# Patient Record
Sex: FEMALE | Race: WHITE | NOT HISPANIC OR LATINO | Employment: OTHER | URBAN - METROPOLITAN AREA
[De-identification: names, ages, dates, MRNs, and addresses within clinical notes are randomized per-mention and may not be internally consistent; named-entity substitution may affect disease eponyms.]

---

## 2017-01-10 ENCOUNTER — LAB (OUTPATIENT)
Dept: INTERNAL MEDICINE | Facility: CLINIC | Age: 67
End: 2017-01-10

## 2017-01-10 DIAGNOSIS — Z00.00 HEALTH CARE MAINTENANCE: ICD-10-CM

## 2017-01-10 DIAGNOSIS — D72.810 LYMPHOCYTOPENIA: ICD-10-CM

## 2017-01-10 LAB
BASOPHILS # BLD AUTO: 0.03 10*3/MM3 (ref 0–0.2)
BASOPHILS NFR BLD AUTO: 0.6 % (ref 0–1)
DEPRECATED RDW RBC AUTO: 45.2 FL (ref 37–54)
EOSINOPHIL # BLD AUTO: 0.16 10*3/MM3 (ref 0.1–0.3)
EOSINOPHIL NFR BLD AUTO: 3.1 % (ref 0–3)
ERYTHROCYTE [DISTWIDTH] IN BLOOD BY AUTOMATED COUNT: 13.9 % (ref 11.3–14.5)
HCT VFR BLD AUTO: 37.6 % (ref 34.5–44)
HCV AB SER DONR QL: NORMAL
HGB BLD-MCNC: 12.2 G/DL (ref 11.5–15.5)
IMM GRANULOCYTES # BLD: 0.01 10*3/MM3 (ref 0–0.03)
IMM GRANULOCYTES NFR BLD: 0.2 % (ref 0–0.6)
LYMPHOCYTES # BLD AUTO: 0.79 10*3/MM3 (ref 0.6–4.8)
LYMPHOCYTES NFR BLD AUTO: 15.3 % (ref 24–44)
MCH RBC QN AUTO: 28.7 PG (ref 27–31)
MCHC RBC AUTO-ENTMCNC: 32.4 G/DL (ref 32–36)
MCV RBC AUTO: 88.5 FL (ref 80–99)
MONOCYTES # BLD AUTO: 0.33 10*3/MM3 (ref 0–1)
MONOCYTES NFR BLD AUTO: 6.4 % (ref 0–12)
NEUTROPHILS # BLD AUTO: 3.85 10*3/MM3 (ref 1.5–8.3)
NEUTROPHILS NFR BLD AUTO: 74.4 % (ref 41–71)
PLATELET # BLD AUTO: 276 10*3/MM3 (ref 150–450)
PMV BLD AUTO: 9.4 FL (ref 6–12)
RBC # BLD AUTO: 4.25 10*6/MM3 (ref 3.89–5.14)
WBC NRBC COR # BLD: 5.17 10*3/MM3 (ref 3.5–10.8)

## 2017-01-10 PROCEDURE — 86803 HEPATITIS C AB TEST: CPT | Performed by: PHYSICIAN ASSISTANT

## 2017-01-10 PROCEDURE — 85025 COMPLETE CBC W/AUTO DIFF WBC: CPT | Performed by: PHYSICIAN ASSISTANT

## 2017-01-11 LAB — VZV IGG SER IA-ACNC: 1630 INDEX

## 2017-01-16 ENCOUNTER — TELEPHONE (OUTPATIENT)
Dept: INTERNAL MEDICINE | Facility: CLINIC | Age: 67
End: 2017-01-16

## 2017-01-16 NOTE — TELEPHONE ENCOUNTER
Pt called requesting recent labs results. Read pt her lab letter and informed her she should be receiving it in the mail soon. Pt verbalized understanding and had no further questions.

## 2017-02-02 ENCOUNTER — OFFICE VISIT (OUTPATIENT)
Dept: INTERNAL MEDICINE | Facility: CLINIC | Age: 67
End: 2017-02-02

## 2017-02-02 VITALS
SYSTOLIC BLOOD PRESSURE: 126 MMHG | HEIGHT: 67 IN | TEMPERATURE: 98.5 F | OXYGEN SATURATION: 98 % | WEIGHT: 150 LBS | HEART RATE: 87 BPM | DIASTOLIC BLOOD PRESSURE: 64 MMHG | BODY MASS INDEX: 23.54 KG/M2

## 2017-02-02 DIAGNOSIS — J06.9 ACUTE URI: Primary | ICD-10-CM

## 2017-02-02 PROCEDURE — 99213 OFFICE O/P EST LOW 20 MIN: CPT | Performed by: PHYSICIAN ASSISTANT

## 2017-02-02 RX ORDER — AZITHROMYCIN 250 MG/1
TABLET, FILM COATED ORAL
Qty: 6 TABLET | Refills: 0 | Status: SHIPPED | OUTPATIENT
Start: 2017-02-02 | End: 2017-03-14

## 2017-02-02 NOTE — PROGRESS NOTES
Chief Complaint   Patient presents with   • Nasal pressure, congestion, drainage, headaches x5 days       Subjective   Annalise Winters is a 66 y.o. female.       History of Present Illness     Pt has had nasal congestion, drainage, ear fullness. NOticed some bilateral maxillary sinus pressure yesterday. Some yellow mucus yesterday. Low grade fever 2 days ago, none since. Has been around the public a lot, no know sick contacts.    Current Outpatient Prescriptions:   •  amLODIPine (NORVASC) 2.5 MG tablet, Take 2.5 mg by mouth Daily. Takes .25 of tablet daily.  Pt is taking half a tablet daily., Disp: , Rfl:   •  Cholecalciferol (VITAMIN D) 2000 UNITS capsule, Take 1 capsule by mouth daily., Disp: , Rfl:   •  flecainide (TAMBOCOR) 50 MG tablet, TAKE ONE TABLET BY MOUTH UP TO  TWICE A DAY AS NEEDED FOR AFIB (ATRIAL FIBRILLATION), Disp: 60 tablet, Rfl: 3  •  levothyroxine (SYNTHROID, LEVOTHROID) 50 MCG tablet, Take one tablet by mouth daily., Disp: 30 tablet, Rfl: 2  •  Loratadine (CLARITIN PO), Take 1 tablet by mouth Daily., Disp: , Rfl:   •  LORazepam (ATIVAN) 0.5 MG tablet, Take 1 tablet by mouth Daily As Needed for anxiety., Disp: 30 tablet, Rfl: 2  •  montelukast (SINGULAIR) 10 MG tablet, Take 1 tablet by mouth Every Night., Disp: 30 tablet, Rfl: 2  •  Multiple Vitamins-Minerals (MULTI FOR HER) capsule, Take 1 capsule by mouth daily., Disp: , Rfl:   •  pantoprazole (PROTONIX) 40 MG EC tablet, 40 mg Daily., Disp: , Rfl:   •  azithromycin (ZITHROMAX Z-HAMIDA) 250 MG tablet, Take 2 tablets the first day, then 1 tablet daily for 4 days., Disp: 6 tablet, Rfl: 0     PMFSH  The following portions of the patient's history were reviewed and updated as appropriate: allergies, current medications, past family history, past medical history, past social history, past surgical history and problem list.    Review of Systems   Constitutional: Positive for fatigue. Negative for activity change and unexpected weight change.   HENT:  "Positive for congestion, postnasal drip and sore throat. Negative for ear pain.    Eyes: Negative for pain and discharge.   Respiratory: Positive for cough. Negative for chest tightness, shortness of breath and wheezing.    Cardiovascular: Negative for chest pain and palpitations.   Gastrointestinal: Negative for abdominal pain, diarrhea and vomiting.   Endocrine: Negative.    Genitourinary: Negative.    Musculoskeletal: Negative for joint swelling.   Skin: Negative for color change, rash and wound.   Allergic/Immunologic: Negative.    Neurological: Negative for seizures and syncope.   Psychiatric/Behavioral: Negative.        Objective   Visit Vitals   • /64   • Pulse 87   • Temp 98.5 °F (36.9 °C)   • Ht 67\" (170.2 cm)   • Wt 150 lb (68 kg)   • SpO2 98%   • BMI 23.49 kg/m2       Physical Exam   Constitutional: She is oriented to person, place, and time. She appears well-developed and well-nourished.  Non-toxic appearance. No distress.   HENT:   Head: Normocephalic and atraumatic. Hair is normal.   Right Ear: External ear normal. No drainage, swelling or tenderness. Tympanic membrane is retracted.   Left Ear: External ear normal. No drainage, swelling or tenderness. Tympanic membrane is retracted.   Nose: Mucosal edema present. No epistaxis.   Mouth/Throat: Uvula is midline and mucous membranes are normal. No oral lesions. No uvula swelling. Posterior oropharyngeal erythema present. No oropharyngeal exudate.   Eyes: Conjunctivae and EOM are normal. Pupils are equal, round, and reactive to light. Right eye exhibits no discharge. Left eye exhibits no discharge. No scleral icterus.   Neck: Normal range of motion. Neck supple.   Cardiovascular: Normal rate, regular rhythm and normal heart sounds.  Exam reveals no gallop.    No murmur heard.  Pulmonary/Chest: Breath sounds normal. No stridor. No respiratory distress. She has no wheezes. She has no rales. She exhibits no tenderness.   Abdominal: Soft. There is no " tenderness.   Lymphadenopathy:     She has cervical adenopathy.   Neurological: She is alert and oriented to person, place, and time. She exhibits normal muscle tone.   Skin: Skin is warm and dry. No rash noted. She is not diaphoretic.   Psychiatric: She has a normal mood and affect. Her behavior is normal. Judgment and thought content normal.   Nursing note and vitals reviewed.           ASSESSMENT/PLAN    Problem List Items Addressed This Visit        Respiratory    Acute URI - Primary    Relevant Medications    azithromycin (ZITHROMAX Z-HAMIDA) 250 MG tablet        Use OTC symptomatic care, increase rest and fluids. If worsening or no improvement, take z-pack as directed.         Return if symptoms worsen or fail to improve.

## 2017-02-14 RX ORDER — AMLODIPINE BESYLATE 2.5 MG/1
TABLET ORAL
Qty: 90 TABLET | Refills: 1 | Status: SHIPPED | OUTPATIENT
Start: 2017-02-14 | End: 2017-08-07 | Stop reason: SDUPTHER

## 2017-02-28 ENCOUNTER — TELEPHONE (OUTPATIENT)
Dept: INTERNAL MEDICINE | Facility: CLINIC | Age: 67
End: 2017-02-28

## 2017-03-01 NOTE — TELEPHONE ENCOUNTER
Saw Emi for an acute visit, hence needs a 6 month follow up.  Please call in 30 days , no refills, needs follow up for future refills.    thanks

## 2017-03-03 DIAGNOSIS — F41.9 ANXIETY: ICD-10-CM

## 2017-03-09 DIAGNOSIS — F41.9 ANXIETY: ICD-10-CM

## 2017-03-09 RX ORDER — LORAZEPAM 0.5 MG/1
0.5 TABLET ORAL DAILY PRN
Qty: 10 TABLET | Refills: 0 | OUTPATIENT
Start: 2017-03-09 | End: 2017-03-14 | Stop reason: SDUPTHER

## 2017-03-14 ENCOUNTER — OFFICE VISIT (OUTPATIENT)
Dept: INTERNAL MEDICINE | Facility: CLINIC | Age: 67
End: 2017-03-14

## 2017-03-14 ENCOUNTER — OFFICE VISIT (OUTPATIENT)
Dept: CARDIOLOGY | Facility: CLINIC | Age: 67
End: 2017-03-14

## 2017-03-14 VITALS
HEIGHT: 68 IN | SYSTOLIC BLOOD PRESSURE: 128 MMHG | BODY MASS INDEX: 22.88 KG/M2 | DIASTOLIC BLOOD PRESSURE: 76 MMHG | HEART RATE: 65 BPM | OXYGEN SATURATION: 98 % | WEIGHT: 151 LBS

## 2017-03-14 VITALS
DIASTOLIC BLOOD PRESSURE: 80 MMHG | SYSTOLIC BLOOD PRESSURE: 150 MMHG | HEIGHT: 68 IN | BODY MASS INDEX: 22.88 KG/M2 | WEIGHT: 151 LBS | HEART RATE: 76 BPM

## 2017-03-14 DIAGNOSIS — F41.9 ANXIETY: Primary | ICD-10-CM

## 2017-03-14 DIAGNOSIS — I48.19 PERSISTENT ATRIAL FIBRILLATION (HCC): Primary | ICD-10-CM

## 2017-03-14 DIAGNOSIS — I10 ESSENTIAL HYPERTENSION: ICD-10-CM

## 2017-03-14 PROCEDURE — 99213 OFFICE O/P EST LOW 20 MIN: CPT | Performed by: INTERNAL MEDICINE

## 2017-03-14 PROCEDURE — 99213 OFFICE O/P EST LOW 20 MIN: CPT | Performed by: NURSE PRACTITIONER

## 2017-03-14 RX ORDER — KETOROLAC TROMETHAMINE 10 MG/1
10 TABLET, FILM COATED ORAL 2 TIMES DAILY PRN
Qty: 10 TABLET | Refills: 0 | Status: SHIPPED | OUTPATIENT
Start: 2017-03-14 | End: 2017-03-14

## 2017-03-14 RX ORDER — HYDROCODONE BITARTRATE AND ACETAMINOPHEN 5; 325 MG/1; MG/1
1 TABLET ORAL EVERY 6 HOURS PRN
Qty: 30 TABLET | Refills: 0 | Status: SHIPPED | OUTPATIENT
Start: 2017-03-14 | End: 2017-03-14

## 2017-03-14 RX ORDER — LORAZEPAM 0.5 MG/1
0.5 TABLET ORAL DAILY PRN
Qty: 30 TABLET | Refills: 3 | Status: SHIPPED | OUTPATIENT
Start: 2017-03-14 | End: 2017-07-17 | Stop reason: SDUPTHER

## 2017-03-14 RX ORDER — LIDOCAINE 50 MG/G
1 PATCH TOPICAL EVERY 24 HOURS
Qty: 30 PATCH | Refills: 1 | Status: SHIPPED | OUTPATIENT
Start: 2017-03-14 | End: 2017-03-14

## 2017-03-14 NOTE — ASSESSMENT & PLAN NOTE
· Continue amlodipine 2.5 mg daily  · Monitor BP for the next month and contact us if SBP less than 100 or greater than 145.  · Recommended a sleep study and patient will contact us should she wish to proceed

## 2017-03-14 NOTE — PROGRESS NOTES
Acute URI and Med Refill    Subjective   Annalise Winters is a 66 y.o. female is here today for follow-up.    History of Present Illness   Annalise is s/p a Cardiology eval, and BP was elevated. Was suggested thyroid and anemia eval for her fatigue.  I adv. Recent labs were negative for thyroid dz or anemia.  She is not on the Flecainide on a routine basis.  She in needing a refill on the ativan.  Issues with Arthritis, and unable to take steroid shots and is concerned , if needs to see rheumatologist.  Having occ. parestheisas in her hands and feet.  HA in her neck and   She stays active as a .  Also was suggested sleep study.    Current Outpatient Prescriptions:   •  amLODIPine (NORVASC) 2.5 MG tablet, TAKE ONE TABLET BY MOUTH DAILY, Disp: 90 tablet, Rfl: 1  •  Cholecalciferol (VITAMIN D) 2000 UNITS capsule, Take 1 capsule by mouth daily., Disp: , Rfl:   •  flecainide (TAMBOCOR) 50 MG tablet, TAKE ONE TABLET BY MOUTH UP TO  TWICE A DAY AS NEEDED FOR AFIB (ATRIAL FIBRILLATION), Disp: 60 tablet, Rfl: 3  •  levothyroxine (SYNTHROID, LEVOTHROID) 50 MCG tablet, Take one tablet by mouth daily., Disp: 30 tablet, Rfl: 2  •  Loratadine (CLARITIN PO), Take 1 tablet by mouth Daily., Disp: , Rfl:   •  LORazepam (ATIVAN) 0.5 MG tablet, Take 1 tablet by mouth Daily As Needed for Anxiety., Disp: 30 tablet, Rfl: 3  •  montelukast (SINGULAIR) 10 MG tablet, Take 1 tablet by mouth Every Night., Disp: 30 tablet, Rfl: 2  •  Multiple Vitamins-Minerals (MULTI FOR HER) capsule, Take 1 capsule by mouth daily., Disp: , Rfl:   •  pantoprazole (PROTONIX) 40 MG EC tablet, 40 mg Daily., Disp: , Rfl:       The following portions of the patient's history were reviewed and updated as appropriate: allergies, current medications, past family history, past medical history, past social history, past surgical history and problem list.    Review of Systems   Constitutional: Positive for fatigue. Negative for chills and fever.   HENT:  "Negative for ear discharge, ear pain, sinus pressure and sore throat.    Respiratory: Negative for cough, chest tightness and shortness of breath.    Cardiovascular: Negative for chest pain, palpitations and leg swelling.   Gastrointestinal: Negative for diarrhea, nausea and vomiting.   Musculoskeletal: Positive for arthralgias and myalgias. Negative for back pain.   Neurological: Negative for dizziness, syncope and headaches.   Psychiatric/Behavioral: Negative for confusion and sleep disturbance.       Objective   Visit Vitals   • /76   • Pulse 65   • Ht 68\" (172.7 cm)   • Wt 151 lb (68.5 kg)   • SpO2 98%   • BMI 22.96 kg/m2     Physical Exam   Constitutional: She is oriented to person, place, and time. She appears well-developed and well-nourished.   HENT:   Head: Normocephalic and atraumatic.   Mouth/Throat: No oropharyngeal exudate.   Eyes: Conjunctivae are normal. Pupils are equal, round, and reactive to light.   Neck: Neck supple.   Cardiovascular: Normal rate and regular rhythm.    Pulmonary/Chest: Effort normal and breath sounds normal.   Abdominal: Soft. Bowel sounds are normal. She exhibits no distension. There is no tenderness.   Musculoskeletal: She exhibits tenderness (multiple joints). She exhibits no edema.   Neurological: She is alert and oriented to person, place, and time. No cranial nerve deficit.   Skin: Skin is warm and dry.   Psychiatric: She has a normal mood and affect. Judgment normal.   Nursing note and vitals reviewed.        Results for orders placed or performed in visit on 01/10/17   Hepatitis C Antibody   Result Value Ref Range    Hepatitis C Ab Non-Reactive Non-Reactive   Varicella Zoster Antibody, IgG   Result Value Ref Range    Varicella IgG 1630 Immune >165 index   CBC Auto Differential   Result Value Ref Range    WBC 5.17 3.50 - 10.80 10*3/mm3    RBC 4.25 3.89 - 5.14 10*6/mm3    Hemoglobin 12.2 11.5 - 15.5 g/dL    Hematocrit 37.6 34.5 - 44.0 %    MCV 88.5 80.0 - 99.0 fL    " MCH 28.7 27.0 - 31.0 pg    MCHC 32.4 32.0 - 36.0 g/dL    RDW 13.9 11.3 - 14.5 %    RDW-SD 45.2 37.0 - 54.0 fl    MPV 9.4 6.0 - 12.0 fL    Platelets 276 150 - 450 10*3/mm3    Neutrophil % 74.4 (H) 41.0 - 71.0 %    Lymphocyte % 15.3 (L) 24.0 - 44.0 %    Monocyte % 6.4 0.0 - 12.0 %    Eosinophil % 3.1 (H) 0.0 - 3.0 %    Basophil % 0.6 0.0 - 1.0 %    Immature Grans % 0.2 0.0 - 0.6 %    Neutrophils, Absolute 3.85 1.50 - 8.30 10*3/mm3    Lymphocytes, Absolute 0.79 0.60 - 4.80 10*3/mm3    Monocytes, Absolute 0.33 0.00 - 1.00 10*3/mm3    Eosinophils, Absolute 0.16 0.10 - 0.30 10*3/mm3    Basophils, Absolute 0.03 0.00 - 0.20 10*3/mm3    Immature Grans, Absolute 0.01 0.00 - 0.03 10*3/mm3             Assessment/Plan   Diagnoses and all orders for this visit:    Anxiety  -     LORazepam (ATIVAN) 0.5 MG tablet; Take 1 tablet by mouth Daily As Needed for Anxiety.    Other orders  -     Discontinue: HYDROcodone-acetaminophen (NORCO) 5-325 MG per tablet; Take 1 tablet by mouth Every 6 (Six) Hours As Needed for Moderate Pain (4-6).  -     Discontinue: ketorolac (TORADOL) 10 MG tablet; Take 1 tablet by mouth 2 (Two) Times a Day As Needed for Moderate Pain (4-6).  -     Discontinue: lidocaine (LIDODERM) 5 %; Place 1 patch on the skin Daily. Remove & Discard patch within 12 hours      BP better. Adv. Arthralgias could be from sleep apnea, and sleep study a good option. She will call back if wants to proceed.           Return in about 4 months (around 7/14/2017) for Next scheduled follow up.

## 2017-03-14 NOTE — PROGRESS NOTES
Encounter Date:03/14/2017    Patient ID: Annalise Winters is a 66 y.o. female who resides in [place], [place].    CC/Reason for visit:  Follow-up (palpitations)          Annalise Winters returns today as a follow up for persistent atrial fibrillation and hypertension. Since last visit, patient has been doing fairly well.  She has only used her flecainide one time several months ago for what she thought could've been a brief episode of atrial fibrillation.  She followed up with Dr. Lackey last October who made no changes to her medication regimen.  She denies any signs or symptoms of a TIA and/or stroke.  She specifically denies visual disturbances, one-sided weakness, slurred speech or facial drooping.  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  She is able to be active without any limitations or difficulties.  She specifically denies chest pain, dyspnea, orthopnea, palpitations or syncope.  She has been concerned about her blood pressure.  It is slightly elevated at today's visit with a systolic of 150.  She takes 2.5 mg amlodipine daily.  She monitors her blood pressure most days in the afternoon and notices it will get as low as 110 but in the earlier part of the day her systolic blood pressure will be in the 150s.  Yesterday afternoon in particular her blood pressure was 105/66.  She is not symptomatic during these times other than feeling more tired than usual.  She has most of her energy in the early part of the day and notices by the afternoon she will feel very fatigued.  This has been going on for quite some time.  She has an appointment with her primary care provider today and is hoping they can see if she has any vitamin deficiencies or if her thyroid levels are within normal limits.  She has never been tested for sleep apnea.    Review of Systems   Constitution: Negative for weakness and malaise/fatigue.   Eyes:  "Negative for vision loss in left eye and vision loss in right eye.   Cardiovascular: Negative for chest pain, dyspnea on exertion, near-syncope, orthopnea, palpitations, paroxysmal nocturnal dyspnea and syncope.   Musculoskeletal: Negative for myalgias.   Neurological: Negative for brief paralysis, excessive daytime sleepiness, focal weakness, numbness and paresthesias.   All other systems reviewed and are negative.      The patient's past medical, social, and family history reviewed in the patient's electronic medical record.    Allergies  Ace inhibitors; Celexa [citalopram hydrobromide]; Paxil [paroxetine hcl]; and Corticosteroids    Outpatient Prescriptions Marked as Taking for the 3/14/17 encounter (Office Visit) with TAMMY Aj   Medication Sig Dispense Refill   • amLODIPine (NORVASC) 2.5 MG tablet TAKE ONE TABLET BY MOUTH DAILY 90 tablet 1   • Cholecalciferol (VITAMIN D) 2000 UNITS capsule Take 1 capsule by mouth daily.     • flecainide (TAMBOCOR) 50 MG tablet TAKE ONE TABLET BY MOUTH UP TO  TWICE A DAY AS NEEDED FOR AFIB (ATRIAL FIBRILLATION) 60 tablet 3   • levothyroxine (SYNTHROID, LEVOTHROID) 50 MCG tablet Take one tablet by mouth daily. 30 tablet 2   • Loratadine (CLARITIN PO) Take 1 tablet by mouth Daily.     • LORazepam (ATIVAN) 0.5 MG tablet Take 1 tablet by mouth Daily As Needed for Anxiety. 10 tablet 0   • montelukast (SINGULAIR) 10 MG tablet Take 1 tablet by mouth Every Night. 30 tablet 2   • Multiple Vitamins-Minerals (MULTI FOR HER) capsule Take 1 capsule by mouth daily.     • pantoprazole (PROTONIX) 40 MG EC tablet 40 mg Daily.           Blood pressure 150/80, pulse 76, height 68\" (172.7 cm), weight 151 lb (68.5 kg).  Body mass index is 22.96 kg/(m^2).    Physical Exam   Constitutional: She is oriented to person, place, and time. She appears well-developed and well-nourished.   HENT:   Head: Normocephalic and atraumatic.   Eyes: Pupils are equal, round, and reactive to light. No " scleral icterus.   Neck: No JVD present. Carotid bruit is not present. No thyromegaly present.   Cardiovascular: Normal rate, regular rhythm, S1 normal and S2 normal.  Exam reveals no gallop.    No murmur heard.  Pulmonary/Chest: Effort normal and breath sounds normal.   Abdominal: Soft. There is no tenderness.   Neurological: She is alert and oriented to person, place, and time.   Skin: Skin is warm and dry. No cyanosis. Nails show no clubbing.   Psychiatric: She has a normal mood and affect. Her behavior is normal.       Data Review:   Procedures         Problem List Items Addressed This Visit        Cardiovascular and Mediastinum    Persistent atrial fibrillation - Primary    Overview     · Initially diagnosed in 2012.  · Myocardial perfusion study (04/02/2013):  No ischemia.  · Event monitor (10/2013): demonstrating paroxysmal atrial fibrillation.  · Failure of flecainide, sotalol and Multaq therapy.  · Pulmonary vein ablation by Dr. Abdirahman Lackey, 08/21/2013.  · Chads Vasc=3 no anticoagulation needed due to successful PVA           Current Assessment & Plan     · Continue flecainide 50 mg BID when necessary for episodes of atrial fibrillation         Essential hypertension    Current Assessment & Plan     · Continue amlodipine 2.5 mg daily  · Monitor BP for the next month and contact us if SBP less than 100 or greater than 145.  · Recommended a sleep study and patient will contact us should she wish to proceed             Mrs. Winters is doing well from a cardiovascular standpoint.  I do feel she suffers from some anxiety and is overtaking her blood pressure.  The lowest her blood pressure is been was 105/66 and she was asymptomatic.  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  I did however feel he would benefit for her to follow-up with her primary care provider for blood work to test for anemia,  hypothyroidism or vitamin B-12 deficiency.  We will make no changes to her medication and have her follow-up with us in 12 months.       · Continue current medications  · Follow-up PCP concerning fatigue  · Keep blood pressure log and monitor blood pressure in the afternoons ×1 month.  Contact us if SBP less than 100 or greater than 145  · Recommend a sleep study to rule out obstructive sleep apnea however patient declines and will contact us should she change her mind.  · Follow-up 12 months or sooner if needed      Britta MUNOZ evaluated treated patient independently    TAMMY Santiago  3/14/2017

## 2017-03-15 ENCOUNTER — TELEPHONE (OUTPATIENT)
Dept: INTERNAL MEDICINE | Facility: CLINIC | Age: 67
End: 2017-03-15

## 2017-03-15 NOTE — TELEPHONE ENCOUNTER
Called and notified Natalia that it was an error.  She said they sent a PA request on the lidocaine patch- please disregard.

## 2017-03-15 NOTE — TELEPHONE ENCOUNTER
----- Message from Madison Malave sent at 3/15/2017  8:19 AM EDT -----  Contact: DULCE WITH [facility]  SHE IS CALLING ABOUT A FEW SCRIPTS THAT WAS CALLED IN YESTERDAY. SHE SAID LIDOCAINE 5 PERCENT PATCHES AND KETOROLAC 10 MG WERE E-SCRIBED FROM US TO THEM YESTERDAY AT 4:35 PM. SHE IS NOT SURE IF THESE WERE SENT IN ERROR OR IF PATIENT IS NEEDING THESE MEDICATIONS. PATIENT IS UNAWARE OF THESE MEDICATION BEING PRESCRIBED AS WELL. SHE NEEDS CLARIFICATION IF THESE WERE SUPPOSE TO BE CALLED IN FOR PATIENT. YOU CAN REACH HER [phone]

## 2017-03-27 ENCOUNTER — TELEPHONE (OUTPATIENT)
Dept: CARDIOLOGY | Facility: CLINIC | Age: 67
End: 2017-03-27

## 2017-03-27 RX ORDER — MONTELUKAST SODIUM 10 MG/1
TABLET ORAL
Qty: 30 TABLET | Refills: 5 | Status: SHIPPED | OUTPATIENT
Start: 2017-03-27 | End: 2017-09-25 | Stop reason: SDUPTHER

## 2017-03-28 RX ORDER — LEVOTHYROXINE SODIUM 0.05 MG/1
TABLET ORAL
Qty: 30 TABLET | Refills: 1 | Status: SHIPPED | OUTPATIENT
Start: 2017-03-28 | End: 2017-05-27 | Stop reason: SDUPTHER

## 2017-03-28 NOTE — TELEPHONE ENCOUNTER
Pt wants to know if she can get steroid shot in her foot.  You had told her in past to not get anymore as she had problems after her knee injection.  She was given Kenalog 40mg with 1% lido = 4cc injected for her knee,  for her foot it would be .5cc dexamethasone, .5cc depomedrol ? and .5cc lido.  She states the pain is significant in her foot.

## 2017-03-28 NOTE — TELEPHONE ENCOUNTER
I don't recall telling her that and my notes back to 2014 don't mention anything about it. If she needs the injection this to me would outweigh any possible PVC palpitation issue that might occur. She does have steroids listed as an allergy so she needs to confirm the reaction with her treating doctor.

## 2017-04-03 ENCOUNTER — TELEPHONE (OUTPATIENT)
Dept: INTERNAL MEDICINE | Facility: CLINIC | Age: 67
End: 2017-04-03

## 2017-04-03 NOTE — TELEPHONE ENCOUNTER
Spoke to pt states that [facility] advised her to see GI and GYN ASAP, pt has put calls into both offices, she will call us back if needing seen here.  Pt declined scheduling appt here tbs.

## 2017-04-03 NOTE — TELEPHONE ENCOUNTER
----- Message from Liliam Weber sent at 4/3/2017 12:33 PM EDT -----  Contact: PATIENT   PATIENT WOULD LIKE A RETURN CALL REGARDING EARLIER CALL ABOUT BEING SEEN BY THIS OFFICE. SHE SPOKE WITH  WHO REFERRED HER BACK TO THIS OFFICE.    CALL BACK #[phone]

## 2017-04-25 ENCOUNTER — TELEPHONE (OUTPATIENT)
Dept: CARDIOLOGY | Facility: CLINIC | Age: 67
End: 2017-04-25

## 2017-04-25 NOTE — TELEPHONE ENCOUNTER
Wants to know if ok with you if she takes vitamin B12 5000mcg daily and also valerian root for sleep.

## 2017-05-30 RX ORDER — LEVOTHYROXINE SODIUM 0.05 MG/1
TABLET ORAL
Qty: 30 TABLET | Refills: 0 | Status: SHIPPED | OUTPATIENT
Start: 2017-05-30 | End: 2017-06-24 | Stop reason: SDUPTHER

## 2017-06-26 RX ORDER — LEVOTHYROXINE SODIUM 0.05 MG/1
TABLET ORAL
Qty: 30 TABLET | Refills: 5 | Status: SHIPPED | OUTPATIENT
Start: 2017-06-26 | End: 2017-12-26 | Stop reason: SDUPTHER

## 2017-07-17 ENCOUNTER — OFFICE VISIT (OUTPATIENT)
Dept: INTERNAL MEDICINE | Facility: CLINIC | Age: 67
End: 2017-07-17

## 2017-07-17 VITALS
OXYGEN SATURATION: 98 % | HEART RATE: 74 BPM | SYSTOLIC BLOOD PRESSURE: 148 MMHG | DIASTOLIC BLOOD PRESSURE: 72 MMHG | BODY MASS INDEX: 22.56 KG/M2 | WEIGHT: 148.4 LBS

## 2017-07-17 DIAGNOSIS — E53.8 B12 DEFICIENCY: ICD-10-CM

## 2017-07-17 DIAGNOSIS — I10 ESSENTIAL HYPERTENSION: ICD-10-CM

## 2017-07-17 DIAGNOSIS — R53.83 FATIGUE, UNSPECIFIED TYPE: ICD-10-CM

## 2017-07-17 DIAGNOSIS — E03.9 ACQUIRED HYPOTHYROIDISM: ICD-10-CM

## 2017-07-17 DIAGNOSIS — F41.9 ANXIETY: ICD-10-CM

## 2017-07-17 DIAGNOSIS — R21 RASH: Primary | ICD-10-CM

## 2017-07-17 LAB
DEPRECATED RDW RBC AUTO: 45.1 FL (ref 37–54)
ERYTHROCYTE [DISTWIDTH] IN BLOOD BY AUTOMATED COUNT: 13.9 % (ref 11.3–14.5)
HCT VFR BLD AUTO: 37.5 % (ref 34.5–44)
HGB BLD-MCNC: 12 G/DL (ref 11.5–15.5)
MCH RBC QN AUTO: 28.1 PG (ref 27–31)
MCHC RBC AUTO-ENTMCNC: 32 G/DL (ref 32–36)
MCV RBC AUTO: 87.8 FL (ref 80–99)
PLATELET # BLD AUTO: 271 10*3/MM3 (ref 150–450)
PMV BLD AUTO: 10 FL (ref 6–12)
RBC # BLD AUTO: 4.27 10*6/MM3 (ref 3.89–5.14)
T4 FREE SERPL-MCNC: 1.15 NG/DL (ref 0.89–1.76)
TSH SERPL DL<=0.05 MIU/L-ACNC: 1.13 MIU/ML (ref 0.35–5.35)
VIT B12 BLD-MCNC: 543 PG/ML (ref 211–911)
WBC NRBC COR # BLD: 4.47 10*3/MM3 (ref 3.5–10.8)

## 2017-07-17 PROCEDURE — 99214 OFFICE O/P EST MOD 30 MIN: CPT | Performed by: INTERNAL MEDICINE

## 2017-07-17 PROCEDURE — 86038 ANTINUCLEAR ANTIBODIES: CPT | Performed by: INTERNAL MEDICINE

## 2017-07-17 PROCEDURE — 85027 COMPLETE CBC AUTOMATED: CPT | Performed by: INTERNAL MEDICINE

## 2017-07-17 PROCEDURE — 84443 ASSAY THYROID STIM HORMONE: CPT | Performed by: INTERNAL MEDICINE

## 2017-07-17 PROCEDURE — 84439 ASSAY OF FREE THYROXINE: CPT | Performed by: INTERNAL MEDICINE

## 2017-07-17 PROCEDURE — 82607 VITAMIN B-12: CPT | Performed by: INTERNAL MEDICINE

## 2017-07-17 RX ORDER — PIMECROLIMUS 10 MG/G
CREAM TOPICAL 2 TIMES DAILY
Qty: 30 G | Refills: 0 | Status: SHIPPED | OUTPATIENT
Start: 2017-07-17 | End: 2017-10-04

## 2017-07-17 RX ORDER — LORAZEPAM 0.5 MG/1
0.5 TABLET ORAL DAILY PRN
Qty: 30 TABLET | Refills: 3 | Status: SHIPPED | OUTPATIENT
Start: 2017-07-17 | End: 2018-06-11 | Stop reason: SDUPTHER

## 2017-07-17 NOTE — PROGRESS NOTES
Hypertension; Hypothyroidism; and Rash (on neck)    Subjective   Annalise Winters is a 66 y.o. female is here today for follow-up.    History of Present Illness   STressed due her dog being sick, has to take care of her.Not sleeping well, has been takinhg the valerian root and ativan as needed for sleep.  No energy, B12 supplement has not helped at all, wonders if B12 shots help.  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    Current Outpatient Prescriptions:   •  amLODIPine (NORVASC) 2.5 MG tablet, TAKE ONE TABLET BY MOUTH DAILY, Disp: 90 tablet, Rfl: 1  •  Calcium-Vitamin D-Vitamin K (CALCIUM SOFT CHEWS PO), Take  by mouth., Disp: , Rfl:   •  Cholecalciferol (VITAMIN D) 2000 UNITS capsule, Take 1 capsule by mouth daily., Disp: , Rfl:   •  flecainide (TAMBOCOR) 50 MG tablet, TAKE ONE TABLET BY MOUTH UP TO  TWICE A DAY AS NEEDED FOR AFIB (ATRIAL FIBRILLATION), Disp: 60 tablet, Rfl: 3  •  levothyroxine (SYNTHROID, LEVOTHROID) 50 MCG tablet, TAKE ONE TABLET BY MOUTH DAILY, Disp: 30 tablet, Rfl: 5  •  Loratadine (CLARITIN PO), Take 1 tablet by mouth Daily., Disp: , Rfl:   •  LORazepam (ATIVAN) 0.5 MG tablet, Take 1 tablet by mouth Daily As Needed for Anxiety., Disp: 30 tablet, Rfl: 3  •  montelukast (SINGULAIR) 10 MG tablet, TAKE ONE TABLET BY MOUTH ONCE NIGHTLY, Disp: 30 tablet, Rfl: 5  •  Multiple Vitamins-Minerals (MULTI FOR HER) capsule, Take 1 capsule by mouth daily., Disp: , Rfl:   •  pantoprazole (PROTONIX) 40 MG EC tablet, 40 mg Daily., Disp: , Rfl:   •  VALERIAN PO, Take  by mouth., Disp: , Rfl:   •  pimecrolimus (ELIDEL) 1 % cream, Apply  topically 2 (Two) Times a Day., Disp: 30 g, Rfl: 0      The following portions of the patient's history were reviewed and updated as appropriate: allergies, current medications, past family history, past medical history, past social history, past surgical history and problem list.    Review of Systems   Constitutional: Positive for fatigue. Negative for chills and fever.    HENT: Negative for ear discharge, ear pain, sinus pressure and sore throat.    Respiratory: Negative for cough, chest tightness and shortness of breath.    Cardiovascular: Negative for chest pain, palpitations and leg swelling.   Gastrointestinal: Negative for diarrhea, nausea and vomiting.   Musculoskeletal: Positive for arthralgias. Negative for back pain and myalgias.   Neurological: Negative for dizziness, syncope and headaches.   Psychiatric/Behavioral: Negative for confusion and sleep disturbance.       Objective   /72  Pulse 74  Wt 148 lb 6.4 oz (67.3 kg)  SpO2 98% Comment: ra  BMI 22.56 kg/m2  Physical Exam   Constitutional: She is oriented to person, place, and time. She appears well-developed and well-nourished.   HENT:   Head: Normocephalic and atraumatic.   Mouth/Throat: No oropharyngeal exudate.   Eyes: Conjunctivae are normal. Pupils are equal, round, and reactive to light.   Neck: Neck supple. No thyromegaly present.   Cardiovascular: Normal rate and regular rhythm.  Exam reveals no friction rub.    No murmur heard.  Pulmonary/Chest: Effort normal and breath sounds normal. She has no wheezes. She has no rales.   Abdominal: Soft. Bowel sounds are normal. She exhibits no distension. There is no tenderness.   Musculoskeletal: She exhibits no edema.   Neurological: She is alert and oriented to person, place, and time. No cranial nerve deficit.   Skin: Skin is warm and dry.   Psychiatric: She has a normal mood and affect. Judgment normal.   Nursing note and vitals reviewed.        Results for orders placed or performed during the hospital encounter of 04/03/17   POCT Urinalysis (automated dipstick)   Result Value Ref Range    Color Yellow Yellow, Straw, Dark Yellow, Kimberli    Clarity, UA Clear Clear    Glucose, UA Negative Negative, 1000 mg/dL (3+) mg/dL    Bilirubin Negative Negative    Ketones, UA Negative Negative    Specific Gravity  1.000 (A) 1.005 - 1.030    Blood, UA Negative Negative     pH, Urine 6.0 5.0 - 8.0    Protein, POC Negative Negative mg/dL    Urobilinogen, UA Normal Normal    Leukocytes Negative Negative    Nitrite, UA Negative Negative             Assessment/Plan   Diagnoses and all orders for this visit:    Rash  -     Nuclear Antigen Antibody, IFA  -     pimecrolimus (ELIDEL) 1 % cream; Apply  topically 2 (Two) Times a Day.    Anxiety  -     LORazepam (ATIVAN) 0.5 MG tablet; Take 1 tablet by mouth Daily As Needed for Anxiety.    Fatigue, unspecified type  -     CBC (No Diff)  -     Nuclear Antigen Antibody, IFA    Acquired hypothyroidism  -     TSH  -     T4, Free    Essential hypertension    B12 deficiency  -     Vitamin B12    Other orders  -     VALERIAN PO; Take  by mouth.  -     Calcium-Vitamin D-Vitamin K (CALCIUM SOFT CHEWS PO); Take  by mouth.         The patient has read and signed the [facility] Controlled Substance Contract.  I will continue to see patient for regular follow up appointments.  They are well controlled on their medication.  JASMYN has been reviewed by me and is updated every 3 months. The patient is aware of the potential for addiction and dependence.    Return in about 5 months (around 12/17/2017) for Medicare Wellness.

## 2017-07-19 LAB
ANA SER QL IA: POSITIVE
ANA SPECKLED TITR SER: NORMAL {TITER}
Lab: NORMAL

## 2017-07-25 ENCOUNTER — TELEPHONE (OUTPATIENT)
Dept: INTERNAL MEDICINE | Facility: CLINIC | Age: 67
End: 2017-07-25

## 2017-07-26 ENCOUNTER — TELEPHONE (OUTPATIENT)
Dept: INTERNAL MEDICINE | Facility: CLINIC | Age: 67
End: 2017-07-26

## 2017-07-26 NOTE — TELEPHONE ENCOUNTER
Please let Pt. Know that her thyroid and blood counts and B12 levels are normal. Autoimmune labs , show mild positive test, but they are not significant as the titer has to be > 1: 320, but she is only at 1: 80.  Letter done , in the mail.

## 2017-08-07 RX ORDER — AMLODIPINE BESYLATE 2.5 MG/1
TABLET ORAL
Qty: 90 TABLET | Refills: 3 | Status: SHIPPED | OUTPATIENT
Start: 2017-08-07 | End: 2018-10-09 | Stop reason: SDUPTHER

## 2017-09-26 RX ORDER — MONTELUKAST SODIUM 10 MG/1
TABLET ORAL
Qty: 30 TABLET | Refills: 4 | Status: SHIPPED | OUTPATIENT
Start: 2017-09-26 | End: 2018-03-14 | Stop reason: SDUPTHER

## 2017-10-03 ENCOUNTER — TELEPHONE (OUTPATIENT)
Dept: ENDOCRINOLOGY | Facility: CLINIC | Age: 67
End: 2017-10-03

## 2017-10-03 NOTE — TELEPHONE ENCOUNTER
PT CALLED AND HAS HARD LUMP ON FOREHEAD. SAID DR KU HAS LOOKED AT IT BEFORE IN PST VISITS BUT PT IS STILL CONCERNED.       PT ALSO STILL HAS RASH THAT DR KU TOOK A LOOK AT IN LAST APPOINTMENT. PT HAS BEEN PRESCRIBED CREAM BUT NEVER GOT IT BECAUSE IT WAS TOO EXPENSIVE. IS THERE ANY OTHER CREAM SHE COULD USE OR SHOULD SHE JUST GO TO THE DERMATOLOGIST?

## 2017-10-04 NOTE — TELEPHONE ENCOUNTER
Please check if we have samples of Eucrisia, we can give it to her to use instead.( 2-3 small tubes)  Since she is allergic to steroids, I would rather she try these.  If Eucrisia works, they have a copay card which may help her bring the price down.  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 No need to worry.

## 2017-10-10 ENCOUNTER — OFFICE VISIT (OUTPATIENT)
Dept: CARDIOLOGY | Facility: CLINIC | Age: 67
End: 2017-10-10

## 2017-10-10 VITALS
WEIGHT: 148.2 LBS | SYSTOLIC BLOOD PRESSURE: 136 MMHG | BODY MASS INDEX: 22.46 KG/M2 | HEIGHT: 68 IN | HEART RATE: 73 BPM | DIASTOLIC BLOOD PRESSURE: 82 MMHG

## 2017-10-10 DIAGNOSIS — I10 ESSENTIAL HYPERTENSION: ICD-10-CM

## 2017-10-10 DIAGNOSIS — I48.19 PERSISTENT ATRIAL FIBRILLATION (HCC): Primary | ICD-10-CM

## 2017-10-10 PROCEDURE — 93000 ELECTROCARDIOGRAM COMPLETE: CPT | Performed by: PHYSICIAN ASSISTANT

## 2017-10-10 PROCEDURE — 99214 OFFICE O/P EST MOD 30 MIN: CPT | Performed by: PHYSICIAN ASSISTANT

## 2017-10-10 NOTE — PROGRESS NOTES
Subjective:   Annalise Winters  1950  [phone]      10/10/2017    [facility] CARDIOLOGY    Maryse Jauregui MD  [id number] [id number]        Patient ID: Annalise Winters is a 66 y.o. female.    Chief Complaint: Afib    PROBLEM LIST:  1.  Palpitations/event monitor, 01/23/2015 through 02/21/2015 revealing episodes of occasional PVCs, occasional PACs, and brief atrial tachycardia, but no evidence of atrial fibrillation.    2. Symptomatic paroxysmal atrial fibrillation:  a. Initial diagnosis, 10/05/2012, with failure of sotalol therapy.  b. Echocardiogram, October 2002,  revealing normal LV function.  c. Cardiolite stress test, April 2013, revealing no evidence of ischemia.  d. Event monitor, October 2013, revealing paroxysmal atrial fibrillation.  e. Flecainide therapy initiated, January 2013, with breakthrough episodes  of atrial fibrillation and later switched to Multaq by Dr. Cooper.  Subsequent episodes of breakthrough atrial fibrillation on Multaq therapy.  f. EP study and RFA on pulmonary veins, 08/21/2013.  EF 65%.  Dr. Abdirahman Lackey.  g. Episode of palpitations,  occurring, January 2015, on 2 separate occasions, lasting approximately 3-4 hours that were self-limiting.   3.  Labile hypertension.  4. Anxiety.  5. Hypothyroidism on chronic Synthroid replacement.  6. GERD.  7. Remote surgical history:  a.  Remote tubal ligation.  b. Remote rhinoplasty.  c. Partial hysterectomy.    Allergies   Allergen Reactions   • Ace Inhibitors Cough   • Celexa [Citalopram Hydrobromide] Dizziness   • Paxil [Paroxetine Hcl] Other (See Comments)     somnolence     • Corticosteroids Rash       Current Outpatient Prescriptions:   •  acetaminophen (TYLENOL) 500 MG tablet, Take 500 mg by mouth As Needed., Disp: , Rfl:   •  amLODIPine (NORVASC) 2.5 MG tablet, TAKE ONE TABLET BY MOUTH DAILY (Patient taking differently: TAKE 1/2 TABLET BY MOUTH DAILY), Disp: 90 tablet, Rfl:  3  •  Calcium-Vitamin D-Vitamin K (CALCIUM SOFT CHEWS PO), Take  by mouth., Disp: , Rfl:   •  Cholecalciferol (VITAMIN D) 2000 UNITS capsule, Take 1 capsule by mouth daily., Disp: , Rfl:   •  Crisaborole (EUCRISA) 2 % ointment, Apply 1 application topically 2 (Two) Times a Day., Disp: 2 tube, Rfl: 0  •  famotidine-calcium carb-mag hydroxide (PEPCID COMPLETE) -165 MG chewable tablet, Chew Daily., Disp: , Rfl:   •  flecainide (TAMBOCOR) 50 MG tablet, TAKE ONE TABLET BY MOUTH UP TO  TWICE A DAY AS NEEDED FOR AFIB (ATRIAL FIBRILLATION), Disp: 60 tablet, Rfl: 3  •  Ginger, Zingiber officinalis, (GINGER EXTRACT PO), Take  by mouth Take As Directed., Disp: , Rfl:   •  levothyroxine (SYNTHROID, LEVOTHROID) 50 MCG tablet, TAKE ONE TABLET BY MOUTH DAILY, Disp: 30 tablet, Rfl: 5  •  Loratadine (CLARITIN PO), Take 1 tablet by mouth Daily., Disp: , Rfl:   •  LORazepam (ATIVAN) 0.5 MG tablet, Take 1 tablet by mouth Daily As Needed for Anxiety., Disp: 30 tablet, Rfl: 3  •  montelukast (SINGULAIR) 10 MG tablet, TAKE ONE TABLET BY MOUTH ONCE NIGHTLY, Disp: 30 tablet, Rfl: 4  •  Multiple Vitamins-Minerals (MULTI FOR HER) capsule, Take 1 capsule by mouth daily., Disp: , Rfl:   •  pantoprazole (PROTONIX) 40 MG EC tablet, 40 mg Daily., Disp: , Rfl:   •  VALERIAN PO, Take  by mouth., Disp: , Rfl:     History of Present Illness  The patient presents for follow-up regarding history of Atrial fibrillation and hypertension.  She states this visit with Dr. Lackey she is overall been doing well although she has had still occasional breakthrough episodes of palpitations lasting longest about an hour once every few months.  She's had a remote PVA for A. fib and following the procedure did have a monitor that time revealing occasional atrial tach/PACs and PVCs.  She's been using Tambocor therapy as needed basis during this time he states this works well for her.  She denies any chest pain or chest tightness with exertion suggesting  "angina.  She denies any near-syncope or syncopal events.  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  She reports that her blood pressure has been well controlled on current medical therapy. . No recent ER visits or hospital stays.    The following portions of the patient's history were reviewed and updated as appropriate: allergies, current medications, past family history, past medical history, past social history, past surgical history and problem list.    ROS   14 point ROS negative except as outlined in problem list, HPI and other parts of the note.      ECG 12 Lead  Date/Time: 10/10/2017 11:05 AM  Performed by: SHAYNA MILLIGAN  Authorized by: SHAYNA MILLIGAN   Rhythm: sinus rhythm  Rate: normal  Conduction: conduction normal  ST Segments: ST segments normal  T Waves: T waves normal  QRS axis: normal  Other: no other findings  Clinical impression: normal ECG               Objective:       Vitals:    10/10/17 1009   BP: 136/82   BP Location: Right arm   Patient Position: Sitting   Pulse: 73   Weight: 148 lb 3.2 oz (67.2 kg)   Height: 68\" (172.7 cm)       GENERAL: Well-developed, well-nourished patient in no acute distress.  HEENT: Normocephalic, atraumatic,   NECK: No JVD present at 30°. No carotid bruits auscultated.  LUNGS: Clear to auscultation.  CARDIOVASCULAR: Heart has a regular rate and rhythm. No murmurs, gallops or rubs noted.   SKIN: Pink, warm  Neuro: Nonfocal exam. Gait intact  Ext: No edema or bruising    The patient's old records including ambulatory rhythm recordings (ECGs, Holter/event monitor) were reviewed and discussed.      Lab Review:   Results for orders placed or performed in visit on 07/17/17   CBC (No Diff)   Result Value Ref Range    WBC 4.47 3.50 - 10.80 10*3/mm3    RBC 4.27 3.89 - 5.14 10*6/mm3    Hemoglobin 12.0 11.5 - 15.5 g/dL    Hematocrit 37.5 34.5 - 44.0 %    MCV 87.8 80.0 - 99.0 " fL    MCH 28.1 27.0 - 31.0 pg    MCHC 32.0 32.0 - 36.0 g/dL    RDW 13.9 11.3 - 14.5 %    RDW-SD 45.1 37.0 - 54.0 fl    MPV 10.0 6.0 - 12.0 fL    Platelets 271 150 - 450 10*3/mm3   TSH   Result Value Ref Range    TSH 1.128 0.350 - 5.350 mIU/mL   T4, Free   Result Value Ref Range    Free T4 1.15 0.89 - 1.76 ng/dL   Nuclear Antigen Antibody, IFA   Result Value Ref Range    GRUPO Positive (A)    Vitamin B12   Result Value Ref Range    Vitamin B-12 543 211 - 911 pg/mL   CHICHO Staining Patterns   Result Value Ref Range    Speckled Pattern 1:80     Note: (Reference) Comment              Diagnosis:   1.  PAF:  S/p Remote PVA 2013. Recurrent palpitations with the event monitor revealing PACs and PVCs.  She is using as needed Tambocor.  2.  Labile HTN: Controlled.  3. Hypothyroidism: Chronic synthroid    Assessment & Plan:   Continue current medical therapy with exception of add aspirin 81 mg daily to current regiment.  We did ask her she has another episode of sustained palpitations try to obtain EKG in clinic although it is important to note that in the past event monitor is revealed that these were actually PACs and PVCs and not recurrent atrial fibrillation.  In any event she does have a St. episode she states she will have a EKG at that time.  Otherwise return follow up her also posterior one year or sooner as needed.    ANDREW Gavin  10/10/17  10:27 AM

## 2017-10-23 ENCOUNTER — TELEPHONE (OUTPATIENT)
Dept: CARDIOLOGY | Facility: CLINIC | Age: 67
End: 2017-10-23

## 2017-11-08 ENCOUNTER — OFFICE VISIT (OUTPATIENT)
Dept: ORTHOPEDIC SURGERY | Facility: CLINIC | Age: 67
End: 2017-11-08

## 2017-11-08 VITALS
HEART RATE: 73 BPM | WEIGHT: 147 LBS | HEIGHT: 68 IN | DIASTOLIC BLOOD PRESSURE: 59 MMHG | SYSTOLIC BLOOD PRESSURE: 117 MMHG | BODY MASS INDEX: 22.28 KG/M2

## 2017-11-08 DIAGNOSIS — M17.11 PRIMARY OSTEOARTHRITIS OF RIGHT KNEE: Primary | ICD-10-CM

## 2017-11-08 PROCEDURE — 99213 OFFICE O/P EST LOW 20 MIN: CPT | Performed by: ORTHOPAEDIC SURGERY

## 2017-11-08 NOTE — PROGRESS NOTES
[facility] [place] [facility] Note    Subjective     Chief Complaint   Patient presents with   • Right Knee - Follow-up     4 months         HPI    Annalise Winters is a 66 y.o. female. She presents today for evaluation of knee pain.  The pain is been mild, aching, associated with swelling and stiffness.  Overall it has improved with the use of viscosupplementation injections.  She is not interested in surgical management.  She currently works as a .       Patient Active Problem List   Diagnosis   • Asthma   • Persistent atrial fibrillation   • Hypertonicity of bladder   • Essential hypertension   • Hypothyroidism   • Anxiety   • Back pain   • Acute URI   • Hyperthyroidism   • GERD (gastroesophageal reflux disease)   • Allergic rhinitis   • Shortness of breath   • Dizziness   • Dysuria   • Skin lesion of face   • Vitamin D deficiency     Past Medical History:   Diagnosis Date   • Abnormal blood chemistry    • Arthritis     right knee   • Cyst of buttocks    • Dizziness    • Dysuria    • Esophagitis    • Flushing    • GERD (gastroesophageal reflux disease)    • Heartburn    • Hypertension    • Hyperthyroidism    • Hypothyroidism    • Irritable bowel syndrome    • LLQ abdominal pain    • Polyp of sigmoid colon    • Sinusitis    • Skin lesion of face    • UTI (urinary tract infection)       Past Surgical History:   Procedure Laterality Date   • CARDIAC ABLATION  2013    Pulmonary vein ablation by Dr. Abdirahman Lackey, 2013.   • PARTIAL HYSTERECTOMY     • RHINOPLASTY     • TUBAL ABDOMINAL LIGATION        Family History   Problem Relation Age of Onset   • Dementia Mother    • Glomerulonephritis Mother    • Hypertension Mother    • Other Mother       at age 88   • Arthritis Father    • Cancer Father      prostate cancer   • Other Father       at age 65   • Heart attack Father    • Hypertension Other    • Osteoarthritis Other      Social History     Social History   •  Marital status:      Spouse name: N/A   • Number of children: N/A   • Years of education: N/A     Occupational History   • Not on file.     Social History Main Topics   • Smoking status: Never Smoker   • Smokeless tobacco: Never Used   • Alcohol use Yes      Comment: on occasion   • Drug use: No   • Sexual activity: Defer     Other Topics Concern   • Not on file     Social History Narrative      Current Outpatient Prescriptions on File Prior to Visit   Medication Sig Dispense Refill   • acetaminophen (TYLENOL) 500 MG tablet Take 500 mg by mouth As Needed.     • amLODIPine (NORVASC) 2.5 MG tablet TAKE ONE TABLET BY MOUTH DAILY (Patient taking differently: TAKE 1/2 TABLET BY MOUTH DAILY) 90 tablet 3   • Calcium-Vitamin D-Vitamin K (CALCIUM SOFT CHEWS PO) Take  by mouth.     • Cholecalciferol (VITAMIN D) 2000 UNITS capsule Take 1 capsule by mouth daily.     • Crisaborole (EUCRISA) 2 % ointment Apply 1 application topically 2 (Two) Times a Day. 2 tube 0   • famotidine-calcium carb-mag hydroxide (PEPCID COMPLETE) -165 MG chewable tablet Chew Daily.     • flecainide (TAMBOCOR) 50 MG tablet TAKE ONE TABLET BY MOUTH UP TO  TWICE A DAY AS NEEDED FOR AFIB (ATRIAL FIBRILLATION) 60 tablet 3   • Ginger, Zingiber officinalis, (GINGER EXTRACT PO) Take  by mouth Take As Directed.     • levothyroxine (SYNTHROID, LEVOTHROID) 50 MCG tablet TAKE ONE TABLET BY MOUTH DAILY 30 tablet 5   • Loratadine (CLARITIN PO) Take 1 tablet by mouth Daily.     • LORazepam (ATIVAN) 0.5 MG tablet Take 1 tablet by mouth Daily As Needed for Anxiety. 30 tablet 3   • montelukast (SINGULAIR) 10 MG tablet TAKE ONE TABLET BY MOUTH ONCE NIGHTLY 30 tablet 4   • Multiple Vitamins-Minerals (MULTI FOR HER) capsule Take 1 capsule by mouth daily.     • pantoprazole (PROTONIX) 40 MG EC tablet 40 mg Daily.     • VALERIAN PO Take  by mouth.       No current facility-administered medications on file prior to visit.       Allergies   Allergen Reactions   •  Ace Inhibitors Cough   • Celexa [Citalopram Hydrobromide] Dizziness   • Paxil [Paroxetine Hcl] Other (See Comments)     somnolence     • Corticosteroids Rash        Review of Systems   Constitutional: Negative for activity change, appetite change, chills, diaphoresis, fatigue, fever and unexpected weight change.   HENT: Negative for congestion, dental problem, drooling, ear discharge, ear pain, facial swelling, hearing loss, mouth sores, nosebleeds, postnasal drip, rhinorrhea, sinus pressure, sneezing, sore throat, tinnitus, trouble swallowing and voice change.    Eyes: Negative for photophobia, pain, discharge, redness, itching and visual disturbance.   Respiratory: Negative for apnea, cough, choking, chest tightness, shortness of breath, wheezing and stridor.    Cardiovascular: Negative for chest pain, palpitations and leg swelling.   Gastrointestinal: Negative for abdominal distention, abdominal pain, anal bleeding, blood in stool, constipation, diarrhea, nausea, rectal pain and vomiting.   Endocrine: Negative for cold intolerance, heat intolerance, polydipsia, polyphagia and polyuria.   Genitourinary: Negative for decreased urine volume, difficulty urinating, dysuria, enuresis, flank pain, frequency, genital sores, hematuria and urgency.   Musculoskeletal: Negative for arthralgias, back pain, gait problem, joint swelling, myalgias, neck pain and neck stiffness.        Joint Pain    Skin: Negative for color change, pallor, rash and wound.   Allergic/Immunologic: Negative for environmental allergies, food allergies and immunocompromised state.   Neurological: Negative for dizziness, tremors, seizures, syncope, facial asymmetry, speech difficulty, weakness, light-headedness, numbness and headaches.   Hematological: Negative for adenopathy. Does not bruise/bleed easily.   Psychiatric/Behavioral: Negative for agitation, behavioral problems, confusion, decreased concentration, dysphoric mood, hallucinations,  "self-injury, sleep disturbance and suicidal ideas. The patient is not nervous/anxious and is not hyperactive.         Objective      Physical Exam  /59  Pulse 73  Ht 68\" (172.7 cm)  Wt 147 lb (66.7 kg)  LMP  (LMP Unknown)  BMI 22.35 kg/m2    Body mass index is 22.35 kg/(m^2).    General:   Mental Status:  Alert   Appearance: Cooperative, in no acute distress   Build and Nutrition: Well-nourished and well developed female   Orientation: Alert and oriented to person, place and time   Posture: Normal   Gait: Normal    Integument:   Right knee: No skin lesions, no rash, no ecchymosis    Neurologic:   Sensation:    Right foot: Intact to light touch on the dorsal and plantar aspect   Motor:  Right lower extremity: 5/5 quadriceps, hamstrings, ankle dorsiflexors, and ankle plantar flexors    Vascular:   Right lower extremity: 2+ dorsalis pedis pulse, prompt capillary refill    Lower Extremities:   Right Knee:    Tenderness:  None    Effusion:  None    Swelling:  None    Crepitus:  Positive    Atrophy:  None    Range of motion:  Extension: 0°       Flexion: 125°  Instability:  No varus laxity, no valgus laxity, negative anterior drawer  Deformities:  None      Imaging/Studies  Previous radiographs were reviewed, which showed arthritic changes in the right knee.      Assessment and Plan     Annalise was seen today for follow-up.    Diagnoses and all orders for this visit:    Primary osteoarthritis of right knee        I reviewed my findings with patient today.  He responded well to Visco supplementation injections.  Last round was in June of this year, and she may repeat the round in December, which is her desire.  We will submit for approval, and I will see her back once that is ready for administration.      Return for After approval of the visco injection.      Medical Decision Making  Management Options : prescription/IM medicine  Data/Risk: independent visualization of imaging, lab tests, or EMG/NCV      Larry " NANCIE Bucio MD  11/08/17  3:11 PM

## 2017-11-09 ENCOUNTER — OFFICE VISIT (OUTPATIENT)
Dept: INTERNAL MEDICINE | Facility: CLINIC | Age: 67
End: 2017-11-09

## 2017-11-09 VITALS
WEIGHT: 149 LBS | DIASTOLIC BLOOD PRESSURE: 82 MMHG | BODY MASS INDEX: 22.66 KG/M2 | SYSTOLIC BLOOD PRESSURE: 148 MMHG | TEMPERATURE: 99.6 F

## 2017-11-09 DIAGNOSIS — J06.9 ACUTE URI: Primary | ICD-10-CM

## 2017-11-09 PROCEDURE — 99213 OFFICE O/P EST LOW 20 MIN: CPT | Performed by: NURSE PRACTITIONER

## 2017-11-09 NOTE — PROGRESS NOTES
Chief Complaint   Patient presents with   • Cough   • Sinus pressure and drainage       HPI  Annalise Winters is a 66 y.o. female who presents with URI. Started with right ear pain 2 days ago, sore throat yesterday and today developed fever 100.2. Took coricidin HBP last night tylenol this afternoon; and has used throat lozenges.  Occasional cough. No SOA.    PMSFH  The following portions of the patient's history were reviewed and updated as appropriate: allergies, current medications, past family history, past medical history, past social history, past surgical history and problem list.    Past Medical History:   Diagnosis Date   • Abnormal blood chemistry    • Arthritis     right knee   • Cyst of buttocks    • Dizziness    • Dysuria    • Esophagitis    • Flushing    • GERD (gastroesophageal reflux disease)    • Heartburn    • Hypertension    • Hyperthyroidism    • Hypothyroidism    • Irritable bowel syndrome    • LLQ abdominal pain    • Polyp of sigmoid colon    • Sinusitis    • Skin lesion of face    • UTI (urinary tract infection)      Past Surgical History:   Procedure Laterality Date   • CARDIAC ABLATION  08/21/2013    Pulmonary vein ablation by Dr. Abdirahman Lackey, 08/21/2013.   • PARTIAL HYSTERECTOMY     • RHINOPLASTY     • TUBAL ABDOMINAL LIGATION       Patient Active Problem List    Diagnosis   • Shortness of breath [R06.02]   • Dizziness [R42]   • Dysuria [R30.0]     Overview Note:     Impression: 06/10/2014 - Send urine for culture. Discussed referral for urodynamic testing with gyn or urology if urine is clear. Pt will see if her current gynecologist does that testing.;      • Skin lesion of face [L98.9]     Overview Note:     Impression: 06/10/2014 - Pt will contact dermatologist for eval of facial lesion and possible removal.;      • Vitamin D deficiency [E55.9]   • Allergic rhinitis [J30.9]     Overview Note:     Impression: 06/10/2014 - Trial of singulair to see if it helps with her occasional  shortness of breath.;      • Hyperthyroidism [E05.90]   • GERD (gastroesophageal reflux disease) [K21.9]   • Acute URI [J06.9]   • Asthma [J45.909]   • Persistent atrial fibrillation [I48.1]     Overview Note:     · Initially diagnosed in 2012.  · Myocardial perfusion study (04/02/2013):  No ischemia.  · Event monitor (10/2013): demonstrating paroxysmal atrial fibrillation.  · Failure of flecainide, sotalol and Multaq therapy.  · Pulmonary vein ablation by Dr. Abdirahman Lackey, 08/21/2013.  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 reports it is resolving, by itself.  Saw GYN who plans to try Vaginal hormone.  Impression: 05/18/2015 - Given hand out for kegels, she will try that, and if not better, will discuss with GYN.;      • Essential hypertension [I10]   • Hypothyroidism [E03.9]   • Anxiety [F41.9]   • Back pain [M54.9]     Overview Note:     Impression: 06/10/2014 - Pt will try chiropractor. If no improvement, she will call for referral to PT.;        Social History   Substance Use Topics   • Smoking status: Never Smoker   • Smokeless tobacco: Never Used   • Alcohol use Yes      Comment: on occasion     Current Outpatient Prescriptions on File Prior to Visit   Medication Sig Dispense Refill   • acetaminophen (TYLENOL) 500 MG tablet Take 500 mg by mouth As Needed.     • amLODIPine (NORVASC) 2.5 MG tablet TAKE ONE TABLET BY MOUTH DAILY (Patient taking differently: TAKE 1/2 TABLET BY MOUTH DAILY) 90 tablet 3   • Calcium-Vitamin D-Vitamin K (CALCIUM SOFT CHEWS PO) Take  by mouth.     • Cholecalciferol (VITAMIN D) 2000 UNITS capsule Take 1 capsule by mouth daily.     • Crisaborole (EUCRISA) 2 % ointment Apply 1 application topically 2 (Two) Times a Day. 2 tube 0   • famotidine-calcium carb-mag hydroxide (PEPCID COMPLETE) -165 MG chewable tablet Chew Daily.     •  flecainide (TAMBOCOR) 50 MG tablet TAKE ONE TABLET BY MOUTH UP TO  TWICE A DAY AS NEEDED FOR AFIB (ATRIAL FIBRILLATION) 60 tablet 3   • Ginger, Zingiber officinalis, (GINGER EXTRACT PO) Take  by mouth Take As Directed.     • levothyroxine (SYNTHROID, LEVOTHROID) 50 MCG tablet TAKE ONE TABLET BY MOUTH DAILY 30 tablet 5   • Loratadine (CLARITIN PO) Take 1 tablet by mouth Daily.     • LORazepam (ATIVAN) 0.5 MG tablet Take 1 tablet by mouth Daily As Needed for Anxiety. 30 tablet 3   • montelukast (SINGULAIR) 10 MG tablet TAKE ONE TABLET BY MOUTH ONCE NIGHTLY 30 tablet 4   • Multiple Vitamins-Minerals (MULTI FOR HER) capsule Take 1 capsule by mouth daily.     • pantoprazole (PROTONIX) 40 MG EC tablet 40 mg Daily.     • VALERIAN PO Take  by mouth.       No current facility-administered medications on file prior to visit.          Review of Systems   Constitutional: Positive for fatigue and fever.   HENT: Positive for congestion, ear pain (pressure), facial swelling, postnasal drip, rhinorrhea and sore throat.    Respiratory: Positive for cough (intermittent). Negative for shortness of breath and wheezing.    Cardiovascular: Negative.    Gastrointestinal: Negative.    Musculoskeletal: Negative for myalgias.   Skin: Negative.    Neurological: Negative.    All other systems reviewed and are negative.          Objective   Blood pressure 148/82, temperature 99.6 °F (37.6 °C), weight 149 lb (67.6 kg).  Body mass index is 22.66 kg/(m^2).      Physical Exam   Constitutional: She is oriented to person, place, and time. She appears well-developed and well-nourished. She appears ill (mildly). No distress.   HENT:   Head: Normocephalic and atraumatic.   Right Ear: Tympanic membrane and ear canal normal. Tympanic membrane is not erythematous.   Left Ear: Tympanic membrane and ear canal normal. Tympanic membrane is not erythematous.   Nose: Right sinus exhibits no maxillary sinus tenderness. Left sinus exhibits no maxillary sinus  tenderness.   Mouth/Throat: Uvula is midline and mucous membranes are normal. Posterior oropharyngeal erythema present. No oropharyngeal exudate or posterior oropharyngeal edema.   Eyes: Conjunctivae are normal. Pupils are equal, round, and reactive to light.   Neck: Normal range of motion.   Cardiovascular: Normal rate, regular rhythm and normal heart sounds.    Pulmonary/Chest: Effort normal and breath sounds normal. No respiratory distress. She has no wheezes.   Lymphadenopathy:     She has cervical adenopathy.   Neurological: She is alert and oriented to person, place, and time.   Skin: Skin is warm and dry. No rash noted.   Psychiatric: She has a normal mood and affect. Her speech is normal and behavior is normal. Thought content normal. Cognition and memory are normal.             ASSESSMENT/PLAN  1. Acute URI  Aggressive symptoms management: Emergencee, warm teas and soup, VICKs, throat lozenges. Tylenol prn fever/pain        Plan of care reviewed with patient at the conclusion of today's visit. Education was provided in regards to diagnosis, management and any prescribed or recommended OTC medications.  Patient verbalizes Understanding of and agreement with management plan.      FOLLOW-UP  Return if symptoms worsen or fail to improve.      Future Appointments  Date Time Provider Department Center   12/11/2017 1:15 PM Maryse Jauregui MD [facility] None   12/20/2017 10:50 AM Larry Bucio MD [facility] None   3/20/2018 9:45 AM Goran Ruth MD [facility] None   10/16/2018 10:00 AM ANDREW Abrams [facility] None         Electronically signed by:  TAMMY Lara  11/09/2017

## 2017-11-14 ENCOUNTER — TELEPHONE (OUTPATIENT)
Dept: INTERNAL MEDICINE | Facility: CLINIC | Age: 67
End: 2017-11-14

## 2017-11-14 ENCOUNTER — TELEPHONE (OUTPATIENT)
Dept: CARDIOLOGY | Facility: CLINIC | Age: 67
End: 2017-11-14

## 2017-11-14 NOTE — TELEPHONE ENCOUNTER
PATIENT CALLED TO REQUEST THIS MEDICATION BE SENT IT TO HER PHARMACY FOR HER. SHE BELIEVES THIS WILL REQUIRE A PA AND WOULD LIKE TO KNOW IF THERE ARE ANY MORE SAMPLES AVAILABLE TO LAST UNTIL A PA CAN BE OBTAINED.    CALL BACK [phone]

## 2017-11-15 NOTE — TELEPHONE ENCOUNTER
LM for pt that we do not have any samples and that I would send script to pharmacy.  Pharmacy will let me know if PA is needed.

## 2017-11-15 NOTE — TELEPHONE ENCOUNTER
Where she's allergic to steroids, Unfortunately there's not another cream to prescribe.    I would recommend she see a dermatologist for evaluation, and treatment options.    thanks

## 2017-11-15 NOTE — TELEPHONE ENCOUNTER
Pharmacy called to request a different medication to replace the order for Eucrisa as this medication is not covered by the patient's insurance provider and will cost $600 out of pocket.    Triamcinolone, fluticasone, mometasone are preferred by her insurance provider.

## 2017-11-20 ENCOUNTER — TELEPHONE (OUTPATIENT)
Dept: CARDIOLOGY | Facility: CLINIC | Age: 67
End: 2017-11-20

## 2017-11-20 NOTE — TELEPHONE ENCOUNTER
Patient is requesting clearance to have dental work done.                  [facility]  [phone])[phone]  Attention: Emi

## 2017-12-11 ENCOUNTER — OFFICE VISIT (OUTPATIENT)
Dept: INTERNAL MEDICINE | Facility: CLINIC | Age: 67
End: 2017-12-11

## 2017-12-11 VITALS
BODY MASS INDEX: 22.48 KG/M2 | OXYGEN SATURATION: 99 % | DIASTOLIC BLOOD PRESSURE: 74 MMHG | WEIGHT: 147.87 LBS | SYSTOLIC BLOOD PRESSURE: 120 MMHG | HEART RATE: 78 BPM

## 2017-12-11 DIAGNOSIS — I48.19 PERSISTENT ATRIAL FIBRILLATION (HCC): ICD-10-CM

## 2017-12-11 DIAGNOSIS — I10 ESSENTIAL HYPERTENSION: ICD-10-CM

## 2017-12-11 DIAGNOSIS — E03.9 ACQUIRED HYPOTHYROIDISM: ICD-10-CM

## 2017-12-11 DIAGNOSIS — G89.29 CHRONIC RIGHT-SIDED LOW BACK PAIN WITHOUT SCIATICA: ICD-10-CM

## 2017-12-11 DIAGNOSIS — Z00.00 MEDICARE ANNUAL WELLNESS VISIT, SUBSEQUENT: Primary | ICD-10-CM

## 2017-12-11 DIAGNOSIS — M54.50 CHRONIC RIGHT-SIDED LOW BACK PAIN WITHOUT SCIATICA: ICD-10-CM

## 2017-12-11 DIAGNOSIS — K21.00 GASTROESOPHAGEAL REFLUX DISEASE WITH ESOPHAGITIS: ICD-10-CM

## 2017-12-11 DIAGNOSIS — E55.9 VITAMIN D DEFICIENCY: ICD-10-CM

## 2017-12-11 LAB
25(OH)D3 SERPL-MCNC: 43.5 NG/ML
ALBUMIN SERPL-MCNC: 4.5 G/DL (ref 3.2–4.8)
ALBUMIN/GLOB SERPL: 1.7 G/DL (ref 1.5–2.5)
ALP SERPL-CCNC: 66 U/L (ref 25–100)
ALT SERPL W P-5'-P-CCNC: 27 U/L (ref 7–40)
ANION GAP SERPL CALCULATED.3IONS-SCNC: 8 MMOL/L (ref 3–11)
ARTICHOKE IGE QN: 115 MG/DL (ref 0–130)
AST SERPL-CCNC: 26 U/L (ref 0–33)
BILIRUB BLD-MCNC: NEGATIVE MG/DL
BILIRUB SERPL-MCNC: 0.4 MG/DL (ref 0.3–1.2)
BUN BLD-MCNC: 17 MG/DL (ref 9–23)
BUN/CREAT SERPL: 24.3 (ref 7–25)
CALCIUM SPEC-SCNC: 9.5 MG/DL (ref 8.7–10.4)
CHLORIDE SERPL-SCNC: 102 MMOL/L (ref 99–109)
CHOLEST SERPL-MCNC: 221 MG/DL (ref 0–200)
CLARITY, POC: CLEAR
CO2 SERPL-SCNC: 32 MMOL/L (ref 20–31)
COLOR UR: YELLOW
CREAT BLD-MCNC: 0.7 MG/DL (ref 0.6–1.3)
DEPRECATED RDW RBC AUTO: 48.9 FL (ref 37–54)
ERYTHROCYTE [DISTWIDTH] IN BLOOD BY AUTOMATED COUNT: 15.1 % (ref 11.3–14.5)
GFR SERPL CREATININE-BSD FRML MDRD: 83 ML/MIN/1.73
GLOBULIN UR ELPH-MCNC: 2.6 GM/DL
GLUCOSE BLD-MCNC: 90 MG/DL (ref 70–100)
GLUCOSE UR STRIP-MCNC: NEGATIVE MG/DL
HCT VFR BLD AUTO: 38.5 % (ref 34.5–44)
HDLC SERPL-MCNC: 90 MG/DL (ref 40–60)
HGB BLD-MCNC: 12 G/DL (ref 11.5–15.5)
KETONES UR QL: NEGATIVE
LEUKOCYTE EST, POC: NEGATIVE
MCH RBC QN AUTO: 27.3 PG (ref 27–31)
MCHC RBC AUTO-ENTMCNC: 31.2 G/DL (ref 32–36)
MCV RBC AUTO: 87.7 FL (ref 80–99)
NITRITE UR-MCNC: NEGATIVE MG/ML
PH UR: 6 [PH] (ref 5–8)
PLATELET # BLD AUTO: 295 10*3/MM3 (ref 150–450)
PMV BLD AUTO: 10 FL (ref 6–12)
POTASSIUM BLD-SCNC: 4.6 MMOL/L (ref 3.5–5.5)
PROT SERPL-MCNC: 7.1 G/DL (ref 5.7–8.2)
PROT UR STRIP-MCNC: NEGATIVE MG/DL
RBC # BLD AUTO: 4.39 10*6/MM3 (ref 3.89–5.14)
RBC # UR STRIP: NEGATIVE /UL
SODIUM BLD-SCNC: 142 MMOL/L (ref 132–146)
SP GR UR: 1.01 (ref 1–1.03)
TRIGL SERPL-MCNC: 93 MG/DL (ref 0–150)
TSH SERPL DL<=0.05 MIU/L-ACNC: 1.97 MIU/ML (ref 0.35–5.35)
UROBILINOGEN UR QL: NORMAL
WBC NRBC COR # BLD: 4.85 10*3/MM3 (ref 3.5–10.8)

## 2017-12-11 PROCEDURE — G0439 PPPS, SUBSEQ VISIT: HCPCS | Performed by: INTERNAL MEDICINE

## 2017-12-11 PROCEDURE — 81003 URINALYSIS AUTO W/O SCOPE: CPT | Performed by: INTERNAL MEDICINE

## 2017-12-11 PROCEDURE — G0444 DEPRESSION SCREEN ANNUAL: HCPCS | Performed by: INTERNAL MEDICINE

## 2017-12-11 PROCEDURE — 99213 OFFICE O/P EST LOW 20 MIN: CPT | Performed by: INTERNAL MEDICINE

## 2017-12-11 PROCEDURE — 80061 LIPID PANEL: CPT | Performed by: INTERNAL MEDICINE

## 2017-12-11 PROCEDURE — 80053 COMPREHEN METABOLIC PANEL: CPT | Performed by: INTERNAL MEDICINE

## 2017-12-11 PROCEDURE — 82306 VITAMIN D 25 HYDROXY: CPT | Performed by: INTERNAL MEDICINE

## 2017-12-11 PROCEDURE — 84443 ASSAY THYROID STIM HORMONE: CPT | Performed by: INTERNAL MEDICINE

## 2017-12-11 PROCEDURE — 85027 COMPLETE CBC AUTOMATED: CPT | Performed by: INTERNAL MEDICINE

## 2017-12-11 RX ORDER — DIAZEPAM 5 MG/1
TABLET ORAL
COMMUNITY
Start: 2017-11-20 | End: 2017-12-11

## 2017-12-11 RX ORDER — CLOPIDOGREL BISULFATE 75 MG/1
75 TABLET ORAL DAILY
Qty: 30 TABLET | Refills: 5 | Status: SHIPPED | OUTPATIENT
Start: 2017-12-11 | End: 2018-07-31 | Stop reason: ALTCHOICE

## 2017-12-11 NOTE — PROGRESS NOTES
QUICK REFERENCE INFORMATION:  The ABCs of the Annual Wellness Visit    Subsequent Medicare Wellness Visit    HEALTH RISK ASSESSMENT    1950    Recent Hospitalizations:  No hospitalization(s) within the last year..        Current Medical Providers:  Patient Care Team:  Maryse Jauregui MD as PCP - General  Larry Bucio MD as PCP - Claims Attributed        Smoking Status:  History   Smoking Status   • Never Smoker   Smokeless Tobacco   • Never Used       Alcohol Consumption:  History   Alcohol Use   • Yes     Comment: on occasion       Depression Screen:   PHQ-2/PHQ-9 Depression Screening 12/11/2017   Little interest or pleasure in doing things 0   Feeling down, depressed, or hopeless 0   Trouble falling or staying asleep, or sleeping too much -   Feeling tired or having little energy -   Poor appetite or overeating -   Feeling bad about yourself - or that you are a failure or have let yourself or your family down -   Trouble concentrating on things, such as reading the newspaper or watching television -   Moving or speaking so slowly that other people could have noticed. Or the opposite - being so fidgety or restless that you have been moving around a lot more than usual -   Thoughts that you would be better off dead, or of hurting yourself in some way -   Total Score 0   If you checked off any problems, how difficult have these problems made it for you to do your work, take care of things at home, or get along with other people? -       Health Habits and Functional and Cognitive Screening:  Functional & Cognitive Status 12/11/2017   Do you have difficulty preparing food and eating? No   Do you have difficulty bathing yourself, getting dressed or grooming yourself? No   Do you have difficulty using the toilet? No   Do you have difficulty moving around from place to place? No   Do you have trouble with steps or getting out of a bed or a chair? No   In the past year have you fallen or experienced a near fall?  No   Current Diet Well Balanced Diet   Dental Exam Up to date   Eye Exam Up to date   Exercise (times per week) (No Data)   Current Exercise Activities Include Aerobics   Do you need help using the phone?  No   Are you deaf or do you have serious difficulty hearing?  No   Do you need help with transportation? No   Do you need help shopping? No   Do you need help preparing meals?  No   Do you need help with housework?  No   Do you need help with laundry? No   Do you need help taking your medications? No   Do you need help managing money? No   Have you felt unusual stress, anger or loneliness in the last month? No   Who do you live with? Spouse   If you need help, do you have trouble finding someone available to you? No   Have you been bothered in the last four weeks by sexual problems? No   Do you have difficulty concentrating, remembering or making decisions? No           Does the patient have evidence of cognitive impairment? No    Aspirin use counseling: Unable to take Asa, due to GI side effects,. To start Plavix      Recent Lab Results:  CMP:  Lab Results   Component Value Date    GLU 92 05/16/2016    BUN 17 12/11/2017    CREATININE 0.70 12/11/2017    EGFRIFNONA 83 12/11/2017    EGFRIFAFRI 106 05/16/2016    BCR 24.3 12/11/2017     12/11/2017    K 4.6 12/11/2017    CO2 32.0 (H) 12/11/2017    CALCIUM 9.5 12/11/2017    PROTENTOTREF 6.9 05/16/2016    ALBUMIN 4.50 12/11/2017    LABGLOBREF 2.3 05/16/2016    LABIL2 1.7 12/11/2017    BILITOT 0.4 12/11/2017    ALKPHOS 66 12/11/2017    AST 26 12/11/2017    ALT 27 12/11/2017     Lipid Panel:  Lab Results   Component Value Date    CHOL 221 (H) 12/11/2017    TRIG 93 12/11/2017    HDL 90 (H) 12/11/2017    VLDL 13 05/16/2016    LDLDIRECT 115 12/11/2017     HbA1c:       Visual Acuity:  No exam data present    Age-appropriate Screening Schedule:  Refer to the list below for future screening recommendations based on patient's age, sex and/or medical conditions. Orders for  these recommended tests are listed in the plan section. The patient has been provided with a written plan.    Health Maintenance   Topic Date Due   • ZOSTER VACCINE  12/12/2018 (Originally 5/10/2016)   • MAMMOGRAM  11/30/2018   • COLONOSCOPY  12/18/2024   • INFLUENZA VACCINE  Completed   • PNEUMOCOCCAL VACCINES (65+ LOW/MEDIUM RISK)  Completed   • TDAP/TD VACCINES  Excluded        Subjective   History of Present Illness    Annalise Winters is a 66 y.o. female who presents for an Subsequent Wellness Visit.and follow up on her PAF, arthralgias, having chronic back issues which are getting worse.  Concerned about her sister's history of dementia and Parkinsons, if its hereditary.      The following portions of the patient's history were reviewed and updated as appropriate: allergies, current medications, past family history, past medical history, past social history, past surgical history and problem list.    Outpatient Medications Prior to Visit   Medication Sig Dispense Refill   • acetaminophen (TYLENOL) 500 MG tablet Take 500 mg by mouth As Needed.     • amLODIPine (NORVASC) 2.5 MG tablet TAKE ONE TABLET BY MOUTH DAILY (Patient taking differently: TAKE 1/2 TABLET BY MOUTH DAILY) 90 tablet 3   • Calcium-Vitamin D-Vitamin K (CALCIUM SOFT CHEWS PO) Take  by mouth.     • Cholecalciferol (VITAMIN D) 2000 UNITS capsule Take 1 capsule by mouth daily.     • Crisaborole (EUCRISA) 2 % ointment Apply 1 application topically 2 (Two) Times a Day. 60 g 1   • famotidine-calcium carb-mag hydroxide (PEPCID COMPLETE) -165 MG chewable tablet Chew Daily.     • flecainide (TAMBOCOR) 50 MG tablet TAKE ONE TABLET BY MOUTH UP TO  TWICE A DAY AS NEEDED FOR AFIB (ATRIAL FIBRILLATION) 60 tablet 3   • Ginger, Zingiber officinalis, (GINGER EXTRACT PO) Take  by mouth Take As Directed.     • levothyroxine (SYNTHROID, LEVOTHROID) 50 MCG tablet TAKE ONE TABLET BY MOUTH DAILY 30 tablet 5   • Loratadine (CLARITIN PO) Take 1 tablet by mouth  Daily.     • LORazepam (ATIVAN) 0.5 MG tablet Take 1 tablet by mouth Daily As Needed for Anxiety. 30 tablet 3   • montelukast (SINGULAIR) 10 MG tablet TAKE ONE TABLET BY MOUTH ONCE NIGHTLY 30 tablet 4   • Multiple Vitamins-Minerals (MULTI FOR HER) capsule Take 1 capsule by mouth daily.     • pantoprazole (PROTONIX) 40 MG EC tablet 40 mg Daily.     • VALERIAN PO Take  by mouth.       No facility-administered medications prior to visit.        Patient Active Problem List   Diagnosis   • Asthma   • Persistent atrial fibrillation   • Hypertonicity of bladder   • Essential hypertension   • Hypothyroidism   • Anxiety   • Back pain   • Acute URI   • Hyperthyroidism   • GERD (gastroesophageal reflux disease)   • Allergic rhinitis   • Shortness of breath   • Dizziness   • Dysuria   • Skin lesion of face   • Vitamin D deficiency       Advance Care Planning:  has an advance directive - a copy has been provided and is in file    Identification of Risk Factors:  Risk factors include: cardiovascular risk, increased fall risk and polypharmacy.    Review of Systems   Constitutional: Positive for fatigue. Negative for chills and fever.   HENT: Negative for ear discharge, ear pain, sinus pressure and sore throat.    Eyes: Negative for pain and redness.   Respiratory: Negative for cough, chest tightness and shortness of breath.    Cardiovascular: Negative for chest pain, palpitations and leg swelling.   Gastrointestinal: Negative for diarrhea, nausea and vomiting.   Endocrine: Negative for polydipsia and polyphagia.   Genitourinary: Negative for frequency and urgency.   Musculoskeletal: Positive for arthralgias, back pain and myalgias.   Skin: Positive for rash.   Neurological: Negative for dizziness, syncope and headaches.   Psychiatric/Behavioral: Negative for confusion and sleep disturbance.       Compared to one year ago, the patient feels her physical health is better.  Compared to one year ago, the patient feels her mental  health is better.    Objective     Physical Exam   Constitutional: She is oriented to person, place, and time. She appears well-developed and well-nourished.   HENT:   Head: Normocephalic and atraumatic.   Right Ear: External ear normal.   Left Ear: External ear normal.   Mouth/Throat: No oropharyngeal exudate.   Eyes: Conjunctivae are normal. Pupils are equal, round, and reactive to light. No scleral icterus.   Neck: Neck supple. No thyromegaly present.   Cardiovascular: Normal rate, regular rhythm and intact distal pulses.  Exam reveals no friction rub.    No murmur heard.  Pulmonary/Chest: Effort normal and breath sounds normal. She has no wheezes. She has no rales.   Abdominal: Soft. Bowel sounds are normal. She exhibits no distension. There is no tenderness.   Musculoskeletal: She exhibits no edema.   Lymphadenopathy:     She has no cervical adenopathy.   Neurological: She is alert and oriented to person, place, and time. She displays normal reflexes. No cranial nerve deficit. Coordination normal.   Skin: Skin is warm and dry.   Psychiatric: She has a normal mood and affect. Her behavior is normal. Judgment and thought content normal.   Nursing note and vitals reviewed.      Vitals:    12/11/17 1324   BP: 120/74   Pulse: 78   SpO2: 99%   Weight: 67.1 kg (147 lb 13.9 oz)       Body mass index is 22.48 kg/(m^2).  Discussed the patient's BMI with her. BMI is normal, no intervention necessary.  Annalise was seen today for subsequent medicare.    Diagnoses and all orders for this visit:    Medicare annual wellness visit, subsequent    Persistent atrial fibrillation  Comments:  Not on Asa due to GI side effects, adv. to start plavix if unable to start ECASA 81mg  Orders:  -     clopidogrel (PLAVIX) 75 MG tablet; Take 1 tablet by mouth Daily.  -     CBC (No Diff)    Essential hypertension  -     Comprehensive Metabolic Panel  -     Lipid Panel    Gastroesophageal reflux disease with esophagitis    Acquired  hypothyroidism  -     Lipid Panel  -     TSH    Chronic right-sided low back pain without sciatica  -     POCT urinalysis dipstick, automated    Vitamin D deficiency  -     Vitamin D 25 Hydroxy        Assessment/Plan   Patient Self-Management and Personalized Health Advice  The patient has been provided with information about: prevention of cardiac or vascular disease, fall prevention and supplements and preventive services including:   · Colorectal cancer screening, colonoscopy referral placed, Fall Risk assessment done, Glaucoma screening recommended, Nutrition counseling provided, c-scope scheduled after the 1st of the year..    Visit Diagnoses:    ICD-10-CM ICD-9-CM   1. Medicare annual wellness visit, subsequent Z00.00 V70.0   2. Persistent atrial fibrillation I48.1 427.31   3. Essential hypertension I10 401.9   4. Gastroesophageal reflux disease with esophagitis K21.0 530.11   5. Acquired hypothyroidism E03.9 244.9   6. Chronic right-sided low back pain without sciatica M54.5 724.2    G89.29 338.29   7. Vitamin D deficiency E55.9 268.9       Orders Placed This Encounter   Procedures   • CBC (No Diff)   • Comprehensive Metabolic Panel   • Lipid Panel   • TSH   • Vitamin D 25 Hydroxy   • POCT urinalysis dipstick, automated       Outpatient Encounter Prescriptions as of 12/11/2017   Medication Sig Dispense Refill   • acetaminophen (TYLENOL) 500 MG tablet Take 500 mg by mouth As Needed.     • amLODIPine (NORVASC) 2.5 MG tablet TAKE ONE TABLET BY MOUTH DAILY (Patient taking differently: TAKE 1/2 TABLET BY MOUTH DAILY) 90 tablet 3   • Calcium-Vitamin D-Vitamin K (CALCIUM SOFT CHEWS PO) Take  by mouth.     • Cholecalciferol (VITAMIN D) 2000 UNITS capsule Take 1 capsule by mouth daily.     • Crisaborole (EUCRISA) 2 % ointment Apply 1 application topically 2 (Two) Times a Day. 60 g 1   • famotidine-calcium carb-mag hydroxide (PEPCID COMPLETE) -165 MG chewable tablet Chew Daily.     • flecainide (TAMBOCOR) 50 MG  tablet TAKE ONE TABLET BY MOUTH UP TO  TWICE A DAY AS NEEDED FOR AFIB (ATRIAL FIBRILLATION) 60 tablet 3   • Ginger, Zingiber officinalis, (GINGER EXTRACT PO) Take  by mouth Take As Directed.     • levothyroxine (SYNTHROID, LEVOTHROID) 50 MCG tablet TAKE ONE TABLET BY MOUTH DAILY 30 tablet 5   • Loratadine (CLARITIN PO) Take 1 tablet by mouth Daily.     • LORazepam (ATIVAN) 0.5 MG tablet Take 1 tablet by mouth Daily As Needed for Anxiety. 30 tablet 3   • montelukast (SINGULAIR) 10 MG tablet TAKE ONE TABLET BY MOUTH ONCE NIGHTLY 30 tablet 4   • Multiple Vitamins-Minerals (MULTI FOR HER) capsule Take 1 capsule by mouth daily.     • pantoprazole (PROTONIX) 40 MG EC tablet 40 mg Daily.     • VALERIAN PO Take  by mouth.     • [DISCONTINUED] diazePAM (VALIUM) 5 MG tablet      • clopidogrel (PLAVIX) 75 MG tablet Take 1 tablet by mouth Daily. 30 tablet 5     No facility-administered encounter medications on file as of 12/11/2017.        Reviewed use of high risk medication in the elderly: yes  Reviewed for potential of harmful drug interactions in the elderly: yes    Follow Up:  Return in about 6 months (around 6/11/2018) for Next scheduled follow up.     An After Visit Summary and PPPS with all of these plans were given to the patient.

## 2017-12-18 ENCOUNTER — TELEPHONE (OUTPATIENT)
Dept: CARDIOLOGY | Facility: CLINIC | Age: 67
End: 2017-12-18

## 2017-12-18 NOTE — TELEPHONE ENCOUNTER
Okay per my standpoint however if gastric physician not let her on aspirin not sure he would allow Plavix. Check with GI doc as well

## 2017-12-18 NOTE — TELEPHONE ENCOUNTER
Ab pt that Will James wanted her to start ASA and her gastric dr said no to and now Dr. Jauregui wantss to start her on Plavix and she wants to know if this is ok.

## 2017-12-26 ENCOUNTER — TELEPHONE (OUTPATIENT)
Dept: INTERNAL MEDICINE | Facility: CLINIC | Age: 67
End: 2017-12-26

## 2017-12-26 RX ORDER — LEVOTHYROXINE SODIUM 0.05 MG/1
TABLET ORAL
Qty: 30 TABLET | Refills: 4 | Status: SHIPPED | OUTPATIENT
Start: 2017-12-26 | End: 2018-05-23 | Stop reason: SDUPTHER

## 2017-12-26 NOTE — TELEPHONE ENCOUNTER
Spoke with pt and went over labs. Pt stated she understood. I also advised she should be getting the results in the mail. Pt was thankful for the call.

## 2017-12-26 NOTE — TELEPHONE ENCOUNTER
Please let her know that her labs and urine were normal.  Letter is in the mail, with the following message-  Your labs including cholesterol, thyroid and vitamin D look good.  Please continue current regimen.  (was printed on the 22nd.)  thanks

## 2018-01-04 ENCOUNTER — TELEPHONE (OUTPATIENT)
Dept: CARDIOLOGY | Facility: CLINIC | Age: 68
End: 2018-01-04

## 2018-01-29 ENCOUNTER — OFFICE VISIT (OUTPATIENT)
Dept: ORTHOPEDIC SURGERY | Facility: CLINIC | Age: 68
End: 2018-01-29

## 2018-01-29 DIAGNOSIS — M17.11 PRIMARY OSTEOARTHRITIS OF RIGHT KNEE: Primary | ICD-10-CM

## 2018-01-29 PROCEDURE — 20610 DRAIN/INJ JOINT/BURSA W/O US: CPT | Performed by: ORTHOPAEDIC SURGERY

## 2018-01-29 RX ORDER — SODIUM, POTASSIUM,MAG SULFATES 17.5-3.13G
SOLUTION, RECONSTITUTED, ORAL ORAL
COMMUNITY
Start: 2018-01-22 | End: 2018-06-11

## 2018-01-29 NOTE — PROGRESS NOTES
Procedure   Large Joint Arthrocentesis  Date/Time: 1/29/2018 3:03 PM  Consent given by: patient  Site marked: site marked  Timeout: Immediately prior to procedure a time out was called to verify the correct patient, procedure, equipment, support staff and site/side marked as required   Supporting Documentation  Indications: pain   Procedure Details  Location: knee - R knee  Preparation: Patient was prepped and draped in the usual sterile fashion  Needle size: 22 G  Medications administered: 30 mg Hyaluronan 30 MG/2ML  Patient tolerance: patient tolerated the procedure well with no immediate complications

## 2018-01-29 NOTE — PROGRESS NOTES
[facility] [place] [facility] Note    Subjective     Chief Complaint   Patient presents with   • Right Knee - Follow-up     Orthovisc Injection #1        HPI    Annalise Winters is a 67 y.o. female who presents for the first Orthovisc injection into the right knee joint.    Patient Active Problem List   Diagnosis   • Asthma   • Persistent atrial fibrillation   • Hypertonicity of bladder   • Essential hypertension   • Hypothyroidism   • Anxiety   • Back pain   • Acute URI   • Hyperthyroidism   • GERD (gastroesophageal reflux disease)   • Allergic rhinitis   • Shortness of breath   • Dizziness   • Dysuria   • Skin lesion of face   • Vitamin D deficiency     Past Medical History:   Diagnosis Date   • Abnormal blood chemistry    • Arthritis     right knee   • Cyst of buttocks    • Dizziness    • Dysuria    • Esophagitis    • Flushing    • GERD (gastroesophageal reflux disease)    • Heartburn    • Hypertension    • Hyperthyroidism    • Hypothyroidism    • Irritable bowel syndrome    • LLQ abdominal pain    • Polyp of sigmoid colon    • Sinusitis    • Skin lesion of face    • UTI (urinary tract infection)       Past Surgical History:   Procedure Laterality Date   • CARDIAC ABLATION  2013    Pulmonary vein ablation by Dr. Abdirahman Lackey, 2013.   • PARTIAL HYSTERECTOMY     • RHINOPLASTY     • TUBAL ABDOMINAL LIGATION        Family History   Problem Relation Age of Onset   • Dementia Mother    • Glomerulonephritis Mother    • Hypertension Mother    • Other Mother       at age 88   • Arthritis Father    • Cancer Father      prostate cancer   • Other Father       at age 65   • Heart attack Father    • Hypertension Other    • Osteoarthritis Other      Social History     Social History   • Marital status:      Spouse name: N/A   • Number of children: N/A   • Years of education: N/A     Occupational History   • Not on file.     Social History Main Topics   • Smoking status: Never Smoker    • Smokeless tobacco: Never Used   • Alcohol use Yes      Comment: on occasion   • Drug use: No   • Sexual activity: Defer     Other Topics Concern   • Not on file     Social History Narrative      Current Outpatient Prescriptions on File Prior to Visit   Medication Sig Dispense Refill   • acetaminophen (TYLENOL) 500 MG tablet Take 500 mg by mouth As Needed.     • amLODIPine (NORVASC) 2.5 MG tablet TAKE ONE TABLET BY MOUTH DAILY (Patient taking differently: TAKE 1/2 TABLET BY MOUTH DAILY) 90 tablet 3   • Calcium-Vitamin D-Vitamin K (CALCIUM SOFT CHEWS PO) Take  by mouth.     • Cholecalciferol (VITAMIN D) 2000 UNITS capsule Take 1 capsule by mouth daily.     • clopidogrel (PLAVIX) 75 MG tablet Take 1 tablet by mouth Daily. 30 tablet 5   • Crisaborole (EUCRISA) 2 % ointment Apply 1 application topically 2 (Two) Times a Day. 60 g 1   • famotidine-calcium carb-mag hydroxide (PEPCID COMPLETE) -165 MG chewable tablet Chew Daily.     • flecainide (TAMBOCOR) 50 MG tablet TAKE ONE TABLET BY MOUTH UP TO  TWICE A DAY AS NEEDED FOR AFIB (ATRIAL FIBRILLATION) 60 tablet 3   • Ginger, Zingiber officinalis, (GINGER EXTRACT PO) Take  by mouth Take As Directed.     • levothyroxine (SYNTHROID, LEVOTHROID) 50 MCG tablet TAKE ONE TABLET BY MOUTH DAILY 30 tablet 4   • Loratadine (CLARITIN PO) Take 1 tablet by mouth Daily.     • LORazepam (ATIVAN) 0.5 MG tablet Take 1 tablet by mouth Daily As Needed for Anxiety. 30 tablet 3   • montelukast (SINGULAIR) 10 MG tablet TAKE ONE TABLET BY MOUTH ONCE NIGHTLY 30 tablet 4   • Multiple Vitamins-Minerals (MULTI FOR HER) capsule Take 1 capsule by mouth daily.     • pantoprazole (PROTONIX) 40 MG EC tablet 40 mg Daily.     • VALERIAN PO Take  by mouth.       No current facility-administered medications on file prior to visit.       Allergies   Allergen Reactions   • Ace Inhibitors Cough   • Celexa [Citalopram Hydrobromide] Dizziness   • Paxil [Paroxetine Hcl] Other (See Comments)      somnolence     • Corticosteroids Rash        Review of Systems   Constitutional: Negative for activity change, appetite change, chills, diaphoresis, fatigue, fever and unexpected weight change.   HENT: Negative for congestion, dental problem, drooling, ear discharge, ear pain, facial swelling, hearing loss, mouth sores, nosebleeds, postnasal drip, rhinorrhea, sinus pressure, sneezing, sore throat, tinnitus, trouble swallowing and voice change.    Eyes: Negative for photophobia, pain, discharge, redness, itching and visual disturbance.   Respiratory: Negative for apnea, cough, choking, chest tightness, shortness of breath, wheezing and stridor.    Cardiovascular: Negative for chest pain, palpitations and leg swelling.   Gastrointestinal: Negative for abdominal distention, abdominal pain, anal bleeding, blood in stool, constipation, diarrhea, nausea, rectal pain and vomiting.   Endocrine: Negative for cold intolerance, heat intolerance, polydipsia, polyphagia and polyuria.   Genitourinary: Negative for decreased urine volume, difficulty urinating, dysuria, enuresis, flank pain, frequency, genital sores, hematuria and urgency.   Musculoskeletal: Negative for arthralgias, back pain, gait problem, joint swelling, myalgias, neck pain and neck stiffness.        Joint Pain    Skin: Negative for color change, pallor, rash and wound.   Allergic/Immunologic: Negative for environmental allergies, food allergies and immunocompromised state.   Neurological: Negative for dizziness, tremors, seizures, syncope, facial asymmetry, speech difficulty, weakness, light-headedness, numbness and headaches.   Hematological: Negative for adenopathy. Does not bruise/bleed easily.   Psychiatric/Behavioral: Negative for agitation, behavioral problems, confusion, decreased concentration, dysphoric mood, hallucinations, self-injury, sleep disturbance and suicidal ideas. The patient is not nervous/anxious and is not hyperactive.         Objective       Physical Exam  LMP  (LMP Unknown)    There is no height or weight on file to calculate BMI.    General:   Mental Status:  Alert   Appearance: Cooperative, in no acute distress   Posture: Normal    Assessment and Plan     Annalise was seen today for follow-up.    Diagnoses and all orders for this visit:    Primary osteoarthritis of right knee  -     Large Joint Arthrocentesis      Administration of viscosupplementation today.  Please see my procedure note for details.    Return in about 1 week (around 2/5/2018) for Injection.    Larry Bucio MD  01/29/18  3:20 PM

## 2018-02-05 ENCOUNTER — CLINICAL SUPPORT (OUTPATIENT)
Dept: ORTHOPEDIC SURGERY | Facility: CLINIC | Age: 68
End: 2018-02-05

## 2018-02-05 DIAGNOSIS — M17.11 PRIMARY OSTEOARTHRITIS OF RIGHT KNEE: Primary | ICD-10-CM

## 2018-02-05 PROCEDURE — 20610 DRAIN/INJ JOINT/BURSA W/O US: CPT | Performed by: PHYSICIAN ASSISTANT

## 2018-02-05 NOTE — PROGRESS NOTES
Procedure   Large Joint Arthrocentesis  Date/Time: 2/5/2018 1:08 PM  Consent given by: patient  Site marked: site marked  Timeout: Immediately prior to procedure a time out was called to verify the correct patient, procedure, equipment, support staff and site/side marked as required   Supporting Documentation  Indications: pain   Procedure Details  Location: knee - R knee  Preparation: Patient was prepped and draped in the usual sterile fashion  Needle size: 22 G  Approach: anterolateral  Medications administered: 30 mg Hyaluronan 30 MG/2ML  Aspirate: clear (to verify intraarticular placement of the needle)  Patient tolerance: patient tolerated the procedure well with no immediate complications

## 2018-02-05 NOTE — PROGRESS NOTES
Subjective     Injections (Right knee Orthovisc Injection #2)      Annalise Winters is a 67 y.o. female.      History of Present Illness   Patient returns to today for second injection to the right knee.  No complaints or problems following last week's injection.  Patient reported ability to return to normal daily activities within 24 hours following the injection.  She denies any numbness, tingling, fever, chills or night sweats.      Allergies   Allergen Reactions   • Ace Inhibitors Cough   • Celexa [Citalopram Hydrobromide] Dizziness   • Paxil [Paroxetine Hcl] Other (See Comments)     somnolence     • Corticosteroids Rash     Current Outpatient Prescriptions on File Prior to Visit   Medication Sig Dispense Refill   • acetaminophen (TYLENOL) 500 MG tablet Take 500 mg by mouth As Needed.     • amLODIPine (NORVASC) 2.5 MG tablet TAKE ONE TABLET BY MOUTH DAILY (Patient taking differently: TAKE 1/2 TABLET BY MOUTH DAILY) 90 tablet 3   • Calcium-Vitamin D-Vitamin K (CALCIUM SOFT CHEWS PO) Take  by mouth.     • Cholecalciferol (VITAMIN D) 2000 UNITS capsule Take 1 capsule by mouth daily.     • clopidogrel (PLAVIX) 75 MG tablet Take 1 tablet by mouth Daily. 30 tablet 5   • Crisaborole (EUCRISA) 2 % ointment Apply 1 application topically 2 (Two) Times a Day. 60 g 1   • famotidine-calcium carb-mag hydroxide (PEPCID COMPLETE) -165 MG chewable tablet Chew Daily.     • flecainide (TAMBOCOR) 50 MG tablet TAKE ONE TABLET BY MOUTH UP TO  TWICE A DAY AS NEEDED FOR AFIB (ATRIAL FIBRILLATION) 60 tablet 3   • Ginger, Zingiber officinalis, (GINGER EXTRACT PO) Take  by mouth Take As Directed.     • levothyroxine (SYNTHROID, LEVOTHROID) 50 MCG tablet TAKE ONE TABLET BY MOUTH DAILY 30 tablet 4   • Loratadine (CLARITIN PO) Take 1 tablet by mouth Daily.     • LORazepam (ATIVAN) 0.5 MG tablet Take 1 tablet by mouth Daily As Needed for Anxiety. 30 tablet 3   • montelukast (SINGULAIR) 10 MG tablet TAKE ONE TABLET BY MOUTH ONCE NIGHTLY  30 tablet 4   • Multiple Vitamins-Minerals (MULTI FOR HER) capsule Take 1 capsule by mouth daily.     • pantoprazole (PROTONIX) 40 MG EC tablet 40 mg Daily.     • SUPREP BOWEL PREP KIT 17.5-3.13-1.6 GM/180ML solution oral solution      • VALERIAN PO Take  by mouth.       No current facility-administered medications on file prior to visit.      Social History     Social History   • Marital status:      Spouse name: N/A   • Number of children: N/A   • Years of education: N/A     Occupational History   • Not on file.     Social History Main Topics   • Smoking status: Never Smoker   • Smokeless tobacco: Never Used   • Alcohol use Yes      Comment: on occasion   • Drug use: No   • Sexual activity: Defer     Other Topics Concern   • Not on file     Social History Narrative     Past Surgical History:   Procedure Laterality Date   • CARDIAC ABLATION  2013    Pulmonary vein ablation by Dr. Abdirahman Lackey, 2013.   • PARTIAL HYSTERECTOMY     • RHINOPLASTY     • TUBAL ABDOMINAL LIGATION       Family History   Problem Relation Age of Onset   • Dementia Mother    • Glomerulonephritis Mother    • Hypertension Mother    • Other Mother       at age 88   • Arthritis Father    • Cancer Father      prostate cancer   • Other Father       at age 65   • Heart attack Father    • Hypertension Other    • Osteoarthritis Other      Past Medical History:   Diagnosis Date   • Abnormal blood chemistry    • Arthritis     right knee   • Cyst of buttocks    • Dizziness    • Dysuria    • Esophagitis    • Flushing    • GERD (gastroesophageal reflux disease)    • Heartburn    • Hypertension    • Hyperthyroidism    • Hypothyroidism    • Irritable bowel syndrome    • LLQ abdominal pain    • Polyp of sigmoid colon    • Sinusitis    • Skin lesion of face    • UTI (urinary tract infection)          Review of Systems    The following portions of the patient's history were reviewed and updated as appropriate: allergies,  current medications, past family history, past medical history, past social history, past surgical history and problem list.    Ortho Exam  Right knee  Skin is intact no redness warmth lesions or effusion noted.  Patient to demonstrate full range of motion.  Motor and sensory grossly intact L2 to S1.    Medical Decision Making    Assessment and Plan/ Diagnosis/Treatment options:   Right knee osteoarthritis    Procedure: After discussing the R/B/I of the visco-supplementation injection, the patient gave consent to proceed.  Her right knee was confirmed as the correct joint to be injected with a timeout.  The knee was then prepped with Hibiclens, and then injected with a prefilled syringe of 2 mL of Orthovisc without any resistance through the anterio-lateral approach, patient in seated position.  The patient tolerated the injection well.  I instructed the patient on signs and symptoms of infection.  They should report to the ER if any of these symptoms develop.   Recommended modifying activity for 48 hours, rest, ice, elevation and oral pain medication as tolerated.  The patient was observed ambulating normally after the injection.    Follow-up in 1 week for third injection.

## 2018-02-12 ENCOUNTER — CLINICAL SUPPORT (OUTPATIENT)
Dept: ORTHOPEDIC SURGERY | Facility: CLINIC | Age: 68
End: 2018-02-12

## 2018-02-12 DIAGNOSIS — M17.11 PRIMARY OSTEOARTHRITIS OF RIGHT KNEE: Primary | ICD-10-CM

## 2018-02-12 PROCEDURE — 20610 DRAIN/INJ JOINT/BURSA W/O US: CPT | Performed by: PHYSICIAN ASSISTANT

## 2018-02-12 NOTE — PROGRESS NOTES
Procedure   Large Joint Arthrocentesis  Date/Time: 2/12/2018 1:02 PM  Consent given by: patient  Site marked: site marked  Timeout: Immediately prior to procedure a time out was called to verify the correct patient, procedure, equipment, support staff and site/side marked as required   Supporting Documentation  Indications: pain   Procedure Details  Location: knee - R knee  Preparation: Patient was prepped and draped in the usual sterile fashion  Needle size: 22 G  Approach: anterolateral  Medications administered: 30 mg Hyaluronan 30 MG/2ML  Patient tolerance: patient tolerated the procedure well with no immediate complications

## 2018-02-12 NOTE — PROGRESS NOTES
Subjective     Injections (right knee #3)      Annalise Winters is a 67 y.o. female.     History of Present Illness   Returns today for her third injection to the right knee.  No complaints or problems following last week's injection.  She currently denies any numbness, tingling, fever, chills or night sweats.    Allergies   Allergen Reactions   • Ace Inhibitors Cough   • Celexa [Citalopram Hydrobromide] Dizziness   • Paxil [Paroxetine Hcl] Other (See Comments)     somnolence     • Corticosteroids Rash     Current Outpatient Prescriptions on File Prior to Visit   Medication Sig Dispense Refill   • acetaminophen (TYLENOL) 500 MG tablet Take 500 mg by mouth As Needed.     • amLODIPine (NORVASC) 2.5 MG tablet TAKE ONE TABLET BY MOUTH DAILY (Patient taking differently: TAKE 1/2 TABLET BY MOUTH DAILY) 90 tablet 3   • Calcium-Vitamin D-Vitamin K (CALCIUM SOFT CHEWS PO) Take  by mouth.     • Cholecalciferol (VITAMIN D) 2000 UNITS capsule Take 1 capsule by mouth daily.     • clopidogrel (PLAVIX) 75 MG tablet Take 1 tablet by mouth Daily. 30 tablet 5   • Crisaborole (EUCRISA) 2 % ointment Apply 1 application topically 2 (Two) Times a Day. 60 g 1   • famotidine-calcium carb-mag hydroxide (PEPCID COMPLETE) -165 MG chewable tablet Chew Daily.     • flecainide (TAMBOCOR) 50 MG tablet TAKE ONE TABLET BY MOUTH UP TO  TWICE A DAY AS NEEDED FOR AFIB (ATRIAL FIBRILLATION) 60 tablet 3   • Ginger, Zingiber officinalis, (GINGER EXTRACT PO) Take  by mouth Take As Directed.     • levothyroxine (SYNTHROID, LEVOTHROID) 50 MCG tablet TAKE ONE TABLET BY MOUTH DAILY 30 tablet 4   • Loratadine (CLARITIN PO) Take 1 tablet by mouth Daily.     • LORazepam (ATIVAN) 0.5 MG tablet Take 1 tablet by mouth Daily As Needed for Anxiety. 30 tablet 3   • montelukast (SINGULAIR) 10 MG tablet TAKE ONE TABLET BY MOUTH ONCE NIGHTLY 30 tablet 4   • Multiple Vitamins-Minerals (MULTI FOR HER) capsule Take 1 capsule by mouth daily.     • pantoprazole  (PROTONIX) 40 MG EC tablet 40 mg Daily.     • SUPREP BOWEL PREP KIT 17.5-3.13-1.6 GM/180ML solution oral solution      • VALERIAN PO Take  by mouth.       No current facility-administered medications on file prior to visit.      Social History     Social History   • Marital status:      Spouse name: N/A   • Number of children: N/A   • Years of education: N/A     Occupational History   • Not on file.     Social History Main Topics   • Smoking status: Never Smoker   • Smokeless tobacco: Never Used   • Alcohol use Yes      Comment: on occasion   • Drug use: No   • Sexual activity: Defer     Other Topics Concern   • Not on file     Social History Narrative     Past Surgical History:   Procedure Laterality Date   • CARDIAC ABLATION  2013    Pulmonary vein ablation by Dr. Abdirahman Lackey, 2013.   • PARTIAL HYSTERECTOMY     • RHINOPLASTY     • TUBAL ABDOMINAL LIGATION       Family History   Problem Relation Age of Onset   • Dementia Mother    • Glomerulonephritis Mother    • Hypertension Mother    • Other Mother       at age 88   • Arthritis Father    • Cancer Father      prostate cancer   • Other Father       at age 65   • Heart attack Father    • Hypertension Other    • Osteoarthritis Other      Past Medical History:   Diagnosis Date   • Abnormal blood chemistry    • Arthritis     right knee   • Cyst of buttocks    • Dizziness    • Dysuria    • Esophagitis    • Flushing    • GERD (gastroesophageal reflux disease)    • Heartburn    • Hypertension    • Hyperthyroidism    • Hypothyroidism    • Irritable bowel syndrome    • LLQ abdominal pain    • Polyp of sigmoid colon    • Sinusitis    • Skin lesion of face    • UTI (urinary tract infection)          Review of Systems    The following portions of the patient's history were reviewed and updated as appropriate: allergies, current medications, past family history, past medical history, past social history, past surgical history and problem  list.    Ortho Exam  Right knee  Skin is intact thigh redness, warmth, lesions or effusion noted.  She is able to demonstrate full range of motion to the knee.  Motor and sensory grossly intact L2 to S1    Medical Decision Making    Assessment and Plan/ Diagnosis/Treatment options:   Right knee osteoarthritis    Plan  Patient was given third injection today.  She'll follow up in 6 months for repeat of the series if required and/or sooner if symptoms worsen/change.    Procedure: After discussing the R/B/I of Visco supplementation injection, the patient gave consent to proceed.  Her right knee was confirmed as the correct joint to be injected with a timeout.  The knee was prepped with Hibiclens and injected with a prefilled syringe of Orthovisc without any resistance to the anterior lateral approach with the patient in seated position.  Patient tolerated procedure well.  Hemostasis was achieved and a Band-Aid was applied over the injection site.  I reviewed signs and symptoms of infection and if she notes any of these to report back to the clinic or the emergency department for further evaluation and treatment.  Recommend activity modification over the next 48 hours to include rest, ice, elevation and oral pain medication as needed.  Patient was observed ambulating normally after the injection.

## 2018-03-15 RX ORDER — MONTELUKAST SODIUM 10 MG/1
TABLET ORAL
Qty: 30 TABLET | Refills: 2 | Status: SHIPPED | OUTPATIENT
Start: 2018-03-15 | End: 2018-06-11 | Stop reason: SDUPTHER

## 2018-05-23 RX ORDER — LEVOTHYROXINE SODIUM 0.05 MG/1
TABLET ORAL
Qty: 30 TABLET | Refills: 0 | Status: SHIPPED | OUTPATIENT
Start: 2018-05-23 | End: 2018-06-11 | Stop reason: SDUPTHER

## 2018-06-11 ENCOUNTER — OFFICE VISIT (OUTPATIENT)
Dept: INTERNAL MEDICINE | Facility: CLINIC | Age: 68
End: 2018-06-11

## 2018-06-11 VITALS
OXYGEN SATURATION: 98 % | DIASTOLIC BLOOD PRESSURE: 64 MMHG | WEIGHT: 149 LBS | BODY MASS INDEX: 22.58 KG/M2 | HEART RATE: 75 BPM | SYSTOLIC BLOOD PRESSURE: 112 MMHG | HEIGHT: 68 IN

## 2018-06-11 DIAGNOSIS — F41.9 ANXIETY: ICD-10-CM

## 2018-06-11 DIAGNOSIS — J30.2 CHRONIC SEASONAL ALLERGIC RHINITIS DUE TO OTHER ALLERGEN: ICD-10-CM

## 2018-06-11 DIAGNOSIS — E03.9 HYPOTHYROIDISM, UNSPECIFIED TYPE: ICD-10-CM

## 2018-06-11 DIAGNOSIS — I10 ESSENTIAL HYPERTENSION: ICD-10-CM

## 2018-06-11 DIAGNOSIS — M54.50 CHRONIC MIDLINE LOW BACK PAIN WITHOUT SCIATICA: Primary | ICD-10-CM

## 2018-06-11 DIAGNOSIS — G89.29 CHRONIC MIDLINE LOW BACK PAIN WITHOUT SCIATICA: Primary | ICD-10-CM

## 2018-06-11 DIAGNOSIS — K21.00 GASTROESOPHAGEAL REFLUX DISEASE WITH ESOPHAGITIS: ICD-10-CM

## 2018-06-11 LAB
T4 FREE SERPL-MCNC: 1.31 NG/DL (ref 0.89–1.76)
TSH SERPL DL<=0.05 MIU/L-ACNC: 1.78 MIU/ML (ref 0.35–5.35)

## 2018-06-11 PROCEDURE — 84443 ASSAY THYROID STIM HORMONE: CPT | Performed by: INTERNAL MEDICINE

## 2018-06-11 PROCEDURE — 99213 OFFICE O/P EST LOW 20 MIN: CPT | Performed by: INTERNAL MEDICINE

## 2018-06-11 PROCEDURE — 84439 ASSAY OF FREE THYROXINE: CPT | Performed by: INTERNAL MEDICINE

## 2018-06-11 RX ORDER — MONTELUKAST SODIUM 10 MG/1
10 TABLET ORAL NIGHTLY
Qty: 30 TABLET | Refills: 2 | Status: SHIPPED | OUTPATIENT
Start: 2018-06-11 | End: 2018-09-26 | Stop reason: SDUPTHER

## 2018-06-11 RX ORDER — LEVOTHYROXINE SODIUM 0.05 MG/1
50 TABLET ORAL DAILY
Qty: 90 TABLET | Refills: 1 | Status: SHIPPED | OUTPATIENT
Start: 2018-06-11 | End: 2018-12-16 | Stop reason: SDUPTHER

## 2018-06-11 RX ORDER — LORAZEPAM 0.5 MG/1
0.5 TABLET ORAL DAILY PRN
Qty: 30 TABLET | Refills: 0 | Status: SHIPPED | OUTPATIENT
Start: 2018-06-11 | End: 2019-01-10 | Stop reason: SDUPTHER

## 2018-06-11 NOTE — PROGRESS NOTES
Hypertension (Follow Up x 6 months); Heartburn; and Back Pain    Subjective   Annalise Winters is a 67 y.o. female is here today for follow-up.    History of Present Illness   Continues to have some back pian, and feels better after she works out and moves around.  Getting knee injections from Dr. Bucio.  Here for a follow up on her HTN, and GERD.  Neck rash got better, on its own.  Hs weaned herself off the ativan, w0uld like to keep an Rx for it, as her anxiety gets worse when she is in a-fib.  A-fib, is very sporadic and has seen Eulogio Ramirez, who advised her to get an EKG.  Sister and friend with severe medical problems.      Current Outpatient Prescriptions:   •  acetaminophen (TYLENOL) 500 MG tablet, Take 500 mg by mouth As Needed., Disp: , Rfl:   •  amLODIPine (NORVASC) 2.5 MG tablet, TAKE ONE TABLET BY MOUTH DAILY (Patient taking differently: TAKE 1/2 TABLET BY MOUTH DAILY), Disp: 90 tablet, Rfl: 3  •  Calcium-Vitamin D-Vitamin K (CALCIUM SOFT CHEWS PO), Take  by mouth., Disp: , Rfl:   •  Cholecalciferol (VITAMIN D) 2000 UNITS capsule, Take 1 capsule by mouth daily., Disp: , Rfl:   •  Crisaborole (EUCRISA) 2 % ointment, Apply 1 application topically 2 (Two) Times a Day., Disp: 60 g, Rfl: 1  •  famotidine-calcium carb-mag hydroxide (PEPCID COMPLETE) -165 MG chewable tablet, Chew Daily., Disp: , Rfl:   •  levothyroxine (SYNTHROID, LEVOTHROID) 50 MCG tablet, Take 1 tablet by mouth Daily., Disp: 90 tablet, Rfl: 1  •  Loratadine (CLARITIN PO), Take 1 tablet by mouth Daily., Disp: , Rfl:   •  LORazepam (ATIVAN) 0.5 MG tablet, Take 1 tablet by mouth Daily As Needed for Anxiety., Disp: 30 tablet, Rfl: 0  •  montelukast (SINGULAIR) 10 MG tablet, Take 1 tablet by mouth Every Night., Disp: 30 tablet, Rfl: 2  •  pantoprazole (PROTONIX) 40 MG EC tablet, 40 mg Daily., Disp: , Rfl:   •  clopidogrel (PLAVIX) 75 MG tablet, Take 1 tablet by mouth Daily., Disp: 30 tablet, Rfl: 5  •  Ginger, Zingiber officinalis, (GINGER  "EXTRACT PO), Take  by mouth Take As Directed., Disp: , Rfl:   •  Multiple Vitamins-Minerals (MULTI FOR HER) capsule, Take 1 capsule by mouth daily., Disp: , Rfl:       The following portions of the patient's history were reviewed and updated as appropriate: allergies, current medications, past family history, past medical history, past social history, past surgical history and problem list.    Review of Systems   Constitutional: Negative.  Negative for chills and fever.   HENT: Negative for ear discharge, ear pain, sinus pressure and sore throat.    Respiratory: Negative for cough, chest tightness and shortness of breath.    Cardiovascular: Positive for palpitations. Negative for chest pain and leg swelling.   Gastrointestinal: Negative for diarrhea, nausea and vomiting.   Musculoskeletal: Positive for back pain and myalgias. Negative for arthralgias.   Neurological: Negative for dizziness, syncope and headaches.   Psychiatric/Behavioral: Negative for confusion and sleep disturbance.       Objective   /64   Pulse 75   Ht 172.7 cm (68\")   Wt 67.6 kg (149 lb)   LMP  (LMP Unknown)   SpO2 98%   Breastfeeding? No   BMI 22.66 kg/m²   Physical Exam   Constitutional: She is oriented to person, place, and time. She appears well-developed and well-nourished.   HENT:   Head: Normocephalic and atraumatic.   Right Ear: External ear normal.   Left Ear: External ear normal.   Mouth/Throat: No oropharyngeal exudate.   Eyes: Conjunctivae are normal. Pupils are equal, round, and reactive to light.   Neck: Neck supple. No thyromegaly present.   Cardiovascular: Normal rate and regular rhythm.    Pulmonary/Chest: Effort normal and breath sounds normal.   Abdominal: Soft. Bowel sounds are normal. She exhibits no distension. There is no tenderness.   Musculoskeletal: She exhibits tenderness. She exhibits no edema.   Neurological: She is alert and oriented to person, place, and time. No cranial nerve deficit.   Skin: Skin is " warm and dry.   Psychiatric: She has a normal mood and affect. Judgment normal.   Nursing note and vitals reviewed.        Results for orders placed or performed in visit on 06/11/18   TSH   Result Value Ref Range    TSH 1.780 0.350 - 5.350 mIU/mL   T4, Free   Result Value Ref Range    Free T4 1.31 0.89 - 1.76 ng/dL             Assessment/Plan   Diagnoses and all orders for this visit:    Chronic midline low back pain without sciatica  Comments:  will call back if would like the xray, declines PT.    Essential hypertension  Comments:  hydrate well, increase po fluids.    Gastroesophageal reflux disease with esophagitis    Anxiety  -     LORazepam (ATIVAN) 0.5 MG tablet; Take 1 tablet by mouth Daily As Needed for Anxiety.    Hypothyroidism, unspecified type  -     TSH  -     T4, Free  -     levothyroxine (SYNTHROID, LEVOTHROID) 50 MCG tablet; Take 1 tablet by mouth Daily.    Chronic seasonal allergic rhinitis due to other allergen  -     montelukast (SINGULAIR) 10 MG tablet; Take 1 tablet by mouth Every Night.           The patient has read and signed the [facility] Controlled Substance Contract.  I will continue to see patient for regular follow up appointments.  They are well controlled on their medication.  JASMYN has been reviewed by me and is updated every 3 months. The patient is aware of the potential for addiction and dependence.        Return in about 6 months (around 12/11/2018) for Medicare Wellness.

## 2018-07-31 ENCOUNTER — OFFICE VISIT (OUTPATIENT)
Dept: CARDIOLOGY | Facility: CLINIC | Age: 68
End: 2018-07-31

## 2018-07-31 VITALS
BODY MASS INDEX: 23.04 KG/M2 | SYSTOLIC BLOOD PRESSURE: 142 MMHG | HEART RATE: 68 BPM | WEIGHT: 152 LBS | HEIGHT: 68 IN | DIASTOLIC BLOOD PRESSURE: 82 MMHG

## 2018-07-31 DIAGNOSIS — I10 ESSENTIAL HYPERTENSION: ICD-10-CM

## 2018-07-31 DIAGNOSIS — I48.19 PERSISTENT ATRIAL FIBRILLATION (HCC): Primary | ICD-10-CM

## 2018-07-31 PROCEDURE — 99213 OFFICE O/P EST LOW 20 MIN: CPT | Performed by: NURSE PRACTITIONER

## 2018-07-31 PROCEDURE — 93000 ELECTROCARDIOGRAM COMPLETE: CPT | Performed by: NURSE PRACTITIONER

## 2018-07-31 RX ORDER — FLECAINIDE ACETATE 50 MG/1
50 TABLET ORAL AS NEEDED
COMMUNITY
End: 2019-02-05 | Stop reason: SDUPTHER

## 2018-07-31 NOTE — PROGRESS NOTES
Encounter Date:07/31/2018    Patient ID: Annalise Winters is a 67 y.o. female who resides in [place], [place].    CC/Reason for visit:  Atrial Fibrillation and Hypertension            Annalise Winters returns today for follow-up of her hypertension and atrial fibrillation.  Since her last visit she has done well.  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  She has taken her flecainide on a couple of occasions where she thinks she went out of rhythm.  After an hour to 2 she went back into rhythm.  She says this is rather normal for her and is rare.  She overall feels well and denies chest pain, dyspnea, orthopnea, presyncope or syncope.  She does not take anticoagulation as her atrial fibrillation has been controlled since her pulmonary vein ablation several years ago.  She no she should take a daily aspirin but has not been taking because of her history of acid reflux.    Review of Systems   HENT: Positive for tinnitus.    Cardiovascular: Positive for leg swelling.   Musculoskeletal: Positive for arthritis.   Gastrointestinal: Positive for bloating, abdominal pain and heartburn.   Genitourinary: Positive for frequency.   Allergic/Immunologic: Positive for environmental allergies.       The patient's past medical, social, family history and ROS reviewed in the patient's electronic medical record.    Allergies  Ace inhibitors; Celexa [citalopram hydrobromide]; Paxil [paroxetine hcl]; and Corticosteroids    Outpatient Prescriptions Marked as Taking for the 7/31/18 encounter (Office Visit) with TAMMY Aj   Medication Sig Dispense Refill   • acetaminophen (TYLENOL) 500 MG tablet Take 500 mg by mouth As Needed.     • amLODIPine (NORVASC) 2.5 MG tablet TAKE ONE TABLET BY MOUTH DAILY (Patient taking differently: TAKE 1 TABLET BY MOUTH DAILY) 90 tablet 3   • Calcium-Vitamin D-Vitamin K (CALCIUM SOFT CHEWS PO) Take 1 tablet by mouth Daily.     •  "Cholecalciferol (VITAMIN D) 2000 UNITS capsule Take 1 capsule by mouth daily.     • Famotidine-Ca Carb-Mag Hydrox (PEPCID COMPLETE PO) Take 1 tablet by mouth As Needed.     • flecainide (TAMBOCOR) 50 MG tablet Take 50 mg by mouth As Needed.     • Ginger, Zingiber officinalis, (GINGER EXTRACT PO) Take 1 tablet by mouth As Needed.     • levothyroxine (SYNTHROID, LEVOTHROID) 50 MCG tablet Take 1 tablet by mouth Daily. 90 tablet 1   • Loratadine (CLARITIN PO) Take 10 mg by mouth Daily.     • LORazepam (ATIVAN) 0.5 MG tablet Take 1 tablet by mouth Daily As Needed for Anxiety. 30 tablet 0   • montelukast (SINGULAIR) 10 MG tablet Take 1 tablet by mouth Every Night. 30 tablet 2   • Multiple Vitamins-Minerals (MULTI FOR HER) capsule Take 1 capsule by mouth As Needed.     • pantoprazole (PROTONIX) 40 MG EC tablet Take 40 mg by mouth Daily.           Blood pressure 142/82, pulse 68, height 172.7 cm (68\"), weight 68.9 kg (152 lb), not currently breastfeeding.  Body mass index is 23.11 kg/m².  There were no vitals filed for this visit.    Physical Exam   Constitutional: She is oriented to person, place, and time. She appears well-developed and well-nourished.   HENT:   Head: Normocephalic and atraumatic.   Eyes: Pupils are equal, round, and reactive to light. No scleral icterus.   Neck: No JVD present. Carotid bruit is not present. No thyromegaly present.   Cardiovascular: Normal rate, regular rhythm, S1 normal and S2 normal.  Exam reveals no gallop.    No murmur heard.  Pulmonary/Chest: Effort normal and breath sounds normal.   Abdominal: Soft. There is no hepatosplenomegaly. There is no tenderness.   Neurological: She is alert and oriented to person, place, and time.   Skin: Skin is warm and dry. No cyanosis. Nails show no clubbing.   Psychiatric: She has a normal mood and affect. Her behavior is normal.       Data Review:       ECG 12 Lead  Date/Time: 7/31/2018 11:37 AM  Performed by: WOODROW KENNEDY  Authorized by: " WOODROW KENNEDY   Rhythm: sinus rhythm  BPM: 66  Clinical impression: normal ECG  Comments: Normal sinus rhythm  QT/QTc 420/440 MS  QRS 92 MS            Lab Results   Component Value Date    CHOL 221 (H) 12/11/2017    TRIG 93 12/11/2017    HDL 90 (H) 12/11/2017     12/11/2017    AST 26 12/11/2017    ALT 27 12/11/2017       No results found for: HGBA1C         Problem List Items Addressed This Visit        Cardiovascular and Mediastinum    Persistent atrial fibrillation (CMS/HCC) - Primary    Overview     · Initially diagnosed in 2012.  · Myocardial perfusion study (04/02/2013):  No ischemia.  · Event monitor (10/2013): demonstrating paroxysmal atrial fibrillation.  · Failure of flecainide, sotalol and Multaq therapy.  · Pulmonary vein ablation by Dr. Abdirahman Lackey, 08/21/2013.  · Chads Vasc=3 no anticoagulation needed due to successful PVA           Current Assessment & Plan     · Continue Plavix 75 mg daily         Essential hypertension    Current Assessment & Plan     · Hypertension is controlled  · Continue amlodipine 2.5 mg daily               Patient is doing well from a cardiovascular standpoint.  She should take a 81 mg aspirin daily and she has agreed to do this.  Her atrial fibrillation appears well controlled.  If her atrial fibrillation episodes appear to be increasing her lasting all longer she should contact us.  If she goes out of rhythm and it is not terminated after taking the flecainide she should go to the nearest place for an EKG.  Could consider a external cardioversion if needed.  Will continue to defer anticoagulation for now     · Continue pill in the pocket approach with flecainide 50 mg as needed for atrial fibrillation.  · If atrial fibrillation not terminated after one to 2 hours should go to nearest place for an EKG.  Could have cardioversion if needed  · Defer anticoagulation due to controlled atrial fibrillation since PVA  · Follow-up 12 months or sooner if needed    Shanita  LINDA Kramer MA  7/31/2018

## 2018-08-20 ENCOUNTER — OFFICE VISIT (OUTPATIENT)
Dept: ORTHOPEDIC SURGERY | Facility: CLINIC | Age: 68
End: 2018-08-20

## 2018-08-20 VITALS — BODY MASS INDEX: 22.12 KG/M2 | WEIGHT: 145.94 LBS | HEART RATE: 62 BPM | HEIGHT: 68 IN | OXYGEN SATURATION: 99 %

## 2018-08-20 DIAGNOSIS — M17.11 PRIMARY OSTEOARTHRITIS OF RIGHT KNEE: Primary | ICD-10-CM

## 2018-08-20 PROCEDURE — 99212 OFFICE O/P EST SF 10 MIN: CPT | Performed by: PHYSICIAN ASSISTANT

## 2018-08-20 NOTE — PROGRESS NOTES
"    [facility] Note    Subjective     CC: Follow-up (6 month f/u right knee)      HPI    Annalise Winters is a 67 y.o. female.  Returns today for 6 month follow-up of her right knee.  She has a known history of osteoarthritis.  In February 2018 she was given Orthovisc series injections.  Patient states she's doing quite well at this time she only notes an intermittent ache or pain to the knee along with occasional swelling.  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  No reported radiculopathy or paresthesias.  No other new complaints.      ROS:    Constiutional:Pt denies fever, chills, nausea, or vomiting.  MSK:as above    Objective      Past Medical History  Past Medical History:   Diagnosis Date   • Abnormal blood chemistry    • Arthritis     right knee   • Cyst of buttocks    • Dizziness    • Dysuria    • Esophagitis    • Flushing    • GERD (gastroesophageal reflux disease)    • Heartburn    • Hypertension    • Hyperthyroidism    • Hypothyroidism    • Irritable bowel syndrome    • LLQ abdominal pain    • Polyp of sigmoid colon    • Sinusitis    • Skin lesion of face    • UTI (urinary tract infection)          Physical Exam  Pulse 62   Ht 172.7 cm (67.99\")   Wt 66.2 kg (145 lb 15.1 oz)   LMP  (LMP Unknown)   SpO2 99%   BMI 22.20 kg/m²     Body mass index is 22.2 kg/m².    Patient is well nourished and well developed.        Ortho Exam  Integument:   Right knee: No skin lesions, no rash, no ecchymosis    Lower Extremities:   Right Knee:    Tenderness:  None    Effusion:  None    Swelling: None    Crepitus:  None    Range of motion:  Extension: 0°       Flexion: 125°  Instability:  No varus laxity, no valgus laxity, negative anterior drawer  Deformities:  None    Imaging/Labs/EMG Reviewed:  Imaging Results (last 24 hours)     ** No results found for the last 24 hours. **      No imaging today.    Assessment:  1. Primary osteoarthritis of " right knee        Plan:  1. Discussion was had the patient regarding exam, assessment and treatment options.    2. At this time since she's doing well no further treatment is warranted.    3. She'll continue with activity modifications, use of over-the-counter medications as needed, ice/heat as needed.   4. Discussed possibility of repeating Visco supplementation in the future if her symptoms return.  She understands that if this is required, she'll need preauthorization.  I instructed her to call the clinic to facilitate this when needed.   5. She'll return in 6 months for repeat evaluation, sooner if issues arise.  When she does present to that 6 month follow-up she'll need pre-clinic imaging bilateral knees.    6. Questions and concerns answered.      Medical Decision Making  Management Options : over-the-counter medicine      Rianna Lay PA-C  08/20/18  4:18 PM

## 2018-09-12 ENCOUNTER — OFFICE VISIT (OUTPATIENT)
Dept: INTERNAL MEDICINE | Facility: CLINIC | Age: 68
End: 2018-09-12

## 2018-09-12 VITALS
TEMPERATURE: 98.2 F | DIASTOLIC BLOOD PRESSURE: 80 MMHG | OXYGEN SATURATION: 99 % | BODY MASS INDEX: 22.88 KG/M2 | HEART RATE: 78 BPM | WEIGHT: 151 LBS | HEIGHT: 68 IN | SYSTOLIC BLOOD PRESSURE: 142 MMHG

## 2018-09-12 DIAGNOSIS — A08.4 VIRAL GASTROENTERITIS: ICD-10-CM

## 2018-09-12 DIAGNOSIS — R19.7 DIARRHEA, UNSPECIFIED TYPE: Primary | ICD-10-CM

## 2018-09-12 PROCEDURE — 99213 OFFICE O/P EST LOW 20 MIN: CPT | Performed by: NURSE PRACTITIONER

## 2018-09-12 NOTE — PROGRESS NOTES
Annalise Winters is a 67 y.o. female who presents for diarrhea and fever.    Chief Complaint   Patient presents with   • Diarrhea   • Fever       Patient states that she woke up this morning and had diarrhea and a fever of 100.5. 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 She felt better until after the class when the diarrhea started again. She has had 5-6 episodes of diarrhea today and decreased appetite.          Past Medical History:   Diagnosis Date   • Abnormal blood chemistry    • Arthritis     right knee   • Cyst of buttocks    • Dizziness    • Dysuria    • Esophagitis    • Flushing    • GERD (gastroesophageal reflux disease)    • Heartburn    • Hypertension    • Hyperthyroidism    • Hypothyroidism    • Irritable bowel syndrome    • LLQ abdominal pain    • Polyp of sigmoid colon    • Sinusitis    • Skin lesion of face    • UTI (urinary tract infection)        Past Surgical History:   Procedure Laterality Date   • CARDIAC ABLATION  2013    Pulmonary vein ablation by Dr. Abdirahman Lackey, 2013.   • PARTIAL HYSTERECTOMY     • RHINOPLASTY     • TUBAL ABDOMINAL LIGATION         Family History   Problem Relation Age of Onset   • Dementia Mother    • Glomerulonephritis Mother    • Hypertension Mother    • Other Mother          at age 88   • Arthritis Father    • Cancer Father         prostate cancer   • Other Father          at age 65   • Heart attack Father    • Hypertension Other    • Osteoarthritis Other        Social History     Social History   • Marital status:      Spouse name: N/A   • Number of children: N/A   • Years of education: N/A     Occupational History   • Not on file.     Social History Main Topics   • Smoking status: Never Smoker   • Smokeless tobacco: Never Used   • Alcohol use Yes      Comment: on occasion   • Drug use: No   • Sexual activity: Defer     Other Topics Concern   • Not on file     Social History Narrative   • No narrative on file  "      Allergies   Allergen Reactions   • Ace Inhibitors Cough   • Celexa [Citalopram Hydrobromide] Dizziness   • Paxil [Paroxetine Hcl] Other (See Comments)     somnolence     • Corticosteroids Rash       ROS    Review of Systems   Constitutional: Positive for fever.   HENT: Negative.    Eyes: Negative.    Respiratory: Negative.    Cardiovascular: Negative.    Gastrointestinal: Positive for diarrhea. Negative for abdominal distention, abdominal pain, blood in stool, nausea, rectal pain and vomiting.   Endocrine: Negative.    Genitourinary: Negative.    Musculoskeletal: Negative.    Skin: Negative.    Allergic/Immunologic: Negative.    Neurological: Negative.    Hematological: Negative.    Psychiatric/Behavioral: Negative.        Vitals:    09/12/18 1412   BP: 142/80   Pulse: 78   Temp: 98.2 °F (36.8 °C)   SpO2: 99%   Weight: 68.5 kg (151 lb)   Height: 172.7 cm (67.99\")   PainSc: 0-No pain         Current Outpatient Prescriptions:   •  acetaminophen (TYLENOL) 500 MG tablet, Take 500 mg by mouth As Needed., Disp: , Rfl:   •  amLODIPine (NORVASC) 2.5 MG tablet, TAKE ONE TABLET BY MOUTH DAILY (Patient taking differently: TAKE 1 TABLET BY MOUTH DAILY), Disp: 90 tablet, Rfl: 3  •  Calcium-Vitamin D-Vitamin K (CALCIUM SOFT CHEWS PO), Take 1 tablet by mouth Daily., Disp: , Rfl:   •  Cholecalciferol (VITAMIN D) 2000 UNITS capsule, Take 1 capsule by mouth daily., Disp: , Rfl:   •  Famotidine-Ca Carb-Mag Hydrox (PEPCID COMPLETE PO), Take 1 tablet by mouth As Needed., Disp: , Rfl:   •  flecainide (TAMBOCOR) 50 MG tablet, Take 50 mg by mouth As Needed., Disp: , Rfl:   •  Ginger, Zingiber officinalis, (GINGER EXTRACT PO), Take 1 tablet by mouth As Needed., Disp: , Rfl:   •  levothyroxine (SYNTHROID, LEVOTHROID) 50 MCG tablet, Take 1 tablet by mouth Daily., Disp: 90 tablet, Rfl: 1  •  Loratadine (CLARITIN PO), Take 10 mg by mouth Daily., Disp: , Rfl:   •  LORazepam (ATIVAN) 0.5 MG tablet, Take 1 tablet by mouth Daily As Needed for " Anxiety., Disp: 30 tablet, Rfl: 0  •  montelukast (SINGULAIR) 10 MG tablet, Take 1 tablet by mouth Every Night., Disp: 30 tablet, Rfl: 2  •  Multiple Vitamins-Minerals (MULTI FOR HER) capsule, Take 1 capsule by mouth As Needed., Disp: , Rfl:   •  pantoprazole (PROTONIX) 40 MG EC tablet, Take 40 mg by mouth Daily., Disp: , Rfl:     PE    Physical Exam   Constitutional: She is oriented to person, place, and time. She appears well-developed and well-nourished.   HENT:   Head: Normocephalic and atraumatic.   Eyes: Pupils are equal, round, and reactive to light. Conjunctivae are normal.   Neck: Normal range of motion. Neck supple.   Cardiovascular: Normal rate, regular rhythm, normal heart sounds and intact distal pulses.  Exam reveals no gallop and no friction rub.    No murmur heard.  Pulmonary/Chest: Effort normal and breath sounds normal. No respiratory distress. She has no wheezes. She has no rales. She exhibits no tenderness.   Abdominal: Soft. She exhibits no distension and no mass. Bowel sounds are increased. There is no hepatosplenomegaly. There is no tenderness. There is no rigidity, no rebound, no guarding, no tenderness at McBurney's point and negative Echeverria's sign. No hernia.   Neurological: She is alert and oriented to person, place, and time.   Skin: Skin is warm, dry and intact. Turgor is normal.   Psychiatric: She has a normal mood and affect. Her speech is normal and behavior is normal. Judgment and thought content normal. Cognition and memory are normal.        A/P    Annalise was seen today for diarrhea and fever.    Diagnoses and all orders for this visit:    Diarrhea, unspecified type    Viral gastroenteritis        Plan of care reviewed with patient at the conclusion of today's visit. Encouraged patient to increase fluids to avoid dehydration. Education was provided regarding diagnosis, management and any prescribed or recommended OTC medications.  Patient verbalizes understanding of and agreement  with management plan.    Return if symptoms worsen or fail to improve.     Alberta Soliz, APRN

## 2018-09-26 DIAGNOSIS — J30.2 CHRONIC SEASONAL ALLERGIC RHINITIS DUE TO OTHER ALLERGEN: ICD-10-CM

## 2018-09-27 RX ORDER — MONTELUKAST SODIUM 10 MG/1
TABLET ORAL
Qty: 30 TABLET | Refills: 1 | Status: SHIPPED | OUTPATIENT
Start: 2018-09-27 | End: 2018-11-26 | Stop reason: SDUPTHER

## 2018-10-10 RX ORDER — AMLODIPINE BESYLATE 2.5 MG/1
TABLET ORAL
Qty: 90 TABLET | Refills: 2 | Status: SHIPPED | OUTPATIENT
Start: 2018-10-10 | End: 2019-02-05 | Stop reason: SDUPTHER

## 2018-10-16 ENCOUNTER — OFFICE VISIT (OUTPATIENT)
Dept: CARDIOLOGY | Facility: CLINIC | Age: 68
End: 2018-10-16

## 2018-10-16 VITALS
WEIGHT: 150 LBS | SYSTOLIC BLOOD PRESSURE: 126 MMHG | HEIGHT: 69 IN | BODY MASS INDEX: 22.22 KG/M2 | HEART RATE: 69 BPM | DIASTOLIC BLOOD PRESSURE: 68 MMHG

## 2018-10-16 DIAGNOSIS — I10 ESSENTIAL HYPERTENSION: ICD-10-CM

## 2018-10-16 DIAGNOSIS — I48.19 PERSISTENT ATRIAL FIBRILLATION (HCC): Primary | ICD-10-CM

## 2018-10-16 PROCEDURE — 99213 OFFICE O/P EST LOW 20 MIN: CPT | Performed by: PHYSICIAN ASSISTANT

## 2018-10-16 PROCEDURE — 93000 ELECTROCARDIOGRAM COMPLETE: CPT | Performed by: PHYSICIAN ASSISTANT

## 2018-10-16 NOTE — PROGRESS NOTES
[facility]   OFFICE NOTE      Annalise Winters  1950  PCP: Maryse Jauregui MD    SUBJECTIVE:   Annalise Winters is a 67 y.o. female seen for a follow up visit regarding the following: Palpitations, Afib, HTN,     CC:Afib    PROBLEM LIST:  1.  Palpitations/event monitor, 01/23/2015 through 02/21/2015 revealing episodes of occasional PVCs, occasional PACs, and brief atrial tachycardia, but no evidence of atrial fibrillation.    2. Symptomatic paroxysmal atrial fibrillation:  a. Initial diagnosis, 10/05/2012, with failure of sotalol therapy.  b. Echocardiogram, October 2002,  revealing normal LV function.  c. Cardiolite stress test, April 2013, revealing no evidence of ischemia.  d. Event monitor, October 2013, revealing paroxysmal atrial fibrillation.  e. Flecainide therapy initiated, January 2013, with breakthrough episodes  of atrial fibrillation and later switched to Multaq by Dr. Cooper.  Subsequent episodes of breakthrough atrial fibrillation on Multaq therapy.  f. EP study and RFA on pulmonary veins, 08/21/2013.  EF 65%.  Dr. Abdirahman Lackey.  g. Episode of palpitations,  occurring, January 2015, on 2 separate occasions, lasting approximately 3-4 hours that were self-limiting. Monitor at that time revealed PVC's, PAC's, and atrial tachycardia.   h. Chadsvasc=3, patient declines anticoagulation.  Takes asa daily.   3.  Labile hypertension.  4. Anxiety.  5. Hypothyroidism on chronic Synthroid replacement.  6. GERD.  7. Remote surgical history:  a.  Remote tubal ligation.  b. Remote rhinoplasty.  c. Partial hysterectomy.    HPI:   The patient is a 67 year female presents today for follow-up regarding history of palpitations, atrial fibrillation, and labile hypertension.  She reports overall she's doing well with no recurrent sustained palpitations.  Her blood pressure remains labile but controlled.  She denies dizziness, near-syncope, or syncopal events.  She denies any  chest pain.  She remains active doing Jazzercise and other physical activities without any symptoms.    ROS:  Review of Symptoms:  General: no recent weight loss/gain, weakness or fatigue  Skin: no rashes, lumps, or other skin changes  HEENT: no dizziness, lightheadedness, or vision changes  Respiratory: no cough or hemoptysis  Cardiovascular: + palpitations, and tachycardia  Gastrointestinal: no black/tarry stools or diarrhea  Urinary: no change in frequency or urgency  Peripheral Vascular: no claudication or leg cramps  Musculoskeletal: no muscle or joint pain/stiffness  Psychiatric: no depression or excessive stress  Neurological: no sensory or motor loss, no syncope  Hematologic: no anemia, easy bruising or bleeding  Endocrine: no thyroid problems, nor heat or cold intolerance    Cardiac PMH: (Old records have been reviewed and summarized below)      Past Medical History, Past Surgical History, Family history, Social History, and Medications were all reviewed with the patient today and updated as necessary.       Current Outpatient Prescriptions:   •  acetaminophen (TYLENOL) 500 MG tablet, Take 500 mg by mouth As Needed., Disp: , Rfl:   •  amLODIPine (NORVASC) 2.5 MG tablet, TAKE ONE TABLET BY MOUTH DAILY, Disp: 90 tablet, Rfl: 2  •  Calcium-Vitamin D-Vitamin K (CALCIUM SOFT CHEWS PO), Take 1 tablet by mouth Daily., Disp: , Rfl:   •  Cholecalciferol (VITAMIN D) 2000 UNITS capsule, Take 1 capsule by mouth daily., Disp: , Rfl:   •  Famotidine-Ca Carb-Mag Hydrox (PEPCID COMPLETE PO), Take 1 tablet by mouth As Needed., Disp: , Rfl:   •  flecainide (TAMBOCOR) 50 MG tablet, Take 50 mg by mouth As Needed., Disp: , Rfl:   •  Ginger, Zingiber officinalis, (GINGER EXTRACT PO), Take 1 tablet by mouth As Needed., Disp: , Rfl:   •  levothyroxine (SYNTHROID, LEVOTHROID) 50 MCG tablet, Take 1 tablet by mouth Daily., Disp: 90 tablet, Rfl: 1  •  Loratadine (CLARITIN PO), Take 10 mg by mouth Daily., Disp: , Rfl:   •  LORazepam  (ATIVAN) 0.5 MG tablet, Take 1 tablet by mouth Daily As Needed for Anxiety., Disp: 30 tablet, Rfl: 0  •  montelukast (SINGULAIR) 10 MG tablet, TAKE ONE TABLET BY MOUTH EVERY NIGHT AT BEDTIME, Disp: 30 tablet, Rfl: 1  •  pantoprazole (PROTONIX) 40 MG EC tablet, Take 40 mg by mouth Every Other Day., Disp: , Rfl:       Allergies   Allergen Reactions   • Ace Inhibitors Cough   • Celexa [Citalopram Hydrobromide] Dizziness   • Paxil [Paroxetine Hcl] Other (See Comments)     somnolence     • Corticosteroids Rash     Patient Active Problem List   Diagnosis   • Asthma   • Persistent atrial fibrillation (CMS/HCC)   • Hypertonicity of bladder   • Essential hypertension   • Hypothyroidism   • Anxiety   • Back pain   • Acute URI   • Hyperthyroidism   • GERD (gastroesophageal reflux disease)   • Allergic rhinitis   • Shortness of breath   • Dizziness   • Dysuria   • Skin lesion of face   • Vitamin D deficiency     Past Medical History:   Diagnosis Date   • Abnormal blood chemistry    • Arthritis     right knee   • Cyst of buttocks    • Dizziness    • Dysuria    • Esophagitis    • Flushing    • GERD (gastroesophageal reflux disease)    • Heartburn    • Hypertension    • Hyperthyroidism    • Hypothyroidism    • Irritable bowel syndrome    • LLQ abdominal pain    • Polyp of sigmoid colon    • Sinusitis    • Skin lesion of face    • UTI (urinary tract infection)      Past Surgical History:   Procedure Laterality Date   • CARDIAC ABLATION  2013    Pulmonary vein ablation by Dr. Abdirahman Lackey, 2013.   • PARTIAL HYSTERECTOMY     • RHINOPLASTY     • TUBAL ABDOMINAL LIGATION       Family History   Problem Relation Age of Onset   • Dementia Mother    • Glomerulonephritis Mother    • Hypertension Mother    • Other Mother          at age 88   • Arthritis Father    • Cancer Father         prostate cancer   • Other Father          at age 65   • Heart attack Father    • Hypertension Other    • Osteoarthritis Other   "    Social History   Substance Use Topics   • Smoking status: Never Smoker   • Smokeless tobacco: Never Used   • Alcohol use Yes      Comment: on occasion         PHYSICAL EXAM:    /68 (BP Location: Left arm, Patient Position: Sitting)   Pulse 69   Ht 174 cm (68.5\")   Wt 68 kg (150 lb)   LMP  (LMP Unknown)   BMI 22.48 kg/m²        Wt Readings from Last 5 Encounters:   10/16/18 68 kg (150 lb)   09/12/18 68.5 kg (151 lb)   08/20/18 66.2 kg (145 lb 15.1 oz)   07/31/18 68.9 kg (152 lb)   06/11/18 67.6 kg (149 lb)       BP Readings from Last 5 Encounters:   10/16/18 126/68   09/12/18 142/80   07/31/18 142/82   06/11/18 112/64   12/11/17 120/74       General appearance - Alert, well appearing, and in no distress   Mental status - Affect appropriate to mood.  Eyes - Sclerae anicteric,  ENMT - Hearing grossly normal bilaterally, Dental hygiene good.  Neck - Carotids upstroke normal bilaterally, no bruits, no JVD.  Resp - Clear to auscultation, no wheezes, rales or rhonchi, symmetric air entry.  Heart - Normal rate, regular rhythm, normal S1, S2, no murmurs, rubs, clicks or gallops.  GI - Soft, nontender, nondistended, no masses or organomegaly.  Neurological - Grossly intact - normal speech, no focal findings  Musculoskeletal - No joint tenderness, deformity or swelling, no muscular tenderness noted.  Extremities - Peripheral pulses normal, no pedal edema, no clubbing or cyanosis.  Skin - Normal coloration and turgor.  Psych -  oriented to person, place, and time.    Medical problems and test results were reviewed with the patient today.       EKG: (EKG has been independently visualized by me and summarized below)    ECG 12 Lead  Date/Time: 10/16/2018 1:40 PM  Performed by: SHAYNA MILLIGAN  Authorized by: SHAYNA MILLIGAN   Comparison: compared with previous ECG from 7/31/2018  Similar to previous ECG  Rhythm: sinus rhythm  Rate: normal  Conduction: conduction normal  QRS axis: normal  Clinical impression: " dysrhythmia - atrial        ASSESSMENT   1. PAF: Remote PVI 2013.  Sinus rhythm with no breakthrough a fib.   2. Hx of Atrial tachycardia, PAC's: Rare occasional palpitations, not overly bothersome with daily activities. Improved on Tambocor.  EKG acceptable.  3. HTN: Controlled  4. Anticoagulation:  Declines anticoagulation.  On ASA.  She sometimes forgets to take it. We asked her to take on a regular basis.       PLAN  · Continue current medical therapy.   · Return for follow up 6 months or sooner as needed.     10/16/2018  1:38 PM    Will James THIBODEAUX

## 2018-10-19 ENCOUNTER — FLU SHOT (OUTPATIENT)
Dept: INTERNAL MEDICINE | Facility: CLINIC | Age: 68
End: 2018-10-19

## 2018-10-19 DIAGNOSIS — Z23 INFLUENZA VACCINE NEEDED: ICD-10-CM

## 2018-10-19 PROCEDURE — 90662 IIV NO PRSV INCREASED AG IM: CPT | Performed by: INTERNAL MEDICINE

## 2018-10-19 PROCEDURE — G0008 ADMIN INFLUENZA VIRUS VAC: HCPCS | Performed by: INTERNAL MEDICINE

## 2018-11-26 RX ORDER — MONTELUKAST SODIUM 10 MG/1
TABLET ORAL
Qty: 30 TABLET | Refills: 5 | Status: SHIPPED | OUTPATIENT
Start: 2018-11-26 | End: 2019-01-10 | Stop reason: SDUPTHER

## 2018-11-27 ENCOUNTER — TELEPHONE (OUTPATIENT)
Dept: CARDIOLOGY | Facility: CLINIC | Age: 68
End: 2018-11-27

## 2018-12-10 ENCOUNTER — OFFICE VISIT (OUTPATIENT)
Dept: ORTHOPEDIC SURGERY | Facility: CLINIC | Age: 68
End: 2018-12-10

## 2018-12-10 VITALS — OXYGEN SATURATION: 98 % | BODY MASS INDEX: 22.9 KG/M2 | WEIGHT: 154.6 LBS | HEART RATE: 85 BPM | HEIGHT: 69 IN

## 2018-12-10 DIAGNOSIS — S76.012A STRAIN OF LEFT HIP, INITIAL ENCOUNTER: ICD-10-CM

## 2018-12-10 DIAGNOSIS — M25.552 LEFT HIP PAIN: Primary | ICD-10-CM

## 2018-12-10 PROCEDURE — 99213 OFFICE O/P EST LOW 20 MIN: CPT | Performed by: PHYSICIAN ASSISTANT

## 2018-12-10 NOTE — PROGRESS NOTES
[facility] Note    Subjective     Chief Complaint   Patient presents with   • Left Leg - Pain        HPI      Annalise Winters is a 67 y.o. female.  Patient returns today for evaluation of her left hip.  Previously saw her for right knee pain/arthritis.  With regards to her hip she notes pain along the anterior lateral aspect of hip into the thigh.  No groin pain.  She describes the pain as dull and aching.  Pain scale 3/10.  Severity the pain mild.  Symptoms associated with stiffness.  Pains worse with walking and stair climbing.  Patient is a .  No reported history of injury or trauma.  No radiculopathy or paresthesias.  She denies any popping or grinding to the hip joint itself.        Past Medical History:   Diagnosis Date   • Abnormal blood chemistry    • Arthritis     right knee   • Cyst of buttocks    • Dizziness    • Dysuria    • Esophagitis    • Flushing    • GERD (gastroesophageal reflux disease)    • Heartburn    • Hypertension    • Hyperthyroidism    • Hypothyroidism    • Irritable bowel syndrome    • LLQ abdominal pain    • Polyp of sigmoid colon    • Sinusitis    • Skin lesion of face    • UTI (urinary tract infection)       Past Surgical History:   Procedure Laterality Date   • CARDIAC ABLATION  2013    Pulmonary vein ablation by Dr. Abdirahman Lackey, 2013.   • PARTIAL HYSTERECTOMY     • RHINOPLASTY     • TUBAL ABDOMINAL LIGATION        Family History   Problem Relation Age of Onset   • Dementia Mother    • Glomerulonephritis Mother    • Hypertension Mother    • Other Mother          at age 88   • Arthritis Father    • Cancer Father         prostate cancer   • Other Father          at age 65   • Heart attack Father    • Hypertension Other    • Osteoarthritis Other      Social History     Socioeconomic History   • Marital status:      Spouse name: Not on file   • Number of children: Not on file   • Years of education: Not on  file   • Highest education level: Not on file   Social Needs   • Financial resource strain: Not on file   • Food insecurity - worry: Not on file   • Food insecurity - inability: Not on file   • Transportation needs - medical: Not on file   • Transportation needs - non-medical: Not on file   Occupational History   • Not on file   Tobacco Use   • Smoking status: Never Smoker   • Smokeless tobacco: Never Used   Substance and Sexual Activity   • Alcohol use: Yes     Comment: on occasion   • Drug use: No   • Sexual activity: Defer   Other Topics Concern   • Not on file   Social History Narrative   • Not on file      Current Outpatient Medications on File Prior to Visit   Medication Sig Dispense Refill   • acetaminophen (TYLENOL) 500 MG tablet Take 500 mg by mouth As Needed.     • amLODIPine (NORVASC) 2.5 MG tablet TAKE ONE TABLET BY MOUTH DAILY 90 tablet 2   • Calcium-Vitamin D-Vitamin K (CALCIUM SOFT CHEWS PO) Take 1 tablet by mouth Daily.     • Cholecalciferol (VITAMIN D) 2000 UNITS capsule Take 1 capsule by mouth daily.     • Famotidine-Ca Carb-Mag Hydrox (PEPCID COMPLETE PO) Take 1 tablet by mouth As Needed.     • flecainide (TAMBOCOR) 50 MG tablet Take 50 mg by mouth As Needed.     • Ginger, Zingiber officinalis, (GINGER EXTRACT PO) Take 1 tablet by mouth As Needed.     • levothyroxine (SYNTHROID, LEVOTHROID) 50 MCG tablet Take 1 tablet by mouth Daily. 90 tablet 1   • Loratadine (CLARITIN PO) Take 10 mg by mouth Daily.     • LORazepam (ATIVAN) 0.5 MG tablet Take 1 tablet by mouth Daily As Needed for Anxiety. 30 tablet 0   • montelukast (SINGULAIR) 10 MG tablet TAKE ONE TABLET BY MOUTH EVERY NIGHT AT BEDTIME 30 tablet 5   • pantoprazole (PROTONIX) 40 MG EC tablet Take 40 mg by mouth Every Other Day.       No current facility-administered medications on file prior to visit.       Allergies   Allergen Reactions   • Ace Inhibitors Cough   • Celexa [Citalopram Hydrobromide] Dizziness   • Paxil [Paroxetine Hcl] Other (See  "Comments)     somnolence     • Corticosteroids Rash        The following portions of the patient's history were reviewed and updated as appropriate: allergies, current medications, past family history, past medical history, past social history, past surgical history and problem list.    Review of Systems   Constitutional: Negative.    HENT: Negative.    Eyes: Negative.    Respiratory: Negative.    Cardiovascular: Negative.    Gastrointestinal: Negative.    Endocrine: Negative.    Genitourinary: Negative.    Musculoskeletal: Positive for gait problem.   Skin: Negative.    Allergic/Immunologic: Positive for environmental allergies.   Hematological: Negative.    Psychiatric/Behavioral: Negative.         Objective      Physical Exam  Pulse 85   Ht 174 cm (68.5\")   Wt 70.1 kg (154 lb 9.6 oz)   LMP  (LMP Unknown)   SpO2 98%   BMI 23.16 kg/m²     Body mass index is 23.16 kg/m².        GENERAL APPEARANCE: awake, alert & oriented x 3, in no acute distress and well developed, well nourished  PSYCH: normal mood and affect  LUNGS:  breathing nonlabored, no wheezing  EYES: sclera anicteric, pupils equal  CARDIOVASCULAR: palpable pulses dorsalis pedis, palpable posterior tibial bilaterally. Capillary refill less than 2 seconds  INTEGUMENTARY: skin intact, no clubbing, cyanosis  NEUROLOGIC:  Normal Sensation and reflexes             Ortho Exam  Peripheral Vascular  Lower Extremity   Edema - None bilaterally    Musculoskeletal  Lower Extremity   Left Hip    Normal sensation and coordination    No crepitus, instability, subluxation or laxity    No known fractures or dislocations   Right Hip    Normal sensation and coordination    No crepitus, instability, subluxation or laxity    No known fractures or dislocations     Inspection and palpation    Tenderness -      Right - none     Left - mild tenderness anterior lateral hip along hip flexors and IT band.    Tissue tension/texture is pliable and soft bilaterally     Normal warmth " bilaterally   Strength and Tone    Left hip flexors - 5/5     Right hip flexors - 5/5   Deformities/Postures/Misalignments/Discrepancies    No leg length discrepancy   Functional Testing    Stinchfield test negative bilaterally    90-90 straight leg raise negative bilaterally    Dimitri's positive left side with pulling sensation.    JORGE and FADIR negative for groin pain, positive along anterior lateral hip.      Imaging/Studies  Imaging Results (last 7 days)     Procedure Component Value Units Date/Time    XR Hip With or Without Pelvis 1 View Left [[id number]] Updated:  12/10/18 1548      Ordered AP pelvis and frog leg lateral of the left hip.  Images reviewed by Dr. Bucio.  No acute bony abnormality, fracture or dislocation.  See chart for official report.    Assessment/Plan        ICD-10-CM ICD-9-CM   1. Left hip pain M25.552 719.45   2. Strain of left hip, initial encounter S76.012A 843.9       Orders Placed This Encounter   Procedures   • XR Hip With or Without Pelvis 1 View Left   • Ambulatory Referral to Physical Therapy Evaluate and treat, Ortho        -Discussion was had with patient with regards to her left hip pain consistent with strain.  -Referred to physical therapy to work on core, hip, lumbar range of motion, stretching and strengthening.  -Recommend over-the-counter pain medications as needed.  -Follow-up in 6 weeks for repeat evaluation.  If she continues to have pain to the hip to look at possible MRI for further evaluation.  -Question and concerns answered.    Medical Decision Making  Management Options : over-the-counter medicine and physical/occupational therapy  Data/Risk: radiology tests    Rianna Lay PA-C  12/13/18  8:11 AM         EMR Dragon/Transcription disclaimer:  Much of this encounter note is an electronic transcription of spoken language to printed text. Electronic transcription of spoken language may permit erroneous, or at times, nonsensical words or phrases to be  inadvertently transcribed. Although I have reviewed the note for such errors, some may still exist.

## 2018-12-16 DIAGNOSIS — E03.9 HYPOTHYROIDISM, UNSPECIFIED TYPE: ICD-10-CM

## 2018-12-17 RX ORDER — LEVOTHYROXINE SODIUM 0.05 MG/1
TABLET ORAL
Qty: 90 TABLET | Refills: 0 | Status: SHIPPED | OUTPATIENT
Start: 2018-12-17 | End: 2019-01-10 | Stop reason: SDUPTHER

## 2019-01-10 ENCOUNTER — OFFICE VISIT (OUTPATIENT)
Dept: INTERNAL MEDICINE | Facility: CLINIC | Age: 69
End: 2019-01-10

## 2019-01-10 VITALS
SYSTOLIC BLOOD PRESSURE: 140 MMHG | DIASTOLIC BLOOD PRESSURE: 82 MMHG | OXYGEN SATURATION: 96 % | HEART RATE: 65 BPM | BODY MASS INDEX: 23.11 KG/M2 | WEIGHT: 156 LBS | HEIGHT: 69 IN

## 2019-01-10 DIAGNOSIS — I48.0 PAROXYSMAL ATRIAL FIBRILLATION (HCC): ICD-10-CM

## 2019-01-10 DIAGNOSIS — E55.9 VITAMIN D DEFICIENCY: ICD-10-CM

## 2019-01-10 DIAGNOSIS — I10 ESSENTIAL HYPERTENSION: ICD-10-CM

## 2019-01-10 DIAGNOSIS — K57.92 DIVERTICULITIS: ICD-10-CM

## 2019-01-10 DIAGNOSIS — E78.5 DYSLIPIDEMIA: ICD-10-CM

## 2019-01-10 DIAGNOSIS — F41.9 ANXIETY: ICD-10-CM

## 2019-01-10 DIAGNOSIS — D16.4 OSTEOMA OF SKULL: ICD-10-CM

## 2019-01-10 DIAGNOSIS — Z00.00 MEDICARE ANNUAL WELLNESS VISIT, SUBSEQUENT: Primary | ICD-10-CM

## 2019-01-10 DIAGNOSIS — E03.9 HYPOTHYROIDISM, UNSPECIFIED TYPE: ICD-10-CM

## 2019-01-10 LAB
25(OH)D3 SERPL-MCNC: 52.7 NG/ML
ALBUMIN SERPL-MCNC: 4.48 G/DL (ref 3.2–4.8)
ALBUMIN/GLOB SERPL: 2.2 G/DL (ref 1.5–2.5)
ALP SERPL-CCNC: 66 U/L (ref 25–100)
ALT SERPL W P-5'-P-CCNC: 20 U/L (ref 7–40)
ANION GAP SERPL CALCULATED.3IONS-SCNC: 3 MMOL/L (ref 3–11)
ARTICHOKE IGE QN: 130 MG/DL (ref 0–130)
AST SERPL-CCNC: 21 U/L (ref 0–33)
BILIRUB SERPL-MCNC: 0.7 MG/DL (ref 0.3–1.2)
BUN BLD-MCNC: 19 MG/DL (ref 9–23)
BUN/CREAT SERPL: 25.3 (ref 7–25)
CALCIUM SPEC-SCNC: 9.6 MG/DL (ref 8.7–10.4)
CHLORIDE SERPL-SCNC: 102 MMOL/L (ref 99–109)
CHOLEST SERPL-MCNC: 234 MG/DL (ref 0–200)
CO2 SERPL-SCNC: 32 MMOL/L (ref 20–31)
CREAT BLD-MCNC: 0.75 MG/DL (ref 0.6–1.3)
DEPRECATED RDW RBC AUTO: 48 FL (ref 37–54)
ERYTHROCYTE [DISTWIDTH] IN BLOOD BY AUTOMATED COUNT: 14.7 % (ref 11.3–14.5)
GFR SERPL CREATININE-BSD FRML MDRD: 76 ML/MIN/1.73
GLOBULIN UR ELPH-MCNC: 2 GM/DL
GLUCOSE BLD-MCNC: 88 MG/DL (ref 70–100)
HCT VFR BLD AUTO: 39.1 % (ref 34.5–44)
HDLC SERPL-MCNC: 89 MG/DL (ref 40–60)
HGB BLD-MCNC: 12.4 G/DL (ref 11.5–15.5)
MCH RBC QN AUTO: 28.4 PG (ref 27–31)
MCHC RBC AUTO-ENTMCNC: 31.7 G/DL (ref 32–36)
MCV RBC AUTO: 89.5 FL (ref 80–99)
PLATELET # BLD AUTO: 295 10*3/MM3 (ref 150–450)
PMV BLD AUTO: 10.4 FL (ref 6–12)
POTASSIUM BLD-SCNC: 4.3 MMOL/L (ref 3.5–5.5)
PROT SERPL-MCNC: 6.5 G/DL (ref 5.7–8.2)
RBC # BLD AUTO: 4.37 10*6/MM3 (ref 3.89–5.14)
SODIUM BLD-SCNC: 137 MMOL/L (ref 132–146)
T4 FREE SERPL-MCNC: 1.31 NG/DL (ref 0.89–1.76)
TRIGL SERPL-MCNC: 54 MG/DL (ref 0–150)
TSH SERPL DL<=0.05 MIU/L-ACNC: 1.76 MIU/ML (ref 0.35–5.35)
WBC NRBC COR # BLD: 4.19 10*3/MM3 (ref 3.5–10.8)

## 2019-01-10 PROCEDURE — 80061 LIPID PANEL: CPT | Performed by: INTERNAL MEDICINE

## 2019-01-10 PROCEDURE — 85027 COMPLETE CBC AUTOMATED: CPT | Performed by: INTERNAL MEDICINE

## 2019-01-10 PROCEDURE — 84439 ASSAY OF FREE THYROXINE: CPT | Performed by: INTERNAL MEDICINE

## 2019-01-10 PROCEDURE — 80053 COMPREHEN METABOLIC PANEL: CPT | Performed by: INTERNAL MEDICINE

## 2019-01-10 PROCEDURE — 82306 VITAMIN D 25 HYDROXY: CPT | Performed by: INTERNAL MEDICINE

## 2019-01-10 PROCEDURE — 84443 ASSAY THYROID STIM HORMONE: CPT | Performed by: INTERNAL MEDICINE

## 2019-01-10 PROCEDURE — G0439 PPPS, SUBSEQ VISIT: HCPCS | Performed by: INTERNAL MEDICINE

## 2019-01-10 PROCEDURE — 99214 OFFICE O/P EST MOD 30 MIN: CPT | Performed by: INTERNAL MEDICINE

## 2019-01-10 RX ORDER — AMOXICILLIN AND CLAVULANATE POTASSIUM 875; 125 MG/1; MG/1
1 TABLET, FILM COATED ORAL 2 TIMES DAILY
Qty: 20 TABLET | Refills: 0 | Status: SHIPPED | OUTPATIENT
Start: 2019-01-10 | End: 2019-01-21

## 2019-01-10 RX ORDER — MONTELUKAST SODIUM 10 MG/1
10 TABLET ORAL
Qty: 90 TABLET | Refills: 1 | Status: SHIPPED | OUTPATIENT
Start: 2019-01-10 | End: 2019-12-04 | Stop reason: SDUPTHER

## 2019-01-10 RX ORDER — LEVOTHYROXINE SODIUM 0.05 MG/1
50 TABLET ORAL DAILY
Qty: 90 TABLET | Refills: 1 | Status: SHIPPED | OUTPATIENT
Start: 2019-01-10 | End: 2019-09-11 | Stop reason: SDUPTHER

## 2019-01-10 RX ORDER — LORAZEPAM 0.5 MG/1
0.5 TABLET ORAL DAILY PRN
Qty: 30 TABLET | Refills: 0 | Status: SHIPPED | OUTPATIENT
Start: 2019-01-10 | End: 2019-11-01 | Stop reason: SDUPTHER

## 2019-01-15 ENCOUNTER — TELEPHONE (OUTPATIENT)
Dept: INTERNAL MEDICINE | Facility: CLINIC | Age: 69
End: 2019-01-15

## 2019-01-15 NOTE — TELEPHONE ENCOUNTER
PATIENT IS REQUESTING A RETURN CALL WITH RECENT LAB RESULTS NAD WOULD ALSO LIKE TO DISCUSS RECENT REFERRALS.    CALL BACK [phone]

## 2019-01-17 NOTE — TELEPHONE ENCOUNTER
We had done a Neurosurgery referral for the Osteoma. Is that what she is referring to. I am unaware of a Derm referral.  Please check with Sandy once confirmed.

## 2019-01-21 ENCOUNTER — OFFICE VISIT (OUTPATIENT)
Dept: ORTHOPEDIC SURGERY | Facility: CLINIC | Age: 69
End: 2019-01-21

## 2019-01-21 VITALS — WEIGHT: 156.09 LBS | BODY MASS INDEX: 23.12 KG/M2 | HEART RATE: 103 BPM | HEIGHT: 69 IN | OXYGEN SATURATION: 98 %

## 2019-01-21 DIAGNOSIS — M25.552 LEFT HIP PAIN: Primary | ICD-10-CM

## 2019-01-21 DIAGNOSIS — S76.012D STRAIN OF LEFT HIP, SUBSEQUENT ENCOUNTER: ICD-10-CM

## 2019-01-21 PROCEDURE — 99212 OFFICE O/P EST SF 10 MIN: CPT | Performed by: PHYSICIAN ASSISTANT

## 2019-01-21 NOTE — PROGRESS NOTES
"    [facility] [place] [facility] Note    Subjective     CC: Follow-up of the Left Hip (6 week follow up)      HPI    Annalise Winters is a 68 y.o. female.  Patient returns today for follow-up evaluation of the left hip.  She is also been previously seen for right knee pain/arthritis.  With regards to her left lateral hip pain has improved with therapy to include dry needling.  She only notes some mild discomfort when she's taking steps and this is intermittent in nature.  She currently endorses pain scale maximum 0-1/10.  Severity the pain mild when she notes it.  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  No reported radiculopathy or paresthesias.       ROS:    Constiutional:Pt denies fever, chills, nausea, or vomiting.  MSK:as above    Objective      Past Medical History  Past Medical History:   Diagnosis Date   • Abnormal blood chemistry    • Arthritis     right knee   • Cyst of buttocks    • Dizziness    • Dysuria    • Esophagitis    • Flushing    • GERD (gastroesophageal reflux disease)    • Heartburn    • Hypertension    • Hyperthyroidism    • Hypothyroidism    • Irritable bowel syndrome    • LLQ abdominal pain    • Polyp of sigmoid colon    • Sinusitis    • Skin lesion of face    • UTI (urinary tract infection)          Physical Exam  Pulse 103   Ht 174 cm (68.5\")   Wt 70.8 kg (156 lb 1.4 oz)   LMP  (LMP Unknown)   SpO2 98%   BMI 23.39 kg/m²     Body mass index is 23.39 kg/m².    Patient is well nourished and well developed.        Ortho Exam  Peripheral Vascular  Lower Extremity              Edema - None bilaterally     Musculoskeletal  Lower Extremity              Left Hip                          Normal sensation and coordination                          No crepitus, instability, subluxation or laxity                          No known fractures or dislocations                 Inspection " and palpation                          Tenderness -                                       Left - none today                           Tissue tension/texture is pliable and soft bilaterally                           Normal warmth bilaterally              Strength and Tone                          Left hip flexors - 5/5                  Deformities/Postures/Misalignments/Discrepancies                          No leg length discrepancy              Functional Testing                          Stinchfield test negative                          90-90 straight leg raise negative                          Dimitri's negative.                          JORGE and FADIR negative for groin pain, positive along anterior lateral hip.       Imaging/Labs/EMG Reviewed:    Imaging Results (last 24 hours)     ** No results found for the last 24 hours. **      None today.    Assessment:  1. Left hip pain    2. Strain of left hip, subsequent encounter        Plan:  1. Since left lateral hip pain has predominantly resolved patient may continue with activities as tolerated.  2. Discussed activity modification.    3. For the history of right knee pain/arthritis, we discussed possible Visco supplementation injections as she is unable to tolerate steroids.  She understands that if she wishes to start the series she needs preauthorization from insurance company.  4. Continue with the exercises taught by therapy with regards to range of motion and stretching.  5. Continue the use of Tylenol as needed.  6. At this time she'll follow up as needed.  7. Question and concerns answered.      Medical Decision Making  Management Options : over-the-counter medicine and physical/occupational therapy      Rianna Lay PA-C  01/21/19  3:48 PM

## 2019-01-21 NOTE — TELEPHONE ENCOUNTER
Spoke to pt, states that she has not seen derm in years and needs a full body check, would like referral.

## 2019-01-22 NOTE — TELEPHONE ENCOUNTER
Please give her the number for dermatology associates. No referral needed for skin check eval.    Thanks

## 2019-01-28 ENCOUNTER — TELEPHONE (OUTPATIENT)
Dept: CARDIOLOGY | Facility: CLINIC | Age: 69
End: 2019-01-28

## 2019-01-29 ENCOUNTER — TELEPHONE (OUTPATIENT)
Dept: ORTHOPEDIC SURGERY | Facility: CLINIC | Age: 69
End: 2019-01-29

## 2019-01-29 ENCOUNTER — OFFICE VISIT (OUTPATIENT)
Dept: CARDIOLOGY | Facility: CLINIC | Age: 69
End: 2019-01-29

## 2019-01-29 VITALS
HEART RATE: 73 BPM | SYSTOLIC BLOOD PRESSURE: 142 MMHG | HEIGHT: 68 IN | DIASTOLIC BLOOD PRESSURE: 88 MMHG | WEIGHT: 150 LBS | BODY MASS INDEX: 22.73 KG/M2

## 2019-01-29 DIAGNOSIS — I10 ESSENTIAL HYPERTENSION: Primary | ICD-10-CM

## 2019-01-29 DIAGNOSIS — I48.19 PERSISTENT ATRIAL FIBRILLATION (HCC): ICD-10-CM

## 2019-01-29 PROCEDURE — 99214 OFFICE O/P EST MOD 30 MIN: CPT | Performed by: PHYSICIAN ASSISTANT

## 2019-01-29 PROCEDURE — 93000 ELECTROCARDIOGRAM COMPLETE: CPT | Performed by: PHYSICIAN ASSISTANT

## 2019-01-29 NOTE — TELEPHONE ENCOUNTER
Please follow up with patient and see if this is for both knees or just the right knee.  Patient has received these injections in the past to the right knee only.

## 2019-01-29 NOTE — TELEPHONE ENCOUNTER
PT WOULD LIKE TO GET ORTHOVISC IN HER KNEES. PLEASE PUT REFERRAL IN AND I WILL GET THEM APPROVED/SCHEDULED, THANKS!

## 2019-01-29 NOTE — PROGRESS NOTES
"[facility]   OFFICE NOTE      Annalise Winters  1950  PCP: Maryse Jauregui MD    SUBJECTIVE:   Annalise Winters is a 68 y.o. female seen for a follow up visit regarding the following:     CC:Palpitations    PROBLEM LIST:  1.  Palpitations/event monitor, 01/23/2015 through 02/21/2015 revealing episodes of occasional PVCs, occasional PACs, and brief atrial tachycardia, but no evidence of atrial fibrillation.    2. Symptomatic paroxysmal atrial fibrillation:  a. Initial diagnosis, 10/05/2012, with failure of sotalol therapy.  b. Echocardiogram, October 2002,  revealing normal LV function.  c. Cardiolite stress test, April 2013, revealing no evidence of ischemia.  d. Event monitor, October 2013, revealing paroxysmal atrial fibrillation.  e. Flecainide therapy initiated, January 2013, with breakthrough episodes  of atrial fibrillation and later switched to Multaq by Dr. Cooper.  Subsequent episodes of breakthrough atrial fibrillation on Multaq therapy.  f. EP study and RFA on pulmonary veins, 08/21/2013.  EF 65%.  Dr. Abdirahman Lackey.  g. Episode of palpitations,  occurring, January 2015, on 2 separate occasions, lasting approximately 3-4 hours that were self-limiting. Monitor at that time revealed PVC's, PAC's, and atrial tachycardia.   h. Chadsvasc=3, patient declines anticoagulation.  Takes asa daily.   3.  Labile hypertension.  4. Anxiety.  5. Hypothyroidism on chronic Synthroid replacement.  6. GERD.  7. Remote surgical history:  a.  Remote tubal ligation.  b. Remote rhinoplasty.  c. Partial hysterectomy    HPI:   The patient is a pleasant 68-year-old female presents today follow-up regarding history of atrial fibrillation, labile hypertension and history of palpitations.  She recently has been battling recurrent episodes of diverticulitis.  She had episode 2 weeks ago she became feverish had chills and she felt her heart rate increased.  She felt possibly she was \"out of " "rhythm\".  She is noted heart was elevated.  She did take an extra Tambocor this progressive symptoms.  She also states her chills and fever improved later that day.  She is planning back to her PCP regarding his symptoms.  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  She states her blood pressures labile but the most part has been controlled with less than 140.  She denies any dizziness, near-syncope or syncope.  She has any chest pain suggesting as pectoris.  She recently has been more faithful about taking aspirin daily.      ROS:  Review of Symptoms:  General: no recent weight loss/gain, weakness or fatigue  Skin: no rashes, lumps, or other skin changes  HEENT: no dizziness, lightheadedness, or vision changes  Respiratory: no cough or hemoptysis  Cardiovascular: Rare palpitations, and tachycardia  Gastrointestinal: no black/tarry stools or diarrhea  Urinary: no change in frequency or urgency  Peripheral Vascular: no claudication or leg cramps  Musculoskeletal: no muscle or joint pain/stiffness  Psychiatric: + Anxiety  Neurological: no sensory or motor loss, no syncope  Hematologic: no anemia, easy bruising or bleeding  Endocrine: no thyroid problems, nor heat or cold intolerance    Cardiac PMH: (Old records have been reviewed and summarized below)      Past Medical History, Past Surgical History, Family history, Social History, and Medications were all reviewed with the patient today and updated as necessary.       Current Outpatient Medications:   •  acetaminophen (TYLENOL) 500 MG tablet, Take 500 mg by mouth As Needed., Disp: , Rfl:   •  amLODIPine (NORVASC) 2.5 MG tablet, TAKE ONE TABLET BY MOUTH DAILY, Disp: 90 tablet, Rfl: 2  •  Calcium-Vitamin D-Vitamin K (CALCIUM SOFT CHEWS PO), Take 1 tablet by mouth Daily., Disp: , Rfl:   •  Cholecalciferol (VITAMIN D) 2000 UNITS capsule, Take 1 capsule by mouth daily., Disp: , Rfl:   •  Famotidine-Ca Carb-Mag Hydrox (PEPCID " COMPLETE PO), Take 1 tablet by mouth As Needed., Disp: , Rfl:   •  flecainide (TAMBOCOR) 50 MG tablet, Take 50 mg by mouth As Needed., Disp: , Rfl:   •  Ginger, Zingiber officinalis, (GINGER EXTRACT PO), Take 1 tablet by mouth As Needed., Disp: , Rfl:   •  levothyroxine (SYNTHROID, LEVOTHROID) 50 MCG tablet, Take 1 tablet by mouth Daily., Disp: 90 tablet, Rfl: 1  •  Loratadine (CLARITIN PO), Take 10 mg by mouth Daily., Disp: , Rfl:   •  LORazepam (ATIVAN) 0.5 MG tablet, Take 1 tablet by mouth Daily As Needed for Anxiety., Disp: 30 tablet, Rfl: 0  •  montelukast (SINGULAIR) 10 MG tablet, Take 1 tablet by mouth every night at bedtime., Disp: 90 tablet, Rfl: 1  •  pantoprazole (PROTONIX) 40 MG EC tablet, Take 40 mg by mouth Every Other Day., Disp: , Rfl:       Allergies   Allergen Reactions   • Ace Inhibitors Cough   • Celexa [Citalopram Hydrobromide] Dizziness   • Paxil [Paroxetine Hcl] Other (See Comments)     somnolence     • Corticosteroids Rash     Patient Active Problem List   Diagnosis   • Asthma   • Persistent atrial fibrillation (CMS/HCC)   • Hypertonicity of bladder   • Essential hypertension   • Hypothyroidism   • Anxiety   • Back pain   • Acute URI   • Hyperthyroidism   • GERD (gastroesophageal reflux disease)   • Allergic rhinitis   • Shortness of breath   • Dizziness   • Dysuria   • Skin lesion of face   • Vitamin D deficiency     Past Medical History:   Diagnosis Date   • Abnormal blood chemistry    • Arthritis     right knee   • Cyst of buttocks    • Dizziness    • Dysuria    • Esophagitis    • Flushing    • GERD (gastroesophageal reflux disease)    • Heartburn    • Hypertension    • Hyperthyroidism    • Hypothyroidism    • Irritable bowel syndrome    • LLQ abdominal pain    • Polyp of sigmoid colon    • Sinusitis    • Skin lesion of face    • UTI (urinary tract infection)      Past Surgical History:   Procedure Laterality Date   • CARDIAC ABLATION  08/21/2013    Pulmonary vein ablation by Dr. Gary  "Ziyad, 2013.   • PARTIAL HYSTERECTOMY     • RHINOPLASTY     • TUBAL ABDOMINAL LIGATION       Family History   Problem Relation Age of Onset   • Dementia Mother    • Glomerulonephritis Mother    • Hypertension Mother    • Other Mother          at age 88   • Arthritis Father    • Cancer Father         prostate cancer   • Other Father          at age 65   • Heart attack Father    • Hypertension Other    • Osteoarthritis Other      Social History     Tobacco Use   • Smoking status: Never Smoker   • Smokeless tobacco: Never Used   Substance Use Topics   • Alcohol use: Yes     Comment: on occasion         PHYSICAL EXAM:    /88 (BP Location: Left arm, Patient Position: Sitting)   Pulse 73   Ht 172.7 cm (68\")   Wt 68 kg (150 lb)   LMP  (LMP Unknown)   BMI 22.81 kg/m²        Wt Readings from Last 5 Encounters:   19 68 kg (150 lb)   19 70.8 kg (156 lb 1.4 oz)   01/10/19 70.8 kg (156 lb)   12/10/18 70.1 kg (154 lb 9.6 oz)   10/16/18 68 kg (150 lb)       BP Readings from Last 5 Encounters:   19 142/88   01/10/19 140/82   10/16/18 126/68   18 142/80   18 142/82       General appearance - Alert, well appearing, and in no distress   Mental status - Affect appropriate to mood.  Eyes - Sclerae anicteric,  ENMT - Hearing grossly normal bilaterally, Dental hygiene good.  Neck - Carotids upstroke normal bilaterally, no bruits, no JVD.  Resp - Clear to auscultation, no wheezes, rales or rhonchi, symmetric air entry.  Heart - Normal rate, regular rhythm, normal S1, S2, no murmurs, rubs, clicks or gallops.  GI - Soft, nontender, nondistended, no masses or organomegaly.  Neurological - Grossly intact - normal speech, no focal findings  Musculoskeletal - No joint tenderness, deformity or swelling, no muscular tenderness noted.  Extremities - Peripheral pulses normal, no pedal edema, no clubbing or cyanosis.  Skin - Normal coloration and turgor.  Psych -  oriented to person, " place, and time.    Medical problems and test results were reviewed with the patient today.     Recent Results (from the past 672 hour(s))   CBC (No Diff)    Collection Time: 01/10/19  9:15 AM   Result Value Ref Range    WBC 4.19 3.50 - 10.80 10*3/mm3    RBC 4.37 3.89 - 5.14 10*6/mm3    Hemoglobin 12.4 11.5 - 15.5 g/dL    Hematocrit 39.1 34.5 - 44.0 %    MCV 89.5 80.0 - 99.0 fL    MCH 28.4 27.0 - 31.0 pg    MCHC 31.7 (L) 32.0 - 36.0 g/dL    RDW 14.7 (H) 11.3 - 14.5 %    RDW-SD 48.0 37.0 - 54.0 fl    MPV 10.4 6.0 - 12.0 fL    Platelets 295 150 - 450 10*3/mm3   Comprehensive Metabolic Panel    Collection Time: 01/10/19  9:15 AM   Result Value Ref Range    Glucose 88 70 - 100 mg/dL    BUN 19 9 - 23 mg/dL    Creatinine 0.75 0.60 - 1.30 mg/dL    Sodium 137 132 - 146 mmol/L    Potassium 4.3 3.5 - 5.5 mmol/L    Chloride 102 99 - 109 mmol/L    CO2 32.0 (H) 20.0 - 31.0 mmol/L    Calcium 9.6 8.7 - 10.4 mg/dL    Total Protein 6.5 5.7 - 8.2 g/dL    Albumin 4.48 3.20 - 4.80 g/dL    ALT (SGPT) 20 7 - 40 U/L    AST (SGOT) 21 0 - 33 U/L    Alkaline Phosphatase 66 25 - 100 U/L    Total Bilirubin 0.7 0.3 - 1.2 mg/dL    eGFR Non African Amer 76 >60 mL/min/1.73    Globulin 2.0 gm/dL    A/G Ratio 2.2 1.5 - 2.5 g/dL    BUN/Creatinine Ratio 25.3 (H) 7.0 - 25.0    Anion Gap 3.0 3.0 - 11.0 mmol/L   Lipid Panel    Collection Time: 01/10/19  9:15 AM   Result Value Ref Range    Total Cholesterol 234 (H) 0 - 200 mg/dL    Triglycerides 54 0 - 150 mg/dL    HDL Cholesterol 89 (H) 40 - 60 mg/dL    LDL Cholesterol  130 0 - 130 mg/dL   TSH    Collection Time: 01/10/19  9:15 AM   Result Value Ref Range    TSH 1.762 0.350 - 5.350 mIU/mL   Vitamin D 25 Hydroxy    Collection Time: 01/10/19  9:15 AM   Result Value Ref Range    25 Hydroxy, Vitamin D 52.7 ng/ml   T4, Free    Collection Time: 01/10/19  9:15 AM   Result Value Ref Range    Free T4 1.31 0.89 - 1.76 ng/dL         EKG: (EKG has been independently visualized by me and summarized below)    ECG 12  Lead  Date/Time: 1/29/2019 3:05 PM  Performed by: Eulogio Ramirez PA  Authorized by: Eulogio Ramirez PA   Comparison: compared with previous ECG from 10/16/2018  Rhythm: sinus rhythm  Rate: normal  Conduction: conduction normal  ST Segments: ST segments normal  T Waves: T waves normal  QRS axis: normal  Other: no other findings  Clinical impression: normal ECG          ASSESSMENT   1. 1. PAF: Remote PVI 2013.  Sinus rhythm with no breakthrough a fib.   2. Hx of Atrial tachycardia, PAC's: No recurrent events.   3. HTN: Controlled, Labile.   4. Anticoagulation:  Declines anticoagulation.  On ASA.    5. Recent diverticulitis      PLAN  · The patient had an episode of elevated heart rate in the setting of recent diverticulitis.  She's had no symptoms suggesting recurrent atrial fibrillation.  We offered her the option of repeating monitor which she will be if she has recurrent symptoms.  She did take one extra dose of Tambocor and this seemed to improve her symptoms.  If her symptoms change she will let her office know..  · Return for follow-up as scheduled in the fall Dr. Coats    1/29/2019  3:02 PM    Will James THIBODEAUX

## 2019-01-29 NOTE — TELEPHONE ENCOUNTER
I called the patient to let her know that I have sent the message to Rianna and will call her back when I hear back from her.  Shantal

## 2019-01-31 DIAGNOSIS — M25.561 CHRONIC PAIN OF RIGHT KNEE: ICD-10-CM

## 2019-01-31 DIAGNOSIS — G89.29 CHRONIC PAIN OF RIGHT KNEE: ICD-10-CM

## 2019-01-31 DIAGNOSIS — M17.11 ARTHRITIS OF RIGHT KNEE: Primary | ICD-10-CM

## 2019-01-31 NOTE — TELEPHONE ENCOUNTER
I called and spoke with Ms. Winters and she is only wanting the Orthovisc for the right knee.  I told her that Rianna with put that in for her.  Shantal

## 2019-02-04 NOTE — PROGRESS NOTES
Encounter Date:02/05/2019    Patient ID: Annalise Winters is a 68 y.o. female who resides in [place], [place]. she is an     CC/Reason for visit:    · Atrial fibrillation  · Hypertension         Problem List Items Addressed This Visit        Cardiology Problems    Paroxysmal atrial fibrillation (CMS/HCC) - Primary    Overview     · Initially diagnosed in 2012.  · Myocardial perfusion study (04/02/2013):  No ischemia.  · Event monitor (10/2013): demonstrating paroxysmal atrial fibrillation.  · Failure of flecainide, sotalol and Multaq therapy.  · Pulmonary vein ablation by Dr. Abdirahman Lackey, 08/21/2013.  · Chads Vasc=3 (age, female, HTN)         Current Assessment & Plan     · Continue pill in the pocket flecainide for atrial fibrillation as her symptoms are infrequent and short-lived  · If symptoms become more frequent or longer duration, event monitoring recommended to confirm the presence of A. Fib  · If A. fib confirmed, patient will need to reconsider NOAC therapy. Otherwise for now we will continue low-dose aspirin         Relevant Medications    amLODIPine (NORVASC) 2.5 MG tablet    flecainide (TAMBOCOR) 50 MG tablet    Essential hypertension    Current Assessment & Plan     · Recently elevated and context of some anxiety regarding her abdominal pain  · Patient will continue to monitor blood pressure closely and contact the office if a petition persist  · Consider ARB/thiazide combination pill if needed         Relevant Medications    amLODIPine (NORVASC) 2.5 MG tablet               · A. fib monitor to be placed if patient's symptoms become more frequent or longer in duration. Otherwise continue pill in the pocket approach  · If blood pressure remains elevated, consider ARB/thiazide combination  Return in about 6 months (around 8/5/2019).          Annalise Winters returns today for her 6 month follow up of her atrial fibrillation and hypertension. The patient underwent PVA with Dr. Treviño  Ziyad several years ago and is been free of recurrent palpitation symptoms until recent. A couple weeks ago the patient developed about 3 hours of rapid heart rate reminiscent of her atrial fibrillation. She took a flecainide and the palpitations resolved. She is somewhat anxious about this recurrence of atrial fibrillation. Recently resumed taking aspirin. She states that she is anxious about some recent worsening of abdominal pain as well as about the atrial fibrillation. As a result, she thinks her blood pressure has been running higher than normal. She denies any chest pain or shortness of breath. She continues to instruct fitness classes.      Review of Systems   Constitution: Negative for weakness and malaise/fatigue.   Eyes: Negative for vision loss in left eye and vision loss in right eye.   Cardiovascular: Positive for palpitations. Negative for chest pain, dyspnea on exertion, near-syncope, orthopnea, paroxysmal nocturnal dyspnea and syncope.   Musculoskeletal: Positive for arthritis, back pain, joint pain and joint swelling. Negative for myalgias.   Gastrointestinal: Positive for bloating, abdominal pain and heartburn.   Genitourinary: Positive for frequency.   Neurological: Positive for dizziness and headaches. Negative for brief paralysis, excessive daytime sleepiness, focal weakness, numbness and paresthesias.   Allergic/Immunologic: Positive for environmental allergies.   All other systems reviewed and are negative.      The patient's past medical, social, family history and ROS reviewed in the patient's electronic medical record.    Allergies  Ace inhibitors; Celexa [citalopram hydrobromide]; Paxil [paroxetine hcl]; and Corticosteroids    Outpatient Medications Marked as Taking for the 2/5/19 encounter (Office Visit) with Goran Ruth IV, MD   Medication Sig Dispense Refill   • acetaminophen (TYLENOL) 500 MG tablet Take 500 mg by mouth As Needed.     • amLODIPine (NORVASC) 2.5 MG  "tablet Take 1 tablet by mouth Daily. 90 tablet 3   • aspirin 81 MG chewable tablet Chew 81 mg Daily.     • Calcium-Vitamin D-Vitamin K (CALCIUM SOFT CHEWS PO) Take 1 tablet by mouth Daily.     • Cholecalciferol (VITAMIN D) 2000 UNITS capsule Take 1 capsule by mouth daily.     • Famotidine-Ca Carb-Mag Hydrox (PEPCID COMPLETE PO) Take 1 tablet by mouth As Needed.     • flecainide (TAMBOCOR) 50 MG tablet Take 1 tablet by mouth As Needed (palpitations). 20 tablet 1   • Ginger, Zingiber officinalis, (GINGER EXTRACT PO) Take 1 tablet by mouth As Needed.     • levothyroxine (SYNTHROID, LEVOTHROID) 50 MCG tablet Take 1 tablet by mouth Daily. 90 tablet 1   • Loratadine (CLARITIN PO) Take 10 mg by mouth Daily.     • LORazepam (ATIVAN) 0.5 MG tablet Take 1 tablet by mouth Daily As Needed for Anxiety. (Patient taking differently: Take 0.5 mg by mouth As Needed for Anxiety (half tab as needed).) 30 tablet 0   • montelukast (SINGULAIR) 10 MG tablet Take 1 tablet by mouth every night at bedtime. 90 tablet 1   • pantoprazole (PROTONIX) 40 MG EC tablet Take 40 mg by mouth Every Other Day.     • [DISCONTINUED] amLODIPine (NORVASC) 2.5 MG tablet TAKE ONE TABLET BY MOUTH DAILY (Patient taking differently: TAKE ONE TABLET BY MOUTH DAILY. PATIENT SAID SHE TAKES 1 EXTRA TAB PRN IF BP IS HIGH) 90 tablet 2   • [DISCONTINUED] flecainide (TAMBOCOR) 50 MG tablet Take 50 mg by mouth As Needed.             Blood pressure 150/82, pulse 68, height 174 cm (68.5\"), weight 70.9 kg (156 lb 6.4 oz), SpO2 98 %, not currently breastfeeding.  Body mass index is 23.43 kg/m².  There were no vitals filed for this visit.    Physical Exam   Constitutional: She is oriented to person, place, and time. She appears well-developed and well-nourished.   HENT:   Head: Normocephalic and atraumatic.   Eyes: Pupils are equal, round, and reactive to light. No scleral icterus.   Neck: No JVD present. Carotid bruit is not present. No thyromegaly present.   Cardiovascular: " Normal rate, regular rhythm, S1 normal and S2 normal. Exam reveals no gallop.   No murmur heard.  Pulmonary/Chest: Effort normal and breath sounds normal.   Abdominal: Soft. There is no hepatosplenomegaly. There is no tenderness.   Neurological: She is alert and oriented to person, place, and time.   Skin: Skin is warm and dry. No cyanosis. Nails show no clubbing.   Psychiatric: She has a normal mood and affect. Her behavior is normal.       Data Review:     Procedures    Lab Results   Component Value Date    CHOL 234 (H) 01/10/2019    TRIG 54 01/10/2019    HDL 89 (H) 01/10/2019     01/10/2019    AST 21 01/10/2019    ALT 20 01/10/2019     Lab Results   Component Value Date    GLUCOSE 88 01/10/2019    BUN 19 01/10/2019    CREATININE 0.75 01/10/2019    EGFRIFNONA 76 01/10/2019    EGFRIFAFRI 106 05/16/2016    BCR 25.3 (H) 01/10/2019    K 4.3 01/10/2019    CO2 32.0 (H) 01/10/2019    CALCIUM 9.6 01/10/2019    PROTENTOTREF 6.9 05/16/2016    ALBUMIN 4.48 01/10/2019    LABIL2 2.0 05/16/2016    AST 21 01/10/2019    ALT 20 01/10/2019     Lab Results   Component Value Date    WBC 4.19 01/10/2019    HGB 12.4 01/10/2019    HCT 39.1 01/10/2019    MCV 89.5 01/10/2019     01/10/2019     Lab Results   Component Value Date    TSH 1.762 01/10/2019             Goran Ruth IV, MD  2/5/2019

## 2019-02-05 ENCOUNTER — OFFICE VISIT (OUTPATIENT)
Dept: CARDIOLOGY | Facility: CLINIC | Age: 69
End: 2019-02-05

## 2019-02-05 VITALS
OXYGEN SATURATION: 98 % | HEART RATE: 68 BPM | WEIGHT: 156.4 LBS | SYSTOLIC BLOOD PRESSURE: 150 MMHG | BODY MASS INDEX: 23.16 KG/M2 | DIASTOLIC BLOOD PRESSURE: 82 MMHG | HEIGHT: 69 IN

## 2019-02-05 DIAGNOSIS — I48.0 PAROXYSMAL ATRIAL FIBRILLATION (HCC): Primary | ICD-10-CM

## 2019-02-05 DIAGNOSIS — I10 ESSENTIAL HYPERTENSION: ICD-10-CM

## 2019-02-05 PROCEDURE — 99214 OFFICE O/P EST MOD 30 MIN: CPT | Performed by: INTERNAL MEDICINE

## 2019-02-05 RX ORDER — FLECAINIDE ACETATE 50 MG/1
50 TABLET ORAL AS NEEDED
Qty: 20 TABLET | Refills: 1 | Status: SHIPPED | OUTPATIENT
Start: 2019-02-05 | End: 2019-08-19 | Stop reason: SDUPTHER

## 2019-02-05 RX ORDER — AMLODIPINE BESYLATE 2.5 MG/1
2.5 TABLET ORAL DAILY
Qty: 90 TABLET | Refills: 3 | Status: SHIPPED | OUTPATIENT
Start: 2019-02-05 | End: 2019-10-21

## 2019-02-05 RX ORDER — ASPIRIN 81 MG/1
81 TABLET, CHEWABLE ORAL DAILY
COMMUNITY
End: 2019-10-21 | Stop reason: SDUPTHER

## 2019-02-05 NOTE — ASSESSMENT & PLAN NOTE
· Continue pill in the pocket flecainide for atrial fibrillation as her symptoms are infrequent and short-lived  · If symptoms become more frequent or longer duration, event monitoring recommended to confirm the presence of A. Fib  · If A. fib confirmed, patient will need to reconsider NOAC therapy. Otherwise for now we will continue low-dose aspirin

## 2019-02-05 NOTE — ASSESSMENT & PLAN NOTE
· Recently elevated and context of some anxiety regarding her abdominal pain  · Patient will continue to monitor blood pressure closely and contact the office if a petition persist  · Consider ARB/thiazide combination pill if needed

## 2019-02-08 ENCOUNTER — OFFICE VISIT (OUTPATIENT)
Dept: INTERNAL MEDICINE | Facility: CLINIC | Age: 69
End: 2019-02-08

## 2019-02-08 VITALS
DIASTOLIC BLOOD PRESSURE: 80 MMHG | HEART RATE: 107 BPM | SYSTOLIC BLOOD PRESSURE: 148 MMHG | HEIGHT: 69 IN | BODY MASS INDEX: 23.25 KG/M2 | OXYGEN SATURATION: 98 % | WEIGHT: 157 LBS

## 2019-02-08 DIAGNOSIS — R51.9 ACUTE NONINTRACTABLE HEADACHE, UNSPECIFIED HEADACHE TYPE: Primary | ICD-10-CM

## 2019-02-08 PROCEDURE — 93000 ELECTROCARDIOGRAM COMPLETE: CPT | Performed by: PHYSICIAN ASSISTANT

## 2019-02-08 PROCEDURE — 99213 OFFICE O/P EST LOW 20 MIN: CPT | Performed by: PHYSICIAN ASSISTANT

## 2019-02-08 NOTE — PROGRESS NOTES
Chief Complaint   Patient presents with   • Headache       Subjective   Annalise Winters is a 68 y.o. female.       History of Present Illness     Pt had visit with Cardiology on 2/5 and her blood pressure was elevated.     She is having a new headache on the right side of her head. Does not bother her when she is doing her aerobic class. Then will come back when she is sitting still. Today she finished her mildred class and her headache has come back. Feels like an ache on the outside of her head.     Her headache has not corresponded to when her blood pressure is high. Has had headache when BP was low. She took a total 5 mg of amlodipine today because blood pressure was elevated today. BP med was mentioned at her cardiology appt.       Current Outpatient Medications:   •  acetaminophen (TYLENOL) 500 MG tablet, Take 500 mg by mouth As Needed., Disp: , Rfl:   •  amLODIPine (NORVASC) 2.5 MG tablet, Take 1 tablet by mouth Daily., Disp: 90 tablet, Rfl: 3  •  aspirin 81 MG chewable tablet, Chew 81 mg Daily., Disp: , Rfl:   •  Calcium-Vitamin D-Vitamin K (CALCIUM SOFT CHEWS PO), Take 1 tablet by mouth Daily., Disp: , Rfl:   •  Cholecalciferol (VITAMIN D) 2000 UNITS capsule, Take 1 capsule by mouth daily., Disp: , Rfl:   •  Famotidine-Ca Carb-Mag Hydrox (PEPCID COMPLETE PO), Take 1 tablet by mouth As Needed., Disp: , Rfl:   •  flecainide (TAMBOCOR) 50 MG tablet, Take 1 tablet by mouth As Needed (palpitations)., Disp: 20 tablet, Rfl: 1  •  Ginger, Zingiber officinalis, (GINGER EXTRACT PO), Take 1 tablet by mouth As Needed., Disp: , Rfl:   •  levothyroxine (SYNTHROID, LEVOTHROID) 50 MCG tablet, Take 1 tablet by mouth Daily., Disp: 90 tablet, Rfl: 1  •  Loratadine (CLARITIN PO), Take 10 mg by mouth Daily., Disp: , Rfl:   •  LORazepam (ATIVAN) 0.5 MG tablet, Take 1 tablet by mouth Daily As Needed for Anxiety. (Patient taking differently: Take 0.5 mg by mouth As Needed for Anxiety (half tab as needed).), Disp: 30 tablet, Rfl:  "0  •  montelukast (SINGULAIR) 10 MG tablet, Take 1 tablet by mouth every night at bedtime., Disp: 90 tablet, Rfl: 1  •  pantoprazole (PROTONIX) 40 MG EC tablet, Take 40 mg by mouth Every Other Day., Disp: , Rfl:      PMFSH  The following portions of the patient's history were reviewed and updated as appropriate: allergies, current medications, past family history, past medical history, past social history, past surgical history and problem list.    Review of Systems   Constitutional: Negative for activity change, fatigue and unexpected weight change.   HENT: Negative for dental problem, ear pain, nosebleeds and sore throat.    Eyes: Negative for pain and discharge.   Respiratory: Negative for chest tightness, shortness of breath and wheezing.    Gastrointestinal: Negative for abdominal pain and blood in stool.   Endocrine: Negative.    Genitourinary: Negative for difficulty urinating and hematuria.   Musculoskeletal: Negative for joint swelling.   Skin: Negative for color change, pallor, rash and wound.   Allergic/Immunologic: Negative.    Neurological: Positive for headaches. Negative for tremors, seizures, syncope, facial asymmetry, speech difficulty and numbness.   Hematological: Negative for adenopathy.   Psychiatric/Behavioral: Negative for agitation, confusion, sleep disturbance and suicidal ideas.       Objective   /80   Pulse 107   Ht 174 cm (68.5\")   Wt 71.2 kg (157 lb)   LMP  (LMP Unknown)   SpO2 98%   BMI 23.52 kg/m²     Physical Exam   Constitutional: She is oriented to person, place, and time. She appears well-developed and well-nourished.  Non-toxic appearance. No distress.   HENT:   Head: Normocephalic and atraumatic. Head is without right periorbital erythema and without left periorbital erythema.   Nose: Nose normal.   Mouth/Throat: Oropharynx is clear and moist.   Eyes: Conjunctivae and EOM are normal. Pupils are equal, round, and reactive to light. Right eye exhibits no discharge. Left " eye exhibits no discharge. No scleral icterus.   Neck: Normal range of motion. Neck supple.   Cardiovascular: Normal rate, regular rhythm and normal heart sounds.   No murmur heard.  Pulmonary/Chest: Effort normal.   Abdominal: Soft. There is no tenderness.   Musculoskeletal: Normal range of motion. She exhibits no tenderness or deformity.   Neurological: She is alert and oriented to person, place, and time. She has normal reflexes. She displays no atrophy, no tremor and normal reflexes. No cranial nerve deficit. She exhibits normal muscle tone. Coordination normal.   Reflex Scores:       Tricep reflexes are 2+ on the right side and 2+ on the left side.       Bicep reflexes are 2+ on the right side and 2+ on the left side.       Brachioradialis reflexes are 2+ on the right side and 2+ on the left side.       Patellar reflexes are 2+ on the right side and 2+ on the left side.       Achilles reflexes are 2+ on the right side and 2+ on the left side.  Skin: Skin is warm and dry. No rash noted. She is not diaphoretic. No erythema.   Psychiatric: She has a normal mood and affect. Her behavior is normal. Judgment and thought content normal.   Nursing note and vitals reviewed.         ECG 12 Lead  Date/Time: 3/5/2019 12:42 PM  Performed by: Emi Blood PA  Authorized by: Emi Blood PA   Comparison: compared with previous ECG from 1/29/2019  Similar to previous ECG  Rhythm: sinus rhythm  Rate: normal  Conduction: conduction normal  ST Segments: ST segments normal  T Waves: T waves normal  QRS axis: normal  Other: no other findings    Clinical impression: normal ECG              ASSESSMENT/PLAN    Problem List Items Addressed This Visit     None      Visit Diagnoses     Acute nonintractable headache, unspecified headache type    -  Primary    Discussed with Dr Bonilla. Refer for CT of head due to persistent headache. Continue to monitor BP at home. Use prn clonazepam for anxiety and to relieve muscle tension in  neck that may be contributing to her headache. If headache worsens and becomes intractable, to ER for eval.    Relevant Orders    CT Head With & Without Contrast               Return if symptoms worsen or fail to improve, for Next scheduled follow up.

## 2019-02-11 ENCOUNTER — TELEPHONE (OUTPATIENT)
Dept: ORTHOPEDIC SURGERY | Facility: CLINIC | Age: 69
End: 2019-02-11

## 2019-02-11 ENCOUNTER — TELEPHONE (OUTPATIENT)
Dept: INTERNAL MEDICINE | Facility: CLINIC | Age: 69
End: 2019-02-11

## 2019-02-11 DIAGNOSIS — I10 ESSENTIAL HYPERTENSION: Primary | ICD-10-CM

## 2019-02-11 NOTE — TELEPHONE ENCOUNTER
Pt's headache has seemed to resolve. She has had fluctuations in blood pressure and has been taking amlodipine off and on, taking 2.5-7.5 mg depending on BP readings. Had a good day on Saturday and did not take the medication. Feeling more anxious and BP is high today.    Recommended she consistently take 5 mg of amlodipine daily and monitor BP less frequently. If still remaining high, she will call back to let us know.

## 2019-02-11 NOTE — TELEPHONE ENCOUNTER
PATIENT IS REQUESTING A RETURN CALL REGARDING HER ISSUE WITH HEADACHE AND BLOOD PRESSURE 'GOING GIO HIGH'. SHE WAS SEEN LAST WEEK FOR THESE ISSUES. SHE IS CONCERNED THAT THIS MAY BE RELATED TO AN UNKNOWN KIDNEY ISSUE AND WOULD LIKE TO SEE IF KIDNEY TESTING (?) IS APPROPRIATE.    CALL BACK [phone]

## 2019-02-11 NOTE — TELEPHONE ENCOUNTER
Patient wants to wait one more week before starting her Orthovisc injections.  She is having trouble with her BP right now. I transfer to the front to cancel appointment for today and set up and appointment for her 3rd injection.  Shantal

## 2019-02-11 NOTE — TELEPHONE ENCOUNTER
Patient has an appointment with Rianna vo to begin the Orthovisc injections. She had some questions regarding the injections and to see if they will interfere with any blood pressure. She can be contacted back at [phone]. She also stated that she would be unavailable from 1 to 2 pm.

## 2019-02-12 ENCOUNTER — LAB (OUTPATIENT)
Dept: INTERNAL MEDICINE | Facility: CLINIC | Age: 69
End: 2019-02-12

## 2019-02-12 ENCOUNTER — TELEPHONE (OUTPATIENT)
Dept: CARDIOLOGY | Facility: CLINIC | Age: 69
End: 2019-02-12

## 2019-02-12 DIAGNOSIS — I10 ESSENTIAL HYPERTENSION: ICD-10-CM

## 2019-02-12 LAB
ANION GAP SERPL CALCULATED.3IONS-SCNC: 4 MMOL/L (ref 3–11)
BUN BLD-MCNC: 16 MG/DL (ref 9–23)
BUN/CREAT SERPL: 18.8 (ref 7–25)
CALCIUM SPEC-SCNC: 10 MG/DL (ref 8.7–10.4)
CHLORIDE SERPL-SCNC: 99 MMOL/L (ref 99–109)
CO2 SERPL-SCNC: 31 MMOL/L (ref 20–31)
CREAT BLD-MCNC: 0.85 MG/DL (ref 0.6–1.3)
GFR SERPL CREATININE-BSD FRML MDRD: 66 ML/MIN/1.73
GLUCOSE BLD-MCNC: 85 MG/DL (ref 70–100)
POTASSIUM BLD-SCNC: 4.8 MMOL/L (ref 3.5–5.5)
SODIUM BLD-SCNC: 134 MMOL/L (ref 132–146)

## 2019-02-12 PROCEDURE — 80048 BASIC METABOLIC PNL TOTAL CA: CPT | Performed by: PHYSICIAN ASSISTANT

## 2019-02-12 NOTE — TELEPHONE ENCOUNTER
Patient called to report her BP has been elevated -180's, unsure of HR. She has been having headaches and has seen her PCP. Taking amlodipine 5 mg daily, recently increased. Having head CT 2/14/2019. Currently 's. She will call back after her testing if her BP remains elevated.

## 2019-02-12 NOTE — TELEPHONE ENCOUNTER
PT SPOKE WITH TIGRE YESTERDAY AND DISCUSSED COMING IN FOR ADDITIONAL LABS. SHE WANTS TO KNOW IF TIGRE RECOMMENDS THAT SHE PROCEED WITH GETTING LABS DRAWN. PLEASE ADVISE AND GIVE PT A CALL. THANKS.

## 2019-02-13 NOTE — PROGRESS NOTES
Please let her know that her metabolic panel shows that her labs are stable and her kidney function is normal.

## 2019-02-14 ENCOUNTER — HOSPITAL ENCOUNTER (OUTPATIENT)
Dept: CT IMAGING | Facility: HOSPITAL | Age: 69
Discharge: HOME | End: 2019-02-14
Admitting: PHYSICIAN ASSISTANT

## 2019-02-14 DIAGNOSIS — R51.9 ACUTE NONINTRACTABLE HEADACHE, UNSPECIFIED HEADACHE TYPE: ICD-10-CM

## 2019-02-14 PROCEDURE — 70470 CT HEAD/BRAIN W/O & W/DYE: CPT

## 2019-02-14 PROCEDURE — 0 IOPAMIDOL PER 1 ML: Performed by: PHYSICIAN ASSISTANT

## 2019-02-14 RX ADMIN — IOPAMIDOL 50 ML: 755 INJECTION, SOLUTION INTRAVENOUS at 15:45

## 2019-02-18 ENCOUNTER — CLINICAL SUPPORT (OUTPATIENT)
Dept: ORTHOPEDIC SURGERY | Facility: CLINIC | Age: 69
End: 2019-02-18

## 2019-02-18 DIAGNOSIS — G89.29 CHRONIC PAIN OF RIGHT KNEE: Primary | ICD-10-CM

## 2019-02-18 DIAGNOSIS — M17.11 PRIMARY OSTEOARTHRITIS OF RIGHT KNEE: ICD-10-CM

## 2019-02-18 DIAGNOSIS — M25.561 CHRONIC PAIN OF RIGHT KNEE: Primary | ICD-10-CM

## 2019-02-18 PROCEDURE — 20610 DRAIN/INJ JOINT/BURSA W/O US: CPT | Performed by: PHYSICIAN ASSISTANT

## 2019-02-18 NOTE — PROGRESS NOTES
Procedure   Large Joint Arthrocentesis: R knee  Date/Time: 2/18/2019 2:55 PM  Consent given by: patient  Site marked: site marked  Timeout: Immediately prior to procedure a time out was called to verify the correct patient, procedure, equipment, support staff and site/side marked as required   Supporting Documentation  Indications: pain   Procedure Details  Location: knee - R knee  Preparation: Patient was prepped and draped in the usual sterile fashion  Needle size: 22 G  Approach: anterolateral  Medications administered: 30 mg Hyaluronan 30 MG/2ML  Patient tolerance: patient tolerated the procedure well with no immediate complications

## 2019-02-18 NOTE — PROGRESS NOTES
CC: Follow-up right knee arthritis, 1/3 Orthovisc injection today    History of present illness: Patient presents for her first Orthovisc injection to the right knee today.  At this time she denies any numbness or tingling into the distal extremity.  No fever, chills, night sweats or other constitutional symptoms.    See chart for PMH, PSH, Meds, All - reviewed.    Ortho exam:  Right knee  Skin is intact without redness, warmth or swelling/effusion.  No lesions or evidence of infection noted.  Tenderness: Mild tenderness noted to the joint line.  Motor/sensory: Grossly intact L2-S1.    Assessment/plan:  Right knee pain with known osteoarthritis    Proceed today with 1/3 of Orthovisc injection.  Patient will follow-up in week for second injection.      After discussing risks of injection the patient gave consent to proceed.  Her right knee confirmed as the correct joint to be injected with a timeout.  The knee was then prepped with Hibiclens and injected with a prefilled syringe of Orthovisc without any resistance using anterior lateral approach, patient in seated position.  The patient tolerated procedure well.  Hemostasis was achieved and a Band-Aid was applied over the injection site.  I instructed the patient on signs and symptoms of infection.  They should report to the ER if any of these develop.  Recommended modifying activity to include rest, ice, elevation and/or heat along with oral pain medication as needed.  Patient observed ambulating normally after the injection.

## 2019-02-18 NOTE — PROGRESS NOTES
I have reviewed the notes, assessments, and/or procedures performed by Emi Blood PA-C, I concur with her/his documentation of Annalise Winters.

## 2019-03-04 ENCOUNTER — CLINICAL SUPPORT (OUTPATIENT)
Dept: ORTHOPEDIC SURGERY | Facility: CLINIC | Age: 69
End: 2019-03-04

## 2019-03-04 DIAGNOSIS — G89.29 CHRONIC PAIN OF RIGHT KNEE: ICD-10-CM

## 2019-03-04 DIAGNOSIS — M17.11 PRIMARY OSTEOARTHRITIS OF RIGHT KNEE: Primary | ICD-10-CM

## 2019-03-04 DIAGNOSIS — M25.561 CHRONIC PAIN OF RIGHT KNEE: ICD-10-CM

## 2019-03-04 PROCEDURE — 20610 DRAIN/INJ JOINT/BURSA W/O US: CPT | Performed by: PHYSICIAN ASSISTANT

## 2019-03-04 NOTE — PROGRESS NOTES
Procedure   Large Joint Arthrocentesis: R knee  Date/Time: 3/4/2019 3:06 PM  Consent given by: patient  Site marked: site marked  Timeout: Immediately prior to procedure a time out was called to verify the correct patient, procedure, equipment, support staff and site/side marked as required   Supporting Documentation  Indications: pain   Procedure Details  Location: knee - R knee  Preparation: Patient was prepped and draped in the usual sterile fashion  Needle size: 22 G  Approach: anterolateral  Medications administered: 30 mg Hyaluronan 30 MG/2ML  Patient tolerance: patient tolerated the procedure well with no immediate complications

## 2019-03-04 NOTE — PROGRESS NOTES
CC: Follow-up right knee arthritis, 2/3 Orthovisc injection today     History of present illness: Patient presents for her second Orthovisc injection to the right knee today.    Patient missed last week secondary to medical issues and other appointments.  At this time she denies any numbness or tingling into the distal extremity.  No fever, chills, night sweats or other constitutional symptoms.     See chart for PMH, PSH, Meds, All - reviewed.     Ortho exam:  Right knee  Skin is intact without redness, warmth or swelling/effusion.  No lesions or evidence of infection noted.  Tenderness: Mild tenderness noted to the joint line.  Motor/sensory: Grossly intact L2-S1.     Assessment/plan:  Right knee pain with known osteoarthritis     Proceed today with 2/3 of Orthovisc injection.  Patient will follow-up in week for third injection.       After discussing risks of injection the patient gave consent to proceed.  Her right knee confirmed as the correct joint to be injected with a timeout.  The knee was then prepped with Hibiclens and injected with a prefilled syringe of Orthovisc without any resistance using anterior lateral approach, patient in seated position.  The patient tolerated procedure well.  Hemostasis was achieved and a Band-Aid was applied over the injection site.  I instructed the patient on signs and symptoms of infection.  They should report to the ER if any of these develop.  Recommended modifying activity to include rest, ice, elevation and/or heat along with oral pain medication as needed.  Patient observed ambulating normally after the injection.

## 2019-03-07 ENCOUNTER — TELEPHONE (OUTPATIENT)
Dept: INTERNAL MEDICINE | Facility: CLINIC | Age: 69
End: 2019-03-07

## 2019-03-07 NOTE — TELEPHONE ENCOUNTER
PT. CALLED AND WOULD LIKE TO HAVE HER REFERRAL REOPENED FOR DR. BOWDEN FOR NEUROSURGERY; SHE STATED THAT SHE CALLED THEIR OFFICE AND THEY TOLD HER THAT THE REFERRAL HAD BEEN CLOSED BECAUSE THEY COULDN'T REACH HER; PT. GAVE DR. BOWDEN'S OFFICE HER CELLPHONE NUMBER; PT'S NUMBER IS [phone]

## 2019-03-11 ENCOUNTER — CLINICAL SUPPORT (OUTPATIENT)
Dept: ORTHOPEDIC SURGERY | Facility: CLINIC | Age: 69
End: 2019-03-11

## 2019-03-11 DIAGNOSIS — M17.11 PRIMARY OSTEOARTHRITIS OF RIGHT KNEE: ICD-10-CM

## 2019-03-11 DIAGNOSIS — M25.561 CHRONIC PAIN OF RIGHT KNEE: Primary | ICD-10-CM

## 2019-03-11 DIAGNOSIS — G89.29 CHRONIC PAIN OF RIGHT KNEE: Primary | ICD-10-CM

## 2019-03-11 PROCEDURE — 20610 DRAIN/INJ JOINT/BURSA W/O US: CPT | Performed by: PHYSICIAN ASSISTANT

## 2019-03-11 NOTE — PROGRESS NOTES
Procedure   Large Joint Arthrocentesis: R knee  Date/Time: 3/11/2019 3:10 PM  Consent given by: patient  Site marked: site marked  Timeout: Immediately prior to procedure a time out was called to verify the correct patient, procedure, equipment, support staff and site/side marked as required   Supporting Documentation  Indications: pain   Procedure Details  Location: knee - R knee  Preparation: Patient was prepped and draped in the usual sterile fashion  Needle size: 22 G  Approach: anterolateral  Medications administered: 30 mg Hyaluronan 30 MG/2ML  Patient tolerance: patient tolerated the procedure well with no immediate complications

## 2019-03-11 NOTE — PROGRESS NOTES
CC: Follow-up right knee arthritis, 3/3 Orthovisc injection today     History of present illness: Patient presents for her third Orthovisc injection to the right knee today.    Patient still reporting only minimal benefit following first 2 injections.   At this time she denies any numbness or tingling into the distal extremity.  No fever, chills, night sweats or other constitutional symptoms.     See chart for PMH, PSH, Meds, All - reviewed.     Ortho exam:  Right knee  Skin is intact without redness, warmth or swelling/effusion.  No lesions or evidence of infection noted.  Tenderness: Mild tenderness noted to the joint line.  Motor/sensory: Grossly intact L2-S1.     Assessment/plan:  Right knee pain with known osteoarthritis     Proceed today with 3/3 of Orthovisc injection.  Patient will follow-up as needed.       After discussing risks of injection the patient gave consent to proceed.  Her right knee confirmed as the correct joint to be injected with a timeout.  The knee was then prepped with Hibiclens and injected with a prefilled syringe of Orthovisc without any resistance using anterior lateral approach, patient in seated position.  The patient tolerated procedure well.  Hemostasis was achieved and a Band-Aid was applied over the injection site.  I instructed the patient on signs and symptoms of infection.  They should report to the ER if any of these develop.  Recommended modifying activity to include rest, ice, elevation and/or heat along with oral pain medication as needed.  Patient observed ambulating normally after the injection.

## 2019-03-12 NOTE — TELEPHONE ENCOUNTER
Dr. Jauregui,    I used the same referral that you issued in January.  It was just a matter or re-opening it.  I'm not sure why when she spoke w/ them that they didn't just go ahead & schedule her.  But I've sent it to Dr. Patterson's office.      NCB

## 2019-03-13 ENCOUNTER — TELEPHONE (OUTPATIENT)
Dept: CARDIOLOGY | Facility: CLINIC | Age: 69
End: 2019-03-13

## 2019-04-30 ENCOUNTER — TELEPHONE (OUTPATIENT)
Dept: INTERNAL MEDICINE | Facility: CLINIC | Age: 69
End: 2019-04-30

## 2019-04-30 NOTE — TELEPHONE ENCOUNTER
PT WAS REFERRED TO DR BOWDEN FOR PLACE ON HEAD, SHE SAW  DERMATOLOGY RECENTLY, AND THEY TOLD HER THAT NEURO WOULD NOT BE ABLE TO DO ANYTHING ABOUT IT UNLESS SHE CHOSE TO HAVE SURGERY TO REMOVE IT. PT IS WANTING TO KNOW IF SHE SHOULD EVEN BOTHER TO KEEP HER APPT WITH NEURO (  DR BOWDEN). PATIENT WOULD LIKE A CALL BACK [phone].

## 2019-05-02 ENCOUNTER — OFFICE VISIT (OUTPATIENT)
Dept: NEUROSURGERY | Facility: CLINIC | Age: 69
End: 2019-05-02

## 2019-05-02 VITALS
BODY MASS INDEX: 23.08 KG/M2 | WEIGHT: 155.8 LBS | TEMPERATURE: 98 F | HEIGHT: 69 IN | DIASTOLIC BLOOD PRESSURE: 82 MMHG | SYSTOLIC BLOOD PRESSURE: 130 MMHG

## 2019-05-02 DIAGNOSIS — M89.9 FRONTAL SKULL LESION: Primary | ICD-10-CM

## 2019-05-02 PROCEDURE — 99214 OFFICE O/P EST MOD 30 MIN: CPT | Performed by: PHYSICIAN ASSISTANT

## 2019-05-02 NOTE — PROGRESS NOTES
Patient: Annalise Winters  : 1950    Primary Care Provider: Maryse Jauregui MD      Chief Complaint: Bump on forehead    History of Present Illness:       Patient is a very nice 68-year-old female who has a relatively interesting problem.  Patient had headache a while back and got a CT scan of her head that revealed a hyperostosis of the left forehead area.  For this she was referred to dermatology who said nothing to do and was referred on to neurosurgery for an evaluation.  Patient wishes to avoid surgery at this time which is reasonable I have given her reasons that would necessitate referral back to us a more urgent fashion.      Review of Systems   Constitutional: Negative for activity change, appetite change, chills, diaphoresis, fatigue, fever and unexpected weight change.   HENT: Positive for tinnitus. Negative for congestion, dental problem, drooling, ear discharge, ear pain, facial swelling, hearing loss, mouth sores, nosebleeds, postnasal drip, rhinorrhea, sinus pressure, sneezing, sore throat, trouble swallowing and voice change.    Eyes: Negative for photophobia, pain, discharge, redness, itching and visual disturbance.   Respiratory: Negative for apnea, cough, choking, chest tightness, shortness of breath, wheezing and stridor.    Cardiovascular: Positive for palpitations. Negative for chest pain and leg swelling.   Gastrointestinal: Negative for abdominal distention, abdominal pain, anal bleeding, blood in stool, constipation, diarrhea, nausea, rectal pain and vomiting.   Endocrine: Negative for cold intolerance, heat intolerance, polydipsia, polyphagia and polyuria.   Genitourinary: Negative for decreased urine volume, difficulty urinating, dysuria, enuresis, flank pain, frequency, genital sores, hematuria and urgency.   Musculoskeletal: Negative for arthralgias, back pain, gait problem, joint swelling, myalgias, neck pain and neck stiffness.   Skin: Negative for color change, pallor, rash  and wound.   Allergic/Immunologic: Positive for environmental allergies. Negative for food allergies and immunocompromised state.   Neurological: Negative for dizziness, tremors, seizures, syncope, facial asymmetry, speech difficulty, weakness, light-headedness, numbness and headaches.   Hematological: Negative for adenopathy. Does not bruise/bleed easily.   Psychiatric/Behavioral: Negative for agitation, behavioral problems, confusion, decreased concentration, dysphoric mood, hallucinations, self-injury, sleep disturbance and suicidal ideas. The patient is not nervous/anxious and is not hyperactive.        Past Medical History:     Past Medical History:   Diagnosis Date   • Abnormal blood chemistry    • Arthritis     right knee   • Cyst of buttocks    • Diverticulosis    • Dizziness    • Dysuria    • Esophagitis    • Flushing    • GERD (gastroesophageal reflux disease)    • Heartburn    • Hypertension    • Hyperthyroidism    • Hypothyroidism    • Irritable bowel syndrome    • LLQ abdominal pain    • Polyp of sigmoid colon    • Sinusitis    • Skin lesion of face    • UTI (urinary tract infection)        Family History:     Family History   Problem Relation Age of Onset   • Dementia Mother    • Glomerulonephritis Mother    • Hypertension Mother    • Other Mother          at age 88   • Arthritis Father    • Cancer Father         prostate cancer   • Other Father          at age 65   • Heart attack Father    • Hypertension Other    • Osteoarthritis Other        Social History:    reports that she has never smoked. She has never used smokeless tobacco. She reports that she drinks alcohol. She reports that she does not use drugs.   SMOKING STATUS: Non-smoker    Surgical History:     Past Surgical History:   Procedure Laterality Date   • CARDIAC ABLATION  2013    Pulmonary vein ablation by Dr. Abdirahman Lackey, 2013.   • PARTIAL HYSTERECTOMY     • RHINOPLASTY     • TUBAL ABDOMINAL LIGATION          Allergies:   Ace inhibitors; Celexa [citalopram hydrobromide]; Paxil [paroxetine hcl]; and Corticosteroids    Physical Exam:    Vital Signs:LMP  (LMP Unknown)    BMI: There is no height or weight on file to calculate BMI.    GENERAL:         The patient is in no acute distress, and is able to answer all questions appropriately.  Neck:        Supple without lymphadenopathy  Musculoskeletal:          strength is 5 out of 5 bilaterally.        Shoulder abduction is 5 out of 5.         Dorsiflexion is 5/5 Bilaterally       Plantarflexion is 5/5 bilaterally       Hip Flexion 5/5 bilaterally.         The patient´s gait is normal without antalgia.  Neurologic:        The patient is alert and oriented by 3.          Pupils are equal and reactive to light.         Visual fields are full.         Extraocular movements are intact without nystagmus.         There is no evidence of central motor drift. No facial droop.  No difficulty with rapid alternating movements.         Sensation is equal bilaterally with no deficit.           Reflexes:  2+ @ biceps, triceps, brachioradialis, as well as the patellar and Achilles tendon bilaterally.  CRANIAL NERVES:       Cranial nerve II: Review of the fundi demonstrates no edema.  Visual fields are full to confrontation.       Cranial nerves III, IV and VI: PERRLA DC.  Extraocular movements are intact.  Nystagmus is not present.       Cranial nerve V: Facial sensation is intact to light touch.       Cranial nerve VII: Muscles of facial expression revealed no asymmetry.       Cranial nerve VIII: Hearing is intact to finger rub bilaterally.       Cranial nerve IX and X: Palate elevates symmetrically.        Cranial nerve XI: Shoulder shrug is intact.       Cranial nerve XII: Tongue is midline without evidence of Atrophy or fasciculation.        Medical Decision Making    Data Review:   CT of the head reviewed and showed a 9 mm x 4 mm nodule on her forehead that looks  calcified    Diagnosis:   Hyperostosis/calcification of cranium    Treatment Options:   Nothing at this time.  Patient will contemplate whether removal/biopsy would be reasonable.    She will call us back if she would like this removed.    It has been a pleasure providing neurosurgical care.    Alber Chun PA-C      No diagnosis found.

## 2019-06-18 ENCOUNTER — TELEPHONE (OUTPATIENT)
Dept: ORTHOPEDIC SURGERY | Facility: CLINIC | Age: 69
End: 2019-06-18

## 2019-06-18 NOTE — TELEPHONE ENCOUNTER
Relayed message verbally to pt, she will proceed with injection for now.   posterior mitral valve leaflet

## 2019-06-18 NOTE — TELEPHONE ENCOUNTER
I called patient and let her know what Rianna had stated in her last note about ice, elevation etc. I made her an appointment to see Dr. Bucio in September per patients request.

## 2019-06-29 PROCEDURE — 87086 URINE CULTURE/COLONY COUNT: CPT | Performed by: FAMILY MEDICINE

## 2019-06-30 ENCOUNTER — TELEPHONE (OUTPATIENT)
Dept: URGENT CARE | Facility: CLINIC | Age: 69
End: 2019-06-30

## 2019-07-01 ENCOUNTER — TELEPHONE (OUTPATIENT)
Dept: URGENT CARE | Facility: CLINIC | Age: 69
End: 2019-07-01

## 2019-07-01 ENCOUNTER — OFFICE VISIT (OUTPATIENT)
Dept: INTERNAL MEDICINE | Facility: CLINIC | Age: 69
End: 2019-07-01

## 2019-07-01 VITALS
BODY MASS INDEX: 23.22 KG/M2 | HEART RATE: 90 BPM | SYSTOLIC BLOOD PRESSURE: 142 MMHG | WEIGHT: 156.8 LBS | HEIGHT: 69 IN | RESPIRATION RATE: 16 BRPM | DIASTOLIC BLOOD PRESSURE: 80 MMHG | OXYGEN SATURATION: 96 %

## 2019-07-01 DIAGNOSIS — R30.9 PAINFUL URINATION: Primary | ICD-10-CM

## 2019-07-01 LAB
BILIRUB BLD-MCNC: ABNORMAL MG/DL
CLARITY, POC: CLEAR
COLOR UR: YELLOW
GLUCOSE UR STRIP-MCNC: ABNORMAL MG/DL
KETONES UR QL: NEGATIVE
LEUKOCYTE EST, POC: NEGATIVE
NITRITE UR-MCNC: NEGATIVE MG/ML
PH UR: 5 [PH] (ref 5–8)
PROT UR STRIP-MCNC: ABNORMAL MG/DL
RBC # UR STRIP: NEGATIVE /UL
SP GR UR: 1.02 (ref 1–1.03)
UROBILINOGEN UR QL: ABNORMAL

## 2019-07-01 PROCEDURE — 87086 URINE CULTURE/COLONY COUNT: CPT | Performed by: PHYSICIAN ASSISTANT

## 2019-07-01 PROCEDURE — 81003 URINALYSIS AUTO W/O SCOPE: CPT | Performed by: INTERNAL MEDICINE

## 2019-07-01 PROCEDURE — 99213 OFFICE O/P EST LOW 20 MIN: CPT | Performed by: INTERNAL MEDICINE

## 2019-07-01 NOTE — PROGRESS NOTES
Chief Complaint   Patient presents with   • Urinary Tract Infection       Subjective   Annalise Winters is a 68 y.o. female.       History of Present Illness     Pt developed some discomfort with urination over the weekend, took some Azo and was seen at urgent care. She was also having increased frequency and urgency the night of her symptoms. Her urine was sent for culture and did not grow any bacteria. Notes that she did have a glass of wine before Friday night.     She has had some increased frequency of urination, got up once at night last night which is typical for her. She feels like she is sweating more than usual. Had some mild dizziness and nausea today. Drinks water often but may be less than usual recently.        Current Outpatient Medications:   •  acetaminophen (TYLENOL) 500 MG tablet, Take 500 mg by mouth As Needed., Disp: , Rfl:   •  amLODIPine (NORVASC) 2.5 MG tablet, Take 1 tablet by mouth Daily., Disp: 90 tablet, Rfl: 3  •  aspirin 81 MG chewable tablet, Chew 81 mg Daily., Disp: , Rfl:   •  Calcium-Vitamin D-Vitamin K (CALCIUM SOFT CHEWS PO), Take 1 tablet by mouth Daily., Disp: , Rfl:   •  Cholecalciferol (VITAMIN D) 2000 UNITS capsule, Take 1 capsule by mouth daily., Disp: , Rfl:   •  Famotidine-Ca Carb-Mag Hydrox (PEPCID COMPLETE PO), Take 1 tablet by mouth As Needed., Disp: , Rfl:   •  flecainide (TAMBOCOR) 50 MG tablet, Take 1 tablet by mouth As Needed (palpitations)., Disp: 20 tablet, Rfl: 1  •  Ginger, Zingiber officinalis, (GINGER EXTRACT PO), Take 1 tablet by mouth As Needed., Disp: , Rfl:   •  levothyroxine (SYNTHROID, LEVOTHROID) 50 MCG tablet, Take 1 tablet by mouth Daily., Disp: 90 tablet, Rfl: 1  •  Loratadine (CLARITIN PO), Take 10 mg by mouth Daily., Disp: , Rfl:   •  LORazepam (ATIVAN) 0.5 MG tablet, Take 1 tablet by mouth Daily As Needed for Anxiety. (Patient taking differently: Take 0.5 mg by mouth As Needed for Anxiety (half tab as needed).), Disp: 30 tablet, Rfl: 0  •   "montelukast (SINGULAIR) 10 MG tablet, Take 1 tablet by mouth every night at bedtime., Disp: 90 tablet, Rfl: 1  •  pantoprazole (PROTONIX) 40 MG EC tablet, Take 40 mg by mouth Every Other Day., Disp: , Rfl:      PMFSH  The following portions of the patient's history were reviewed and updated as appropriate: allergies, current medications, past family history, past medical history, past social history, past surgical history and problem list.    Review of Systems   Constitutional: Negative for appetite change, chills, fever and unexpected weight change.   HENT: Negative.    Eyes: Negative.    Respiratory: Negative for chest tightness, shortness of breath and wheezing.    Cardiovascular: Negative for chest pain and palpitations.   Gastrointestinal: Negative for abdominal distention, abdominal pain and vomiting.   Endocrine: Negative.    Genitourinary: Positive for dysuria, frequency and urgency. Negative for difficulty urinating, genital sores, hematuria, menstrual problem, pelvic pain, vaginal bleeding, vaginal discharge and vaginal pain.   Musculoskeletal: Negative for joint swelling.   Skin: Negative for color change and rash.   Allergic/Immunologic: Negative.    Neurological: Negative for seizures and syncope.   Hematological: Negative for adenopathy. Does not bruise/bleed easily.   Psychiatric/Behavioral: Negative for confusion and decreased concentration.       Objective   /80   Pulse 90   Resp 16   Ht 174 cm (68.5\")   Wt 71.1 kg (156 lb 12.8 oz)   LMP  (LMP Unknown)   SpO2 96%   BMI 23.49 kg/m²     Physical Exam   Constitutional: She is oriented to person, place, and time. She appears well-developed and well-nourished.   HENT:   Head: Normocephalic and atraumatic.   Right Ear: External ear normal.   Left Ear: External ear normal.   Eyes: Conjunctivae are normal.   Neck: Normal range of motion.   Cardiovascular: Normal rate.   Pulmonary/Chest: Effort normal.   Abdominal: Soft. There is no tenderness. " There is no guarding.   Neurological: She is alert and oriented to person, place, and time.   Skin: Skin is warm and dry.       Results for orders placed or performed in visit on 07/01/19   POCT urinalysis dipstick, automated   Result Value Ref Range    Color Yellow Yellow, Straw, Dark Yellow, Kimberli    Clarity, UA Clear Clear    Specific Gravity  1.025 1.005 - 1.030    pH, Urine 5.0 5.0 - 8.0    Leukocytes Negative Negative    Nitrite, UA Negative Negative    Protein, POC 30 mg/dL (A) Negative mg/dL    Glucose, UA 50 mg/dL (A) Negative, 1000 mg/dL (3+) mg/dL    Ketones, UA Negative Negative    Urobilinogen, UA 1 E.U./dL  (A) Normal    Bilirubin 1 mg/dL (A) Negative    Blood, UA Negative Negative        ASSESSMENT/PLAN    Problem List Items Addressed This Visit     None      Visit Diagnoses     Painful urination    -  Primary    Increase fluid intake. Recheck urine culture. Stop Bactrim until culture results available.    Relevant Orders    POCT urinalysis dipstick, automated (Completed)    Urine Culture - Urine, Urine, Clean Catch               Return if symptoms worsen or fail to improve.

## 2019-07-01 NOTE — TELEPHONE ENCOUNTER
Informed patient of negative culture.  Advised per Dr. Santana to stop antibiotic and follow up with pcp.  FF 7/1/19

## 2019-07-02 LAB — BACTERIA SPEC AEROBE CULT: NO GROWTH

## 2019-07-05 ENCOUNTER — TELEPHONE (OUTPATIENT)
Dept: CARDIOLOGY | Facility: CLINIC | Age: 69
End: 2019-07-05

## 2019-07-05 NOTE — TELEPHONE ENCOUNTER
Patient called me back to let me know that she has decided to go to the closest ER to be evaluated and that she would let us know how she is feeling.

## 2019-07-05 NOTE — TELEPHONE ENCOUNTER
Patient has not felt well all week. She started having painful urination and having the feeling that she needed to urinate frequently. She went to an [facility] and they started her on an antibiotic and told her to take Azo. She said that the symptoms started to resolve, but they called her back and told her that she did not have a UTI and to stop taking the antibiotic. She has also been experiencing a burning sensation in her chest. She does have reflux and has been taking antacids. She said that they have relieved the burning some but not completely. Yesterday she drank a vitamin supplement drink and consumed a small amount of alcohol. This morning she woke up and her HR was 120 and her BP was 160/95. She took 2 Flecainide pills, 1/2 of an Ativan pill and Amlodipine. Now her BP is 127/82 and her HR is 85 bpm. I instructed her not to consume any more alcohol and to not drink any more supplement drinks. I advised her to f/u with her PCP to recheck her urine to make sure that the UTI has actually resolved. I also instructed her to go to the closest ER if her symptoms continued to get worse. She agreed and understood.

## 2019-07-08 ENCOUNTER — TELEPHONE (OUTPATIENT)
Dept: CARDIOLOGY | Facility: CLINIC | Age: 69
End: 2019-07-08

## 2019-07-08 NOTE — TELEPHONE ENCOUNTER
Patient did go to the ER on Friday but she is still not feeling well. She wants to schedule an appointment with a PA or APRN. I transferred her to scheduling.

## 2019-07-09 ENCOUNTER — OFFICE VISIT (OUTPATIENT)
Dept: CARDIOLOGY | Facility: CLINIC | Age: 69
End: 2019-07-09

## 2019-07-09 VITALS
DIASTOLIC BLOOD PRESSURE: 82 MMHG | WEIGHT: 155.6 LBS | HEIGHT: 69 IN | HEART RATE: 81 BPM | SYSTOLIC BLOOD PRESSURE: 130 MMHG | BODY MASS INDEX: 23.05 KG/M2

## 2019-07-09 DIAGNOSIS — I48.0 PAROXYSMAL ATRIAL FIBRILLATION (HCC): Primary | ICD-10-CM

## 2019-07-09 DIAGNOSIS — I10 ESSENTIAL HYPERTENSION: ICD-10-CM

## 2019-07-09 PROCEDURE — 93000 ELECTROCARDIOGRAM COMPLETE: CPT | Performed by: NURSE PRACTITIONER

## 2019-07-09 PROCEDURE — 99214 OFFICE O/P EST MOD 30 MIN: CPT | Performed by: NURSE PRACTITIONER

## 2019-07-09 NOTE — PROGRESS NOTES
Encounter Date:07/09/2019    Patient ID: Annalise Winters is a 68 y.o. female who is a  that resides in [place], [place]    CC/Reason for visit:  Atrial Fibrillation           Problem List Items Addressed This Visit        Cardiovascular and Mediastinum    Paroxysmal atrial fibrillation (CMS/HCC)    Overview     · Initially diagnosed in 2012.  · Myocardial perfusion study (04/02/2013):  No ischemia.  · Event monitor (10/2013): demonstrating paroxysmal atrial fibrillation.  · Failure of flecainide, sotalol and Multaq therapy.  · Pulmonary vein ablation by Dr. Abdirahman Lackey, 08/21/2013.  · Chads Vasc=3 (age, female, HTN)         Current Assessment & Plan     · If episodes increase in occurrence will order 30-day monitor to evaluate for any atrial fibrillation.  If present will refer to EP for consideration of ablation and may need to consider anticoagulation  · Continue flecainide till in the pocket approach  · Continue aspirin 81 mg daily         Essential hypertension - Primary    Current Assessment & Plan     · Hypertension is borderline  · Continue amlodipine 2.5 mg daily  · Patient remains borderline elevated at next visit consider increasing amlodipine             The patient has experienced a couple episodes of some type of atrial tachycardia or extrasystoles.  It terminates after use of flecainide.  We discussed various treatment options.  For now we will continue the course.  If she has reoccurrence we will place a 30-day monitor for further evaluation.  If she does have established return of atrial fibrillation will refer to EP for consideration of ablation.  Would need to consider adding in chronic anticoagulation.       · Continue pill in the pocket approach with flecainide  · Continue aspirin 81 mg daily  · If further episodes occur patient is to contact us and we will place a 30-day monitor for evaluation.  If atrial fibrillation is detected will refer to EP for their  recommendations  Return in about 3 months (around 10/9/2019), or if symptoms worsen or fail to improve.            Annalise Winters returns today sooner than expected at the request of TAMMY Cobian after the patient had a episode of atrial fibrillation.  She reports about a week ago she began feeling poorly with dysuria and back pain.  She went to the urgent treatment center and was diagnosed with a UTI and started on antibiotics.  She was later contacted that her urine culture was negative and to stop the antibiotics.  On 4 July the patient had a few drinks of alcohol and had also drank a vitamin drink.  The next day the patient was having some hypertension and her heart rate increased to 1 20-1 40.  She just knew she was having A. fib.  She took a dose of flecainide and waited an hour but remained out of rhythm.  It made her very panicky and nervous and she took a half of Ativan.  She ended up taking an extra dose of flecainide as well as an extra dose of amlodipine.  She then went to her local emergency room in [facility] but was found to be in normal sinus rhythm by EKG.  Lab work was also normal.  She has had no further episodes since then.  She estimates that over the past 6 months she has had 2-3 episodes of where she needed to use flecainide.  She denies signs or symptoms of a TIA or stroke.  She takes a daily 81 mg aspirin but is never been on anticoagulation.  She has a history of undergoing an ablation in the past with Dr. Abdirahman Lackey.  She denies chest pain, dyspnea, orthopnea, presyncope or syncope.  She is very active and is a  that does Jasvir daily.    Review of Systems   Constitution: Negative for weakness and malaise/fatigue.   Eyes: Negative for vision loss in left eye and vision loss in right eye.   Cardiovascular: Negative for chest pain, dyspnea on exertion, near-syncope, orthopnea, palpitations, paroxysmal nocturnal dyspnea and syncope.   Musculoskeletal:  "Negative for myalgias.   Neurological: Negative for brief paralysis, excessive daytime sleepiness, focal weakness, numbness and paresthesias.   All other systems reviewed and are negative.      The patient's past medical, social, family history and ROS reviewed in the patient's electronic medical record.    Allergies  Ace inhibitors; Celexa [citalopram hydrobromide]; Paxil [paroxetine hcl]; and Corticosteroids    Outpatient Medications Marked as Taking for the 7/9/19 encounter (Office Visit) with Britta Tobin APRN   Medication Sig Dispense Refill   • acetaminophen (TYLENOL) 500 MG tablet Take 500 mg by mouth As Needed.     • amLODIPine (NORVASC) 2.5 MG tablet Take 1 tablet by mouth Daily. 90 tablet 3   • aspirin 81 MG chewable tablet Chew 81 mg Daily.     • Calcium-Vitamin D-Vitamin K (CALCIUM SOFT CHEWS PO) Take 1 tablet by mouth Daily.     • Cholecalciferol (VITAMIN D) 2000 UNITS capsule Take 1 capsule by mouth daily.     • Famotidine-Ca Carb-Mag Hydrox (PEPCID COMPLETE PO) Take 1 tablet by mouth As Needed.     • flecainide (TAMBOCOR) 50 MG tablet Take 1 tablet by mouth As Needed (palpitations). 20 tablet 1   • Ginger, Zingiber officinalis, (GINGER EXTRACT PO) Take 1 tablet by mouth As Needed.     • levothyroxine (SYNTHROID, LEVOTHROID) 50 MCG tablet Take 1 tablet by mouth Daily. 90 tablet 1   • Loratadine (CLARITIN PO) Take 10 mg by mouth Daily.     • LORazepam (ATIVAN) 0.5 MG tablet Take 1 tablet by mouth Daily As Needed for Anxiety. (Patient taking differently: Take 0.5 mg by mouth As Needed for Anxiety (half tab as needed).) 30 tablet 0   • montelukast (SINGULAIR) 10 MG tablet Take 1 tablet by mouth every night at bedtime. 90 tablet 1   • pantoprazole (PROTONIX) 40 MG EC tablet Take 40 mg by mouth Every Other Day.             Blood pressure 130/82, pulse 81, height 174 cm (68.5\"), weight 70.6 kg (155 lb 9.6 oz), not currently breastfeeding.  Body mass index is 23.31 kg/m².  There were no vitals filed " for this visit.    Physical Exam   Constitutional: She is oriented to person, place, and time. She appears well-developed and well-nourished.   HENT:   Head: Normocephalic and atraumatic.   Eyes: Pupils are equal, round, and reactive to light. No scleral icterus.   Neck: No JVD present. Carotid bruit is not present. No thyromegaly present.   Cardiovascular: Normal rate and regular rhythm. Exam reveals no gallop.   No murmur heard.  Pulmonary/Chest: Effort normal and breath sounds normal.   Abdominal: Soft. She exhibits no distension. There is no hepatosplenomegaly.   Musculoskeletal: She exhibits no edema.   Neurological: She is alert and oriented to person, place, and time.   Skin: Skin is warm and dry.   Psychiatric: She has a normal mood and affect. Her behavior is normal.       Data Review (reviewed with patient):       ECG 12 Lead  Date/Time: 7/9/2019 3:20 PM  Performed by: Britta Tobin APRN  Authorized by: Britta Tobin APRN   Comparison: compared with previous ECG from 3/5/2019  Similar to previous ECG  Rhythm: sinus rhythm  BPM: 81    Clinical impression: normal ECG  Comments: Normal sinus rhythm  QT/QTc 360/418 MS            Lab Results   Component Value Date    CHOL 234 (H) 01/10/2019    TRIG 54 01/10/2019    HDL 89 (H) 01/10/2019     01/10/2019    AST 21 01/10/2019    ALT 20 01/10/2019       No results found for: HGBA1C        TAMMY Aj  7/9/2019

## 2019-07-09 NOTE — ASSESSMENT & PLAN NOTE
· Hypertension is borderline  · Continue amlodipine 2.5 mg daily  · Patient remains borderline elevated at next visit consider increasing amlodipine

## 2019-07-09 NOTE — ASSESSMENT & PLAN NOTE
· If episodes increase in occurrence will order 30-day monitor to evaluate for any atrial fibrillation.  If present will refer to EP for consideration of ablation and may need to consider anticoagulation  · Continue flecainide till in the pocket approach  · Continue aspirin 81 mg daily

## 2019-07-16 ENCOUNTER — TELEPHONE (OUTPATIENT)
Dept: INTERNAL MEDICINE | Facility: CLINIC | Age: 69
End: 2019-07-16

## 2019-07-16 NOTE — TELEPHONE ENCOUNTER
PATIENTS CARDIOLOGIST IS WANTING PATIENT TO HAVE CHOLESTEROL LEVELS RECHECKED. SHE IS WANTING TO SEE IF DR. KU COULD GIVE HER SOME ORDERS FOR BLOOD WORK TO HAVE DRAWN. SHE NEEDS TO HAVE CHOLESTEROL RECHECKED. PATIENTS NUMBER [phone]

## 2019-07-16 NOTE — TELEPHONE ENCOUNTER
Placed the lab orders. She can have them done now or prior to her august appointment with me.    thanks

## 2019-08-13 ENCOUNTER — LAB (OUTPATIENT)
Dept: INTERNAL MEDICINE | Facility: CLINIC | Age: 69
End: 2019-08-13

## 2019-08-13 DIAGNOSIS — E03.9 ACQUIRED HYPOTHYROIDISM: ICD-10-CM

## 2019-08-13 DIAGNOSIS — E78.49 OTHER HYPERLIPIDEMIA: ICD-10-CM

## 2019-08-13 LAB
ALBUMIN SERPL-MCNC: 4.3 G/DL (ref 3.5–5.2)
ALBUMIN/GLOB SERPL: 1.8 G/DL
ALP SERPL-CCNC: 60 U/L (ref 39–117)
ALT SERPL W P-5'-P-CCNC: 8 U/L (ref 1–33)
ANION GAP SERPL CALCULATED.3IONS-SCNC: 9.5 MMOL/L (ref 5–15)
AST SERPL-CCNC: 16 U/L (ref 1–32)
BILIRUB SERPL-MCNC: 0.6 MG/DL (ref 0.2–1.2)
BUN BLD-MCNC: 18 MG/DL (ref 8–23)
BUN/CREAT SERPL: 24.3 (ref 7–25)
CALCIUM SPEC-SCNC: 9.5 MG/DL (ref 8.6–10.5)
CHLORIDE SERPL-SCNC: 95 MMOL/L (ref 98–107)
CHOLEST SERPL-MCNC: 223 MG/DL (ref 0–200)
CO2 SERPL-SCNC: 29.5 MMOL/L (ref 22–29)
CREAT BLD-MCNC: 0.74 MG/DL (ref 0.57–1)
GFR SERPL CREATININE-BSD FRML MDRD: 78 ML/MIN/1.73
GLOBULIN UR ELPH-MCNC: 2.4 GM/DL
GLUCOSE BLD-MCNC: 90 MG/DL (ref 65–99)
HDLC SERPL-MCNC: 83 MG/DL (ref 40–60)
LDLC SERPL CALC-MCNC: 130 MG/DL (ref 0–100)
LDLC/HDLC SERPL: 1.57 {RATIO}
POTASSIUM BLD-SCNC: 3.8 MMOL/L (ref 3.5–5.2)
PROT SERPL-MCNC: 6.7 G/DL (ref 6–8.5)
SODIUM BLD-SCNC: 134 MMOL/L (ref 136–145)
T4 FREE SERPL-MCNC: 1.31 NG/DL (ref 0.93–1.7)
TRIGL SERPL-MCNC: 48 MG/DL (ref 0–150)
TSH SERPL DL<=0.05 MIU/L-ACNC: 1.97 MIU/ML (ref 0.27–4.2)
VLDLC SERPL-MCNC: 9.6 MG/DL (ref 5–40)

## 2019-08-13 PROCEDURE — 80061 LIPID PANEL: CPT | Performed by: INTERNAL MEDICINE

## 2019-08-13 PROCEDURE — 84443 ASSAY THYROID STIM HORMONE: CPT | Performed by: INTERNAL MEDICINE

## 2019-08-13 PROCEDURE — 80053 COMPREHEN METABOLIC PANEL: CPT | Performed by: INTERNAL MEDICINE

## 2019-08-13 PROCEDURE — 84439 ASSAY OF FREE THYROXINE: CPT | Performed by: INTERNAL MEDICINE

## 2019-08-19 DIAGNOSIS — I48.0 PAROXYSMAL ATRIAL FIBRILLATION (HCC): ICD-10-CM

## 2019-08-19 RX ORDER — FLECAINIDE ACETATE 50 MG/1
TABLET ORAL
Qty: 20 TABLET | Refills: 0 | Status: SHIPPED | OUTPATIENT
Start: 2019-08-19 | End: 2019-10-21 | Stop reason: SDUPTHER

## 2019-08-26 ENCOUNTER — OFFICE VISIT (OUTPATIENT)
Dept: INTERNAL MEDICINE | Facility: CLINIC | Age: 69
End: 2019-08-26

## 2019-08-26 VITALS
HEIGHT: 69 IN | WEIGHT: 157 LBS | HEART RATE: 80 BPM | DIASTOLIC BLOOD PRESSURE: 80 MMHG | SYSTOLIC BLOOD PRESSURE: 138 MMHG | OXYGEN SATURATION: 97 % | BODY MASS INDEX: 23.25 KG/M2

## 2019-08-26 DIAGNOSIS — I10 ESSENTIAL HYPERTENSION: ICD-10-CM

## 2019-08-26 DIAGNOSIS — E78.5 DYSLIPIDEMIA: ICD-10-CM

## 2019-08-26 DIAGNOSIS — E03.9 ACQUIRED HYPOTHYROIDISM: Primary | ICD-10-CM

## 2019-08-26 DIAGNOSIS — E78.49 OTHER HYPERLIPIDEMIA: ICD-10-CM

## 2019-08-26 PROCEDURE — 99213 OFFICE O/P EST LOW 20 MIN: CPT | Performed by: INTERNAL MEDICINE

## 2019-08-26 NOTE — PROGRESS NOTES
Hypertension    Subjective   Annalise Winters is a 68 y.o. female is here today for follow-up.    History of Present Illness   Was in Afib in [place], and went to the er, AND TOOK a FLECAINIDE, and Dr. Krishnan consulted.  Seen by [facility], [place], operated by [facility] after that and was adv. To continue the pill in pocket with the Flecainide.    Current Outpatient Medications:   •  acetaminophen (TYLENOL) 500 MG tablet, Take 500 mg by mouth As Needed., Disp: , Rfl:   •  amLODIPine (NORVASC) 2.5 MG tablet, Take 1 tablet by mouth Daily., Disp: 90 tablet, Rfl: 3  •  aspirin 81 MG chewable tablet, Chew 81 mg Daily., Disp: , Rfl:   •  Calcium-Vitamin D-Vitamin K (CALCIUM SOFT CHEWS PO), Take 1 tablet by mouth Daily., Disp: , Rfl:   •  Cholecalciferol (VITAMIN D) 2000 UNITS capsule, Take 1 capsule by mouth daily., Disp: , Rfl:   •  Famotidine-Ca Carb-Mag Hydrox (PEPCID COMPLETE PO), Take 1 tablet by mouth As Needed., Disp: , Rfl:   •  flecainide (TAMBOCOR) 50 MG tablet, TAKE ONE TABLET BY MOUTH DAILY AS NEEDED FOR PALPTATIONS, Disp: 20 tablet, Rfl: 0  •  levothyroxine (SYNTHROID, LEVOTHROID) 50 MCG tablet, Take 1 tablet by mouth Daily., Disp: 90 tablet, Rfl: 1  •  Loratadine (CLARITIN PO), Take 10 mg by mouth Daily., Disp: , Rfl:   •  LORazepam (ATIVAN) 0.5 MG tablet, Take 1 tablet by mouth Daily As Needed for Anxiety. (Patient taking differently: Take 0.5 mg by mouth As Needed for Anxiety (half tab as needed).), Disp: 30 tablet, Rfl: 0  •  montelukast (SINGULAIR) 10 MG tablet, Take 1 tablet by mouth every night at bedtime., Disp: 90 tablet, Rfl: 1  •  pantoprazole (PROTONIX) 40 MG EC tablet, Take 40 mg by mouth Every Other Day., Disp: , Rfl:       The following portions of the patient's history were reviewed and updated as appropriate: allergies, current medications, past family history, past medical history, past social history, past surgical history and problem list.    Review of Systems   Constitutional: Negative.  Negative for chills and fever.  "  HENT: Negative for ear discharge, ear pain, sinus pressure and sore throat.    Respiratory: Negative for cough, chest tightness and shortness of breath.    Cardiovascular: Positive for palpitations (better). Negative for chest pain and leg swelling.   Gastrointestinal: Negative for diarrhea, nausea and vomiting.   Musculoskeletal: Negative for arthralgias, back pain and myalgias.   Neurological: Negative for dizziness, syncope and headaches.   Psychiatric/Behavioral: Negative for confusion and sleep disturbance.       Objective   /80   Pulse 80   Ht 174 cm (68.5\")   Wt 71.2 kg (157 lb)   LMP  (LMP Unknown)   SpO2 97% Comment: ra  BMI 23.52 kg/m²   Physical Exam   Constitutional: She is oriented to person, place, and time. She appears well-developed and well-nourished.   HENT:   Head: Normocephalic and atraumatic.   Right Ear: External ear normal.   Left Ear: External ear normal.   Mouth/Throat: No oropharyngeal exudate.   Eyes: Conjunctivae are normal. Pupils are equal, round, and reactive to light.   Neck: Neck supple. No thyromegaly present.   Cardiovascular: Normal rate and regular rhythm.   Pulmonary/Chest: Effort normal and breath sounds normal.   Abdominal: Soft. Bowel sounds are normal. She exhibits no distension. There is no tenderness.   Musculoskeletal: She exhibits no edema.   Neurological: She is alert and oriented to person, place, and time. No cranial nerve deficit.   Skin: Skin is warm and dry.   Psychiatric: She has a normal mood and affect. Judgment normal.   Nursing note and vitals reviewed.        Results for orders placed or performed in visit on 08/13/19   Comprehensive Metabolic Panel   Result Value Ref Range    Glucose 90 65 - 99 mg/dL    BUN 18 8 - 23 mg/dL    Creatinine 0.74 0.57 - 1.00 mg/dL    Sodium 134 (L) 136 - 145 mmol/L    Potassium 3.8 3.5 - 5.2 mmol/L    Chloride 95 (L) 98 - 107 mmol/L    CO2 29.5 (H) 22.0 - 29.0 mmol/L    Calcium 9.5 8.6 - 10.5 mg/dL    Total Protein " 6.7 6.0 - 8.5 g/dL    Albumin 4.30 3.50 - 5.20 g/dL    ALT (SGPT) 8 1 - 33 U/L    AST (SGOT) 16 1 - 32 U/L    Alkaline Phosphatase 60 39 - 117 U/L    Total Bilirubin 0.6 0.2 - 1.2 mg/dL    eGFR Non African Amer 78 >60 mL/min/1.73    Globulin 2.4 gm/dL    A/G Ratio 1.8 g/dL    BUN/Creatinine Ratio 24.3 7.0 - 25.0    Anion Gap 9.5 5.0 - 15.0 mmol/L   Lipid Panel   Result Value Ref Range    Total Cholesterol 223 (H) 0 - 200 mg/dL    Triglycerides 48 0 - 150 mg/dL    HDL Cholesterol 83 (H) 40 - 60 mg/dL    LDL Cholesterol  130 (H) 0 - 100 mg/dL    VLDL Cholesterol 9.6 5 - 40 mg/dL    LDL/HDL Ratio 1.57    TSH   Result Value Ref Range    TSH 1.970 0.270 - 4.200 mIU/mL   T4, Free   Result Value Ref Range    Free T4 1.31 0.93 - 1.70 ng/dL             Assessment/Plan   Diagnoses and all orders for this visit:    Acquired hypothyroidism  -     TSH; Future  -     T4, Free; Future    Other hyperlipidemia  -     Comprehensive Metabolic Panel; Future  -     Lipid Panel; Future    Essential hypertension    Dyslipidemia  Comments:  red rice yeast, oatmeal, continue exercise, avoid high cholesterol foods.                 Return in about 5 months (around 1/26/2020) for Medicare Wellness.

## 2019-09-11 DIAGNOSIS — E03.9 HYPOTHYROIDISM, UNSPECIFIED TYPE: ICD-10-CM

## 2019-09-12 RX ORDER — LEVOTHYROXINE SODIUM 0.05 MG/1
TABLET ORAL
Qty: 90 TABLET | Refills: 0 | Status: SHIPPED | OUTPATIENT
Start: 2019-09-12 | End: 2019-12-10 | Stop reason: SDUPTHER

## 2019-09-16 ENCOUNTER — OFFICE VISIT (OUTPATIENT)
Dept: ORTHOPEDIC SURGERY | Facility: CLINIC | Age: 69
End: 2019-09-16

## 2019-09-16 VITALS — OXYGEN SATURATION: 97 % | BODY MASS INDEX: 22.2 KG/M2 | HEIGHT: 69 IN | WEIGHT: 149.91 LBS | HEART RATE: 69 BPM

## 2019-09-16 DIAGNOSIS — M17.11 PRIMARY OSTEOARTHRITIS OF RIGHT KNEE: Primary | ICD-10-CM

## 2019-09-16 PROCEDURE — 20610 DRAIN/INJ JOINT/BURSA W/O US: CPT | Performed by: ORTHOPAEDIC SURGERY

## 2019-09-16 PROCEDURE — 99213 OFFICE O/P EST LOW 20 MIN: CPT | Performed by: ORTHOPAEDIC SURGERY

## 2019-09-16 NOTE — PROGRESS NOTES
Procedure   Large Joint Arthrocentesis: R knee  Date/Time: 9/16/2019 3:53 PM  Consent given by: patient  Site marked: site marked  Timeout: Immediately prior to procedure a time out was called to verify the correct patient, procedure, equipment, support staff and site/side marked as required   Supporting Documentation  Indications: pain   Procedure Details  Location: knee - R knee  Preparation: Patient was prepped and draped in the usual sterile fashion  Needle size: 22 G  Approach: anterolateral  Medications administered: 30 mg Hyaluronan 30 MG/2ML  Patient tolerance: patient tolerated the procedure well with no immediate complications

## 2019-09-16 NOTE — PROGRESS NOTES
[facility] Note    Subjective     Chief Complaint   Patient presents with   • Follow-up     6 month follow up after visco injection - primary osteoarthritis of right knee         HPI    Annalise Winters is a 68 y.o. female.  She follows up today for her right knee.  She has responded well to Visco supplementation injections in the past, and would like another round at this point.  The pain is dull and achy, worse after activities, and she ices and uses anti-inflammatory gels and creams to relieve the pain.      Patient Active Problem List   Diagnosis   • Asthma   • Paroxysmal atrial fibrillation (CMS/HCC)   • Hypertonicity of bladder   • Essential hypertension   • Hypothyroidism   • Anxiety   • Back pain   • Hyperthyroidism   • GERD (gastroesophageal reflux disease)   • Allergic rhinitis   • Dysuria   • Skin lesion of face   • Vitamin D deficiency   • Frontal skull lesion     Past Medical History:   Diagnosis Date   • Abnormal blood chemistry    • Arthritis     right knee   • Cyst of buttocks    • Diverticulosis    • Dizziness    • Dysuria    • Esophagitis    • Flushing    • GERD (gastroesophageal reflux disease)    • Heartburn    • Hypertension    • Hyperthyroidism    • Hypothyroidism    • Irritable bowel syndrome    • LLQ abdominal pain    • Polyp of sigmoid colon    • Sinusitis    • Skin lesion of face    • UTI (urinary tract infection)       Past Surgical History:   Procedure Laterality Date   • CARDIAC ABLATION  2013    Pulmonary vein ablation by Dr. Abdirahman Lackey, 2013.   • PARTIAL HYSTERECTOMY     • RHINOPLASTY     • TUBAL ABDOMINAL LIGATION        Family History   Problem Relation Age of Onset   • Dementia Mother    • Glomerulonephritis Mother    • Hypertension Mother    • Other Mother          at age 88   • Arthritis Father    • Cancer Father         prostate cancer   • Other Father          at age 65   • Heart attack Father    • Hypertension Other    •  Osteoarthritis Other      Social History     Socioeconomic History   • Marital status:      Spouse name: Not on file   • Number of children: Not on file   • Years of education: Not on file   • Highest education level: Not on file   Tobacco Use   • Smoking status: Never Smoker   • Smokeless tobacco: Never Used   Substance and Sexual Activity   • Alcohol use: Yes     Comment: on occasion   • Drug use: No   • Sexual activity: Defer      Current Outpatient Medications on File Prior to Visit   Medication Sig Dispense Refill   • acetaminophen (TYLENOL) 500 MG tablet Take 500 mg by mouth As Needed.     • amLODIPine (NORVASC) 2.5 MG tablet Take 1 tablet by mouth Daily. 90 tablet 3   • aspirin 81 MG chewable tablet Chew 81 mg Daily.     • Calcium-Vitamin D-Vitamin K (CALCIUM SOFT CHEWS PO) Take 1 tablet by mouth Daily.     • Cholecalciferol (VITAMIN D) 2000 UNITS capsule Take 1 capsule by mouth daily.     • Famotidine-Ca Carb-Mag Hydrox (PEPCID COMPLETE PO) Take 1 tablet by mouth As Needed.     • flecainide (TAMBOCOR) 50 MG tablet TAKE ONE TABLET BY MOUTH DAILY AS NEEDED FOR PALPTATIONS 20 tablet 0   • levothyroxine (SYNTHROID, LEVOTHROID) 50 MCG tablet TAKE ONE TABLET BY MOUTH DAILY 90 tablet 0   • Loratadine (CLARITIN PO) Take 10 mg by mouth Daily.     • LORazepam (ATIVAN) 0.5 MG tablet Take 1 tablet by mouth Daily As Needed for Anxiety. (Patient taking differently: Take 0.5 mg by mouth As Needed for Anxiety (half tab as needed).) 30 tablet 0   • montelukast (SINGULAIR) 10 MG tablet Take 1 tablet by mouth every night at bedtime. 90 tablet 1   • pantoprazole (PROTONIX) 40 MG EC tablet Take 40 mg by mouth Every Other Day.       No current facility-administered medications on file prior to visit.       Allergies   Allergen Reactions   • Ace Inhibitors Cough   • Celexa [Citalopram Hydrobromide] Dizziness   • Paxil [Paroxetine Hcl] Other (See Comments)     somnolence     • Corticosteroids Rash        Review of Systems    Constitutional: Negative for activity change, appetite change, chills, diaphoresis, fatigue, fever and unexpected weight change.   HENT: Negative for congestion, dental problem, drooling, ear discharge, ear pain, facial swelling, hearing loss, mouth sores, nosebleeds, postnasal drip, rhinorrhea, sinus pressure, sneezing, sore throat, tinnitus, trouble swallowing and voice change.    Eyes: Negative for photophobia, pain, discharge, redness, itching and visual disturbance.   Respiratory: Negative for apnea, cough, choking, chest tightness, shortness of breath, wheezing and stridor.    Cardiovascular: Positive for leg swelling. Negative for chest pain and palpitations.   Gastrointestinal: Negative for abdominal distention, abdominal pain, anal bleeding, blood in stool, constipation, diarrhea, nausea, rectal pain and vomiting.   Endocrine: Negative for cold intolerance, heat intolerance, polydipsia, polyphagia and polyuria.   Genitourinary: Negative for decreased urine volume, difficulty urinating, dysuria, enuresis, flank pain, frequency, genital sores, hematuria and urgency.   Musculoskeletal: Positive for arthralgias and joint swelling. Negative for back pain, gait problem, myalgias, neck pain and neck stiffness.   Skin: Negative for color change, pallor, rash and wound.   Allergic/Immunologic: Positive for environmental allergies. Negative for food allergies and immunocompromised state.   Neurological: Negative for dizziness, tremors, seizures, syncope, facial asymmetry, speech difficulty, weakness, light-headedness, numbness and headaches.   Hematological: Negative for adenopathy. Bruises/bleeds easily.   Psychiatric/Behavioral: Negative for agitation, behavioral problems, confusion, decreased concentration, dysphoric mood, hallucinations, self-injury, sleep disturbance and suicidal ideas. The patient is not nervous/anxious and is not hyperactive.         Objective      Physical Exam  Pulse 69   Ht 174 cm  "(68.5\")   Wt 68 kg (149 lb 14.6 oz)   LMP  (LMP Unknown)   SpO2 97%   BMI 22.46 kg/m²     Body mass index is 22.46 kg/m².    General:   Mental Status:  Alert   Appearance: Cooperative, in no acute distress   Build and Nutrition: Well-nourished well-developed female   Orientation: Alert and oriented to person, place and time   Posture: Normal   Gait: Normal    Integument:   Right knee: No skin lesions, no rash, no ecchymosis    Lower Extremities:   Right Knee:    Tenderness:  None    Effusion:  None    Swelling: None    Crepitus:  Positive    Range of motion:  Extension: 0°       Flexion: 130°  Instability:  No varus laxity, no valgus laxity, negative anterior drawer  Deformities:  None      Imaging/Studies  Imaging Results (last 24 hours)     Procedure Component Value Units Date/Time    XR Knee 4+ View Right [[id number]] Resulted:  09/16/19 1603     Updated:  09/16/19 1604    Narrative:       Right Knee Radiographs  Indication: right knee pain  Views: Standing AP's and skiers of both knees, with lateral and sunrise   views of the right knee    Comparison: no prior studies available    Findings:   Bone-on-bone contact in the medial compartment, tricompartment   osteophytes, varus alignment, with advanced patellofemoral degeneration,   consistent with advanced arthritis.    Impression: Advanced right knee osteoarthritis.            Assessment and Plan     Annalise was seen today for follow-up.    Diagnoses and all orders for this visit:    Primary osteoarthritis of right knee  -     XR Knee 4+ View Right  -     Large Joint Arthrocentesis: R knee        1. Primary osteoarthritis of right knee        I reviewed my findings with patient today.  She does have right knee arthritis, and responded to Visco supplementation injections in the past.  She would like a repeat series.  We will start the series today, and I will see her back in a week for the second injection, but sooner for any problems.  She is not interested in " surgical intervention.    Return in about 1 week (around 9/23/2019) for Injection.      Medical Decision Making  Management Options : prescription/IM medicine  Data/Risk: radiology tests and independent visualization of imaging, lab tests, or EMG/NCV      Larry Bucio MD  09/16/19  5:01 PM

## 2019-09-19 ENCOUNTER — TELEPHONE (OUTPATIENT)
Dept: INTERNAL MEDICINE | Facility: CLINIC | Age: 69
End: 2019-09-19

## 2019-09-21 NOTE — TELEPHONE ENCOUNTER
Please let her know, we haven't done any B12 or magnesium levels this year.  Did she have them drawn elsewhere?  If she has concerns she would like to discuss, to be seen.    thanks

## 2019-09-23 ENCOUNTER — CLINICAL SUPPORT (OUTPATIENT)
Dept: ORTHOPEDIC SURGERY | Facility: CLINIC | Age: 69
End: 2019-09-23

## 2019-09-23 DIAGNOSIS — M17.11 PRIMARY OSTEOARTHRITIS OF RIGHT KNEE: Primary | ICD-10-CM

## 2019-09-23 PROCEDURE — 20610 DRAIN/INJ JOINT/BURSA W/O US: CPT | Performed by: PHYSICIAN ASSISTANT

## 2019-09-23 NOTE — PROGRESS NOTES
Procedure   Large Joint Arthrocentesis: R knee  Date/Time: 9/23/2019 3:06 PM  Consent given by: patient  Site marked: site marked  Timeout: Immediately prior to procedure a time out was called to verify the correct patient, procedure, equipment, support staff and site/side marked as required   Supporting Documentation  Indications: pain   Procedure Details  Location: knee - R knee  Preparation: Patient was prepped and draped in the usual sterile fashion  Needle size: 22 G  Approach: superior  Medications administered: 30 mg Hyaluronan 30 MG/2ML  Aspirate: clear (to verify intraarticular placement of the needle)  Patient tolerance: patient tolerated the procedure well with no immediate complications

## 2019-09-23 NOTE — PROGRESS NOTES
CC: Follow-up right knee arthritis, 2/3 Orthovisc injection today     History of present illness: Patient presents for her second Orthovisc injection to the right knee today.  At this time she denies any numbness or tingling into the distal extremity.  No fever, chills, night sweats or other constitutional symptoms.     See chart for PMH, PSH, Meds, All - reviewed.     Ortho exam:  Right knee  Skin is intact without redness, warmth or swelling/effusion.  No lesions or evidence of infection noted.  Motor/sensory: Grossly intact L2-S1.     Assessment/plan:  Right knee pain with known osteoarthritis     Proceed today with 2/3 of Orthovisc injection.  Patient will follow-up  1 week.       After discussing risks of injection the patient gave consent to proceed.  Her right knee confirmed as the correct joint to be injected with a timeout.  The knee was then prepped with Hibiclens and injected with a prefilled syringe of Orthovisc without any resistance using anterior lateral approach, patient in seated position.  The patient tolerated procedure well.  Hemostasis was achieved and a Band-Aid was applied over the injection site.  I instructed the patient on signs and symptoms of infection.  They should report to the ER if any of these develop.  Recommended modifying activity to include rest, ice, elevation and/or heat along with oral pain medication as needed.  Patient observed ambulating normally after the injection.

## 2019-09-30 ENCOUNTER — CLINICAL SUPPORT (OUTPATIENT)
Dept: ORTHOPEDIC SURGERY | Facility: CLINIC | Age: 69
End: 2019-09-30

## 2019-09-30 DIAGNOSIS — M25.561 CHRONIC PAIN OF RIGHT KNEE: ICD-10-CM

## 2019-09-30 DIAGNOSIS — G89.29 CHRONIC PAIN OF RIGHT KNEE: ICD-10-CM

## 2019-09-30 DIAGNOSIS — M17.11 PRIMARY OSTEOARTHRITIS OF RIGHT KNEE: Primary | ICD-10-CM

## 2019-09-30 PROCEDURE — 20610 DRAIN/INJ JOINT/BURSA W/O US: CPT | Performed by: PHYSICIAN ASSISTANT

## 2019-09-30 NOTE — PROGRESS NOTES
Procedure   Large Joint Arthrocentesis: R knee  Date/Time: 9/30/2019 2:59 PM  Consent given by: patient  Site marked: site marked  Timeout: Immediately prior to procedure a time out was called to verify the correct patient, procedure, equipment, support staff and site/side marked as required   Supporting Documentation  Indications: pain   Procedure Details  Location: knee - R knee  Preparation: Patient was prepped and draped in the usual sterile fashion  Needle size: 22 G  Approach: anterolateral  Medications administered: 30 mg Hyaluronan 30 MG/2ML  Patient tolerance: patient tolerated the procedure well with no immediate complications

## 2019-09-30 NOTE — PROGRESS NOTES
CC: Follow-up right knee arthritis, 3/3 Orthovisc injection today     History of present illness: Patient presents for her third Orthovisc injection to the right knee today.  At this time she denies any numbness or tingling into the distal extremity.  No fever, chills, night sweats or other constitutional symptoms.     See chart for PMH, PSH, Meds, All - reviewed.     Ortho exam:  Right knee  Skin is intact without redness, warmth or swelling/effusion.  No lesions or evidence of infection noted.  Motor/sensory: Grossly intact L2-S1.     Assessment/plan:  Right knee pain with known osteoarthritis     Proceed today with 3/3 of Orthovisc injection.  Patient will follow-up 6 months or as needed for repeat evaluation.       After discussing risks of injection the patient gave consent to proceed.  Her right knee confirmed as the correct joint to be injected with a timeout.  The knee was then prepped with Hibiclens and injected with a prefilled syringe of Orthovisc without any resistance using anterior lateral approach, patient in seated position.  The patient tolerated procedure well.  Hemostasis was achieved and a Band-Aid was applied over the injection site.  I instructed the patient on signs and symptoms of infection.  They should report to the ER if any of these develop.  Recommended modifying activity to include rest, ice, elevation and/or heat along with oral pain medication as needed.  Patient observed ambulating normally after the injection.

## 2019-10-04 ENCOUNTER — OFFICE VISIT (OUTPATIENT)
Dept: INTERNAL MEDICINE | Facility: CLINIC | Age: 69
End: 2019-10-04

## 2019-10-04 VITALS
HEIGHT: 69 IN | SYSTOLIC BLOOD PRESSURE: 138 MMHG | DIASTOLIC BLOOD PRESSURE: 66 MMHG | HEART RATE: 71 BPM | WEIGHT: 156.8 LBS | OXYGEN SATURATION: 96 % | BODY MASS INDEX: 23.22 KG/M2

## 2019-10-04 DIAGNOSIS — Z23 NEED FOR VACCINATION: ICD-10-CM

## 2019-10-04 DIAGNOSIS — K21.00 GASTROESOPHAGEAL REFLUX DISEASE WITH ESOPHAGITIS: Primary | ICD-10-CM

## 2019-10-04 DIAGNOSIS — F41.9 ANXIETY: ICD-10-CM

## 2019-10-04 DIAGNOSIS — I48.0 PAROXYSMAL ATRIAL FIBRILLATION (HCC): ICD-10-CM

## 2019-10-04 PROCEDURE — 99213 OFFICE O/P EST LOW 20 MIN: CPT | Performed by: INTERNAL MEDICINE

## 2019-10-04 PROCEDURE — 83735 ASSAY OF MAGNESIUM: CPT | Performed by: INTERNAL MEDICINE

## 2019-10-04 PROCEDURE — 82607 VITAMIN B-12: CPT | Performed by: INTERNAL MEDICINE

## 2019-10-04 PROCEDURE — 90653 IIV ADJUVANT VACCINE IM: CPT | Performed by: INTERNAL MEDICINE

## 2019-10-04 PROCEDURE — 80048 BASIC METABOLIC PNL TOTAL CA: CPT | Performed by: INTERNAL MEDICINE

## 2019-10-04 PROCEDURE — G0008 ADMIN INFLUENZA VIRUS VAC: HCPCS | Performed by: INTERNAL MEDICINE

## 2019-10-04 NOTE — PROGRESS NOTES
Needing labs and Flu Vaccine    Subjective   Annalise Winters is a 68 y.o. female is here today for follow-up.    History of Present Illness   Concerned about te magnesium and B12, and other side effects from the medicine protonix.  Notes palpitations on occasion and taking flecainide per pill in the pocket protocol.    Current Outpatient Medications:   •  acetaminophen (TYLENOL) 500 MG tablet, Take 500 mg by mouth As Needed., Disp: , Rfl:   •  amLODIPine (NORVASC) 2.5 MG tablet, Take 1 tablet by mouth Daily., Disp: 90 tablet, Rfl: 3  •  aspirin 81 MG chewable tablet, Chew 81 mg Daily., Disp: , Rfl:   •  Calcium-Vitamin D-Vitamin K (CALCIUM SOFT CHEWS PO), Take 1 tablet by mouth Daily., Disp: , Rfl:   •  Cholecalciferol (VITAMIN D) 2000 UNITS capsule, Take 1 capsule by mouth daily., Disp: , Rfl:   •  Famotidine-Ca Carb-Mag Hydrox (PEPCID COMPLETE PO), Take 1 tablet by mouth As Needed., Disp: , Rfl:   •  flecainide (TAMBOCOR) 50 MG tablet, TAKE ONE TABLET BY MOUTH DAILY AS NEEDED FOR PALPTATIONS, Disp: 20 tablet, Rfl: 0  •  levothyroxine (SYNTHROID, LEVOTHROID) 50 MCG tablet, TAKE ONE TABLET BY MOUTH DAILY, Disp: 90 tablet, Rfl: 0  •  Loratadine (CLARITIN PO), Take 10 mg by mouth Daily., Disp: , Rfl:   •  LORazepam (ATIVAN) 0.5 MG tablet, Take 1 tablet by mouth Daily As Needed for Anxiety. (Patient taking differently: Take 0.5 mg by mouth As Needed for Anxiety (half tab as needed).), Disp: 30 tablet, Rfl: 0  •  montelukast (SINGULAIR) 10 MG tablet, Take 1 tablet by mouth every night at bedtime., Disp: 90 tablet, Rfl: 1  •  pantoprazole (PROTONIX) 40 MG EC tablet, Take 40 mg by mouth Every Other Day., Disp: , Rfl:       The following portions of the patient's history were reviewed and updated as appropriate: allergies, current medications, past family history, past medical history, past social history, past surgical history and problem list.    Review of Systems   Constitutional: Negative.  Negative for chills and  "fever.   HENT: Negative for ear discharge, ear pain, sinus pressure and sore throat.    Respiratory: Negative for cough, chest tightness and shortness of breath.    Cardiovascular: Positive for palpitations. Negative for chest pain and leg swelling.   Gastrointestinal: Negative for diarrhea, nausea and vomiting.   Musculoskeletal: Negative for arthralgias, back pain and myalgias.   Neurological: Negative for dizziness, syncope and headaches.   Psychiatric/Behavioral: Negative for confusion and sleep disturbance.       Objective   /66   Pulse 71   Ht 174 cm (68.5\")   Wt 71.1 kg (156 lb 12.8 oz)   LMP  (LMP Unknown)   SpO2 96% Comment: ra  BMI 23.49 kg/m²   Physical Exam   Constitutional: She is oriented to person, place, and time. She appears well-developed and well-nourished.   HENT:   Head: Normocephalic and atraumatic.   Right Ear: External ear normal.   Left Ear: External ear normal.   Mouth/Throat: No oropharyngeal exudate.   Eyes: Conjunctivae are normal. Pupils are equal, round, and reactive to light.   Neck: Neck supple. No thyromegaly present.   Cardiovascular: Normal rate, regular rhythm and intact distal pulses.   Pulmonary/Chest: Effort normal and breath sounds normal.   Abdominal: Soft. Bowel sounds are normal. She exhibits no distension. There is no tenderness.   Musculoskeletal: She exhibits no edema.   Neurological: She is alert and oriented to person, place, and time. No cranial nerve deficit.   Skin: Skin is warm and dry.   Psychiatric: She has a normal mood and affect. Judgment normal.   Nursing note and vitals reviewed.        Results for orders placed or performed in visit on 10/04/19   Magnesium   Result Value Ref Range    Magnesium 2.1 1.6 - 2.4 mg/dL   Vitamin B12   Result Value Ref Range    Vitamin B-12 432 211 - 946 pg/mL   Basic Metabolic Panel   Result Value Ref Range    Glucose 99 65 - 99 mg/dL    BUN 13 8 - 23 mg/dL    Creatinine 0.76 0.57 - 1.00 mg/dL    Sodium 135 (L) 136 - " 145 mmol/L    Potassium 4.8 3.5 - 5.2 mmol/L    Chloride 96 (L) 98 - 107 mmol/L    CO2 27.1 22.0 - 29.0 mmol/L    Calcium 9.5 8.6 - 10.5 mg/dL    eGFR Non African Amer 75 >60 mL/min/1.73    BUN/Creatinine Ratio 17.1 7.0 - 25.0    Anion Gap 11.9 5.0 - 15.0 mmol/L             Assessment/Plan   Diagnoses and all orders for this visit:    Gastroesophageal reflux disease with esophagitis  -     Magnesium  -     Vitamin B12    Need for vaccination    Paroxysmal atrial fibrillation (CMS/HCC)  -     Basic Metabolic Panel    Other orders  -     Flu[employer] High Dose 65YR+ ([id number])                 Return for Next scheduled follow up.

## 2019-10-05 LAB
ANION GAP SERPL CALCULATED.3IONS-SCNC: 11.9 MMOL/L (ref 5–15)
BUN BLD-MCNC: 13 MG/DL (ref 8–23)
BUN/CREAT SERPL: 17.1 (ref 7–25)
CALCIUM SPEC-SCNC: 9.5 MG/DL (ref 8.6–10.5)
CHLORIDE SERPL-SCNC: 96 MMOL/L (ref 98–107)
CO2 SERPL-SCNC: 27.1 MMOL/L (ref 22–29)
CREAT BLD-MCNC: 0.76 MG/DL (ref 0.57–1)
GFR SERPL CREATININE-BSD FRML MDRD: 75 ML/MIN/1.73
GLUCOSE BLD-MCNC: 99 MG/DL (ref 65–99)
MAGNESIUM SERPL-MCNC: 2.1 MG/DL (ref 1.6–2.4)
POTASSIUM BLD-SCNC: 4.8 MMOL/L (ref 3.5–5.2)
SODIUM BLD-SCNC: 135 MMOL/L (ref 136–145)
VIT B12 BLD-MCNC: 432 PG/ML (ref 211–946)

## 2019-10-15 ENCOUNTER — TELEPHONE (OUTPATIENT)
Dept: INTERNAL MEDICINE | Facility: CLINIC | Age: 69
End: 2019-10-15

## 2019-10-15 NOTE — TELEPHONE ENCOUNTER
PATIENT IS CALLING WANTING THE RESULTS OF HER RECENT LAB WORK. PATIENTS NUMBER [id number]  
Reviewed lab letter with pt, states verbal understanding.  
stated

## 2019-10-21 ENCOUNTER — OFFICE VISIT (OUTPATIENT)
Dept: CARDIOLOGY | Facility: CLINIC | Age: 69
End: 2019-10-21

## 2019-10-21 VITALS
WEIGHT: 155.6 LBS | SYSTOLIC BLOOD PRESSURE: 142 MMHG | OXYGEN SATURATION: 98 % | BODY MASS INDEX: 23.05 KG/M2 | HEIGHT: 69 IN | HEART RATE: 74 BPM | DIASTOLIC BLOOD PRESSURE: 70 MMHG

## 2019-10-21 DIAGNOSIS — I48.0 PAROXYSMAL ATRIAL FIBRILLATION (HCC): Primary | ICD-10-CM

## 2019-10-21 DIAGNOSIS — I10 ESSENTIAL HYPERTENSION: ICD-10-CM

## 2019-10-21 PROCEDURE — 99214 OFFICE O/P EST MOD 30 MIN: CPT | Performed by: INTERNAL MEDICINE

## 2019-10-21 PROCEDURE — 93000 ELECTROCARDIOGRAM COMPLETE: CPT | Performed by: INTERNAL MEDICINE

## 2019-10-21 RX ORDER — ASPIRIN 81 MG/1
81 TABLET, CHEWABLE ORAL DAILY
Start: 2019-10-21 | End: 2021-03-02

## 2019-10-21 RX ORDER — FLECAINIDE ACETATE 50 MG/1
50 TABLET ORAL 2 TIMES DAILY PRN
Qty: 180 TABLET | Refills: 1 | Status: SHIPPED | OUTPATIENT
Start: 2019-10-21 | End: 2020-04-17

## 2019-10-21 RX ORDER — CHOLECALCIFEROL (VITAMIN D3) 125 MCG
500 CAPSULE ORAL DAILY
COMMUNITY
End: 2020-04-15

## 2019-10-21 NOTE — ASSESSMENT & PLAN NOTE
· Discontinue amlodipine  · Start metoprolol tartrate 25 mg twice daily  · If SBP >130 mmHg or DBP >85 mmHg, consider adding losartan

## 2019-10-21 NOTE — ASSESSMENT & PLAN NOTE
· Intermittently symptomatic with palpitations with no confirmed atrial fibrillation  · Discussed options including daily antiarrhythmic therapy, continued pill in the pocket approach, as well as referral for redo PVA.  · Recommend starting metoprolol tartrate 25 mg twice daily and continue flecainide as needed  · If symptoms become more frequent, patient will need monitoring to confirm the presence of atrial fibrillation  · Anticoagulation may need to be resumed if substantial atrial fibrillation burden identified.

## 2019-10-21 NOTE — PROGRESS NOTES
[facility] at [facility]  Follow-up note    Encounter Date:10/21/2019    Patient ID: Annalise Winters is a  68 y.o. female who resides in [place], [place]. She is employed at the [employer] in [place].    CC/Reason for visit:  PAF           Problem List Items Addressed This Visit        Cardiology Problems    Paroxysmal atrial fibrillation (CMS/HCC) - Primary    Overview     · Initially diagnosed in 2012.  · Myocardial perfusion study (04/02/2013):  No ischemia.  · Event monitor (10/2013): demonstrating paroxysmal atrial fibrillation.  · Failure of flecainide, sotalol and Multaq therapy.  · Pulmonary vein ablation by Dr. Abdirahman Lackey, 08/21/2013.  · Chads Vasc=3 (age, female, HTN)         Current Assessment & Plan     · Intermittently symptomatic with palpitations with no confirmed atrial fibrillation  · Discussed options including daily antiarrhythmic therapy, continued pill in the pocket approach, as well as referral for redo PVA.  · Recommend starting metoprolol tartrate 25 mg twice daily and continue flecainide as needed  · If symptoms become more frequent, patient will need monitoring to confirm the presence of atrial fibrillation  · Anticoagulation may need to be resumed if substantial atrial fibrillation burden identified.         Relevant Medications    metoprolol tartrate (LOPRESSOR) 25 MG tablet    aspirin 81 MG chewable tablet    flecainide (TAMBOCOR) 50 MG tablet    Essential hypertension    Current Assessment & Plan     · Discontinue amlodipine  · Start metoprolol tartrate 25 mg twice daily  · If SBP >130 mmHg or DBP >85 mmHg, consider adding losartan         Relevant Medications    metoprolol tartrate (LOPRESSOR) 25 MG tablet               · Discontinue amlodipine  · Start metoprolol 25 mg twice daily  · If palpitations become more frequent, repeat Holter monitoring to document atrial fibrillation  · Consider flecainide daily dosing if metoprolol  insufficient  · Reconsider anticoagulation strategy if symptoms become more frequent  Return in about 6 months (around 4/21/2020).            Annalise Winters is a 68 y.o. female who returns to my office for follow-up of her paroxysmal atrial fibrillation and hypertension.  The patient has been having occasional episodes of palpitations which resolved within 2 hours of flecainide pill in the pocket use.  She has had 2 episodes over the last 6 months.  She has not had these palpitations documented with EKG or monitoring.  She denies TIA or stroke symptoms.  She has been off of anticoagulation since Dr. Lackey performed radiofrequency ablation years ago.    Review of Systems   Constitution: Negative for weakness and malaise/fatigue.   Eyes: Negative for vision loss in left eye and vision loss in right eye.   Cardiovascular: Positive for irregular heartbeat and leg swelling. Negative for chest pain, dyspnea on exertion, near-syncope, orthopnea, palpitations, paroxysmal nocturnal dyspnea and syncope.   Respiratory: Positive for cough.    Musculoskeletal: Negative for myalgias.   Neurological: Negative for brief paralysis, excessive daytime sleepiness, focal weakness, numbness and paresthesias.   All other systems reviewed and are negative.      The patient's past medical, social, family history and ROS reviewed in the patient's electronic medical record.    Allergies  Ace inhibitors; Celexa [citalopram hydrobromide]; Paxil [paroxetine hcl]; and Corticosteroids    Outpatient Medications Marked as Taking for the 10/21/19 encounter (Office Visit) with Goran Ruth IV, MD   Medication Sig Dispense Refill   • acetaminophen (TYLENOL) 500 MG tablet Take 500 mg by mouth As Needed.     • aspirin 81 MG chewable tablet Chew 1 tablet Daily.     • Calcium-Vitamin D-Vitamin K (CALCIUM SOFT CHEWS PO) Take 1 tablet by mouth Daily.     • Cholecalciferol (VITAMIN D) 2000 UNITS capsule Take 1 capsule by mouth daily.     •  "Famotidine-Ca Carb-Mag Hydrox (PEPCID COMPLETE PO) Take 1 tablet by mouth As Needed.     • flecainide (TAMBOCOR) 50 MG tablet Take 1 tablet by mouth 2 (Two) Times a Day As Needed (PALPITATOINS). 180 tablet 1   • levothyroxine (SYNTHROID, LEVOTHROID) 50 MCG tablet TAKE ONE TABLET BY MOUTH DAILY 90 tablet 0   • Loratadine (CLARITIN PO) Take 10 mg by mouth Daily.     • LORazepam (ATIVAN) 0.5 MG tablet Take 1 tablet by mouth Daily As Needed for Anxiety. (Patient taking differently: Take 0.5 mg by mouth As Needed for Anxiety (half tab as needed).) 30 tablet 0   • montelukast (SINGULAIR) 10 MG tablet Take 1 tablet by mouth every night at bedtime. 90 tablet 1   • pantoprazole (PROTONIX) 40 MG EC tablet Take 40 mg by mouth Every Other Day.     • vitamin B-12 (CYANOCOBALAMIN) 500 MCG tablet Take 500 mcg by mouth Daily.     • [DISCONTINUED] amLODIPine (NORVASC) 2.5 MG tablet Take 1 tablet by mouth Daily. 90 tablet 3   • [DISCONTINUED] aspirin 81 MG chewable tablet Chew 81 mg Daily.     • [DISCONTINUED] flecainide (TAMBOCOR) 50 MG tablet TAKE ONE TABLET BY MOUTH DAILY AS NEEDED FOR PALPTATIONS 20 tablet 0           Blood pressure 142/70, pulse 74, height 174 cm (68.5\"), weight 70.6 kg (155 lb 9.6 oz), SpO2 98 %, not currently breastfeeding.  Body mass index is 23.31 kg/m².  Vitals:    10/21/19 1531   Patient Position: Sitting       Physical Exam   Constitutional: She is oriented to person, place, and time. She appears well-developed and well-nourished.   HENT:   Head: Normocephalic and atraumatic.   Eyes: Pupils are equal, round, and reactive to light. No scleral icterus.   Neck: No JVD present. Carotid bruit is not present. No thyromegaly present.   Cardiovascular: Normal rate, regular rhythm, S1 normal and S2 normal. Exam reveals no gallop.   No murmur heard.  Pulmonary/Chest: Effort normal and breath sounds normal.   Abdominal: Soft. There is no hepatosplenomegaly. There is no tenderness.   Neurological: She is alert and " oriented to person, place, and time.   Skin: Skin is warm and dry. No cyanosis. Nails show no clubbing.   Psychiatric: She has a normal mood and affect. Her behavior is normal.       Data Review (reviewed with patient):       ECG 12 Lead  Date/Time: 10/21/2019 5:37 PM  Performed by: Goran Ruth IV, MD  Authorized by: Goran Ruth IV, MD   Comparison: compared with previous ECG from 7/9/2019  Similar to previous ECG  Rhythm: sinus rhythm  BPM: 74  QRS axis: right    Clinical impression: non-specific ECG  Comments: QTc interval 430 ms            Lab Results   Component Value Date    CHOL 223 (H) 08/13/2019    TRIG 48 08/13/2019    HDL 83 (H) 08/13/2019     (H) 08/13/2019    AST 16 08/13/2019    ALT 8 08/13/2019         HPriscila Ruth MD [facility], [facility]  Interventional and General Cardiology

## 2019-10-29 ENCOUNTER — TELEPHONE (OUTPATIENT)
Dept: CARDIOLOGY | Facility: CLINIC | Age: 69
End: 2019-10-29

## 2019-10-29 NOTE — TELEPHONE ENCOUNTER
"Pt called with side effects since starting Metoprolol 25 mg BID. She took it once on Saturday 10/26 feeling extremely tired and HR in the 50's. She has not taken it again because she felt \"awful\". She states her baseline HR is between 65-75. She cannot remember what her BP was that day but states it was \"normal\". Pt reports palpitations are well controlled as of right now. Denies SOB  chest pain. Please advise.   "

## 2019-10-29 NOTE — TELEPHONE ENCOUNTER
Patient advised to decrease Metoprolol to 12.5 mg BID, to continue monitoring BP and HR and keep a log of these readings. Advised if fatigue symptoms do not subside or get worse with this lower dose to call and let us know. Pt verbalized understanding and agreeable to plan.

## 2019-11-01 ENCOUNTER — TELEPHONE (OUTPATIENT)
Dept: INTERNAL MEDICINE | Facility: CLINIC | Age: 69
End: 2019-11-01

## 2019-11-01 RX ORDER — LORAZEPAM 0.5 MG/1
0.5 TABLET ORAL DAILY PRN
Qty: 30 TABLET | Refills: 0 | Status: SHIPPED | OUTPATIENT
Start: 2019-11-01 | End: 2020-01-27 | Stop reason: SDUPTHER

## 2019-11-01 NOTE — TELEPHONE ENCOUNTER
Pt is requesting a refill on Ativan 0.5 mg. Pt would like to  rx today if possible but if not possible she has a few tablets left.    Also patient would like a call back from [facility] to discuss other issues.    Please call back on cell number.

## 2019-11-01 NOTE — TELEPHONE ENCOUNTER
Pt states that she has been taking her ativan for her rapid heart rate.  Has seen cardiology and they have started her on beta blocker, but has been using this while getting adjusted.  Pt states she has appt with Christiane on Wed, offered her 4 appt with you next week, they did not fit her sched, pt wanting to know if you would fill her script at appt when she sees Christiane.

## 2019-11-04 ENCOUNTER — TELEPHONE (OUTPATIENT)
Dept: CARDIOLOGY | Facility: CLINIC | Age: 69
End: 2019-11-04

## 2019-11-04 NOTE — TELEPHONE ENCOUNTER
"Patient called reporting that her HR was, \"OK\" but her BP had been running , \"high\". No specific readings but reports she took extra and her SBP came down to 130. She also reports she is not sleeping and would like to go back to taking atenolol. I was unable to reach her but left a message to call back with more info.  "

## 2019-11-08 ENCOUNTER — OFFICE VISIT (OUTPATIENT)
Dept: INTERNAL MEDICINE | Facility: CLINIC | Age: 69
End: 2019-11-08

## 2019-11-08 VITALS
HEART RATE: 61 BPM | TEMPERATURE: 98.3 F | SYSTOLIC BLOOD PRESSURE: 130 MMHG | WEIGHT: 156.2 LBS | OXYGEN SATURATION: 98 % | DIASTOLIC BLOOD PRESSURE: 80 MMHG | BODY MASS INDEX: 23.4 KG/M2

## 2019-11-08 DIAGNOSIS — R51.9 INTERMITTENT HEADACHE: Primary | ICD-10-CM

## 2019-11-08 PROCEDURE — 99213 OFFICE O/P EST LOW 20 MIN: CPT | Performed by: PHYSICIAN ASSISTANT

## 2019-11-08 RX ORDER — AMLODIPINE BESYLATE 5 MG/1
2.5 TABLET ORAL 2 TIMES DAILY
Qty: 30 TABLET | Refills: 11 | Status: SHIPPED | OUTPATIENT
Start: 2019-11-08 | End: 2020-03-10 | Stop reason: SDUPTHER

## 2019-11-08 NOTE — PROGRESS NOTES
Chief Complaint   Patient presents with   • Cough     Acute   • Sinus pressure       Subjective   Annalise Winters is a 68 y.o. female.       History of Present Illness     Pt has been having an intermittent headache whenever she coughs. It is not unusual for her to have coughing fits periodically. Will have a headache for a few seconds and then it resolves, pain is in the top of her head. No sinus pressure or pain. No pain in her ears. Does not happen daily and has not occurred in the past week- maybe since BP was better controlled.    She has been going through some medication changes. Switched to taking flecainide twice a day. Was also started on metoprolol BID, then switched to 1/2 tablet because her HR got too low. She stopped her amlodipine but her blood pressure went back up. She has not had anymore episodes of palpitations.        Current Outpatient Medications:   •  acetaminophen (TYLENOL) 500 MG tablet, Take 500 mg by mouth As Needed., Disp: , Rfl:   •  aspirin 81 MG chewable tablet, Chew 1 tablet Daily., Disp: , Rfl:   •  Calcium-Vitamin D-Vitamin K (CALCIUM SOFT CHEWS PO), Take 1 tablet by mouth Daily., Disp: , Rfl:   •  Cholecalciferol (VITAMIN D) 2000 UNITS capsule, Take 1 capsule by mouth daily., Disp: , Rfl:   •  Famotidine-Ca Carb-Mag Hydrox (PEPCID COMPLETE PO), Take 1 tablet by mouth As Needed., Disp: , Rfl:   •  flecainide (TAMBOCOR) 50 MG tablet, Take 1 tablet by mouth 2 (Two) Times a Day As Needed (PALPITATOINS)., Disp: 180 tablet, Rfl: 1  •  levothyroxine (SYNTHROID, LEVOTHROID) 50 MCG tablet, TAKE ONE TABLET BY MOUTH DAILY, Disp: 90 tablet, Rfl: 0  •  Loratadine (CLARITIN PO), Take 10 mg by mouth Daily., Disp: , Rfl:   •  LORazepam (ATIVAN) 0.5 MG tablet, Take 1 tablet by mouth Daily As Needed for Anxiety., Disp: 30 tablet, Rfl: 0  •  metoprolol tartrate (LOPRESSOR) 25 MG tablet, Take 1 tablet by mouth 2 (Two) Times a Day., Disp: 180 tablet, Rfl: 3  •  montelukast (SINGULAIR) 10 MG tablet,  Take 1 tablet by mouth every night at bedtime., Disp: 90 tablet, Rfl: 1  •  pantoprazole (PROTONIX) 40 MG EC tablet, Take 40 mg by mouth Every Other Day., Disp: , Rfl:   •  vitamin B-12 (CYANOCOBALAMIN) 500 MCG tablet, Take 500 mcg by mouth Daily., Disp: , Rfl:   •  amLODIPine (NORVASC) 5 MG tablet, Take 0.5 tablets by mouth 2 (Two) Times a Day., Disp: 30 tablet, Rfl: 11     PMFSH  The following portions of the patient's history were reviewed and updated as appropriate: allergies, current medications, past family history, past medical history, past social history, past surgical history and problem list.    Review of Systems   Constitutional: Negative for activity change, appetite change and fatigue.   HENT: Negative for congestion and rhinorrhea.    Respiratory: Negative for chest tightness and shortness of breath.    Cardiovascular: Negative for chest pain and palpitations.   Gastrointestinal: Negative for abdominal pain.   Genitourinary: Negative for dysuria.   Musculoskeletal: Negative for arthralgias and myalgias.   Neurological: Positive for headaches. Negative for dizziness, weakness and light-headedness.   Psychiatric/Behavioral: Negative for dysphoric mood. The patient is not nervous/anxious.        Objective   /80   Pulse 61   Temp 98.3 °F (36.8 °C)   Wt 70.9 kg (156 lb 3.2 oz)   LMP  (LMP Unknown)   SpO2 98%   BMI 23.40 kg/m²     Physical Exam   Constitutional: She appears well-developed and well-nourished.   HENT:   Head: Normocephalic.   Right Ear: Hearing, tympanic membrane, external ear and ear canal normal.   Left Ear: Hearing, tympanic membrane, external ear and ear canal normal.   Nose: Nose normal.   Mouth/Throat: Oropharynx is clear and moist.   Eyes: Conjunctivae are normal. Pupils are equal, round, and reactive to light.   Neck: Normal range of motion.   Cardiovascular: Normal rate, regular rhythm and normal heart sounds.   Pulmonary/Chest: Effort normal and breath sounds normal. She  has no decreased breath sounds. She has no wheezes. She has no rhonchi. She has no rales.   Musculoskeletal: Normal range of motion.   Neurological: She is alert.   Skin: Skin is warm and dry.   Psychiatric: She has a normal mood and affect. Her behavior is normal.   Nursing note and vitals reviewed.          ASSESSMENT/PLAN    Problem List Items Addressed This Visit     None      Visit Diagnoses     Intermittent headache    -  Primary    Discussed etiology, could dehydration vs BP elevations. Improved with better BP control. Continue to push fluids. Call or rtc if worsening or no better.               Return if symptoms worsen or fail to improve, for Next scheduled follow up.

## 2019-11-08 NOTE — TELEPHONE ENCOUNTER
Have a refill of the amlodipine.  I would prescribe a 5 mg tablet and break it in half of 2.5 twice daily so if her blood pressure continues to be higher we could increase it.

## 2019-11-08 NOTE — TELEPHONE ENCOUNTER
"Pt called stating she has not heard since she called Monday. She is taking Flecainide 50 mg BID, beta blocker (one-half tablet) in the am, and amlodipine 2.5 mg BID. She is \"stable\" on this regimen but would need refill on BID amlodipine.   "

## 2019-11-19 ENCOUNTER — TELEPHONE (OUTPATIENT)
Dept: INTERNAL MEDICINE | Facility: CLINIC | Age: 69
End: 2019-11-19

## 2019-11-19 NOTE — TELEPHONE ENCOUNTER
Pt called and stated that her GYN is requesting that she have a mammogram with dye - however they need to make sure her creatine and GFR levels are ok - pt needs to let her GYN office know as to what mammo they need to order for her.  Pt did  Have the dye last year     Please advise patient [phone] or [phone]

## 2019-11-20 NOTE — TELEPHONE ENCOUNTER
Please let her know that her creatinine and GFR are normal.  Creatinine was 0.76( normal is < 1)  GFR was 75( normal > 60)

## 2019-12-05 RX ORDER — MONTELUKAST SODIUM 10 MG/1
TABLET ORAL
Qty: 90 TABLET | Refills: 0 | Status: SHIPPED | OUTPATIENT
Start: 2019-12-05 | End: 2020-04-15 | Stop reason: SDUPTHER

## 2019-12-06 ENCOUNTER — TELEPHONE (OUTPATIENT)
Dept: ORTHOPEDIC SURGERY | Facility: CLINIC | Age: 69
End: 2019-12-06

## 2019-12-06 NOTE — TELEPHONE ENCOUNTER
I spoke with the patient and she mentioned that over the past couple of days, she has had a twinge in her knee that is causing her some pain and discomfort. She only remembers an incident where she was carry yazmin ornaments and she slightly twisted the knee and is now in some pain. She mentioned that her knee is also swollen. The patient did let me know that she has been icing and elevating her knee as well as taking Tylenol for the pain. I told Mrs. Winters that we cannot tell her whether or not she should go to the ED because it is mainly because if she is just in discomfort and is able to weightbear, then I believe she is fine, but if she gets to the point where she is unable to stand the pain, then she can go to the ED to be checked.     She proceeded to ask in regards to a meniscus tear and I told her that unfortunately, I cannot give her a definite answer on this at all. She asked if the ED would do an MRI while she was there because she figured that Dr. Bucio would want one and she would already have it done. I told her that I do not know what the ED doctor would want to do while she was there other than x-rays. She understood.     I finally advised the patient to take it easy all day today and to wear a knee sleeve to help with her pain and swelling. I also advised that she would need to elevate her knee higher than her heart to help with the swelling and to ice for 20 min on and 40 min off throughout the hour. If she is to have any pain that become unbearable, she needs to go to the ED. She understood.    Padmini HAMILTON

## 2019-12-06 NOTE — TELEPHONE ENCOUNTER
PT CALLED THIS MORNING ABOUT HER KNEE. SHE HAS AN APPOINTMENT SCHEDULED FOR Monday WITH [facility] BUT PATIENT WANTS TO KNOW SINCE SHE CANNOT PUT PRESSURE ON THE KNEE IF SHE NEEDS TO GO TO THE ER SOONER. ALTHOUGH THIS IS THE SAME KNEE THAT DR. SPEARS HAS TREATED HER FOR, SHE FELT A POP MORE RECENTLY SO THIS IS A NEW INJURY TO THE KNEE. I ADVISED PT THAT SINCE THIS IS A NEW ISSUE THEY MIGHT NOT BE ABLE TO GIVE HER CLINICAL ADVISE WITHOUT HAVING A CONSULTATION PRIOR. SHE WOULD LIKE TO BE CALLED BACK [phone]

## 2019-12-09 ENCOUNTER — OFFICE VISIT (OUTPATIENT)
Dept: ORTHOPEDIC SURGERY | Facility: CLINIC | Age: 69
End: 2019-12-09

## 2019-12-09 VITALS — WEIGHT: 151.9 LBS | OXYGEN SATURATION: 99 % | HEIGHT: 69 IN | HEART RATE: 65 BPM | BODY MASS INDEX: 22.5 KG/M2

## 2019-12-09 DIAGNOSIS — M17.11 PRIMARY OSTEOARTHRITIS OF RIGHT KNEE: Primary | ICD-10-CM

## 2019-12-09 PROCEDURE — 99212 OFFICE O/P EST SF 10 MIN: CPT | Performed by: PHYSICIAN ASSISTANT

## 2019-12-09 NOTE — PROGRESS NOTES
"    [facility]    Subjective     CC: Follow-up (Primary osteoarthritis of right knee - visco series finished on 09/30/19)      HPI    Annalise Winters is a 68 y.o. female.  Patient presents today for evaluation of her right knee.  She has a known history of osteoarthritis and undergoes Visco supplementation injections.  She states that  couple weeks ago she began noting increased pain especially to the posterior aspect the knee, swelling as well as pain along the lateral to posterior lateral aspect of the knee.  She called for an appointment last week and was brought in today.  Since then she has had improvement of the swelling and pain.  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  She is unable to take corticosteroid injections secondary to rapid heart rate/palpitations.  Patient currently takes Tylenol for pain control.  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  She ices.  Once again no reported numbness or tingling into the extremity.    At this time she is wondering what her treatment options are for chronic pain.      ROS:    Constiutional:Pt denies fever, chills, nausea, or vomiting.  MSK:as above    Objective      Past Medical History  Past Medical History:   Diagnosis Date   • Abnormal blood chemistry    • Arthritis     right knee   • Cyst of buttocks    • Diverticulosis    • Dizziness    • Dysuria    • Esophagitis    • Flushing    • GERD (gastroesophageal reflux disease)    • Heartburn    • Hypertension    • Hyperthyroidism    • Hypothyroidism    • Irritable bowel syndrome    • LLQ abdominal pain    • Polyp of sigmoid colon    • Sinusitis    • Skin lesion of face    • UTI (urinary tract infection)          Physical Exam  Pulse 65   Ht 174 cm (68.5\")   Wt 68.9 kg (151 lb 14.4 oz)   LMP  (LMP " Unknown)   SpO2 99%   BMI 22.76 kg/m²     Body mass index is 22.76 kg/m².    Patient is well nourished and well developed.        Ortho Exam  Peripheral Vascular:    Upper Extremity:   Inspection:  Left--no cyanotic nail beds Right--no cyanotic nail beds   Bilateral:  Pink nail beds with brisk capillary refill   Palpation:  Bilateral radial pulse normal    Musculoskeletal:  Global Assessment:  Overall assessment of Lower Extremity Muscle Strength and Tone:  Right quadriceps--5/5   Right hamstrings--5/5       Right tibialis anterior--5/5  Right gastroc-soleus--5/5  Right EHL --5/5    Lower Extremity:  Knee/Patella:  No digital clubbing or cyanosis.    Examination of right knee reveals:  Normal deep tendon reflexes, coordination, strength, tone, sensation.  No known fractures or deformities.    Inspection and Palpation:  Right knee:  Tenderness: No significant tenderness on exam today.  Effusion: None  Crepitus:  Positive  Pulses:  2+  Ecchymosis:  None  Warmth:  None     ROM:  Right:  Extension: 0    Flexion:130    Instability:    Right:  Lachman Test:  Negative, Varus stress test negative, Valgus stress test negative    Deformities/Malalignments/Discrepancies:    Left:  Genu varum   Right:  Genu Varum    Functional Testing:  Nilam's test:  Negative  Patella grind test:  Positive      Imaging/Labs/EMG Reviewed:  Imaging Results (Last 24 Hours)     ** No results found for the last 24 hours. **          Assessment:  1. Primary osteoarthritis of right knee        Plan:  1. Right knee osteoarthritis.  2. Patient was wondering if she should get an MRI of the knee to see why she was hurting to the lateral and posterior lateral aspect with swelling.  No MRI required.  3. Dr. Bucio and I both discussed that right knee arthroscopy is not the best option for her since she does have osteoarthritis to the knee--bone-on-bone contact to the medial compartment along with tricompartmental osteophytes.  4. Ultimately he  recommended proceeding with TKA as definitive treatment.  5. She will have to think about this option.  6. She is currently undergoing various dental procedures to include implants.  She wants to get all of her dental work completed before even considering a TKA.  7. If she continues with her Visco supplementation injections her next series would be in March 2020.  8. Follow-up in 3 months for repeat evaluation, see where she is regarding her dental implants and determination whether she wants to proceed with TKA versus restart the Visco supplementation injection series.  In the meantime she will continue use of her Tylenol as well as modification of activity level for symptom control.  9. Questions and concerns answered.    Patient was also examined by Dr. Bucio and he agrees with the above assessment and plan.      Rianna Lay PA-C  12/10/19  8:18 AM

## 2019-12-10 DIAGNOSIS — E03.9 HYPOTHYROIDISM, UNSPECIFIED TYPE: ICD-10-CM

## 2019-12-10 RX ORDER — LEVOTHYROXINE SODIUM 0.05 MG/1
TABLET ORAL
Qty: 90 TABLET | Refills: 0 | Status: SHIPPED | OUTPATIENT
Start: 2019-12-10 | End: 2020-02-28

## 2020-01-27 ENCOUNTER — OFFICE VISIT (OUTPATIENT)
Dept: INTERNAL MEDICINE | Facility: CLINIC | Age: 70
End: 2020-01-27

## 2020-01-27 VITALS
HEIGHT: 69 IN | BODY MASS INDEX: 23.05 KG/M2 | HEART RATE: 68 BPM | DIASTOLIC BLOOD PRESSURE: 60 MMHG | WEIGHT: 155.6 LBS | SYSTOLIC BLOOD PRESSURE: 124 MMHG

## 2020-01-27 DIAGNOSIS — Z00.00 MEDICARE ANNUAL WELLNESS VISIT, SUBSEQUENT: Primary | ICD-10-CM

## 2020-01-27 DIAGNOSIS — I48.0 PAROXYSMAL ATRIAL FIBRILLATION (HCC): ICD-10-CM

## 2020-01-27 DIAGNOSIS — F41.9 ANXIETY: ICD-10-CM

## 2020-01-27 DIAGNOSIS — R42 DIZZINESS: ICD-10-CM

## 2020-01-27 DIAGNOSIS — E78.49 OTHER HYPERLIPIDEMIA: ICD-10-CM

## 2020-01-27 DIAGNOSIS — I10 ESSENTIAL HYPERTENSION: ICD-10-CM

## 2020-01-27 DIAGNOSIS — E03.9 ACQUIRED HYPOTHYROIDISM: ICD-10-CM

## 2020-01-27 DIAGNOSIS — E55.9 VITAMIN D DEFICIENCY: ICD-10-CM

## 2020-01-27 DIAGNOSIS — R51.9 INTERMITTENT HEADACHE: ICD-10-CM

## 2020-01-27 DIAGNOSIS — J30.89 ALLERGIC RHINITIS DUE TO OTHER ALLERGIC TRIGGER, UNSPECIFIED SEASONALITY: ICD-10-CM

## 2020-01-27 PROCEDURE — G0444 DEPRESSION SCREEN ANNUAL: HCPCS | Performed by: INTERNAL MEDICINE

## 2020-01-27 PROCEDURE — G0439 PPPS, SUBSEQ VISIT: HCPCS | Performed by: INTERNAL MEDICINE

## 2020-01-27 PROCEDURE — 99213 OFFICE O/P EST LOW 20 MIN: CPT | Performed by: INTERNAL MEDICINE

## 2020-01-27 RX ORDER — LORAZEPAM 0.5 MG/1
0.5 TABLET ORAL DAILY PRN
Qty: 30 TABLET | Refills: 0 | Status: SHIPPED | OUTPATIENT
Start: 2020-01-27 | End: 2020-04-15 | Stop reason: SDUPTHER

## 2020-01-27 NOTE — PROGRESS NOTES
The ABCs of the Annual Wellness Visit  Subsequent Medicare Wellness Visit    Chief Complaint   Patient presents with   • Medicare Wellness-subsequent       Subjective   History of Present Illness:  Annalise Winters is a 69 y.o. female who presents for a Subsequent Medicare Wellness Visit.    HEALTH RISK ASSESSMENT    Recent Hospitalizations:  No hospitalization(s) within the last year.    Current Medical Providers:  Patient Care Team:  Maryse Jauregui MD as PCP - General (Internal Medicine)  Maryse Jauregui MD as PCP - Claims Attributed  Yumiko Hall MD as Consulting Physician (Gastroenterology)  Groan Ruth IV, MD as Cardiologist (Interventional Cardiology)    Smoking Status:  Social History     Tobacco Use   Smoking Status Never Smoker   Smokeless Tobacco Never Used       Alcohol Consumption:  Social History     Substance and Sexual Activity   Alcohol Use Yes    Comment: on occasion       Depression Screen:   PHQ-2/PHQ-9 Depression Screening 1/27/2020   Little interest or pleasure in doing things 0   Feeling down, depressed, or hopeless 0   Trouble falling or staying asleep, or sleeping too much -   Feeling tired or having little energy -   Poor appetite or overeating -   Feeling bad about yourself - or that you are a failure or have let yourself or your family down -   Trouble concentrating on things, such as reading the newspaper or watching television -   Moving or speaking so slowly that other people could have noticed. Or the opposite - being so fidgety or restless that you have been moving around a lot more than usual -   Thoughts that you would be better off dead, or of hurting yourself in some way -   Total Score 0   If you checked off any problems, how difficult have these problems made it for you to do your work, take care of things at home, or get along with other people? -       Fall Risk Screen:  STEADI Fall Risk Assessment was completed, and patient is at LOW risk for  falls.Assessment completed on:1/27/2020    Health Habits and Functional and Cognitive Screening:  Functional & Cognitive Status 1/27/2020   Do you have difficulty preparing food and eating? No   Do you have difficulty bathing yourself, getting dressed or grooming yourself? No   Do you have difficulty using the toilet? No   Do you have difficulty moving around from place to place? No   Do you have trouble with steps or getting out of a bed or a chair? No   Current Diet Well Balanced Diet   Dental Exam Up to date   Eye Exam Up to date   Exercise (times per week) 7 times per week   Current Exercise Activities Include Aerobics   Do you need help using the phone?  No   Are you deaf or do you have serious difficulty hearing?  No   Do you need help with transportation? No   Do you need help shopping? No   Do you need help preparing meals?  No   Do you need help with housework?  No   Do you need help with laundry? No   Do you need help taking your medications? No   Do you need help managing money? No   Do you ever drive or ride in a car without wearing a seat belt? No   Have you felt unusual stress, anger or loneliness in the last month? No   Who do you live with? Spouse   If you need help, do you have trouble finding someone available to you? No   Have you been bothered in the last four weeks by sexual problems? No   Do you have difficulty concentrating, remembering or making decisions? No         Does the patient have evidence of cognitive impairment? No    Asprin use counseling:Taking ASA appropriately as indicated    Age-appropriate Screening Schedule:  Refer to the list below for future screening recommendations based on patient's age, sex and/or medical conditions. Orders for these recommended tests are listed in the plan section. The patient has been provided with a written plan.    Health Maintenance   Topic Date Due   • MAMMOGRAM  01/29/2020 (Originally 11/22/2019)   • ZOSTER VACCINE (1 of 2) 09/03/2020 (Originally  12/15/1998)   • LIPID PANEL  08/13/2020   • COLONOSCOPY  01/25/2028   • INFLUENZA VACCINE  Completed   • TDAP/TD VACCINES  Discontinued          The following portions of the patient's history were reviewed and updated as appropriate: allergies, current medications, past family history, past medical history, past social history, past surgical history and problem list.    Outpatient Medications Prior to Visit   Medication Sig Dispense Refill   • acetaminophen (TYLENOL) 500 MG tablet Take 500 mg by mouth As Needed.     • amLODIPine (NORVASC) 5 MG tablet Take 0.5 tablets by mouth 2 (Two) Times a Day. 30 tablet 11   • aspirin 81 MG chewable tablet Chew 1 tablet Daily.     • Calcium-Vitamin D-Vitamin K (CALCIUM SOFT CHEWS PO) Take 1 tablet by mouth Daily.     • Cholecalciferol (VITAMIN D) 2000 UNITS capsule Take 1 capsule by mouth daily.     • Famotidine-Ca Carb-Mag Hydrox (PEPCID COMPLETE PO) Take 1 tablet by mouth As Needed.     • flecainide (TAMBOCOR) 50 MG tablet Take 1 tablet by mouth 2 (Two) Times a Day As Needed (PALPITATOINS). 180 tablet 1   • levothyroxine (SYNTHROID, LEVOTHROID) 50 MCG tablet TAKE ONE TABLET BY MOUTH DAILY 90 tablet 0   • Loratadine (CLARITIN PO) Take 10 mg by mouth Daily.     • metoprolol tartrate (LOPRESSOR) 25 MG tablet Take 1 tablet by mouth 2 (Two) Times a Day. (Patient taking differently: Take 12.5 mg by mouth Every Morning.) 180 tablet 3   • montelukast (SINGULAIR) 10 MG tablet TAKE ONE TABLET BY MOUTH EVERY NIGHT AT BEDTIME 90 tablet 0   • pantoprazole (PROTONIX) 40 MG EC tablet Take 40 mg by mouth Every Other Day.     • vitamin B-12 (CYANOCOBALAMIN) 500 MCG tablet Take 500 mcg by mouth Daily.     • LORazepam (ATIVAN) 0.5 MG tablet Take 1 tablet by mouth Daily As Needed for Anxiety. 30 tablet 0     No facility-administered medications prior to visit.        Patient Active Problem List   Diagnosis   • Asthma   • Paroxysmal atrial fibrillation (CMS/HCC)   • Hypertonicity of bladder   •  "Essential hypertension   • Hypothyroidism   • Anxiety   • Back pain   • Hyperthyroidism   • GERD (gastroesophageal reflux disease)   • Allergic rhinitis   • Dysuria   • Skin lesion of face   • Vitamin D deficiency   • Frontal skull lesion       Advanced Care Planning:  Patient has an advance directive - a copy has not been provided. Have asked the patient to send this to us to add to record    Review of Systems   Constitutional: Negative.  Negative for chills, fatigue and fever.   HENT: Negative for congestion, ear discharge, ear pain, sinus pressure and sore throat.    Eyes: Negative for pain, redness and visual disturbance.   Respiratory: Negative for cough, chest tightness and shortness of breath.    Cardiovascular: Positive for palpitations. Negative for chest pain and leg swelling.   Gastrointestinal: Negative for abdominal pain, diarrhea, nausea and vomiting.   Endocrine: Negative for cold intolerance and heat intolerance.   Genitourinary: Negative for flank pain and urgency.   Musculoskeletal: Positive for arthralgias. Negative for back pain, gait problem and myalgias.   Skin: Negative for pallor and rash.   Neurological: Positive for headaches. Negative for dizziness, syncope and weakness.   Psychiatric/Behavioral: Negative for confusion, dysphoric mood and sleep disturbance. The patient is not nervous/anxious.        Compared to one year ago, the patient feels her physical health is better.  Compared to one year ago, the patient feels her mental health is better.    Reviewed chart for potential of high risk medication in the elderly: yes  Reviewed chart for potential of harmful drug interactions in the elderly:yes    Objective         Vitals:    01/27/20 1506   BP: 124/60   Pulse: 68   Weight: 70.6 kg (155 lb 9.6 oz)   Height: 174 cm (68.5\")   PainSc: 0-No pain       Body mass index is 23.31 kg/m².  Discussed the patient's BMI with her. The BMI is in the acceptable range.    Physical Exam   Constitutional: " She is oriented to person, place, and time. She appears well-developed and well-nourished.   HENT:   Head: Normocephalic and atraumatic.   Right Ear: External ear normal.   Left Ear: External ear normal.   Mouth/Throat: No oropharyngeal exudate.   Eyes: Pupils are equal, round, and reactive to light. Conjunctivae are normal.   Neck: Neck supple. No thyromegaly present.   Cardiovascular: Normal rate, regular rhythm and intact distal pulses. Exam reveals no friction rub.   No murmur heard.  Pulmonary/Chest: Effort normal and breath sounds normal. No stridor. She has no wheezes.   Abdominal: Soft. Bowel sounds are normal. She exhibits no distension and no mass. There is no tenderness. There is no rebound. No hernia.   Musculoskeletal: She exhibits no edema.   Lymphadenopathy:     She has no cervical adenopathy.   Neurological: She is alert and oriented to person, place, and time. She displays normal reflexes. No cranial nerve deficit. She exhibits normal muscle tone. Coordination normal.   Skin: Skin is warm and dry. No rash noted. No erythema.   Psychiatric: She has a normal mood and affect. Her behavior is normal. Judgment and thought content normal.   Nursing note and vitals reviewed.            Assessment/Plan   Medicare Risks and Personalized Health Plan  CMS Preventative Services Quick Reference  Breast Cancer/Mammogram Screening  Cardiovascular risk  Diabetic Lab Screening   Fall Risk  Osteoprorosis Risk  Urinary Incontinence    The above risks/problems have been discussed with the patient.  Pertinent information has been shared with the patient in the After Visit Summary.  Follow up plans and orders are seen below in the Assessment/Plan Section.    Medicare Wellness-subsequent    Subjective   Annalise Winters is a 69 y.o. female is here today for follow-up.  HPI  Notes she continues to have headache with coughing or any reason that increases the blood flow in her brain like straining for her bowels.  Has a pet  "which is hypoallergenic.  Has been on flecainide     Current Outpatient Medications:   •  acetaminophen (TYLENOL) 500 MG tablet, Take 500 mg by mouth As Needed., Disp: , Rfl:   •  amLODIPine (NORVASC) 5 MG tablet, Take 0.5 tablets by mouth 2 (Two) Times a Day., Disp: 30 tablet, Rfl: 11  •  aspirin 81 MG chewable tablet, Chew 1 tablet Daily., Disp: , Rfl:   •  Calcium-Vitamin D-Vitamin K (CALCIUM SOFT CHEWS PO), Take 1 tablet by mouth Daily., Disp: , Rfl:   •  Cholecalciferol (VITAMIN D) 2000 UNITS capsule, Take 1 capsule by mouth daily., Disp: , Rfl:   •  Famotidine-Ca Carb-Mag Hydrox (PEPCID COMPLETE PO), Take 1 tablet by mouth As Needed., Disp: , Rfl:   •  flecainide (TAMBOCOR) 50 MG tablet, Take 1 tablet by mouth 2 (Two) Times a Day As Needed (PALPITATOINS)., Disp: 180 tablet, Rfl: 1  •  levothyroxine (SYNTHROID, LEVOTHROID) 50 MCG tablet, TAKE ONE TABLET BY MOUTH DAILY, Disp: 90 tablet, Rfl: 0  •  Loratadine (CLARITIN PO), Take 10 mg by mouth Daily., Disp: , Rfl:   •  LORazepam (ATIVAN) 0.5 MG tablet, Take 1 tablet by mouth Daily As Needed for Anxiety., Disp: 30 tablet, Rfl: 0  •  metoprolol tartrate (LOPRESSOR) 25 MG tablet, Take 1 tablet by mouth 2 (Two) Times a Day. (Patient taking differently: Take 12.5 mg by mouth Every Morning.), Disp: 180 tablet, Rfl: 3  •  montelukast (SINGULAIR) 10 MG tablet, TAKE ONE TABLET BY MOUTH EVERY NIGHT AT BEDTIME, Disp: 90 tablet, Rfl: 0  •  pantoprazole (PROTONIX) 40 MG EC tablet, Take 40 mg by mouth Every Other Day., Disp: , Rfl:   •  vitamin B-12 (CYANOCOBALAMIN) 500 MCG tablet, Take 500 mcg by mouth Daily., Disp: , Rfl:       The following portions of the patient's history were reviewed and updated as appropriate: allergies, current medications, past family history, past medical history, past social history, past surgical history and problem list.        Objective   /60   Pulse 68   Ht 174 cm (68.5\")   Wt 70.6 kg (155 lb 9.6 oz)   LMP  (LMP Unknown)   BMI 23.31 " kg/m²         Results for orders placed or performed in visit on 10/04/19   Magnesium   Result Value Ref Range    Magnesium 2.1 1.6 - 2.4 mg/dL   Vitamin B12   Result Value Ref Range    Vitamin B-12 432 211 - 946 pg/mL   Basic Metabolic Panel   Result Value Ref Range    Glucose 99 65 - 99 mg/dL    BUN 13 8 - 23 mg/dL    Creatinine 0.76 0.57 - 1.00 mg/dL    Sodium 135 (L) 136 - 145 mmol/L    Potassium 4.8 3.5 - 5.2 mmol/L    Chloride 96 (L) 98 - 107 mmol/L    CO2 27.1 22.0 - 29.0 mmol/L    Calcium 9.5 8.6 - 10.5 mg/dL    eGFR Non African Amer 75 >60 mL/min/1.73    BUN/Creatinine Ratio 17.1 7.0 - 25.0    Anion Gap 11.9 5.0 - 15.0 mmol/L             Assessment/Plan   Diagnoses and all orders for this visit:    Medicare annual wellness visit, subsequent    Intermittent headache  -     Duplex Carotid Ultrasound CAR    Paroxysmal atrial fibrillation (CMS/HCC)  -     Duplex Carotid Ultrasound CAR    Dizziness  -     Duplex Carotid Ultrasound CAR  -     CBC (No Diff)  -     Vitamin B12    Allergic rhinitis due to other allergic trigger, unspecified seasonality  Comments:  stop singulair and claritin, and if sxs worsen, start zyrtec/ allegra.cannot use nasal sprays    Acquired hypothyroidism  -     TSH  -     T4, Free    Other hyperlipidemia  -     Comprehensive Metabolic Panel  -     Lipid Panel    Essential hypertension    Vitamin D deficiency  -     Vitamin D 25 Hydroxy    Anxiety  -     LORazepam (ATIVAN) 0.5 MG tablet; Take 1 tablet by mouth Daily As Needed for Anxiety.             The patient has read and signed the [facility] Controlled Substance Contract.  I will continue to see patient for regular follow up appointments.  They are well controlled on their medication.  JASMYN has been reviewed by me and is updated every 3 months. The patient is aware of the potential for addiction and dependence.    PHQ-2/PHQ-9 Depression screening reviewed with patient . Total time spent today for depression screening  with  Annalise Winters  was 15 minutes in counseling, along with plans for any diagnositc work-up and treatment.    Follow Up:  Return in about 6 months (around 7/27/2020) for Next scheduled follow up.     An After Visit Summary and PPPS were given to the patient.

## 2020-01-31 ENCOUNTER — APPOINTMENT (OUTPATIENT)
Dept: LAB | Facility: HOSPITAL | Age: 70
End: 2020-01-31

## 2020-01-31 LAB
25(OH)D3 SERPL-MCNC: 46.1 NG/ML (ref 30–100)
ALBUMIN SERPL-MCNC: 4.2 G/DL (ref 3.5–5.2)
ALBUMIN/GLOB SERPL: 1.6 G/DL
ALP SERPL-CCNC: 58 U/L (ref 39–117)
ALT SERPL W P-5'-P-CCNC: 12 U/L (ref 1–33)
ANION GAP SERPL CALCULATED.3IONS-SCNC: 12.6 MMOL/L (ref 5–15)
AST SERPL-CCNC: 16 U/L (ref 1–32)
BILIRUB SERPL-MCNC: 0.4 MG/DL (ref 0.2–1.2)
BUN BLD-MCNC: 11 MG/DL (ref 8–23)
BUN/CREAT SERPL: 13.3 (ref 7–25)
CALCIUM SPEC-SCNC: 9.6 MG/DL (ref 8.6–10.5)
CHLORIDE SERPL-SCNC: 97 MMOL/L (ref 98–107)
CHOLEST SERPL-MCNC: 208 MG/DL (ref 0–200)
CO2 SERPL-SCNC: 27.4 MMOL/L (ref 22–29)
CREAT BLD-MCNC: 0.83 MG/DL (ref 0.57–1)
DEPRECATED RDW RBC AUTO: 41.5 FL (ref 37–54)
ERYTHROCYTE [DISTWIDTH] IN BLOOD BY AUTOMATED COUNT: 13 % (ref 12.3–15.4)
GFR SERPL CREATININE-BSD FRML MDRD: 68 ML/MIN/1.73
GLOBULIN UR ELPH-MCNC: 2.6 GM/DL
GLUCOSE BLD-MCNC: 79 MG/DL (ref 65–99)
HCT VFR BLD AUTO: 36.9 % (ref 34–46.6)
HDLC SERPL-MCNC: 85 MG/DL (ref 40–60)
HGB BLD-MCNC: 12.1 G/DL (ref 12–15.9)
LDLC SERPL CALC-MCNC: 112 MG/DL (ref 0–100)
LDLC/HDLC SERPL: 1.32 {RATIO}
MCH RBC QN AUTO: 28.9 PG (ref 26.6–33)
MCHC RBC AUTO-ENTMCNC: 32.8 G/DL (ref 31.5–35.7)
MCV RBC AUTO: 88.3 FL (ref 79–97)
PLATELET # BLD AUTO: 253 10*3/MM3 (ref 140–450)
PMV BLD AUTO: 10 FL (ref 6–12)
POTASSIUM BLD-SCNC: 4.5 MMOL/L (ref 3.5–5.2)
PROT SERPL-MCNC: 6.8 G/DL (ref 6–8.5)
RBC # BLD AUTO: 4.18 10*6/MM3 (ref 3.77–5.28)
SODIUM BLD-SCNC: 137 MMOL/L (ref 136–145)
T4 FREE SERPL-MCNC: 1.28 NG/DL (ref 0.93–1.7)
TRIGL SERPL-MCNC: 54 MG/DL (ref 0–150)
TSH SERPL DL<=0.05 MIU/L-ACNC: 2.3 UIU/ML (ref 0.27–4.2)
VIT B12 BLD-MCNC: 358 PG/ML (ref 211–946)
VLDLC SERPL-MCNC: 10.8 MG/DL (ref 5–40)
WBC NRBC COR # BLD: 3.31 10*3/MM3 (ref 3.4–10.8)

## 2020-01-31 PROCEDURE — 84443 ASSAY THYROID STIM HORMONE: CPT | Performed by: INTERNAL MEDICINE

## 2020-01-31 PROCEDURE — 84439 ASSAY OF FREE THYROXINE: CPT | Performed by: INTERNAL MEDICINE

## 2020-01-31 PROCEDURE — 80053 COMPREHEN METABOLIC PANEL: CPT | Performed by: INTERNAL MEDICINE

## 2020-01-31 PROCEDURE — 82306 VITAMIN D 25 HYDROXY: CPT | Performed by: INTERNAL MEDICINE

## 2020-01-31 PROCEDURE — 82607 VITAMIN B-12: CPT | Performed by: INTERNAL MEDICINE

## 2020-01-31 PROCEDURE — 80061 LIPID PANEL: CPT | Performed by: INTERNAL MEDICINE

## 2020-01-31 PROCEDURE — 85027 COMPLETE CBC AUTOMATED: CPT | Performed by: INTERNAL MEDICINE

## 2020-02-05 ENCOUNTER — TELEPHONE (OUTPATIENT)
Dept: INTERNAL MEDICINE | Facility: CLINIC | Age: 70
End: 2020-02-05

## 2020-02-05 NOTE — TELEPHONE ENCOUNTER
Patient called in to request a callback to go over lab results.     Callback Number confirmed       Patient is also wanting to know if she needs to be seen again in regards to discomfort in her ear.     Please Advise    (unavailable between 1-2pm)

## 2020-02-06 RX ORDER — OFLOXACIN 3 MG/ML
5 SOLUTION AURICULAR (OTIC) DAILY
Qty: 5 ML | Refills: 0 | Status: SHIPPED | OUTPATIENT
Start: 2020-02-06 | End: 2020-04-15

## 2020-02-07 NOTE — TELEPHONE ENCOUNTER
Please let Pt. Know-  Your labs including electrolytes, cholesterol, thyroid and Vitamin D look good.  White count is borderline low ,which could be due to a viral infection.  We can recheck at your follow-up.    For the ear ache, I've sent in an rx for ear drops.  She should use it as directed.  To be seen if not better.

## 2020-02-07 NOTE — TELEPHONE ENCOUNTER
Pt advised, states verbal understanding.    Wanting you to know that she does not have an earache, it feels more like a hollow feeling in her ear when she coughs and is worried about this being her carotid artery.  Pt advised I was unsure, but that I would send message to Dr Jauregui and get back with her.

## 2020-02-08 NOTE — TELEPHONE ENCOUNTER
The hollow feeling is usually due to fluid in her middle ear. Carotid issues will make her dizzy. Especially with all the work out she does.  She can come in to be seen and we can further evaluate if needed.    Thanks,

## 2020-02-10 ENCOUNTER — OFFICE VISIT (OUTPATIENT)
Dept: INTERNAL MEDICINE | Facility: CLINIC | Age: 70
End: 2020-02-10

## 2020-02-10 ENCOUNTER — APPOINTMENT (OUTPATIENT)
Dept: LAB | Facility: HOSPITAL | Age: 70
End: 2020-02-10

## 2020-02-10 VITALS
BODY MASS INDEX: 23.28 KG/M2 | OXYGEN SATURATION: 97 % | HEART RATE: 79 BPM | WEIGHT: 155.4 LBS | SYSTOLIC BLOOD PRESSURE: 130 MMHG | DIASTOLIC BLOOD PRESSURE: 80 MMHG

## 2020-02-10 DIAGNOSIS — B34.9 VIRAL INFECTION: ICD-10-CM

## 2020-02-10 DIAGNOSIS — D72.819 LEUKOPENIA, UNSPECIFIED TYPE: Primary | ICD-10-CM

## 2020-02-10 DIAGNOSIS — G44.83 COUGH HEADACHE: ICD-10-CM

## 2020-02-10 LAB
EXPIRATION DATE: NORMAL
FLUAV AG NPH QL: NEGATIVE
FLUBV AG NPH QL: NEGATIVE
INTERNAL CONTROL: NORMAL
Lab: NORMAL

## 2020-02-10 PROCEDURE — 87804 INFLUENZA ASSAY W/OPTIC: CPT | Performed by: PHYSICIAN ASSISTANT

## 2020-02-10 PROCEDURE — 85025 COMPLETE CBC W/AUTO DIFF WBC: CPT | Performed by: PHYSICIAN ASSISTANT

## 2020-02-10 PROCEDURE — 99214 OFFICE O/P EST MOD 30 MIN: CPT | Performed by: PHYSICIAN ASSISTANT

## 2020-02-10 NOTE — PROGRESS NOTES
Chief Complaint   Patient presents with   • Cough causing sensation in her head     Acute        Subjective   Annalise Winters is a 69 y.o. female.       History of Present Illness     Pt has had recurrent issue with headache/sensation in her head whenever she coughs or sneezes. She has pending carotid ultrasound ordered by Dr Jauregui. Last night she had sensation in her neck and her blood pressure was elevated. She took an extra dose of medication and an ativan because her anxiety is elevated whenever her blood pressure is elevated.    She felt like she was coming down with the flu last week with achiness, felt fatigued when walking around a store, nausea, stomach upset. She felt better as the day went on. Since then has had some mild upper respiratory symptoms. Dr Jauregui stopped her claritin and singulair and suggested she try zyrtec.          Current Outpatient Medications:   •  acetaminophen (TYLENOL) 500 MG tablet, Take 500 mg by mouth As Needed., Disp: , Rfl:   •  amLODIPine (NORVASC) 5 MG tablet, Take 0.5 tablets by mouth 2 (Two) Times a Day., Disp: 30 tablet, Rfl: 11  •  aspirin 81 MG chewable tablet, Chew 1 tablet Daily., Disp: , Rfl:   •  Calcium-Vitamin D-Vitamin K (CALCIUM SOFT CHEWS PO), Take 1 tablet by mouth Daily., Disp: , Rfl:   •  Cholecalciferol (VITAMIN D) 2000 UNITS capsule, Take 1 capsule by mouth daily., Disp: , Rfl:   •  Famotidine-Ca Carb-Mag Hydrox (PEPCID COMPLETE PO), Take 1 tablet by mouth As Needed., Disp: , Rfl:   •  flecainide (TAMBOCOR) 50 MG tablet, Take 1 tablet by mouth 2 (Two) Times a Day As Needed (PALPITATOINS)., Disp: 180 tablet, Rfl: 1  •  levothyroxine (SYNTHROID, LEVOTHROID) 50 MCG tablet, TAKE ONE TABLET BY MOUTH DAILY, Disp: 90 tablet, Rfl: 0  •  LORazepam (ATIVAN) 0.5 MG tablet, Take 1 tablet by mouth Daily As Needed for Anxiety., Disp: 30 tablet, Rfl: 0  •  metoprolol tartrate (LOPRESSOR) 25 MG tablet, Take 1 tablet by mouth 2 (Two) Times a Day. (Patient taking  differently: Take 12.5 mg by mouth Every Morning.), Disp: 180 tablet, Rfl: 3  •  ofloxacin (FLOXIN) 0.3 % otic solution, Administer 5 drops into ear(s) as directed by provider Daily., Disp: 5 mL, Rfl: 0  •  pantoprazole (PROTONIX) 40 MG EC tablet, Take 40 mg by mouth Every Other Day., Disp: , Rfl:   •  vitamin B-12 (CYANOCOBALAMIN) 500 MCG tablet, Take 500 mcg by mouth Daily., Disp: , Rfl:   •  Loratadine (CLARITIN PO), Take 10 mg by mouth Daily., Disp: , Rfl:   •  montelukast (SINGULAIR) 10 MG tablet, TAKE ONE TABLET BY MOUTH EVERY NIGHT AT BEDTIME, Disp: 90 tablet, Rfl: 0     PMFSH  The following portions of the patient's history were reviewed and updated as appropriate: allergies, current medications, past family history, past medical history, past social history, past surgical history and problem list.    Review of Systems   Constitutional: Negative for activity change, appetite change and fatigue.   HENT: Negative for congestion and rhinorrhea.    Respiratory: Negative for chest tightness and shortness of breath.    Cardiovascular: Negative for chest pain and palpitations.   Gastrointestinal: Negative for abdominal pain.   Genitourinary: Negative for dysuria.   Musculoskeletal: Positive for neck pain. Negative for arthralgias and myalgias.   Neurological: Positive for headaches. Negative for dizziness, weakness and light-headedness.   Psychiatric/Behavioral: Negative for dysphoric mood. The patient is not nervous/anxious.        Objective   /80   Pulse 79   Wt 70.5 kg (155 lb 6.4 oz)   LMP  (LMP Unknown)   SpO2 97%   BMI 23.28 kg/m²     Physical Exam   Constitutional: She appears well-developed and well-nourished.   HENT:   Head: Normocephalic.   Right Ear: Hearing, tympanic membrane, external ear and ear canal normal.   Left Ear: Hearing, tympanic membrane, external ear and ear canal normal.   Nose: Nose normal.   Mouth/Throat: Oropharynx is clear and moist.   Eyes: Pupils are equal, round, and  reactive to light. Conjunctivae are normal.   Neck: Normal range of motion.   Cardiovascular: Normal rate, regular rhythm and normal heart sounds.   Pulmonary/Chest: Effort normal and breath sounds normal. She has no decreased breath sounds. She has no wheezes. She has no rhonchi. She has no rales.   Musculoskeletal: Normal range of motion.   Neurological: She is alert.   Skin: Skin is warm and dry.   Psychiatric: She has a normal mood and affect. Her behavior is normal.   Nursing note and vitals reviewed.           ASSESSMENT/PLAN    Problem List Items Addressed This Visit     None      Visit Diagnoses     Leukopenia, unspecified type    -  Primary    Recheck cbc.    Relevant Orders    CBC & Differential (Completed)    CBC Auto Differential (Completed)    Viral infection        Flu test is negative. Continue supportive care.    Relevant Orders    POCT Influenza A/B (Completed)    Cough headache        Pt will have bilateral carotid doppler done and can discuss next steps if normal. Discussed musculoskeletal etiology as possibility. Start zyrtec for cough.               Return for Next scheduled follow up.

## 2020-02-11 LAB
BASOPHILS # BLD AUTO: 0.01 10*3/MM3 (ref 0–0.2)
BASOPHILS NFR BLD AUTO: 0.2 % (ref 0–1.5)
DEPRECATED RDW RBC AUTO: 42.6 FL (ref 37–54)
EOSINOPHIL # BLD AUTO: 0.16 10*3/MM3 (ref 0–0.4)
EOSINOPHIL NFR BLD AUTO: 2.9 % (ref 0.3–6.2)
ERYTHROCYTE [DISTWIDTH] IN BLOOD BY AUTOMATED COUNT: 13.2 % (ref 12.3–15.4)
HCT VFR BLD AUTO: 36.9 % (ref 34–46.6)
HGB BLD-MCNC: 12.1 G/DL (ref 12–15.9)
IMM GRANULOCYTES # BLD AUTO: 0.01 10*3/MM3 (ref 0–0.05)
IMM GRANULOCYTES NFR BLD AUTO: 0.2 % (ref 0–0.5)
LYMPHOCYTES # BLD AUTO: 1.26 10*3/MM3 (ref 0.7–3.1)
LYMPHOCYTES NFR BLD AUTO: 22.8 % (ref 19.6–45.3)
MCH RBC QN AUTO: 29.1 PG (ref 26.6–33)
MCHC RBC AUTO-ENTMCNC: 32.8 G/DL (ref 31.5–35.7)
MCV RBC AUTO: 88.7 FL (ref 79–97)
MONOCYTES # BLD AUTO: 0.45 10*3/MM3 (ref 0.1–0.9)
MONOCYTES NFR BLD AUTO: 8.2 % (ref 5–12)
NEUTROPHILS # BLD AUTO: 3.63 10*3/MM3 (ref 1.7–7)
NEUTROPHILS NFR BLD AUTO: 65.7 % (ref 42.7–76)
NRBC BLD AUTO-RTO: 0 /100 WBC (ref 0–0.2)
PLATELET # BLD AUTO: 304 10*3/MM3 (ref 140–450)
PMV BLD AUTO: 10.1 FL (ref 6–12)
RBC # BLD AUTO: 4.16 10*6/MM3 (ref 3.77–5.28)
WBC NRBC COR # BLD: 5.52 10*3/MM3 (ref 3.4–10.8)

## 2020-02-11 NOTE — PROGRESS NOTES
Please let her know that her blood counts were back to normal.     I spoke with Dr Jauregui and she agreed that we should go ahead with the carotid duplex and then can move ahead with any other testing based on those results.

## 2020-02-13 ENCOUNTER — HOSPITAL ENCOUNTER (OUTPATIENT)
Dept: CARDIOLOGY | Facility: HOSPITAL | Age: 70
Discharge: HOME | End: 2020-02-13
Admitting: INTERNAL MEDICINE

## 2020-02-13 VITALS — WEIGHT: 155 LBS | BODY MASS INDEX: 23.49 KG/M2 | HEIGHT: 68 IN

## 2020-02-13 LAB
BH CV XLRA MEAS LEFT DIST CCA EDV: 21.8 CM/SEC
BH CV XLRA MEAS LEFT DIST CCA PSV: 86 CM/SEC
BH CV XLRA MEAS LEFT DIST ICA EDV: 35.2 CM/SEC
BH CV XLRA MEAS LEFT DIST ICA PSV: 117 CM/SEC
BH CV XLRA MEAS LEFT ICA/CCA RATIO: 1.02
BH CV XLRA MEAS LEFT MID CCA EDV: 18.3 CM/SEC
BH CV XLRA MEAS LEFT MID CCA PSV: 81.6 CM/SEC
BH CV XLRA MEAS LEFT MID ICA EDV: 31 CM/SEC
BH CV XLRA MEAS LEFT MID ICA PSV: 83.4 CM/SEC
BH CV XLRA MEAS LEFT PROX CCA EDV: 22.3 CM/SEC
BH CV XLRA MEAS LEFT PROX CCA PSV: 117 CM/SEC
BH CV XLRA MEAS LEFT PROX ECA EDV: 12.9 CM/SEC
BH CV XLRA MEAS LEFT PROX ECA PSV: 68.1 CM/SEC
BH CV XLRA MEAS LEFT PROX ICA EDV: 19.9 CM/SEC
BH CV XLRA MEAS LEFT PROX ICA PSV: 71.6 CM/SEC
BH CV XLRA MEAS LEFT PROX SCLA EDV: 14 CM/SEC
BH CV XLRA MEAS LEFT PROX SCLA PSV: 163 CM/SEC
BH CV XLRA MEAS LEFT VERTEBRAL A EDV: 12.3 CM/SEC
BH CV XLRA MEAS LEFT VERTEBRAL A PSV: 46.5 CM/SEC
BH CV XLRA MEAS RIGHT DIST CCA EDV: 23.1 CM/SEC
BH CV XLRA MEAS RIGHT DIST CCA PSV: 78.1 CM/SEC
BH CV XLRA MEAS RIGHT DIST ICA EDV: 15.4 CM/SEC
BH CV XLRA MEAS RIGHT DIST ICA PSV: 60.1 CM/SEC
BH CV XLRA MEAS RIGHT ICA/CCA RATIO: 1.07
BH CV XLRA MEAS RIGHT MID CCA EDV: 24.3 CM/SEC
BH CV XLRA MEAS RIGHT MID CCA PSV: 93.7 CM/SEC
BH CV XLRA MEAS RIGHT MID ICA EDV: 32.4 CM/SEC
BH CV XLRA MEAS RIGHT MID ICA PSV: 101 CM/SEC
BH CV XLRA MEAS RIGHT PROX CCA EDV: 19.9 CM/SEC
BH CV XLRA MEAS RIGHT PROX CCA PSV: 103 CM/SEC
BH CV XLRA MEAS RIGHT PROX ECA EDV: 15.2 CM/SEC
BH CV XLRA MEAS RIGHT PROX ECA PSV: 88.4 CM/SEC
BH CV XLRA MEAS RIGHT PROX ICA EDV: 26.2 CM/SEC
BH CV XLRA MEAS RIGHT PROX ICA PSV: 77.5 CM/SEC
BH CV XLRA MEAS RIGHT PROX SCLA EDV: 10.2 CM/SEC
BH CV XLRA MEAS RIGHT PROX SCLA PSV: 112 CM/SEC
BH CV XLRA MEAS RIGHT VERTEBRAL A EDV: 12.9 CM/SEC
BH CV XLRA MEAS RIGHT VERTEBRAL A PSV: 51.6 CM/SEC
LEFT ARM BP: NORMAL MMHG
RIGHT ARM BP: NORMAL MMHG

## 2020-02-13 PROCEDURE — 93880 EXTRACRANIAL BILAT STUDY: CPT

## 2020-02-13 PROCEDURE — 93880 EXTRACRANIAL BILAT STUDY: CPT | Performed by: INTERNAL MEDICINE

## 2020-02-18 ENCOUNTER — TELEPHONE (OUTPATIENT)
Dept: INTERNAL MEDICINE | Facility: CLINIC | Age: 70
End: 2020-02-18

## 2020-02-18 DIAGNOSIS — G44.83 COUGH HEADACHE: Primary | ICD-10-CM

## 2020-02-18 NOTE — TELEPHONE ENCOUNTER
Pt is calling to request a call back about her test results done on 02/13/20. Please call back at [phone]. Please advise   Mobile : [phone]

## 2020-02-18 NOTE — TELEPHONE ENCOUNTER
Please let her know that her carotid duplex is normal. If she is still having the intermittent headaches, we could refer her to a Neurologist for further evaluation.

## 2020-02-19 NOTE — TELEPHONE ENCOUNTER
Pt states that the vibration in her neck is still there, wants to know, should she see vascular, ENT, or neuro?  Wants to know what is going on with her neck when she is coughing that sometimes causes a ha.  Please advise.

## 2020-02-27 ENCOUNTER — TELEPHONE (OUTPATIENT)
Dept: CARDIOLOGY | Facility: CLINIC | Age: 70
End: 2020-02-27

## 2020-02-27 DIAGNOSIS — E03.9 HYPOTHYROIDISM, UNSPECIFIED TYPE: ICD-10-CM

## 2020-02-27 NOTE — TELEPHONE ENCOUNTER
Patient called to report that she was having hypertension at night and chest pressure. She also reports that she has GERD and like something is stuck in her throat. She also has neck issues, which she is seeing neurology. I told her she could come in for an EKG if she wanted and to increase her amlodipine.

## 2020-02-28 ENCOUNTER — OFFICE VISIT (OUTPATIENT)
Dept: INTERNAL MEDICINE | Facility: CLINIC | Age: 70
End: 2020-02-28

## 2020-02-28 VITALS
BODY MASS INDEX: 23.49 KG/M2 | RESPIRATION RATE: 18 BRPM | DIASTOLIC BLOOD PRESSURE: 74 MMHG | TEMPERATURE: 98.9 F | HEART RATE: 78 BPM | OXYGEN SATURATION: 96 % | WEIGHT: 155 LBS | HEIGHT: 68 IN | SYSTOLIC BLOOD PRESSURE: 126 MMHG

## 2020-02-28 DIAGNOSIS — M62.838 MUSCLE SPASMS OF NECK: ICD-10-CM

## 2020-02-28 DIAGNOSIS — R53.83 FATIGUE, UNSPECIFIED TYPE: Primary | ICD-10-CM

## 2020-02-28 PROCEDURE — 99213 OFFICE O/P EST LOW 20 MIN: CPT | Performed by: NURSE PRACTITIONER

## 2020-02-28 PROCEDURE — 87804 INFLUENZA ASSAY W/OPTIC: CPT | Performed by: NURSE PRACTITIONER

## 2020-02-28 RX ORDER — LEVOTHYROXINE SODIUM 0.05 MG/1
TABLET ORAL
Qty: 90 TABLET | Refills: 0 | Status: SHIPPED | OUTPATIENT
Start: 2020-02-28 | End: 2020-04-15 | Stop reason: SDUPTHER

## 2020-02-28 NOTE — PROGRESS NOTES
Annalise Winters is a 69 y.o. female who presents for fatigue, headache and hypertension.    Chief Complaint   Patient presents with   • Hypertension   • Headache   • Fatigue       Hypertension   This is a chronic problem. The current episode started more than 1 year ago. The problem has been waxing and waning since onset. The problem is controlled. Associated symptoms include headaches, malaise/fatigue and neck pain. Pertinent negatives include no blurred vision, chest pain or palpitations. Risk factors for coronary artery disease include post-menopausal state. Past treatments include beta blockers (norvasc). Current antihypertension treatment includes beta blockers (Norvasc). The current treatment provides moderate improvement. There are no compliance problems.  Identifiable causes of hypertension include a thyroid problem.   Headache    This is a recurrent problem. The current episode started 1 to 4 weeks ago. The problem occurs intermittently. The problem has been waxing and waning. The pain is located in the temporal region. The pain radiates to the right neck and left neck. The pain quality is similar to prior headaches. The quality of the pain is described as throbbing. The pain is at a severity of 10/10. The pain is severe (goes away in seconds). Associated symptoms include coughing and neck pain. Pertinent negatives include no abdominal pain, blurred vision, ear pain, eye pain, fever, nausea, tingling or visual change. The symptoms are aggravated by coughing. Treatments tried: rest. The treatment provided moderate relief. Her past medical history is significant for hypertension.   Fatigue   This is a new problem. The current episode started in the past 7 days. The problem occurs intermittently. The problem has been waxing and waning. Associated symptoms include coughing, fatigue, headaches and neck pain. Pertinent negatives include no abdominal pain, chest pain, chills, congestion, fever, nausea, rash or  visual change. The symptoms are aggravated by coughing. She has tried rest for the symptoms. The treatment provided mild relief.         Past Medical History:   Diagnosis Date   • Abnormal blood chemistry    • Arthritis     right knee   • Cyst of buttocks    • Diverticulosis    • Dizziness    • Dysuria    • Esophagitis    • Flushing    • GERD (gastroesophageal reflux disease)    • Heartburn    • Hypertension    • Hyperthyroidism    • Hypothyroidism    • Irritable bowel syndrome    • LLQ abdominal pain    • Polyp of sigmoid colon    • Sinusitis    • Skin lesion of face    • UTI (urinary tract infection)        Past Surgical History:   Procedure Laterality Date   • CARDIAC ABLATION  2013    Pulmonary vein ablation by Dr. Abdirahman Lackey, 2013.   • RHINOPLASTY     • SUBTOTAL HYSTERECTOMY     • TUBAL ABDOMINAL LIGATION         Family History   Problem Relation Age of Onset   • Dementia Mother    • Glomerulonephritis Mother    • Hypertension Mother    • Other Mother          at age 88   • Arthritis Father    • Cancer Father         prostate cancer   • Other Father          at age 65   • Heart attack Father    • Hypertension Other    • Osteoarthritis Other        Social History     Socioeconomic History   • Marital status:      Spouse name: Not on file   • Number of children: Not on file   • Years of education: Not on file   • Highest education level: Not on file   Tobacco Use   • Smoking status: Never Smoker   • Smokeless tobacco: Never Used   Substance and Sexual Activity   • Alcohol use: Yes     Comment: on occasion   • Drug use: No   • Sexual activity: Defer       Allergies   Allergen Reactions   • Ace Inhibitors Cough   • Celexa [Citalopram Hydrobromide] Dizziness   • Corticosteroids Rash   • Paxil [Paroxetine Hcl] Other (See Comments)     somnolence         ROS    Review of Systems   Constitutional: Positive for fatigue and malaise/fatigue. Negative for chills and fever.   HENT:  "Negative for congestion and ear pain.    Eyes: Negative for blurred vision, pain and visual disturbance.   Respiratory: Positive for cough.    Cardiovascular: Negative for chest pain, palpitations and leg swelling.   Gastrointestinal: Negative for abdominal pain and nausea.   Musculoskeletal: Positive for neck pain.   Skin: Negative for rash.   Allergic/Immunologic: Negative for immunocompromised state.   Neurological: Negative for tingling and headache.   Hematological: Negative for adenopathy.       Vitals:    02/28/20 1434 02/28/20 1513   BP: 126/74    Pulse: 78    Resp: 18    Temp:  98.9 °F (37.2 °C)   TempSrc:  Temporal   SpO2: 96%    Weight: 70.3 kg (155 lb)    Height: 172.7 cm (68\")    PainSc: 0-No pain          Current Outpatient Medications:   •  acetaminophen (TYLENOL) 500 MG tablet, Take 500 mg by mouth As Needed., Disp: , Rfl:   •  amLODIPine (NORVASC) 5 MG tablet, Take 0.5 tablets by mouth 2 (Two) Times a Day., Disp: 30 tablet, Rfl: 11  •  aspirin 81 MG chewable tablet, Chew 1 tablet Daily., Disp: , Rfl:   •  Calcium-Vitamin D-Vitamin K (CALCIUM SOFT CHEWS PO), Take 1 tablet by mouth Daily., Disp: , Rfl:   •  Cholecalciferol (VITAMIN D) 2000 UNITS capsule, Take 1 capsule by mouth daily., Disp: , Rfl:   •  Famotidine-Ca Carb-Mag Hydrox (PEPCID COMPLETE PO), Take 1 tablet by mouth As Needed., Disp: , Rfl:   •  flecainide (TAMBOCOR) 50 MG tablet, Take 1 tablet by mouth 2 (Two) Times a Day As Needed (PALPITATOINS)., Disp: 180 tablet, Rfl: 1  •  levothyroxine (SYNTHROID, LEVOTHROID) 50 MCG tablet, TAKE ONE TABLET BY MOUTH DAILY, Disp: 90 tablet, Rfl: 0  •  Loratadine (CLARITIN PO), Take 10 mg by mouth Daily., Disp: , Rfl:   •  LORazepam (ATIVAN) 0.5 MG tablet, Take 1 tablet by mouth Daily As Needed for Anxiety., Disp: 30 tablet, Rfl: 0  •  metoprolol tartrate (LOPRESSOR) 25 MG tablet, Take 1 tablet by mouth 2 (Two) Times a Day. (Patient taking differently: Take 12.5 mg by mouth Every Morning.), Disp: 180 " tablet, Rfl: 3  •  montelukast (SINGULAIR) 10 MG tablet, TAKE ONE TABLET BY MOUTH EVERY NIGHT AT BEDTIME, Disp: 90 tablet, Rfl: 0  •  ofloxacin (FLOXIN) 0.3 % otic solution, Administer 5 drops into ear(s) as directed by provider Daily., Disp: 5 mL, Rfl: 0  •  pantoprazole (PROTONIX) 40 MG EC tablet, Take 40 mg by mouth Every Other Day., Disp: , Rfl:   •  vitamin B-12 (CYANOCOBALAMIN) 500 MCG tablet, Take 500 mcg by mouth Daily., Disp: , Rfl:     PE    Physical Exam   Constitutional: She is oriented to person, place, and time. She appears well-developed and well-nourished. No distress.   HENT:   Head: Normocephalic and atraumatic.   Nose: Rhinorrhea present.   Mouth/Throat: No posterior oropharyngeal erythema.   Eyes: Pupils are equal, round, and reactive to light. Conjunctivae and EOM are normal.   Neck: Normal range of motion. Neck supple.   Cardiovascular: Normal rate, regular rhythm and normal heart sounds. Exam reveals no gallop and no friction rub.   No murmur heard.  Pulmonary/Chest: Effort normal and breath sounds normal.   Musculoskeletal: Normal range of motion.        Cervical back: She exhibits spasm.   Lymphadenopathy:     She has no cervical adenopathy.   Neurological: She is alert and oriented to person, place, and time.   Skin: Skin is warm. Capillary refill takes less than 2 seconds. No rash noted. She is not diaphoretic.   Psychiatric: She has a normal mood and affect. Her behavior is normal. Judgment and thought content normal.   Nursing note and vitals reviewed.       A/P    Problem List Items Addressed This Visit     None      Visit Diagnoses     Fatigue, unspecified type    -  Primary    Rest and hydrate, F/U as needed.    Relevant Orders    POC Influenza A / B (Completed)    Muscle spasms of neck        Ice and heat as needed for neck tightness          Assessment      ICD-10-CM ICD-9-CM   1. Fatigue, unspecified type R53.83 780.79   2. Muscle spasms of neck M62.838 728.85            Problems  Addressed this Visit     None      Visit Diagnoses     Fatigue, unspecified type    -  Primary    Rest and hydrate, F/U as needed.    Relevant Orders    POC Influenza A / B (Completed)    Muscle spasms of neck        Ice and heat as needed for neck tightness        Lab Results   Component Value Date    RAPFLUA Negative 02/28/2020    RAPFLUB Negative 02/28/2020       Plan    Orders Placed This Encounter   Procedures   • POC Influenza A / B     No orders of the defined types were placed in this encounter.                Plan of care reviewed with patient at the conclusion of today's visit. Education was provided regarding diagnosis, management and any prescribed or recommended OTC medications.  Patient verbalizes understanding of and agreement with management plan.    Return if symptoms worsen or fail to improve.     José Miguel Goel, APRN

## 2020-03-09 ENCOUNTER — OFFICE VISIT (OUTPATIENT)
Dept: ORTHOPEDIC SURGERY | Facility: CLINIC | Age: 70
End: 2020-03-09

## 2020-03-09 VITALS — BODY MASS INDEX: 23.49 KG/M2 | OXYGEN SATURATION: 99 % | HEART RATE: 69 BPM | WEIGHT: 154.98 LBS | HEIGHT: 68 IN

## 2020-03-09 DIAGNOSIS — M17.11 PRIMARY OSTEOARTHRITIS OF RIGHT KNEE: Primary | ICD-10-CM

## 2020-03-09 PROCEDURE — 99212 OFFICE O/P EST SF 10 MIN: CPT | Performed by: PHYSICIAN ASSISTANT

## 2020-03-09 NOTE — PROGRESS NOTES
"    [facility] Note        Subjective     CC: Follow-up (3 months follow up for Primary osteoarthritis of right knee )      HPI    Annalise Winters is a 69 y.o. female.  Patient well-known to this clinic for her right knee osteoarthritis.  At last appointment she was recommended for TKA however due to undergoing dental implants she is unable to proceed.  She would like to repeat her Visco supplementation series as she has received good response to these injections in the past.  Last series finished 9/30/2019.    Once again patient unable to undergo corticosteroid injections secondary to rapid heart rate/palpitations.    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    ROS:    Constiutional:Pt denies fever, chills, nausea, or vomiting.  MSK:as above        Objective      Past Medical History  Past Medical History:   Diagnosis Date   • Abnormal blood chemistry    • Arthritis     right knee   • Cyst of buttocks    • Diverticulosis    • Dizziness    • Dysuria    • Esophagitis    • Flushing    • GERD (gastroesophageal reflux disease)    • Heartburn    • Hypertension    • Hyperthyroidism    • Hypothyroidism    • Irritable bowel syndrome    • LLQ abdominal pain    • Polyp of sigmoid colon    • Sinusitis    • Skin lesion of face    • UTI (urinary tract infection)          Physical Exam  Pulse 69   Ht 172.7 cm (67.99\")   Wt 70.3 kg (154 lb 15.7 oz)   LMP  (LMP Unknown)   SpO2 99%   BMI 23.57 kg/m²     Body mass index is 23.57 kg/m².    Patient is well nourished and well developed.        Ortho Exam  Right knee  Tenderness: Pain noted lateral and posterior aspect of the knee.  Motion: 0-130 degrees      Imaging/Labs/EMG Reviewed:  No new imaging today.      Assessment:  1. Primary osteoarthritis of right knee        Plan:  1. Primary osteoarthritis right knee--obtain preauthorization for Visco supplementation series.  2. Patient unable to proceed " with TKA until minimal 4 months after dental implants.  She reports there is still working on her implants.  3. Follow-up when Visco series available to be started.  4. Questions and concerns answered.      Rianna Lay PA-C  03/09/20  4:07 PM

## 2020-03-10 ENCOUNTER — TELEPHONE (OUTPATIENT)
Dept: CARDIOLOGY | Facility: CLINIC | Age: 70
End: 2020-03-10

## 2020-03-10 RX ORDER — AMLODIPINE BESYLATE 5 MG/1
5 TABLET ORAL 2 TIMES DAILY
Qty: 60 TABLET | Refills: 5 | Status: SHIPPED | OUTPATIENT
Start: 2020-03-10 | End: 2020-06-01 | Stop reason: DRUGHIGH

## 2020-03-12 ENCOUNTER — OFFICE VISIT (OUTPATIENT)
Dept: NEUROLOGY | Facility: CLINIC | Age: 70
End: 2020-03-12

## 2020-03-12 VITALS — HEART RATE: 78 BPM | BODY MASS INDEX: 23.49 KG/M2 | WEIGHT: 154.98 LBS | OXYGEN SATURATION: 96 % | HEIGHT: 68 IN

## 2020-03-12 DIAGNOSIS — G44.83 PRIMARY COUGH HEADACHE: Primary | ICD-10-CM

## 2020-03-12 PROCEDURE — 99215 OFFICE O/P EST HI 40 MIN: CPT | Performed by: PHYSICIAN ASSISTANT

## 2020-03-12 NOTE — PROGRESS NOTES
"Subjective     Chief Complaint: headache      History of Present Illness   Annalise Winters is a 69 y.o. female who comes to clinic today for evaluation of headache. She initially noted symptoms since late 2019 marked by a headache described as a diffuse sensation of pressure primarily noted after coughing and sneezing. This typically lasts for several seconds before resolving. There is associated neck pain. She denies any additional associated neurologic symptoms.     Prior evaluation has included a head CT  in 2/19 which was unremarkable  (I personally reviewed).       I have reviewed and confirmed the past family, social and medical history as accurate on 3/12/2020.     Review of Systems   Constitutional: Negative.    Eyes: Negative.    Respiratory: Negative.    Cardiovascular: Negative.    Gastrointestinal: Negative.    Endocrine: Negative.    Genitourinary: Negative.    Musculoskeletal: Negative.    Skin: Negative.    Allergic/Immunologic: Negative.    Neurological: Positive for headaches.   Hematological: Negative.    Psychiatric/Behavioral: Negative.        Objective     Pulse 78   Ht 172.7 cm (67.99\")   Wt 70.3 kg (154 lb 15.7 oz)   LMP  (LMP Unknown)   SpO2 96%   BMI 23.57 kg/m²     General appearance today is normal.     Physical Exam   Neurological: She has normal strength. She has a normal Finger-Nose-Finger Test. Gait normal.   Reflex Scores:       Bicep reflexes are 2+ on the right side and 2+ on the left side.       Brachioradialis reflexes are 2+ on the right side and 2+ on the left side.       Patellar reflexes are 2+ on the right side and 2+ on the left side.  Psychiatric: Her speech is normal.        Neurologic Exam     Mental Status   Speech: speech is normal   Level of consciousness: alert  Normal comprehension.     Cranial Nerves   Cranial nerves II through XII intact.     Motor Exam   Muscle bulk: normal  Overall muscle tone: normal    Strength   Strength 5/5 throughout.     Sensory Exam "   Light touch normal.     Gait, Coordination, and Reflexes     Gait  Gait: normal    Coordination   Finger to nose coordination: normal    Tremor   Resting tremor: absent    Reflexes   Right brachioradialis: 2+  Left brachioradialis: 2+  Right biceps: 2+  Left biceps: 2+  Right patellar: 2+  Left patellar: 2+          Assessment/Plan   Annalise was seen today for headache.    Diagnoses and all orders for this visit:    Primary cough headache  -     CT Head Without Contrast  -     CT Angiogram Head With & Without Contrast  -     CT cervical spine wo contrast          Discussion/Summary   Annalise Winters comes to clinic today with a history most consistent with primary cough headache. This was discussed. It was elected to obtain a head CT and a CTA of the head. It was also elected to obtain a CT of the neck given her associated neck pain. We discussed potentially adding TPM, which was declined for now. She will then follow up in our clinic based on the results of her diagnostic testing.   I spent 45 minutes face to face with the patient with 25 minutes spent on discussing diagnosis, prognosis, diagnostic testing, evaluation, current status, treatment options and management as discussed above.       As part of this visit I reviewed radiology results and reviewed radiology images.      Molly Ramos PA-C

## 2020-03-19 ENCOUNTER — HOSPITAL ENCOUNTER (OUTPATIENT)
Dept: CT IMAGING | Facility: HOSPITAL | Age: 70
End: 2020-03-19

## 2020-04-07 ENCOUNTER — TELEPHONE (OUTPATIENT)
Dept: INTERNAL MEDICINE | Facility: CLINIC | Age: 70
End: 2020-04-07

## 2020-04-07 RX ORDER — AZELASTINE 1 MG/ML
2 SPRAY, METERED NASAL 2 TIMES DAILY
Qty: 30 ML | Refills: 1 | Status: SHIPPED | OUTPATIENT
Start: 2020-04-07 | End: 2020-07-07

## 2020-04-08 ENCOUNTER — TELEPHONE (OUTPATIENT)
Dept: NEUROLOGY | Facility: CLINIC | Age: 70
End: 2020-04-08

## 2020-04-08 NOTE — TELEPHONE ENCOUNTER
PT WAS LAST SEEN BY 3/12/20 BY RONNI ORELLANA PA-C. SHE STATES SHE HAS BEEN HAVING RINGING IN HER EARS FOR A FEW DAYS NOW AND IT HASN'T GONE AWAY. SHE STATES SHE'S NOT HAVING ANY OTHER PAIN BUT EVERY ONCE IN A WHILE, SHE WILL NOTICE A CHANGE IN HER VOICE AND THAT IT SOUNDS DIFFERENT. SHE CALLED HER PCP AND SHE PRESCRIBED NOSE SPRAY AND SHE HAS APPLIED THAT BUT SHE HASN'T SPOKEN WITH THEIR OFFICE SINCE THEN. SHE SAID TODAY, 4/8/20, ABOUT AN HOUR AND A HALF TO TWO HOURS AGO SHE GOT A REALLY BAD DIZZY SPELL AND IT WORRIES HER IF THIS COULD BE IN REGARDS TO HER NEUROLOGY ISSUE WITH HER COUGHING AND HEADACHE. SHE SAID SHE TOOK AN ATIVAN TO CALM HER DOWN BUT SHE WANTED TO KNOW IF THERE IS SOMETHING SHE SHOULD BE WORRIED ABOUT    BEST CALL BACK - [phone]

## 2020-04-09 NOTE — TELEPHONE ENCOUNTER
I doubt these symptoms are related given her history. How would she describe the dizziness? As lightheadedness or a spinning sensation? I would recommend increasing her fluid intake to at least 65oz of water a day. Also, I had ordered imaging at her last appt? Do you know when this is scheduled? Thanks!

## 2020-04-09 NOTE — TELEPHONE ENCOUNTER
Called and spoke to Annalise regarding Molly's response and she stated understanding and that she has been drinking a lot of water everyday as  also recommended, but that she did start a new nasal spray and at the same time had missed some doses of her other allergy meds, plus has been doing a lot of up and down movements with her body during morning excersize videos, picking up papers off the floor and up on a ladder outside at some point so any of these things could be the culprit! Regardless I told her how impressed with how productive she is in one single day. She mentioned she had really reached out to us to make sure it wasn't related to her Head pain issues, but sounds like PORSHA does not think they are related so that gives her some relief as well!       Molly,  She was originally scheduled to have the CT's you ordered performed on March 19th very shortly after her visit with us (she was impressed with how quickly it was approved/scheduled), however due to the COVID 19 epidemic her imaging has been put off until the 23 rd of this month. Hopefully she can have these scans done then, but she is not sure if they will postpone it another month at this point or not.

## 2020-04-13 ENCOUNTER — TELEPHONE (OUTPATIENT)
Dept: NEUROLOGY | Facility: CLINIC | Age: 70
End: 2020-04-13

## 2020-04-13 NOTE — TELEPHONE ENCOUNTER
is wanting advisory if she should schedule the appointment to get the lab work done that was ordered or if she  should wait until after COVID-19.     Callback number: [phone]

## 2020-04-13 NOTE — TELEPHONE ENCOUNTER
Spoke with Annalise who states this was not regarding lab work but instead her Imaging or CT scans ordered by ANDREW Silva. The [facility] is closing down so her scans scheduled for the 23rd will either need to be rescheduled until June or scheduled @ the [facility].     ANDREW Silva is comfortable with whichever decision she makes so Annalise will put them off until June which hopefully the VOCID 19 scare is over by then!

## 2020-04-14 ENCOUNTER — TELEPHONE (OUTPATIENT)
Dept: INTERNAL MEDICINE | Facility: CLINIC | Age: 70
End: 2020-04-14

## 2020-04-14 NOTE — TELEPHONE ENCOUNTER
PT STATES THE HUMMING  IN HER EARS HAS STARTED AGAIN TODAY.  SHE IS BACK ON BOTH ALLERGY MEDICINE AND NASAL SPRAY AND NOTHING IS WORKING.  PLEASE ADVISE WHAT THE PATIENT CAN DO FOR THIS.    PT CALL BACK [phone]   [facility]

## 2020-04-15 ENCOUNTER — OFFICE VISIT (OUTPATIENT)
Dept: INTERNAL MEDICINE | Facility: CLINIC | Age: 70
End: 2020-04-15

## 2020-04-15 DIAGNOSIS — J30.89 ALLERGIC RHINITIS DUE TO OTHER ALLERGIC TRIGGER, UNSPECIFIED SEASONALITY: ICD-10-CM

## 2020-04-15 DIAGNOSIS — F41.9 ANXIETY: ICD-10-CM

## 2020-04-15 DIAGNOSIS — E03.9 HYPOTHYROIDISM, UNSPECIFIED TYPE: ICD-10-CM

## 2020-04-15 DIAGNOSIS — H93.13 TINNITUS OF BOTH EARS: Primary | ICD-10-CM

## 2020-04-15 PROCEDURE — 99213 OFFICE O/P EST LOW 20 MIN: CPT | Performed by: INTERNAL MEDICINE

## 2020-04-15 RX ORDER — LORAZEPAM 0.5 MG/1
0.5 TABLET ORAL DAILY PRN
Qty: 30 TABLET | Refills: 0 | Status: SHIPPED | OUTPATIENT
Start: 2020-04-15 | End: 2020-07-10 | Stop reason: SDUPTHER

## 2020-04-15 RX ORDER — MONTELUKAST SODIUM 10 MG/1
10 TABLET ORAL
Qty: 90 TABLET | Refills: 1 | Status: SHIPPED | OUTPATIENT
Start: 2020-04-15 | End: 2020-12-12 | Stop reason: SDUPTHER

## 2020-04-15 RX ORDER — OFLOXACIN 3 MG/ML
5 SOLUTION AURICULAR (OTIC) DAILY
Qty: 5 ML | Refills: 0 | Status: SHIPPED | OUTPATIENT
Start: 2020-04-15 | End: 2020-07-07

## 2020-04-15 RX ORDER — LEVOCETIRIZINE DIHYDROCHLORIDE 5 MG/1
5 TABLET, FILM COATED ORAL EVERY EVENING
Qty: 30 TABLET | Refills: 2 | Status: SHIPPED | OUTPATIENT
Start: 2020-04-15 | End: 2020-07-07

## 2020-04-15 RX ORDER — ASCORBIC ACID 500 MG
500 TABLET ORAL DAILY
COMMUNITY

## 2020-04-15 RX ORDER — LEVOTHYROXINE SODIUM 0.05 MG/1
50 TABLET ORAL DAILY
Qty: 90 TABLET | Refills: 1 | Status: SHIPPED | OUTPATIENT
Start: 2020-04-15 | End: 2020-12-08

## 2020-04-15 NOTE — PROGRESS NOTES
Humming and ringing in ears    Subjective   Annalise Winters is a 69 y.o. female is here today for follow-up.    History of Present Illness   Notes some humming when she wakes up in the morning. Tried a heating pad, in the am, and feels better.  Cannot take steroids or decongestants as they cause palps.  Blood pressure is okay, occ in 130s AM, but it is back down to under 120 after taking her medications.  Would like a refill on her Ativan and thyroid medication.      Current Outpatient Medications:   •  acetaminophen (TYLENOL) 500 MG tablet, Take 500 mg by mouth As Needed., Disp: , Rfl:   •  amLODIPine (NORVASC) 5 MG tablet, Take 1 tablet by mouth 2 (Two) Times a Day., Disp: 60 tablet, Rfl: 5  •  aspirin 81 MG chewable tablet, Chew 1 tablet Daily., Disp: , Rfl:   •  azelastine (ASTELIN) 0.1 % nasal spray, 2 sprays into the nostril(s) as directed by provider 2 (Two) Times a Day. Use in each nostril as directed, Disp: 30 mL, Rfl: 1  •  Calcium-Vitamin D-Vitamin K (CALCIUM SOFT CHEWS PO), Take 1 tablet by mouth Daily., Disp: , Rfl:   •  Cholecalciferol (VITAMIN D) 2000 UNITS capsule, Take 1 capsule by mouth daily., Disp: , Rfl:   •  Famotidine-Ca Carb-Mag Hydrox (PEPCID COMPLETE PO), Take 1 tablet by mouth As Needed., Disp: , Rfl:   •  flecainide (TAMBOCOR) 50 MG tablet, Take 1 tablet by mouth 2 (Two) Times a Day As Needed (PALPITATOINS)., Disp: 180 tablet, Rfl: 1  •  levothyroxine (SYNTHROID, LEVOTHROID) 50 MCG tablet, Take 1 tablet by mouth Daily., Disp: 90 tablet, Rfl: 1  •  LORazepam (ATIVAN) 0.5 MG tablet, Take 1 tablet by mouth Daily As Needed for Anxiety., Disp: 30 tablet, Rfl: 0  •  metoprolol tartrate (LOPRESSOR) 25 MG tablet, Take 1 tablet by mouth 2 (Two) Times a Day. (Patient taking differently: Take 12.5 mg by mouth Every Morning.), Disp: 180 tablet, Rfl: 3  •  montelukast (SINGULAIR) 10 MG tablet, Take 1 tablet by mouth every night at bedtime., Disp: 90 tablet, Rfl: 1  •  pantoprazole (PROTONIX) 40 MG EC  tablet, Take 40 mg by mouth Every Other Day., Disp: , Rfl:   •  vitamin C (ASCORBIC ACID) 500 MG tablet, Take 500 mg by mouth Daily., Disp: , Rfl:   •  levocetirizine (Xyzal) 5 MG tablet, Take 1 tablet by mouth Every Evening., Disp: 30 tablet, Rfl: 2  •  ofloxacin (FLOXIN) 0.3 % otic solution, Administer 5 drops into both ears Daily., Disp: 5 mL, Rfl: 0      The following portions of the patient's history were reviewed and updated as appropriate: allergies, current medications, past family history, past medical history, past social history, past surgical history and problem list.    Review of Systems   Constitutional: Negative for chills, fatigue and fever.   HENT: Positive for tinnitus. Negative for ear discharge and ear pain.    Cardiovascular: Positive for palpitations.   Neurological: Positive for light-headedness.   Psychiatric/Behavioral: Negative for agitation. The patient is nervous/anxious.        Objective   LMP  (LMP Unknown)   Physical Exam   Constitutional: She is oriented to person, place, and time.   Pulmonary/Chest: Effort normal. No stridor. No respiratory distress.   Neurological: She is alert and oriented to person, place, and time.   Psychiatric: She has a normal mood and affect. Judgment normal.         Results for orders placed or performed in visit on 02/28/20   POC Influenza A / B   Result Value Ref Range    Rapid Influenza A Ag Negative Negative    Rapid Influenza B Ag Negative Negative    Internal Control Passed Passed    Lot Number [id number]     Expiration Date 9/30/2021              Assessment/Plan   Diagnoses and all orders for this visit:    Tinnitus of both ears  -     levocetirizine (Xyzal) 5 MG tablet; Take 1 tablet by mouth Every Evening.  -     ofloxacin (FLOXIN) 0.3 % otic solution; Administer 5 drops into both ears Daily.    Hypothyroidism, unspecified type  -     levothyroxine (SYNTHROID, LEVOTHROID) 50 MCG tablet; Take 1 tablet by mouth Daily.    Anxiety  -     LORazepam  (ATIVAN) 0.5 MG tablet; Take 1 tablet by mouth Daily As Needed for Anxiety.    Allergic rhinitis due to other allergic trigger, unspecified seasonality  -     montelukast (SINGULAIR) 10 MG tablet; Take 1 tablet by mouth every night at bedtime.  -     levocetirizine (Xyzal) 5 MG tablet; Take 1 tablet by mouth Every Evening.    Other orders  -     vitamin C (ASCORBIC ACID) 500 MG tablet; Take 500 mg by mouth Daily.           This patient has consented to a telehealth visit via audio as video could not be done due to access issues. The visit was scheduled to comply with patient safety concerns in accordance with [facility] recommendations.  I spent 18 minutes in total including but not limited to the 11 minutes spent in direct conversation with this patient.     The patient has read and signed the [facility] Controlled Substance Contract.  I will continue to see patient for regular follow up appointments.  They are well controlled on their medication.  JASMYN has been reviewed by me and is updated every 3 months. The patient is aware of the potential for addiction and dependence.    EMR Dragon/Transcription disclaimer:  Parts of this encounter note is an electronic transcription/translation of spoken language to printed text. Electronic translation of spoken language may permit erroneous, or at times, nonsensical words or phrases, to be inadvertently transcribed. Although I have reviewed the note for such errors, some may still exist.      Return if symptoms worsen or fail to improve.

## 2020-04-16 ENCOUNTER — TELEPHONE (OUTPATIENT)
Dept: CARDIOLOGY | Facility: CLINIC | Age: 70
End: 2020-04-16

## 2020-04-16 ENCOUNTER — TELEPHONE (OUTPATIENT)
Dept: INTERNAL MEDICINE | Facility: CLINIC | Age: 70
End: 2020-04-16

## 2020-04-16 DIAGNOSIS — I48.0 PAROXYSMAL ATRIAL FIBRILLATION (HCC): ICD-10-CM

## 2020-04-16 NOTE — TELEPHONE ENCOUNTER
PT calls with complaints of a dizzy spell and a one time episode of her BP increasing yesterday- it got up to 160/90, and she took an extra 5 mg amlodipine and an ativan and rested and her BP came on down- she feels better today- she is currently being treated for an inner ear infection by PCP-     Instructed to finish meds prescribed by pcp for inner ear issue- she will keep an eye on her BP and let us know if it consistently is running greater than 130-140 systolic- she will start metoprolol tartrate  BID instead of daily-

## 2020-04-17 RX ORDER — FLECAINIDE ACETATE 50 MG/1
TABLET ORAL
Qty: 180 TABLET | Refills: 0 | Status: SHIPPED | OUTPATIENT
Start: 2020-04-17 | End: 2020-07-20

## 2020-04-21 ENCOUNTER — TELEPHONE (OUTPATIENT)
Dept: INTERNAL MEDICINE | Facility: CLINIC | Age: 70
End: 2020-04-21

## 2020-04-21 NOTE — TELEPHONE ENCOUNTER
Patient requested a call back. Patient had a telephone visit with Dr. Jauregui on 4/15/20 and was prescribed ofloxacin (FLOXIN) 0.3 % otic solution. Patient stated her ears are still not feeling better and would like to know if she can be referred to ENT.    Please call and advise. Patient call back [phone] or [phone]

## 2020-04-23 ENCOUNTER — TELEPHONE (OUTPATIENT)
Dept: CARDIOLOGY | Facility: CLINIC | Age: 70
End: 2020-04-23

## 2020-04-23 ENCOUNTER — HOSPITAL ENCOUNTER (OUTPATIENT)
Dept: CT IMAGING | Facility: HOSPITAL | Age: 70
End: 2020-04-23

## 2020-04-23 ENCOUNTER — TELEPHONE (OUTPATIENT)
Dept: NEUROLOGY | Facility: CLINIC | Age: 70
End: 2020-04-23

## 2020-04-23 RX ORDER — NEOMYCIN SULFATE, POLYMYXIN B SULFATE, HYDROCORTISONE 3.5; 10000; 1 MG/ML; [USP'U]/ML; MG/ML
3 SOLUTION/ DROPS AURICULAR (OTIC) 3 TIMES DAILY
Qty: 10 ML | Refills: 0 | Status: SHIPPED | OUTPATIENT
Start: 2020-04-23 | End: 2020-07-07

## 2020-04-23 NOTE — TELEPHONE ENCOUNTER
Called pt and gave HTR recommendations above.     Pt verbalizes understanding and agreeable to plan.

## 2020-04-23 NOTE — TELEPHONE ENCOUNTER
Unfortunately she cannot take decongestants or corticosteroids, which would be the ideal treatment for it.  Can she try topical cortisone ear drops? If yes, please send in the pended rx..

## 2020-04-23 NOTE — TELEPHONE ENCOUNTER
Spoke with Annalise who stated understanding and her ENT appt is this coming Tuesday. We will request the notes.

## 2020-04-23 NOTE — TELEPHONE ENCOUNTER
NILSON  [phone]    WORSENING RINGING AND HUMMING NOISE IN HER EARS  DAY AND NIGHT  HER PCP GAVE YOU EAR DROPS AND THEY DID NOT HELP AT ALL SHE HAS BEEN USING FOR 10 DAYS    SHE IS SEEING ENT NEXT WEEK    HER QUESTION IS, IS IT SOMETHING YULISSA RELATED TO WHY SHE IS SEEING RONNI THAT MIGHT BE CAUSING THIS

## 2020-04-23 NOTE — TELEPHONE ENCOUNTER
"Pt called and stated that she has been prescribed ear drops (neomycin-polymyxin-hydrocortisone 1%) for ongoing ear issue and will not take them until it is \"ok\"ed with Dr. Ruth.       Please advise.   "

## 2020-04-23 NOTE — TELEPHONE ENCOUNTER
I don't believe it is related to her headaches. I would recommend keeping the appt with ENT. So sorry. Thanks

## 2020-04-23 NOTE — TELEPHONE ENCOUNTER
Patient called back this morning. She stated she's not doing any better. She still has the humming in her ears and its worse at night. She is still using her ear drops but states they are not working.     I let her know her referral was placed and gave her the number for central scheduling she just didn't want to go in to the weekend still feeling bad.     You can reach the patient at [phone]

## 2020-05-14 ENCOUNTER — HOSPITAL ENCOUNTER (OUTPATIENT)
Dept: CT IMAGING | Facility: HOSPITAL | Age: 70
Discharge: HOME | End: 2020-05-14
Admitting: PHYSICIAN ASSISTANT

## 2020-05-14 LAB — CREAT BLDA-MCNC: 0.8 MG/DL (ref 0.6–1.3)

## 2020-05-14 PROCEDURE — 82565 ASSAY OF CREATININE: CPT

## 2020-05-14 PROCEDURE — 70450 CT HEAD/BRAIN W/O DYE: CPT

## 2020-05-14 PROCEDURE — 70496 CT ANGIOGRAPHY HEAD: CPT

## 2020-05-14 PROCEDURE — 72125 CT NECK SPINE W/O DYE: CPT

## 2020-05-14 PROCEDURE — 0 IOPAMIDOL PER 1 ML: Performed by: PHYSICIAN ASSISTANT

## 2020-05-14 RX ADMIN — IOPAMIDOL 75 ML: 755 INJECTION, SOLUTION INTRAVENOUS at 13:55

## 2020-05-20 ENCOUNTER — TELEPHONE (OUTPATIENT)
Dept: NEUROLOGY | Facility: CLINIC | Age: 70
End: 2020-05-20

## 2020-06-01 ENCOUNTER — CLINICAL SUPPORT (OUTPATIENT)
Dept: ORTHOPEDIC SURGERY | Facility: CLINIC | Age: 70
End: 2020-06-01

## 2020-06-01 DIAGNOSIS — M17.11 PRIMARY OSTEOARTHRITIS OF RIGHT KNEE: ICD-10-CM

## 2020-06-01 PROCEDURE — 20610 DRAIN/INJ JOINT/BURSA W/O US: CPT | Performed by: PHYSICIAN ASSISTANT

## 2020-06-01 RX ORDER — AMLODIPINE BESYLATE 2.5 MG/1
2.5 TABLET ORAL DAILY
COMMUNITY
End: 2020-06-12

## 2020-06-01 NOTE — PROGRESS NOTES
CC: Follow-up right knee osteoarthritis, 1/3 Orthovisc injection today    History of present illness: Patient presents for her first Orthovisc injection to the right knee today.  At this time she denies any numbness or tingling into the distal extremity.  No fever, chills, night sweats or other constitutional symptoms.    See chart for PMH, PSH, Meds, All - reviewed.    Ortho exam:  Right knee  Skin is intact without redness, warmth or swelling/effusion.  No lesions or evidence of infection noted.  Tenderness: Moderate lateral joint line tenderness.  Motor/sensory: Grossly intact L2-S1.    Assessment/plan:  Right knee osteoarthritis    Proceed today with 1/3 of Orthovisc injection.  Patient will follow-up in week for second injection.      After discussing risks of injection the patient gave consent to proceed.  Her right knee confirmed as the correct joint to be injected with a timeout.  The knee was then prepped with Hibiclens and injected with a prefilled syringe of Orthovisc without any resistance using anterior lateral approach, patient in seated position.  The patient tolerated procedure well.  Hemostasis was achieved and a Band-Aid was applied over the injection site.  I instructed the patient on signs and symptoms of infection.  They should report to the ER if any of these develop.  Recommended modifying activity to include rest, ice, elevation and/or heat along with oral pain medication as needed.  Patient observed ambulating normally after the injection.

## 2020-06-01 NOTE — PROGRESS NOTES
Procedure   Large Joint Arthrocentesis: R knee  Date/Time: 6/1/2020 3:04 PM  Consent given by: patient  Site marked: site marked  Timeout: Immediately prior to procedure a time out was called to verify the correct patient, procedure, equipment, support staff and site/side marked as required   Supporting Documentation  Indications: pain   Procedure Details  Location: knee - R knee  Preparation: Patient was prepped and draped in the usual sterile fashion  Needle size: 22 G  Approach: anterolateral  Medications administered: 30 mg Hyaluronan 30 MG/2ML  Patient tolerance: patient tolerated the procedure well with no immediate complications

## 2020-06-03 ENCOUNTER — TELEPHONE (OUTPATIENT)
Dept: CARDIOLOGY | Facility: CLINIC | Age: 70
End: 2020-06-03

## 2020-06-03 NOTE — TELEPHONE ENCOUNTER
"Patient called reporting she felt, \"drained\". BP 94/59, unsure of HR. She reports she is taking metoprolol 12.5 mg BID and amlodipine 2.5 mg BID. Advised the patient that she may decrease her amlodipine to daily. Also told her we could move her appt up to this Friday. Told her to call back and let us know what she would like to do.   "

## 2020-06-08 ENCOUNTER — CLINICAL SUPPORT (OUTPATIENT)
Dept: ORTHOPEDIC SURGERY | Facility: CLINIC | Age: 70
End: 2020-06-08

## 2020-06-08 DIAGNOSIS — M17.11 PRIMARY OSTEOARTHRITIS OF RIGHT KNEE: Primary | ICD-10-CM

## 2020-06-08 PROCEDURE — 20610 DRAIN/INJ JOINT/BURSA W/O US: CPT | Performed by: PHYSICIAN ASSISTANT

## 2020-06-08 NOTE — PROGRESS NOTES
CC: Follow-up right knee osteoarthritis, 2/3 Orthovisc injection today     History of present illness: Patient presents for her second Orthovisc injection to the right knee today.  At this time she denies any numbness or tingling into the distal extremity.  No fever, chills, night sweats or other constitutional symptoms.     See chart for PMH, PSH, Meds, All - reviewed.     Ortho exam:  Right knee  Skin is intact without redness, warmth or swelling/effusion.  No lesions or evidence of infection noted.  Motor/sensory: Grossly intact L2-S1.     Assessment/plan:  Right knee osteoarthritis     Proceed today with 2/3 of Orthovisc injection.  Patient will follow-up in week for third injection.       After discussing risks of injection the patient gave consent to proceed.  Her right knee confirmed as the correct joint to be injected with a timeout.  The knee was then prepped with Hibiclens and injected with a prefilled syringe of Orthovisc without any resistance using anterior lateral approach, patient in seated position.  The patient tolerated procedure well.  Hemostasis was achieved and a Band-Aid was applied over the injection site.  I instructed the patient on signs and symptoms of infection.  They should report to the ER if any of these develop.  Recommended modifying activity to include rest, ice, elevation and/or heat along with oral pain medication as needed.  Patient observed ambulating normally after the injection.

## 2020-06-08 NOTE — PROGRESS NOTES
Procedure   Large Joint Arthrocentesis: R knee  Date/Time: 6/8/2020 3:01 PM  Consent given by: patient  Site marked: site marked  Timeout: Immediately prior to procedure a time out was called to verify the correct patient, procedure, equipment, support staff and site/side marked as required   Supporting Documentation  Indications: pain   Procedure Details  Location: knee - R knee  Preparation: Patient was prepped and draped in the usual sterile fashion  Needle size: 22 G  Approach: anterolateral  Medications administered: 30 mg Hyaluronan 30 MG/2ML  Patient tolerance: patient tolerated the procedure well with no immediate complications

## 2020-06-10 NOTE — PROGRESS NOTES
[facility] at [facility]  Office Visit Note    Encounter Date:06/12/2020    Patient ID: Annalise Winters is a 69 y.o. female who resides in [place], [place].  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    CC/Reason for visit:    • Atrial fibrillation  • Hypertension         Problem List Items Addressed This Visit        Cardiovascular and Mediastinum    Essential hypertension - Primary    Current Assessment & Plan     · Hypertension is not controlled  · Increase amlodipine 5 mg daily  · Discontinue metoprolol  · Keep blood pressure log for next 3 weeks and return in 3 weeks for reassessment with home blood pressure cuff           Relevant Medications    amLODIPine (NORVASC) 5 MG tablet    Paroxysmal atrial fibrillation (CMS/HCC)    Overview     · Initially diagnosed in 2012.  · Myocardial perfusion study (04/02/2013):  No ischemia.  · Event monitor (10/2013): demonstrating paroxysmal atrial fibrillation.  · Failure of flecainide, sotalol and Multaq therapy.  · Pulmonary vein ablation by Dr. Abdirahman Lackey, 08/21/2013.  · Chads Vasc=3 (age, female, HTN)         Current Assessment & Plan     · Discontinue metoprolol  · Continue flecainide 50 mg twice daily  · Continue aspirin 81 mg daily  · No signs or symptoms of TIA or CVA         Relevant Medications    amLODIPine (NORVASC) 5 MG tablet          The patient denies signs or symptoms of angina or heart failure.  She also denies signs or symptoms to suggest TIA or CVA.  She is maintaining normal sinus rhythm on flecainide twice daily.  Her blood pressures not well controlled.  She is bradycardic today and has complaints of fatigue therefore I will discontinue her metoprolol.  She should increase her amlodipine to 5 mg daily.  I would like for her to keep a blood pressure log for the next 3 weeks.  She will return in 3 weeks for reassessment of her blood pressure and to review her blood pressure measurements.   She should also bring her home blood pressure cuff with her to compare readings with ours.     · Discontinue metoprolol  · Increase amlodipine 5 mg daily  · Defer anticoagulation and continue daily aspirin   · Continue flecainide twice daily  · Keep blood pressure log  · Return in 3 weeks for reassessment of blood pressure and to review blood pressure measurements and check her home blood pressure cuff with our readings.  Return in about 3 weeks (around 7/3/2020), or if symptoms worsen or fail to improve.              Annalise Winters returns today for follow-up for paroxysmal atrial fibrillation and hypertension.  At her last visit she was placed on metoprolol twice daily for complaints of palpitations.  She has a history of an ablation with Dr. Lackey in 2013 for atrial fibrillation and has not had a reoccurrence that she knows of.  She has not been on anticoagulation but takes an aspirin daily.  Since her last visit she contacted our office reporting hypertension at night and chest pressure and her amlodipine was increased to 5 mg daily.  She then contacted our office back reporting blood pressures were lower and that she felt very fatigued so her metoprolol was decreased to 12.5 mg twice daily.  She tells me her blood pressures were just 2 weeks ago running in the 90's systolic so she decreased her amlodipine to 2.5 mg daily.  She has taken her metoprolol and flecainide this morning and she is hypertensive at 166/84.  She had her 2.5 mg amlodipine last evening.  At her last visit she was only using flecainide as needed but has started taking it twice daily as instructed by our office and she has had no further palpitations. EKG today shows sinus bradycardia with acceptable QT interval.  She denies chest pain or increased dyspnea.  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  She denies any chest pain/pressure or increased  shortness of breath.    Review of Systems   Constitution: Positive for malaise/fatigue.   Eyes: Negative for vision loss in left eye and vision loss in right eye.   Cardiovascular: Negative for chest pain, dyspnea on exertion, near-syncope, orthopnea, palpitations, paroxysmal nocturnal dyspnea and syncope.   Musculoskeletal: Negative for myalgias.   Neurological: Negative for brief paralysis, excessive daytime sleepiness, focal weakness, numbness, paresthesias and weakness.   All other systems reviewed and are negative.      The patient's past medical, social, family history and ROS reviewed in the patient's electronic medical record.    Allergies   Allergen Reactions   • Ace Inhibitors Cough   • Celexa [Citalopram Hydrobromide] Dizziness   • Corticosteroids Rash   • Paxil [Paroxetine Hcl] Other (See Comments)     somnolence           Current Outpatient Medications:   •  acetaminophen (TYLENOL) 500 MG tablet, Take 500 mg by mouth As Needed., Disp: , Rfl:   •  aspirin 81 MG chewable tablet, Chew 1 tablet Daily., Disp: , Rfl:   •  azelastine (ASTELIN) 0.1 % nasal spray, 2 sprays into the nostril(s) as directed by provider 2 (Two) Times a Day. Use in each nostril as directed, Disp: 30 mL, Rfl: 1  •  Calcium-Vitamin D-Vitamin K (CALCIUM SOFT CHEWS PO), Take 1 tablet by mouth Daily., Disp: , Rfl:   •  Cholecalciferol (VITAMIN D) 2000 UNITS capsule, Take 1 capsule by mouth daily., Disp: , Rfl:   •  Famotidine-Ca Carb-Mag Hydrox (PEPCID COMPLETE PO), Take 1 tablet by mouth As Needed., Disp: , Rfl:   •  flecainide (TAMBOCOR) 50 MG tablet, TAKE ONE TABLET BY MOUTH TWICE A DAY AS NEEDED FOR PALPITATIONS, Disp: 180 tablet, Rfl: 0  •  levocetirizine (Xyzal) 5 MG tablet, Take 1 tablet by mouth Every Evening., Disp: 30 tablet, Rfl: 2  •  levothyroxine (SYNTHROID, LEVOTHROID) 50 MCG tablet, Take 1 tablet by mouth Daily., Disp: 90 tablet, Rfl: 1  •  LORazepam (ATIVAN) 0.5 MG tablet, Take 1 tablet by mouth Daily As Needed for  Anxiety., Disp: 30 tablet, Rfl: 0  •  montelukast (SINGULAIR) 10 MG tablet, Take 1 tablet by mouth every night at bedtime., Disp: 90 tablet, Rfl: 1  •  neomycin-polymyxin-hydrocortisone (CORTISPORIN) 1 % solution otic solution, Administer 3 drops into both ears 3 (Three) Times a Day., Disp: 10 mL, Rfl: 0  •  ofloxacin (FLOXIN) 0.3 % otic solution, Administer 5 drops into both ears Daily., Disp: 5 mL, Rfl: 0  •  pantoprazole (PROTONIX) 40 MG EC tablet, Take 40 mg by mouth Every Other Day., Disp: , Rfl:   •  vitamin C (ASCORBIC ACID) 500 MG tablet, Take 500 mg by mouth Daily., Disp: , Rfl:   •  amLODIPine (NORVASC) 5 MG tablet, Take 1 tablet by mouth Daily., Disp: 30 tablet, Rfl: 11    Past Medical History:   Diagnosis Date   • Abnormal blood chemistry    • Arthritis     right knee   • Cyst of buttocks    • Diverticulosis    • Dizziness    • Dysuria    • Esophagitis    • Flushing    • GERD (gastroesophageal reflux disease)    • Heartburn    • Hypertension    • Hyperthyroidism    • Hypothyroidism    • Irritable bowel syndrome    • LLQ abdominal pain    • Polyp of sigmoid colon    • Sinusitis    • Skin lesion of face    • UTI (urinary tract infection)        Past Surgical History:   Procedure Laterality Date   • CARDIAC ABLATION  2013    Pulmonary vein ablation by Dr. Abdirahman Lackey, 2013.   • RHINOPLASTY     • SUBTOTAL HYSTERECTOMY     • TUBAL ABDOMINAL LIGATION         Family History   Problem Relation Age of Onset   • Dementia Mother    • Glomerulonephritis Mother    • Hypertension Mother    • Other Mother          at age 88   • Arthritis Father    • Cancer Father         prostate cancer   • Other Father          at age 65   • Heart attack Father    • Hypertension Other    • Osteoarthritis Other        Social History     Tobacco Use   • Smoking status: Never Smoker   • Smokeless tobacco: Never Used   Substance Use Topics   • Alcohol use: Yes     Comment: on occasion           Blood  "pressure 166/84, pulse 52, height 172.7 cm (68\"), weight 72.6 kg (160 lb), not currently breastfeeding.  Body mass index is 24.33 kg/m².  There were no vitals filed for this visit.    Physical Exam   Constitutional: She is oriented to person, place, and time. She appears well-developed and well-nourished.   HENT:   Head: Normocephalic and atraumatic.   Eyes: Conjunctivae are normal. No scleral icterus.   Neck: Normal range of motion. No JVD present. Carotid bruit is not present. No thyromegaly present.   Cardiovascular: Normal rate and regular rhythm. Exam reveals no gallop.   No murmur heard.  Pulmonary/Chest: Effort normal and breath sounds normal.   Abdominal: Soft. She exhibits no distension and no mass. There is no hepatosplenomegaly.   Musculoskeletal: She exhibits no edema.   Neurological: She is alert and oriented to person, place, and time.   Skin: Skin is warm and dry. No rash noted.   Psychiatric: She has a normal mood and affect. Her behavior is normal.       Data Review (reviewed with patient):       ECG 12 Lead  Date/Time: 6/12/2020 12:42 PM  Performed by: Britta Tobin APRN  Authorized by: Britta Tobin APRN   Comparison: compared with previous ECG from 10/12/2019  Comparison to previous ECG: Sinus bradycardia has replaced sinus rhythm  Rhythm: sinus bradycardia  BPM: 52    Clinical impression: abnormal EKG  Comments: Sinus bradycardia  QRS duration 94 MS  QT/QTc 444/412 mm            Lab Results   Component Value Date    CHOL 208 (H) 01/31/2020    TRIG 54 01/31/2020    HDL 85 (H) 01/31/2020     (H) 01/31/2020    AST 16 01/31/2020    ALT 12 01/31/2020       No results found for: HGBA1C        TAMMY Aj, CCRN    6/12/2020  "

## 2020-06-12 ENCOUNTER — OFFICE VISIT (OUTPATIENT)
Dept: CARDIOLOGY | Facility: CLINIC | Age: 70
End: 2020-06-12

## 2020-06-12 VITALS
HEART RATE: 52 BPM | DIASTOLIC BLOOD PRESSURE: 84 MMHG | BODY MASS INDEX: 24.25 KG/M2 | HEIGHT: 68 IN | WEIGHT: 160 LBS | SYSTOLIC BLOOD PRESSURE: 166 MMHG

## 2020-06-12 DIAGNOSIS — I48.0 PAROXYSMAL ATRIAL FIBRILLATION (HCC): Primary | ICD-10-CM

## 2020-06-12 DIAGNOSIS — I10 ESSENTIAL HYPERTENSION: ICD-10-CM

## 2020-06-12 PROCEDURE — 93000 ELECTROCARDIOGRAM COMPLETE: CPT | Performed by: NURSE PRACTITIONER

## 2020-06-12 PROCEDURE — 99213 OFFICE O/P EST LOW 20 MIN: CPT | Performed by: NURSE PRACTITIONER

## 2020-06-12 RX ORDER — AMLODIPINE BESYLATE 5 MG/1
5 TABLET ORAL DAILY
Qty: 30 TABLET | Refills: 11 | Status: SHIPPED | OUTPATIENT
Start: 2020-06-12 | End: 2020-10-06 | Stop reason: SDUPTHER

## 2020-06-12 NOTE — ASSESSMENT & PLAN NOTE
· Discontinue metoprolol  · Continue flecainide 50 mg twice daily  · Continue aspirin 81 mg daily  · No signs or symptoms of TIA or CVA

## 2020-06-12 NOTE — ASSESSMENT & PLAN NOTE
· Hypertension is not controlled  · Increase amlodipine 5 mg daily  · Discontinue metoprolol  · Keep blood pressure log for next 3 weeks and return in 3 weeks for reassessment with home blood pressure cuff

## 2020-06-15 ENCOUNTER — CLINICAL SUPPORT (OUTPATIENT)
Dept: ORTHOPEDIC SURGERY | Facility: CLINIC | Age: 70
End: 2020-06-15

## 2020-06-15 DIAGNOSIS — M17.11 PRIMARY OSTEOARTHRITIS OF RIGHT KNEE: Primary | ICD-10-CM

## 2020-06-15 PROCEDURE — 20610 DRAIN/INJ JOINT/BURSA W/O US: CPT | Performed by: PHYSICIAN ASSISTANT

## 2020-06-15 NOTE — PROGRESS NOTES
CC: Follow-up right knee osteoarthritis, 3/3 Orthovisc injection today     History of present illness: Patient presents for her third Orthovisc injection to the right knee today.  At this time she denies any numbness or tingling into the distal extremity.  No fever, chills, night sweats or other constitutional symptoms.     See chart for PMH, PSH, Meds, All - reviewed.     Ortho exam:  Right knee  Skin is intact without redness, warmth or swelling/effusion.  No lesions or evidence of infection noted.  Motor/sensory: Grossly intact L2-S1.     Assessment/plan:  Right knee osteoarthritis     Proceed today with 3/3 of Orthovisc injection.  Patient will follow-up 6 months for repeat evaluation and VS series.       After discussing risks of injection the patient gave consent to proceed.  Her right knee confirmed as the correct joint to be injected with a timeout.  The knee was then prepped with Hibiclens and injected with a prefilled syringe of Orthovisc without any resistance using anterior lateral approach, patient in seated position.  The patient tolerated procedure well.  Hemostasis was achieved and a Band-Aid was applied over the injection site.  I instructed the patient on signs and symptoms of infection.  They should report to the ER if any of these develop.  Recommended modifying activity to include rest, ice, elevation and/or heat along with oral pain medication as needed.  Patient observed ambulating normally after the injection.

## 2020-06-15 NOTE — PROGRESS NOTES
Procedure   Large Joint Arthrocentesis: R knee  Date/Time: 6/15/2020 3:03 PM  Consent given by: patient  Site marked: site marked  Timeout: Immediately prior to procedure a time out was called to verify the correct patient, procedure, equipment, support staff and site/side marked as required   Supporting Documentation  Indications: pain   Procedure Details  Location: knee - R knee  Preparation: Patient was prepped and draped in the usual sterile fashion  Needle size: 22 G  Approach: anterolateral  Medications administered: 30 mg Hyaluronan 30 MG/2ML  Patient tolerance: patient tolerated the procedure well with no immediate complications

## 2020-07-06 NOTE — PROGRESS NOTES
[facility]  Office Visit Note    DATE: 07/07/2020    IDENTIFICATION: Annalise Winters is a 69 y.o. female who resides in [place], [place].  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  At her last visit the patient was suffering from symptomatic bradycardia and metoprolol was discontinued.  Since stopping the metoprolol her fatigue has resolved.  She is maintaining normal sinus rhythm confirmed by EKG today.  She is still taking flecainide twice daily.  At her last visit her blood pressure was elevated at 166/84 but patient reported having normal pressures at home.  She has been monitoring her blood pressures and they were all within normal range until yesterday.  She has taken an extra half a dose of amlodipine.  She normally takes 2.5 mg in the a.m. and 2.5 mg in the p.m. however this morning she is taking a full 5 of amlodipine.  She was stung by bee a couple of days ago and her  has not been doing well with his health.  All of this has her very stressed out.  She brought her home blood pressure cuff with her and measurement was very comparable to measurements taken with our office manual cuff.    Review of Systems   Constitution: Negative for malaise/fatigue.   Eyes: Negative for vision loss in left eye and vision loss in right eye.   Cardiovascular: Negative for chest pain, dyspnea on exertion, near-syncope, orthopnea, palpitations, paroxysmal nocturnal dyspnea and syncope.   Musculoskeletal: Negative for myalgias.   Neurological: Negative for brief paralysis, excessive daytime sleepiness, focal weakness, numbness, paresthesias and weakness.   All other systems reviewed and are negative.      The patient's past medical, social, family history and ROS reviewed in the patient's  electronic medical record.    Allergies   Allergen Reactions   • Ace Inhibitors Cough   • Celexa [Citalopram Hydrobromide] Dizziness   • Corticosteroids Rash   • Paxil [Paroxetine Hcl] Other (See Comments)     somnolence           Current Outpatient Medications:   •  acetaminophen (TYLENOL) 500 MG tablet, Take 500 mg by mouth As Needed., Disp: , Rfl:   •  amLODIPine (NORVASC) 5 MG tablet, Take 1 tablet by mouth Daily., Disp: 30 tablet, Rfl: 11  •  aspirin 81 MG chewable tablet, Chew 1 tablet Daily., Disp: , Rfl:   •  Calcium-Vitamin D-Vitamin K (CALCIUM SOFT CHEWS PO), Take 1 tablet by mouth Daily., Disp: , Rfl:   •  Cholecalciferol (VITAMIN D) 2000 UNITS capsule, Take 1 capsule by mouth daily., Disp: , Rfl:   •  Famotidine-Ca Carb-Mag Hydrox (PEPCID COMPLETE PO), Take 1 tablet by mouth As Needed., Disp: , Rfl:   •  flecainide (TAMBOCOR) 50 MG tablet, TAKE ONE TABLET BY MOUTH TWICE A DAY AS NEEDED FOR PALPITATIONS (Patient taking differently: Take 50 mg by mouth 2 (Two) Times a Day.), Disp: 180 tablet, Rfl: 0  •  levothyroxine (SYNTHROID, LEVOTHROID) 50 MCG tablet, Take 1 tablet by mouth Daily., Disp: 90 tablet, Rfl: 1  •  loratadine (Claritin) 10 MG tablet, Take 10 mg by mouth Daily., Disp: , Rfl:   •  LORazepam (ATIVAN) 0.5 MG tablet, Take 1 tablet by mouth Daily As Needed for Anxiety., Disp: 30 tablet, Rfl: 0  •  montelukast (SINGULAIR) 10 MG tablet, Take 1 tablet by mouth every night at bedtime., Disp: 90 tablet, Rfl: 1  •  pantoprazole (PROTONIX) 40 MG EC tablet, Take 40 mg by mouth Every Other Day., Disp: , Rfl:   •  vitamin C (ASCORBIC ACID) 500 MG tablet, Take 500 mg by mouth Daily., Disp: , Rfl:     Past Medical History:   Diagnosis Date   • Abnormal blood chemistry    • Arthritis     right knee   • Cyst of buttocks    • Diverticulosis    • Dizziness    • Dysuria    • Esophagitis    • Flushing    • GERD (gastroesophageal reflux disease)    • Heartburn    • Hypertension    • Hyperthyroidism    •  "Hypothyroidism    • Irritable bowel syndrome    • LLQ abdominal pain    • Polyp of sigmoid colon    • Sinusitis    • Skin lesion of face    • UTI (urinary tract infection)        Past Surgical History:   Procedure Laterality Date   • CARDIAC ABLATION  2013    Pulmonary vein ablation by Dr. Abdirahman Lackey, 2013.   • RHINOPLASTY     • SUBTOTAL HYSTERECTOMY     • TUBAL ABDOMINAL LIGATION         Family History   Problem Relation Age of Onset   • Dementia Mother    • Glomerulonephritis Mother    • Hypertension Mother    • Other Mother          at age 88   • Arthritis Father    • Cancer Father         prostate cancer   • Other Father          at age 65   • Heart attack Father    • Hypertension Other    • Osteoarthritis Other        Social History     Tobacco Use   • Smoking status: Never Smoker   • Smokeless tobacco: Never Used   Substance Use Topics   • Alcohol use: Yes     Comment: on occasion           Blood pressure 158/99, pulse 86, temperature 97.3 °F (36.3 °C), height 174 cm (68.5\"), weight 72.1 kg (159 lb), SpO2 93 %, not currently breastfeeding.  Body mass index is 23.82 kg/m².  Vitals:    20 1438 20 1443   Patient Position: Sitting Sitting       Physical Exam   Constitutional: She is oriented to person, place, and time. She appears well-developed and well-nourished.   HENT:   Head: Normocephalic and atraumatic.   Eyes: Conjunctivae are normal. No scleral icterus.   Neck: Normal range of motion. No JVD present. Carotid bruit is not present. No thyromegaly present.   Cardiovascular: Normal rate and regular rhythm. Exam reveals no gallop.   No murmur heard.  Pulmonary/Chest: Effort normal and breath sounds normal.   Abdominal: Soft. She exhibits no distension and no mass. There is no hepatosplenomegaly.   Musculoskeletal: She exhibits no edema.   Neurological: She is alert and oriented to person, place, and time.   Skin: Skin is warm and dry. No rash noted.   Psychiatric: " She has a normal mood and affect. Her behavior is normal.       Data Review (reviewed with patient):       ECG 12 Lead  Date/Time: 7/7/2020 3:23 PM  Performed by: Britta Tobin APRN  Authorized by: Britta Tobin APRN   Comparison: compared with previous ECG from 6/12/2020  Similar to previous ECG  Rhythm: sinus rhythm  BPM: 89    Clinical impression: normal ECG  Comments: Normal sinus rhythm  QT/QTc 386/461 MS            Lab Results   Component Value Date    GLUCOSE 79 01/31/2020    BUN 11 01/31/2020    CREATININE 0.80 05/14/2020    EGFRIFNONA 68 01/31/2020    EGFRIFAFRI 106 05/16/2016    BCR 13.3 01/31/2020    K 4.5 01/31/2020    CO2 27.4 01/31/2020    CALCIUM 9.6 01/31/2020    ALBUMIN 4.20 01/31/2020    ALKPHOS 58 01/31/2020    AST 16 01/31/2020    ALT 12 01/31/2020      Lab Results   Component Value Date    CHOL 208 (H) 01/31/2020    CHLPL 231 (H) 05/16/2016    TRIG 54 01/31/2020    HDL 85 (H) 01/31/2020     (H) 01/31/2020     No results found for: HGBA1C  Lab Results   Component Value Date    WBC 5.52 02/10/2020    HGB 12.1 02/10/2020    HCT 36.9 02/10/2020    MCV 88.7 02/10/2020     02/10/2020     Lab Results   Component Value Date    TSH 2.300 01/31/2020            Problem List Items Addressed This Visit        Cardiovascular and Mediastinum    Essential hypertension    Current Assessment & Plan     · Blood pressures not well controlled however patient has a blood pressure log with BPs within normal range  · Home blood pressure log corresponds to our manual blood pressure check  · Patient to continue to monitor blood pressure with goal of being less than 130/80.  Can increase amlodipine to 10 mg daily if needed         Paroxysmal atrial fibrillation (CMS/HCC) - Primary    Overview     · Initially diagnosed in 2012.  · Myocardial perfusion study (04/02/2013):  No ischemia.  · Event monitor (10/2013): demonstrating paroxysmal atrial fibrillation.  · Failure of flecainide, sotalol and  Multaq therapy.  · Pulmonary vein ablation by Dr. Abdirahman Lackey, 08/21/2013.  · Chads Vasc=3 (age, female, HTN)         Current Assessment & Plan     · Maintaining normal sinus rhythm confirmed by EKG today  · Continue flecainide 50 mg twice daily  · No signs or symptoms of TIA or CVA             Patient is maintaining normal sinus rhythm and symptoms improved off metoprolol however she remains hypertensive.  I would like to start her on lisinopril however the patient is opposed to starting any new medication.  She reports her blood pressures only started elevating this morning and she thinks it was her nerves from having to get out of meds COVID.  The patient could have hypertension that is strictly stress related and possible whitecoat syndrome.  She will continue to monitor blood pressures.  The goal is to be less than 130/80 and she can increase her amlodipine to 10 mg daily if needed.  I want her to give us a call and check in in 2 weeks.  If she still remains elevated would recommend starting lisinopril.       · Patient to continue checking blood pressures.  Recommend BP less than 130/80.  Can increase amlodipine 10 mg daily.  Would recommend starting lisinopril if needed  Return in about 6 months (around 1/7/2021), or if symptoms worsen or fail to improve.    TAMMY Aj, CCRN    7/7/2020

## 2020-07-07 ENCOUNTER — OFFICE VISIT (OUTPATIENT)
Dept: CARDIOLOGY | Facility: CLINIC | Age: 70
End: 2020-07-07

## 2020-07-07 VITALS
SYSTOLIC BLOOD PRESSURE: 158 MMHG | HEIGHT: 69 IN | DIASTOLIC BLOOD PRESSURE: 99 MMHG | TEMPERATURE: 97.3 F | HEART RATE: 86 BPM | WEIGHT: 159 LBS | OXYGEN SATURATION: 93 % | BODY MASS INDEX: 23.55 KG/M2

## 2020-07-07 DIAGNOSIS — I48.0 PAROXYSMAL ATRIAL FIBRILLATION (HCC): Primary | ICD-10-CM

## 2020-07-07 DIAGNOSIS — I10 ESSENTIAL HYPERTENSION: ICD-10-CM

## 2020-07-07 PROCEDURE — 93000 ELECTROCARDIOGRAM COMPLETE: CPT | Performed by: NURSE PRACTITIONER

## 2020-07-07 PROCEDURE — 99214 OFFICE O/P EST MOD 30 MIN: CPT | Performed by: NURSE PRACTITIONER

## 2020-07-07 RX ORDER — LORATADINE 10 MG/1
10 TABLET ORAL DAILY
COMMUNITY

## 2020-07-07 NOTE — ASSESSMENT & PLAN NOTE
· Blood pressures not well controlled however patient has a blood pressure log with BPs within normal range  · Home blood pressure log corresponds to our manual blood pressure check  · Patient to continue to monitor blood pressure with goal of being less than 130/80.  Can increase amlodipine to 10 mg daily if needed

## 2020-07-07 NOTE — ASSESSMENT & PLAN NOTE
· Maintaining normal sinus rhythm confirmed by EKG today  · Continue flecainide 50 mg twice daily  · No signs or symptoms of TIA or CVA

## 2020-07-08 ENCOUNTER — TELEPHONE (OUTPATIENT)
Dept: INTERNAL MEDICINE | Facility: CLINIC | Age: 70
End: 2020-07-08

## 2020-07-08 ENCOUNTER — TELEPHONE (OUTPATIENT)
Dept: CARDIOLOGY | Facility: CLINIC | Age: 70
End: 2020-07-08

## 2020-07-08 DIAGNOSIS — R00.2 PALPITATIONS: Primary | ICD-10-CM

## 2020-07-08 NOTE — TELEPHONE ENCOUNTER
Left message on her personalized VM. Told her to call and let us know if she wanted to wear a monitor.

## 2020-07-08 NOTE — TELEPHONE ENCOUNTER
"Patient called to report that yesterday evening she had an episode where her heart was, \"racing\" and teeth chattering. She knows she was in afib. BP and HR elevated at that time. She reports this AM she is not in afib anymore and HR and BP have returned to normal after taking her medications.    Did you want any changes in meds? It looked like she stopped taking her metoprolol d/t bradycardia.     Did you want a monitor?    "

## 2020-07-09 NOTE — TELEPHONE ENCOUNTER
The patient has only had one episode and will hold off on the monitor right now. She sees her PCP tomorrow and reports she will get lab work. If she has anymore episodes, she will call the office and we will order the monitor.

## 2020-07-10 ENCOUNTER — OFFICE VISIT (OUTPATIENT)
Dept: INTERNAL MEDICINE | Facility: CLINIC | Age: 70
End: 2020-07-10

## 2020-07-10 ENCOUNTER — LAB (OUTPATIENT)
Dept: LAB | Facility: HOSPITAL | Age: 70
End: 2020-07-10

## 2020-07-10 VITALS
HEART RATE: 68 BPM | HEIGHT: 69 IN | SYSTOLIC BLOOD PRESSURE: 148 MMHG | OXYGEN SATURATION: 99 % | DIASTOLIC BLOOD PRESSURE: 90 MMHG | TEMPERATURE: 97.3 F | BODY MASS INDEX: 23.37 KG/M2 | WEIGHT: 157.8 LBS

## 2020-07-10 DIAGNOSIS — E03.9 ACQUIRED HYPOTHYROIDISM: ICD-10-CM

## 2020-07-10 DIAGNOSIS — F51.02 ADJUSTMENT INSOMNIA: ICD-10-CM

## 2020-07-10 DIAGNOSIS — I10 ESSENTIAL HYPERTENSION: ICD-10-CM

## 2020-07-10 DIAGNOSIS — I48.0 PAROXYSMAL ATRIAL FIBRILLATION (HCC): ICD-10-CM

## 2020-07-10 DIAGNOSIS — K57.92 ACUTE DIVERTICULITIS: Primary | ICD-10-CM

## 2020-07-10 DIAGNOSIS — E87.1 HYPONATREMIA: ICD-10-CM

## 2020-07-10 DIAGNOSIS — F41.9 ANXIETY: ICD-10-CM

## 2020-07-10 LAB
ALBUMIN SERPL-MCNC: 4.6 G/DL (ref 3.5–5.2)
ALBUMIN/GLOB SERPL: 1.8 G/DL
ALP SERPL-CCNC: 57 U/L (ref 39–117)
ALT SERPL W P-5'-P-CCNC: 15 U/L (ref 1–33)
ANION GAP SERPL CALCULATED.3IONS-SCNC: 10.4 MMOL/L (ref 5–15)
AST SERPL-CCNC: 16 U/L (ref 1–32)
BASOPHILS # BLD AUTO: 0.02 10*3/MM3 (ref 0–0.2)
BASOPHILS NFR BLD AUTO: 0.4 % (ref 0–1.5)
BILIRUB SERPL-MCNC: 0.5 MG/DL (ref 0–1.2)
BUN SERPL-MCNC: 14 MG/DL (ref 8–23)
BUN/CREAT SERPL: 15.2 (ref 7–25)
CALCIUM SPEC-SCNC: 10 MG/DL (ref 8.6–10.5)
CHLORIDE SERPL-SCNC: 93 MMOL/L (ref 98–107)
CO2 SERPL-SCNC: 27.6 MMOL/L (ref 22–29)
CREAT SERPL-MCNC: 0.92 MG/DL (ref 0.57–1)
DEPRECATED RDW RBC AUTO: 39.7 FL (ref 37–54)
EOSINOPHIL # BLD AUTO: 0.05 10*3/MM3 (ref 0–0.4)
EOSINOPHIL NFR BLD AUTO: 1 % (ref 0.3–6.2)
ERYTHROCYTE [DISTWIDTH] IN BLOOD BY AUTOMATED COUNT: 12.7 % (ref 12.3–15.4)
GFR SERPL CREATININE-BSD FRML MDRD: 60 ML/MIN/1.73
GLOBULIN UR ELPH-MCNC: 2.6 GM/DL
GLUCOSE SERPL-MCNC: 98 MG/DL (ref 65–99)
HCT VFR BLD AUTO: 37.3 % (ref 34–46.6)
HGB BLD-MCNC: 12.3 G/DL (ref 12–15.9)
IMM GRANULOCYTES # BLD AUTO: 0.02 10*3/MM3 (ref 0–0.05)
IMM GRANULOCYTES NFR BLD AUTO: 0.4 % (ref 0–0.5)
LYMPHOCYTES # BLD AUTO: 0.55 10*3/MM3 (ref 0.7–3.1)
LYMPHOCYTES NFR BLD AUTO: 10.6 % (ref 19.6–45.3)
MCH RBC QN AUTO: 28.7 PG (ref 26.6–33)
MCHC RBC AUTO-ENTMCNC: 33 G/DL (ref 31.5–35.7)
MCV RBC AUTO: 86.9 FL (ref 79–97)
MONOCYTES # BLD AUTO: 0.31 10*3/MM3 (ref 0.1–0.9)
MONOCYTES NFR BLD AUTO: 6 % (ref 5–12)
NEUTROPHILS NFR BLD AUTO: 4.23 10*3/MM3 (ref 1.7–7)
NEUTROPHILS NFR BLD AUTO: 81.6 % (ref 42.7–76)
NRBC BLD AUTO-RTO: 0 /100 WBC (ref 0–0.2)
PLATELET # BLD AUTO: 286 10*3/MM3 (ref 140–450)
PMV BLD AUTO: 9.7 FL (ref 6–12)
POTASSIUM SERPL-SCNC: 3.8 MMOL/L (ref 3.5–5.2)
PROT SERPL-MCNC: 7.2 G/DL (ref 6–8.5)
RBC # BLD AUTO: 4.29 10*6/MM3 (ref 3.77–5.28)
SODIUM SERPL-SCNC: 131 MMOL/L (ref 136–145)
T4 FREE SERPL-MCNC: 1.6 NG/DL (ref 0.93–1.7)
TSH SERPL DL<=0.05 MIU/L-ACNC: 2.01 UIU/ML (ref 0.27–4.2)
VIT B12 BLD-MCNC: 441 PG/ML (ref 211–946)
WBC # BLD AUTO: 5.18 10*3/MM3 (ref 3.4–10.8)

## 2020-07-10 PROCEDURE — 84443 ASSAY THYROID STIM HORMONE: CPT | Performed by: INTERNAL MEDICINE

## 2020-07-10 PROCEDURE — 80053 COMPREHEN METABOLIC PANEL: CPT | Performed by: INTERNAL MEDICINE

## 2020-07-10 PROCEDURE — 84439 ASSAY OF FREE THYROXINE: CPT | Performed by: INTERNAL MEDICINE

## 2020-07-10 PROCEDURE — 99214 OFFICE O/P EST MOD 30 MIN: CPT | Performed by: INTERNAL MEDICINE

## 2020-07-10 PROCEDURE — 85025 COMPLETE CBC W/AUTO DIFF WBC: CPT | Performed by: INTERNAL MEDICINE

## 2020-07-10 PROCEDURE — 82607 VITAMIN B-12: CPT | Performed by: INTERNAL MEDICINE

## 2020-07-10 RX ORDER — ONDANSETRON 4 MG/1
4 TABLET, ORALLY DISINTEGRATING ORAL EVERY 8 HOURS PRN
Qty: 30 TABLET | Refills: 0 | OUTPATIENT
Start: 2020-07-10 | End: 2020-07-12

## 2020-07-10 RX ORDER — TRAZODONE HYDROCHLORIDE 50 MG/1
50-100 TABLET ORAL NIGHTLY
Qty: 60 TABLET | Refills: 5 | Status: SHIPPED | OUTPATIENT
Start: 2020-07-10 | End: 2021-01-21

## 2020-07-10 RX ORDER — LORAZEPAM 0.5 MG/1
0.5 TABLET ORAL DAILY PRN
Qty: 30 TABLET | Refills: 0 | Status: SHIPPED | OUTPATIENT
Start: 2020-07-10 | End: 2020-10-06 | Stop reason: SDUPTHER

## 2020-07-10 NOTE — PROGRESS NOTES
Elevated Blood Pressure (states taking an abx at this time, but unable to remember what it is)    Subjective   Annalise Winters is a 69 y.o. female is here today for follow-up.    History of Present Illness   Pt. Is a little vague about her history- apparently on 2 antibiotics for her stomach pain by her Dr. Hall. She is not sure what meds she's on.  Has been having diarrhea and nausea.  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   having trouble sleeping    Current Outpatient Medications:   •  acetaminophen (TYLENOL) 500 MG tablet, Take 500 mg by mouth As Needed., Disp: , Rfl:   •  amLODIPine (NORVASC) 5 MG tablet, Take 1 tablet by mouth Daily., Disp: 30 tablet, Rfl: 11  •  aspirin 81 MG chewable tablet, Chew 1 tablet Daily., Disp: , Rfl:   •  Calcium-Vitamin D-Vitamin K (CALCIUM SOFT CHEWS PO), Take 1 tablet by mouth Daily., Disp: , Rfl:   •  Cholecalciferol (VITAMIN D) 2000 UNITS capsule, Take 1 capsule by mouth daily., Disp: , Rfl:   •  Famotidine-Ca Carb-Mag Hydrox (PEPCID COMPLETE PO), Take 1 tablet by mouth As Needed., Disp: , Rfl:   •  flecainide (TAMBOCOR) 50 MG tablet, TAKE ONE TABLET BY MOUTH TWICE A DAY AS NEEDED FOR PALPITATIONS (Patient taking differently: Take 50 mg by mouth 2 (Two) Times a Day.), Disp: 180 tablet, Rfl: 0  •  levothyroxine (SYNTHROID, LEVOTHROID) 50 MCG tablet, Take 1 tablet by mouth Daily., Disp: 90 tablet, Rfl: 1  •  loratadine (Claritin) 10 MG tablet, Take 10 mg by mouth Daily., Disp: , Rfl:   •  LORazepam (ATIVAN) 0.5 MG tablet, Take 1 tablet by mouth Daily As Needed for Anxiety., Disp: 30 tablet, Rfl: 0  •  montelukast (SINGULAIR) 10 MG tablet, Take 1 tablet by mouth every night at bedtime., Disp: 90 tablet, Rfl: 1  •  pantoprazole (PROTONIX) 40 MG EC tablet, Take 40 mg by mouth Every Other Day., Disp: , Rfl:   •  vitamin C (ASCORBIC ACID) 500 MG tablet, Take 500 mg by mouth Daily., Disp: , Rfl:   •   "ondansetron ODT (Zofran ODT) 4 MG disintegrating tablet, Place 1 tablet on the tongue Every 8 (Eight) Hours As Needed for Nausea or Vomiting., Disp: 30 tablet, Rfl: 0  •  traZODone (DESYREL) 50 MG tablet, Take 1-2 tablets by mouth Every Night., Disp: 60 tablet, Rfl: 5      The following portions of the patient's history were reviewed and updated as appropriate: allergies, current medications, past family history, past medical history, past social history, past surgical history and problem list.    Review of Systems   Constitutional: Negative for chills and fever.   HENT: Negative for ear discharge, ear pain, sinus pressure and sore throat.    Respiratory: Negative for cough, chest tightness and shortness of breath.    Cardiovascular: Positive for palpitations. Negative for chest pain and leg swelling.   Gastrointestinal: Negative for diarrhea, nausea and vomiting.   Musculoskeletal: Negative for arthralgias, back pain and myalgias.   Neurological: Positive for weakness. Negative for dizziness, syncope and headaches.   Psychiatric/Behavioral: Negative for confusion and sleep disturbance.       Objective   /90   Pulse 68   Temp 97.3 °F (36.3 °C)   Ht 174 cm (68.5\")   Wt 71.6 kg (157 lb 12.8 oz)   LMP  (LMP Unknown)   SpO2 99% Comment: ra  BMI 23.64 kg/m²   Physical Exam   Constitutional: She is oriented to person, place, and time. She appears well-developed and well-nourished.   HENT:   Head: Normocephalic and atraumatic.   Right Ear: External ear normal.   Left Ear: External ear normal.   Mouth/Throat: No oropharyngeal exudate.   Eyes: Pupils are equal, round, and reactive to light. Conjunctivae are normal.   Neck: Neck supple. No thyromegaly present.   Cardiovascular: Normal rate and regular rhythm.   Pulmonary/Chest: Effort normal and breath sounds normal.   Abdominal: Soft. Bowel sounds are normal. She exhibits distension. She exhibits no mass. There is tenderness. There is no guarding. "   Musculoskeletal: She exhibits no edema.   Lymphadenopathy:     She has no cervical adenopathy.   Neurological: She is alert and oriented to person, place, and time. No cranial nerve deficit.   Skin: Skin is warm and dry.   Psychiatric: She has a normal mood and affect. Judgment normal.   Nursing note and vitals reviewed.        Results for orders placed or performed during the hospital encounter of 05/14/20   POC Creatinine   Result Value Ref Range    Creatinine 0.80 0.60 - 1.30 mg/dL             Assessment/Plan   Diagnoses and all orders for this visit:    Acute diverticulitis  -     ondansetron ODT (Zofran ODT) 4 MG disintegrating tablet; Place 1 tablet on the tongue Every 8 (Eight) Hours As Needed for Nausea or Vomiting.    Acquired hypothyroidism  -     TSH; Future  -     T4, Free; Future    Essential hypertension  -     Comprehensive Metabolic Panel; Future    Paroxysmal atrial fibrillation (CMS/HCC)  -     CBC & Differential; Future  -     Comprehensive Metabolic Panel; Future  -     TSH; Future  -     T4, Free; Future  -     Vitamin B12; Future    Anxiety  -     LORazepam (ATIVAN) 0.5 MG tablet; Take 1 tablet by mouth Daily As Needed for Anxiety.    Adjustment insomnia  -     traZODone (DESYREL) 50 MG tablet; Take 1-2 tablets by mouth Every Night.      Likely  On flagyl, and cipro not dispensed. She has been on flagyl < 2 days. Adv. To call Dr. Hall's office if not better.    Also to call and schedule Event monitor .           Return for Next scheduled follow up.

## 2020-07-12 PROCEDURE — 87086 URINE CULTURE/COLONY COUNT: CPT | Performed by: NURSE PRACTITIONER

## 2020-07-13 ENCOUNTER — TELEPHONE (OUTPATIENT)
Dept: URGENT CARE | Facility: CLINIC | Age: 70
End: 2020-07-13

## 2020-07-13 NOTE — TELEPHONE ENCOUNTER
Advised PT of negative culture. Pt was able to see her results on My Chart and called for clarification on the results, PT will take medication as prescribed and f/u as needed.

## 2020-07-13 NOTE — TELEPHONE ENCOUNTER
Pt calling w continued complaints of palpitations. Would like to proceed w 2 week monitor. Orders placed. Pt states she will try to stop by tomorrow afternoon.

## 2020-07-14 ENCOUNTER — TELEPHONE (OUTPATIENT)
Dept: URGENT CARE | Facility: CLINIC | Age: 70
End: 2020-07-14

## 2020-07-14 ENCOUNTER — TELEPHONE (OUTPATIENT)
Dept: INTERNAL MEDICINE | Facility: CLINIC | Age: 70
End: 2020-07-14

## 2020-07-14 NOTE — TELEPHONE ENCOUNTER
Please let her know that her electrolytes showed low sodium and chloride indicating dehydration.  She should increase her intake of fluids rich in electrolytes like Gatorade etc.   Thyroid is normal, B12 is a little better.  I do not think that her mildly low electrolytes could be causing her A. fib episodes.  So she should proceed with the heart monitor.  I would like like for her to stop by for recheck of her electrolytes in a couple of weeks, no appointment necessary.

## 2020-07-18 DIAGNOSIS — I48.0 PAROXYSMAL ATRIAL FIBRILLATION (HCC): ICD-10-CM

## 2020-07-20 RX ORDER — FLECAINIDE ACETATE 50 MG/1
TABLET ORAL
Qty: 180 TABLET | Refills: 0 | Status: SHIPPED | OUTPATIENT
Start: 2020-07-20 | End: 2020-10-13

## 2020-07-28 ENCOUNTER — OFFICE VISIT (OUTPATIENT)
Dept: INTERNAL MEDICINE | Facility: CLINIC | Age: 70
End: 2020-07-28

## 2020-07-28 VITALS
HEART RATE: 59 BPM | BODY MASS INDEX: 23.93 KG/M2 | HEIGHT: 69 IN | TEMPERATURE: 97.1 F | DIASTOLIC BLOOD PRESSURE: 70 MMHG | OXYGEN SATURATION: 98 % | SYSTOLIC BLOOD PRESSURE: 110 MMHG | WEIGHT: 161.6 LBS

## 2020-07-28 DIAGNOSIS — E87.1 HYPONATREMIA: Primary | ICD-10-CM

## 2020-07-28 DIAGNOSIS — G47.09 OTHER INSOMNIA: ICD-10-CM

## 2020-07-28 DIAGNOSIS — E53.8 B12 DEFICIENCY: ICD-10-CM

## 2020-07-28 DIAGNOSIS — E78.5 DYSLIPIDEMIA: ICD-10-CM

## 2020-07-28 DIAGNOSIS — K21.00 GASTROESOPHAGEAL REFLUX DISEASE WITH ESOPHAGITIS: ICD-10-CM

## 2020-07-28 PROCEDURE — 99214 OFFICE O/P EST MOD 30 MIN: CPT | Performed by: INTERNAL MEDICINE

## 2020-07-28 RX ORDER — DEXLANSOPRAZOLE 60 MG/1
60 CAPSULE, DELAYED RELEASE ORAL DAILY
Qty: 30 CAPSULE | Refills: 0 | Status: SHIPPED | OUTPATIENT
Start: 2020-07-28 | End: 2020-08-27

## 2020-07-30 ENCOUNTER — LAB (OUTPATIENT)
Dept: LAB | Facility: HOSPITAL | Age: 70
End: 2020-07-30

## 2020-07-30 DIAGNOSIS — E78.5 DYSLIPIDEMIA: ICD-10-CM

## 2020-07-30 DIAGNOSIS — K21.00 GASTROESOPHAGEAL REFLUX DISEASE WITH ESOPHAGITIS: ICD-10-CM

## 2020-07-30 DIAGNOSIS — E87.1 HYPONATREMIA: ICD-10-CM

## 2020-07-30 DIAGNOSIS — E53.8 B12 DEFICIENCY: ICD-10-CM

## 2020-07-30 LAB
ALBUMIN SERPL-MCNC: 4.5 G/DL (ref 3.5–5.2)
ALBUMIN/GLOB SERPL: 1.7 G/DL
ALP SERPL-CCNC: 58 U/L (ref 39–117)
ALT SERPL W P-5'-P-CCNC: 17 U/L (ref 1–33)
ANION GAP SERPL CALCULATED.3IONS-SCNC: 7.4 MMOL/L (ref 5–15)
AST SERPL-CCNC: 16 U/L (ref 1–32)
BASOPHILS # BLD AUTO: 0.03 10*3/MM3 (ref 0–0.2)
BASOPHILS NFR BLD AUTO: 0.8 % (ref 0–1.5)
BILIRUB SERPL-MCNC: 0.6 MG/DL (ref 0–1.2)
BUN SERPL-MCNC: 12 MG/DL (ref 8–23)
BUN/CREAT SERPL: 15.4 (ref 7–25)
CALCIUM SPEC-SCNC: 10 MG/DL (ref 8.6–10.5)
CHLORIDE SERPL-SCNC: 97 MMOL/L (ref 98–107)
CHOLEST SERPL-MCNC: 241 MG/DL (ref 0–200)
CO2 SERPL-SCNC: 29.6 MMOL/L (ref 22–29)
CREAT SERPL-MCNC: 0.78 MG/DL (ref 0.57–1)
DEPRECATED RDW RBC AUTO: 43.6 FL (ref 37–54)
EOSINOPHIL # BLD AUTO: 0.33 10*3/MM3 (ref 0–0.4)
EOSINOPHIL NFR BLD AUTO: 9.1 % (ref 0.3–6.2)
ERYTHROCYTE [DISTWIDTH] IN BLOOD BY AUTOMATED COUNT: 13.2 % (ref 12.3–15.4)
GFR SERPL CREATININE-BSD FRML MDRD: 73 ML/MIN/1.73
GLOBULIN UR ELPH-MCNC: 2.6 GM/DL
GLUCOSE SERPL-MCNC: 84 MG/DL (ref 65–99)
HCT VFR BLD AUTO: 40.5 % (ref 34–46.6)
HDLC SERPL-MCNC: 79 MG/DL (ref 40–60)
HGB BLD-MCNC: 13 G/DL (ref 12–15.9)
IMM GRANULOCYTES # BLD AUTO: 0.01 10*3/MM3 (ref 0–0.05)
IMM GRANULOCYTES NFR BLD AUTO: 0.3 % (ref 0–0.5)
LDLC SERPL CALC-MCNC: 150 MG/DL (ref 0–100)
LDLC/HDLC SERPL: 1.9 {RATIO}
LYMPHOCYTES # BLD AUTO: 0.97 10*3/MM3 (ref 0.7–3.1)
LYMPHOCYTES NFR BLD AUTO: 26.8 % (ref 19.6–45.3)
MCH RBC QN AUTO: 28.9 PG (ref 26.6–33)
MCHC RBC AUTO-ENTMCNC: 32.1 G/DL (ref 31.5–35.7)
MCV RBC AUTO: 90 FL (ref 79–97)
MONOCYTES # BLD AUTO: 0.3 10*3/MM3 (ref 0.1–0.9)
MONOCYTES NFR BLD AUTO: 8.3 % (ref 5–12)
NEUTROPHILS NFR BLD AUTO: 1.98 10*3/MM3 (ref 1.7–7)
NEUTROPHILS NFR BLD AUTO: 54.7 % (ref 42.7–76)
NRBC BLD AUTO-RTO: 0 /100 WBC (ref 0–0.2)
PLATELET # BLD AUTO: 302 10*3/MM3 (ref 140–450)
PMV BLD AUTO: 10.1 FL (ref 6–12)
POTASSIUM SERPL-SCNC: 4.3 MMOL/L (ref 3.5–5.2)
PROT SERPL-MCNC: 7.1 G/DL (ref 6–8.5)
RBC # BLD AUTO: 4.5 10*6/MM3 (ref 3.77–5.28)
SODIUM SERPL-SCNC: 134 MMOL/L (ref 136–145)
TRIGL SERPL-MCNC: 59 MG/DL (ref 0–150)
VLDLC SERPL-MCNC: 11.8 MG/DL (ref 5–40)
WBC # BLD AUTO: 3.62 10*3/MM3 (ref 3.4–10.8)

## 2020-07-30 PROCEDURE — 85025 COMPLETE CBC W/AUTO DIFF WBC: CPT | Performed by: INTERNAL MEDICINE

## 2020-07-30 PROCEDURE — 80053 COMPREHEN METABOLIC PANEL: CPT | Performed by: INTERNAL MEDICINE

## 2020-07-30 PROCEDURE — 80061 LIPID PANEL: CPT | Performed by: INTERNAL MEDICINE

## 2020-07-31 ENCOUNTER — TELEPHONE (OUTPATIENT)
Dept: CARDIOLOGY | Facility: CLINIC | Age: 70
End: 2020-07-31

## 2020-08-05 ENCOUNTER — TELEPHONE (OUTPATIENT)
Dept: INTERNAL MEDICINE | Facility: CLINIC | Age: 70
End: 2020-08-05

## 2020-08-10 NOTE — TELEPHONE ENCOUNTER
Please make sure patient is aware of the following [employer] message:  Edinson Woody,    Your blood counts, sugars, electrolytes, kidney and liver function are normal.  Cholesterol has gone up, likely due to the current situation.  Please exercise regularly as you have been doing and restrict high cholesterol foods like red meat, egg yolks etc.  Continue current regimen otherwise.    thanks

## 2020-09-16 ENCOUNTER — TELEPHONE (OUTPATIENT)
Dept: INTERNAL MEDICINE | Facility: CLINIC | Age: 70
End: 2020-09-16

## 2020-09-16 NOTE — TELEPHONE ENCOUNTER
Called and advised patient that she could stop taking the medication since she was taking only 1/2 a night.    Patient states she takes 2000 units of OTC  Vitamin D daily and bone density showed that she has Osteopenia and would like to know if this dose is appropriate or if she needs a prescription for a different dose.

## 2020-09-16 NOTE — TELEPHONE ENCOUNTER
Yes, she can wean off.  If she is on 2 HS, she can cut back to 1 x 1 week , and then 1/2 for 1 week, then stop.    thanks

## 2020-09-16 NOTE — TELEPHONE ENCOUNTER
PT CALLED STATED THAT SHE IS ON RX TRAZODONE AND IS HAVING SOME SIDE EFFECTS, LIGHT HEADED, BLOOD PRESSURE ELEVATED AND FEELS ANXIETY TAKING RX. PT WOULD LIKE TO  KNOW IF SHE COULD COME OFF RX.    PLEASE ADSVISE.  CALL BACK:[phone]

## 2020-09-17 ENCOUNTER — TELEPHONE (OUTPATIENT)
Dept: CARDIOLOGY | Facility: CLINIC | Age: 70
End: 2020-09-17

## 2020-09-17 NOTE — PROGRESS NOTES
Patient called reporting her BP was elevated over night. I attempted to call her back but had to leave a message on her personalized VM. Will await return call.

## 2020-09-28 ENCOUNTER — CLINICAL SUPPORT (OUTPATIENT)
Dept: INTERNAL MEDICINE | Facility: CLINIC | Age: 70
End: 2020-09-28

## 2020-09-28 DIAGNOSIS — Z23 NEED FOR INFLUENZA VACCINATION: ICD-10-CM

## 2020-09-28 PROCEDURE — G0008 ADMIN INFLUENZA VIRUS VAC: HCPCS | Performed by: INTERNAL MEDICINE

## 2020-09-28 PROCEDURE — 90694 VACC AIIV4 NO PRSRV 0.5ML IM: CPT | Performed by: INTERNAL MEDICINE

## 2020-10-06 DIAGNOSIS — F41.9 ANXIETY: ICD-10-CM

## 2020-10-06 RX ORDER — AMLODIPINE BESYLATE 5 MG/1
5 TABLET ORAL 2 TIMES DAILY
Qty: 180 TABLET | Refills: 3 | Status: SHIPPED | OUTPATIENT
Start: 2020-10-06 | End: 2021-08-31 | Stop reason: SDUPTHER

## 2020-10-06 RX ORDER — LORAZEPAM 0.5 MG/1
0.5 TABLET ORAL DAILY PRN
Qty: 30 TABLET | Refills: 0 | Status: SHIPPED | OUTPATIENT
Start: 2020-10-06 | End: 2021-01-13 | Stop reason: SDUPTHER

## 2020-10-06 NOTE — TELEPHONE ENCOUNTER
Caller: Annalise Winters    Relationship: Self    Best call back number:[phone]     Medication needed:   Requested Prescriptions     Pending Prescriptions Disp Refills   • LORazepam (ATIVAN) 0.5 MG tablet 30 tablet 0     Sig: Take 1 tablet by mouth Daily As Needed for Anxiety.       When do you need the refill by: 10/07/2020    What details did the patient provide when requesting the medication:    Does the patient have less than a 3 day supply:  [x] Yes  [] No    What is the patient's preferred pharmacy: [facility] [phone] [facility] [phone] FX

## 2020-10-06 NOTE — TELEPHONE ENCOUNTER
Patient reports that she has been taking amlodipine 5 mg BID and sometimes daily, depending on BP. She is requesting a new refill for BID so she doesn't run out.

## 2020-10-12 DIAGNOSIS — I48.0 PAROXYSMAL ATRIAL FIBRILLATION (HCC): ICD-10-CM

## 2020-10-13 RX ORDER — FLECAINIDE ACETATE 50 MG/1
TABLET ORAL
Qty: 180 TABLET | Refills: 0 | Status: SHIPPED | OUTPATIENT
Start: 2020-10-13 | End: 2021-01-12

## 2020-12-08 DIAGNOSIS — E03.9 HYPOTHYROIDISM, UNSPECIFIED TYPE: ICD-10-CM

## 2020-12-08 RX ORDER — LEVOTHYROXINE SODIUM 0.05 MG/1
TABLET ORAL
Qty: 90 TABLET | Refills: 0 | Status: SHIPPED | OUTPATIENT
Start: 2020-12-08 | End: 2021-03-02 | Stop reason: SDUPTHER

## 2020-12-08 NOTE — TELEPHONE ENCOUNTER
Last Office Visit: 07/28/20  Next Office Visit:02/02/21  Labs completed in past 6 months? yes  Labs completed in past year? yes    Last Refill Date: 04/15/20  Quantity:90  Refills:1

## 2020-12-12 DIAGNOSIS — J30.89 ALLERGIC RHINITIS DUE TO OTHER ALLERGIC TRIGGER, UNSPECIFIED SEASONALITY: ICD-10-CM

## 2020-12-12 RX ORDER — MONTELUKAST SODIUM 10 MG/1
10 TABLET ORAL
Qty: 90 TABLET | Refills: 1 | Status: SHIPPED | OUTPATIENT
Start: 2020-12-12 | End: 2021-06-04

## 2020-12-12 NOTE — TELEPHONE ENCOUNTER
Last Office Visit: 07/30/20  Next Office Visit: 02/12/21    Labs completed in past 6 months? yes  Labs completed in past year? yes    Last Refill Date:04/15/20  Quantity:90  Refills:0

## 2020-12-21 ENCOUNTER — TELEPHONE (OUTPATIENT)
Dept: ORTHOPEDIC SURGERY | Facility: CLINIC | Age: 70
End: 2020-12-21

## 2020-12-21 ENCOUNTER — OFFICE VISIT (OUTPATIENT)
Dept: ORTHOPEDIC SURGERY | Facility: CLINIC | Age: 70
End: 2020-12-21

## 2020-12-21 DIAGNOSIS — M17.11 PRIMARY OSTEOARTHRITIS OF RIGHT KNEE: Primary | ICD-10-CM

## 2020-12-21 PROCEDURE — 20610 DRAIN/INJ JOINT/BURSA W/O US: CPT | Performed by: PHYSICIAN ASSISTANT

## 2020-12-21 NOTE — PROGRESS NOTES
CC: Follow-up right knee osteoarthritis, 1/3 Orthovisc injection today    History of present illness: Patient presents for her first Orthovisc injection to the right knee today.  At this time she denies any numbness or tingling into the distal extremity.  No fever, chills, night sweats or other constitutional symptoms.    Patient endorses a pain scale 3/10.  Symptoms are worse with stair climbing.    Patient was previously recommended for TKA but unable to proceed due to getting dental implants.  This is still an ongoing process.    Last series was provided June 2020 with last shot given 6/15/2020.    See chart for PMH, PSH, Meds, All - reviewed.    Ortho exam:  Right knee  Skin is intact without redness, warmth or swelling/effusion.  No lesions or evidence of infection noted.  Tenderness: Predominantly along lateral joint line occasional pain noted posterior knee.  Motor/sensory: Grossly intact L2-S1.    Assessment/plan:  Right knee osteoarthritis    Proceed today with 1/3 of Orthovisc injection.  Patient will follow-up in week for second injection.      After discussing risks of injection the patient gave consent to proceed.  Her right knee was confirmed as the correct joint to be injected with a timeout.  The knee was then prepped with Hibiclens and injected with a prefilled syringe of Orthovisc without any resistance using anterior m lateral approach, patient in seated position.  The patient tolerated procedure well.  Hemostasis was achieved and a Band-Aid was applied over the injection site.  I instructed the patient on signs and symptoms of infection.  They should report to the ED if any of these develop.  Recommended modifying activity to include rest, ice, elevation and/or heat along with oral pain medication as needed.  Patient observed ambulating normally after the injection.

## 2020-12-21 NOTE — TELEPHONE ENCOUNTER
PATIENT WOULD LIKE TO BEGIN GEL INJECTIONS TODAY AT APPOINTMENT. PATIENT CAN BE REACHED [phone] WITH ANY QUESTIONS.

## 2020-12-21 NOTE — TELEPHONE ENCOUNTER
Rianna is ok to begin injections today and Kate said with the patient's insurance the patient is ok to proceed.  I called the patient and told her this information.

## 2020-12-28 ENCOUNTER — CLINICAL SUPPORT (OUTPATIENT)
Dept: ORTHOPEDIC SURGERY | Facility: CLINIC | Age: 70
End: 2020-12-28

## 2020-12-28 DIAGNOSIS — M17.11 PRIMARY OSTEOARTHRITIS OF RIGHT KNEE: ICD-10-CM

## 2020-12-28 PROCEDURE — 20610 DRAIN/INJ JOINT/BURSA W/O US: CPT | Performed by: PHYSICIAN ASSISTANT

## 2020-12-28 NOTE — PROGRESS NOTES
Procedure   Large Joint Arthrocentesis: R knee  Date/Time: 12/28/2020 3:01 PM  Consent given by: patient  Site marked: site marked  Timeout: Immediately prior to procedure a time out was called to verify the correct patient, procedure, equipment, support staff and site/side marked as required   Supporting Documentation  Indications: pain   Procedure Details  Location: knee - R knee  Preparation: Patient was prepped and draped in the usual sterile fashion  Needle size: 22 G  Approach: anterolateral  Medications administered: 30 mg Hyaluronan 30 MG/2ML  Patient tolerance: patient tolerated the procedure well with no immediate complications

## 2020-12-28 NOTE — PROGRESS NOTES
CC: Follow-up right knee osteoarthritis, 2/3 Orthovisc injection today     History of present illness: Patient presents for her second Orthovisc injection to the right knee today.  At this time she denies any numbness or tingling into the distal extremity.  No fever, chills, night sweats or other constitutional symptoms.     See chart for PMH, PSH, Meds, All - reviewed.     Ortho exam:  Right knee  Skin is intact without redness, warmth or swelling/effusion.    Motor/sensory: Grossly intact L2-S1.     Assessment/plan:  Right knee osteoarthritis     Proceed today with 2/3 of Orthovisc injection.  Patient will follow-up in week for third injection.       After discussing risks of injection the patient gave consent to proceed.  Her right knee was confirmed as the correct joint to be injected with a timeout.  The knee was then prepped with Hibiclens and injected with a prefilled syringe of Orthovisc without any resistance using anterior lateral approach, patient in seated position.  The patient tolerated procedure well.  Hemostasis was achieved and a Band-Aid was applied over the injection site.  I instructed the patient on signs and symptoms of infection.  They should report to the ED if any of these develop.  Recommended modifying activity to include rest, ice, elevation and/or heat along with oral pain medication as needed.  Patient observed ambulating normally after the injection.

## 2021-01-04 ENCOUNTER — CLINICAL SUPPORT (OUTPATIENT)
Dept: ORTHOPEDIC SURGERY | Facility: CLINIC | Age: 71
End: 2021-01-04

## 2021-01-04 DIAGNOSIS — M17.11 PRIMARY OSTEOARTHRITIS OF RIGHT KNEE: Primary | ICD-10-CM

## 2021-01-04 PROCEDURE — 20610 DRAIN/INJ JOINT/BURSA W/O US: CPT | Performed by: PHYSICIAN ASSISTANT

## 2021-01-04 NOTE — PROGRESS NOTES
Procedure   Large Joint Arthrocentesis: R knee  Date/Time: 1/4/2021 2:53 PM  Consent given by: patient  Site marked: site marked  Timeout: Immediately prior to procedure a time out was called to verify the correct patient, procedure, equipment, support staff and site/side marked as required   Supporting Documentation  Indications: pain   Procedure Details  Location: knee - R knee  Preparation: Patient was prepped and draped in the usual sterile fashion  Needle size: 22 G  Approach: anterolateral  Medications administered: 30 mg Hyaluronan 30 MG/2ML

## 2021-01-04 NOTE — PROGRESS NOTES
CC: Follow-up right knee osteoarthritis, 3/3 Orthovisc injection today     History of present illness: Patient presents for her third Orthovisc injection to the right knee today.  At this time she denies any numbness or tingling into the distal extremity.  No fever, chills, night sweats or other constitutional symptoms.     See chart for PMH, PSH, Meds, All - reviewed.     Ortho exam:  Right knee  Skin is intact without redness, warmth or swelling/effusion.    Motor/sensory: Grossly intact L2-S1.     Assessment/plan:  Right knee osteoarthritis     Proceed today with 3/3 of Orthovisc injection.  Patient will follow-up in 6 months.       After discussing risks of injection the patient gave consent to proceed.  Her right knee was confirmed as the correct joint to be injected with a timeout.  The knee was then prepped with Hibiclens and injected with a prefilled syringe of Orthovisc without any resistance using anterior lateral approach, patient in seated position.  The patient tolerated procedure well.  Hemostasis was achieved and a Band-Aid was applied over the injection site.  I instructed the patient on signs and symptoms of infection.  They should report to the ED if any of these develop.  Recommended modifying activity to include rest, ice, elevation and/or heat along with oral pain medication as needed.  Patient observed ambulating normally after the injection.

## 2021-01-08 NOTE — PROGRESS NOTES
[facility] at   Office Visit Note    DATE: 01/12/2021    IDENTIFICATION: Annalise Winters is a 70 y.o. female who resides in [place], [place].  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  Since her last visit she has done well with any hospitalizations or new medical diagnoses.  She continues to take flecainide twice daily.  She does not take her metoprolol twice daily as it makes her heart rate go too low.  She has been taking 12.5 metoprolol in the evening.  She has been taking her amlodipine twice daily and her blood pressures have been way more controlled.  She is not anticoagulated but takes a daily aspirin.  She denies signs or symptoms of TIA or CVA.  EKG today shows sinus rhythm with acceptable QT interval.    Review of Systems   Constitution: Negative for malaise/fatigue.   Eyes: Negative for vision loss in left eye and vision loss in right eye.   Cardiovascular: Negative for chest pain, dyspnea on exertion, near-syncope, orthopnea, palpitations, paroxysmal nocturnal dyspnea and syncope.   Musculoskeletal: Negative for myalgias.   Neurological: Negative for brief paralysis, excessive daytime sleepiness, focal weakness, numbness, paresthesias and weakness.   All other systems reviewed and are negative.      The patient's past medical, social, family history and ROS reviewed in the patient's electronic medical record.    Allergies   Allergen Reactions   • Ace Inhibitors Cough   • Celexa [Citalopram Hydrobromide] Dizziness   • Corticosteroids Rash   • Paxil [Paroxetine Hcl] Other (See Comments)     somnolence           Current Outpatient Medications:   •  acetaminophen (TYLENOL) 500 MG tablet, Take 500 mg by mouth As Needed., Disp: , Rfl:   •  amLODIPine (NORVASC) 5 MG tablet,  Take 1 tablet by mouth 2 (two) times a day., Disp: 180 tablet, Rfl: 3  •  aspirin 81 MG chewable tablet, Chew 1 tablet Daily., Disp: , Rfl:   •  Cholecalciferol (VITAMIN D) 2000 UNITS capsule, Take 1 capsule by mouth daily., Disp: , Rfl:   •  Famotidine-Ca Carb-Mag Hydrox (PEPCID COMPLETE PO), Take 1 tablet by mouth As Needed., Disp: , Rfl:   •  flecainide (TAMBOCOR) 50 MG tablet, Take 1 tablet by mouth 2 (Two) Times a Day., Disp: 180 tablet, Rfl: 0  •  levothyroxine (SYNTHROID, LEVOTHROID) 50 MCG tablet, TAKE ONE TABLET BY MOUTH DAILY, Disp: 90 tablet, Rfl: 0  •  loratadine (Claritin) 10 MG tablet, Take 10 mg by mouth Daily., Disp: , Rfl:   •  LORazepam (ATIVAN) 0.5 MG tablet, Take 1 tablet by mouth Daily As Needed for Anxiety., Disp: 30 tablet, Rfl: 0  •  metroNIDAZOLE (FLAGYL) 500 MG tablet, Take 500 mg by mouth 3 (Three) Times a Day., Disp: , Rfl:   •  montelukast (SINGULAIR) 10 MG tablet, Take 1 tablet by mouth every night at bedtime., Disp: 90 tablet, Rfl: 1  •  pantoprazole (PROTONIX) 40 MG EC tablet, Take 40 mg by mouth Every Other Day., Disp: , Rfl:   •  traZODone (DESYREL) 50 MG tablet, Take 1-2 tablets by mouth Every Night., Disp: 60 tablet, Rfl: 5  •  vitamin C (ASCORBIC ACID) 500 MG tablet, Take 500 mg by mouth Daily., Disp: , Rfl:   •  metoprolol tartrate (LOPRESSOR) 25 MG tablet, Take 0.5 tablets by mouth Every Night., Disp: 180 tablet, Rfl: 3    Past Medical History:   Diagnosis Date   • Abnormal blood chemistry    • Arthritis     right knee   • Cyst of buttocks    • Diverticulosis    • Dizziness    • Dysuria    • Esophagitis    • Flushing    • GERD (gastroesophageal reflux disease)    • Heartburn    • Hypertension    • Hyperthyroidism    • Hypothyroidism    • Irritable bowel syndrome    • LLQ abdominal pain    • Polyp of sigmoid colon    • Sinusitis    • Skin lesion of face    • UTI (urinary tract infection)        Past Surgical History:   Procedure Laterality Date   • CARDIAC ABLATION  08/21/2013  "   Pulmonary vein ablation by Dr. Abdirahman Lackey, 2013.   • RHINOPLASTY     • SUBTOTAL HYSTERECTOMY     • TUBAL ABDOMINAL LIGATION         Family History   Problem Relation Age of Onset   • Dementia Mother    • Glomerulonephritis Mother    • Hypertension Mother    • Other Mother          at age 88   • Arthritis Father    • Cancer Father         prostate cancer   • Other Father          at age 65   • Heart attack Father    • Hypertension Other    • Osteoarthritis Other        Social History     Tobacco Use   • Smoking status: Never Smoker   • Smokeless tobacco: Never Used   Substance Use Topics   • Alcohol use: Yes     Comment: on occasion           Blood pressure 112/72, pulse 93, height 174 cm (68.5\"), weight 72.6 kg (160 lb), SpO2 98 %, not currently breastfeeding.  Body mass index is 23.97 kg/m².  Vitals:    21 1324   Patient Position: Sitting       Constitutional:       Appearance: Healthy appearance. Well-developed.   Eyes:      General: Lids are normal. No scleral icterus.     Conjunctiva/sclera: Conjunctivae normal.   HENT:      Head: Normocephalic and atraumatic.   Neck:      Musculoskeletal: Normal range of motion.      Thyroid: No thyromegaly.      Vascular: No carotid bruit or JVD.   Pulmonary:      Effort: Pulmonary effort is normal.      Breath sounds: Normal breath sounds. No wheezing. No rhonchi. No rales.   Cardiovascular:      Normal rate. Regular rhythm.      Murmurs: There is no murmur.      No gallop. No rub.   Pulses:     Intact distal pulses.   Edema:     Peripheral edema absent.   Abdominal:      General: There is no distension.      Palpations: Abdomen is soft. There is no abdominal mass.   Skin:     General: Skin is warm and dry.      Findings: No rash.   Neurological:      General: No focal deficit present.      Mental Status: Alert and oriented to person, place, and time.      Gait: Gait is intact.   Psychiatric:         Attention and Perception: Attention " normal.         Mood and Affect: Mood normal.         Behavior: Behavior normal.         Data Review (reviewed with patient):       ECG 12 Lead    Date/Time: 1/12/2021 2:12 PM  Performed by: Britta Tobin APRN  Authorized by: Britta Tobin APRN   Comparison: compared with previous ECG from 7/7/2020  Similar to previous ECG  Rhythm: sinus rhythm  BPM: 72  Comments: Normal sinus rhythm  QT/QTc 394/431 MS            Lab Results   Component Value Date    GLUCOSE 84 07/30/2020    BUN 12 07/30/2020    CREATININE 0.78 07/30/2020    EGFRIFNONA 73 07/30/2020    EGFRIFAFRI 106 05/16/2016    BCR 15.4 07/30/2020    K 4.3 07/30/2020    CO2 29.6 (H) 07/30/2020    CALCIUM 10.0 07/30/2020    ALBUMIN 4.50 07/30/2020    ALKPHOS 58 07/30/2020    AST 16 07/30/2020    ALT 17 07/30/2020      Lab Results   Component Value Date    CHOL 241 (H) 07/30/2020    CHLPL 231 (H) 05/16/2016    TRIG 59 07/30/2020    HDL 79 (H) 07/30/2020     (H) 07/30/2020     No results found for: HGBA1C  Lab Results   Component Value Date    WBC 3.62 07/30/2020    HGB 13.0 07/30/2020    HCT 40.5 07/30/2020    MCV 90.0 07/30/2020     07/30/2020     Lab Results   Component Value Date    TSH 2.010 07/10/2020            Problem List Items Addressed This Visit        Other    Essential hypertension    Current Assessment & Plan     · Hypertension is controlled  · Continue amlodipine 5 mg twice daily         Relevant Medications    metoprolol tartrate (LOPRESSOR) 25 MG tablet    Paroxysmal atrial fibrillation (CMS/HCC) - Primary    Overview     · Initially diagnosed in 2012.  · Myocardial perfusion study (04/02/2013):  No ischemia.  · Event monitor (10/2013): demonstrating paroxysmal atrial fibrillation.  · Failure of flecainide, sotalol and Multaq therapy.  · Pulmonary vein ablation by Dr. Abdirahman Lackey, 08/21/2013.  · Chads Vasc=3 (age, female, HTN)         Current Assessment & Plan     · Continue flecainide 50 mg twice daily  · Continue  metoprolol tartrate 12.5 mg in the evening  · Continue aspirin 81 mg daily  · Defer anticoagulation with NOAC due to control of A. fib.  If has multiple episodes will refer to EP for redo ablation and consider starting Eliquis         Relevant Medications    metoprolol tartrate (LOPRESSOR) 25 MG tablet    flecainide (TAMBOCOR) 50 MG tablet          Patient is doing well from a cardiovascular standpoint.  She has no signs or symptoms of angina, heart failure or TIA or CVA.  She is maintaining normal sinus rhythm.  We will defer NOAC and continue her on aspirin due to having no episodes of atrial fibrillation.  If she would have increased episodes we would likely need to restart Eliquis and refer to EP for redo ablation.  She will follow-up 6 months or sooner if needed.     · Continue current medications  · Defer NOAC due to limited episodes of A. fib but take daily aspirin  Return in about 6 months (around 7/12/2021), or if symptoms worsen or fail to improve, for Follow-up with Dr. Ruth next visit.    Britta Tobin, APRN  1/12/2021

## 2021-01-12 ENCOUNTER — OFFICE VISIT (OUTPATIENT)
Dept: CARDIOLOGY | Facility: CLINIC | Age: 71
End: 2021-01-12

## 2021-01-12 VITALS
HEIGHT: 69 IN | SYSTOLIC BLOOD PRESSURE: 112 MMHG | WEIGHT: 160 LBS | OXYGEN SATURATION: 98 % | BODY MASS INDEX: 23.7 KG/M2 | DIASTOLIC BLOOD PRESSURE: 72 MMHG | HEART RATE: 93 BPM

## 2021-01-12 DIAGNOSIS — I10 ESSENTIAL HYPERTENSION: ICD-10-CM

## 2021-01-12 DIAGNOSIS — I48.0 PAROXYSMAL ATRIAL FIBRILLATION (HCC): Primary | ICD-10-CM

## 2021-01-12 PROCEDURE — 93000 ELECTROCARDIOGRAM COMPLETE: CPT | Performed by: NURSE PRACTITIONER

## 2021-01-12 PROCEDURE — 99214 OFFICE O/P EST MOD 30 MIN: CPT | Performed by: NURSE PRACTITIONER

## 2021-01-12 RX ORDER — FLECAINIDE ACETATE 50 MG/1
50 TABLET ORAL 2 TIMES DAILY
Qty: 180 TABLET | Refills: 0 | Status: SHIPPED | OUTPATIENT
Start: 2021-01-12 | End: 2021-04-14

## 2021-01-12 NOTE — ASSESSMENT & PLAN NOTE
· Continue flecainide 50 mg twice daily  · Continue metoprolol tartrate 12.5 mg in the evening  · Continue aspirin 81 mg daily  · Defer anticoagulation with NOAC due to control of A. fib.  If has multiple episodes will refer to EP for redo ablation and consider starting Eliquis

## 2021-01-13 DIAGNOSIS — F41.9 ANXIETY: ICD-10-CM

## 2021-01-13 NOTE — TELEPHONE ENCOUNTER
Last Office Visit: 7/30/20  Next Office Visit:3/2/21    Labs completed in past 6 months? yes  Labs completed in past year? yes    Last Refill Date:10/6/20  Quantity:30  Refills:0    Pharmacy:

## 2021-01-13 NOTE — TELEPHONE ENCOUNTER
Caller: Annalise Winters    Relationship: Self    Best call back number:   [phone]    Medication needed:   Requested Prescriptions     Pending Prescriptions Disp Refills   • LORazepam (ATIVAN) 0.5 MG tablet 30 tablet 0     Sig: Take 1 tablet by mouth Daily As Needed for Anxiety.       When do you need the refill by: 01/20/21    What details did the patient provide when requesting the medication: has about a week left     Does the patient have less than a 3 day supply:  [] Yes  [x] No    What is the patient's preferred pharmacy: [facility] [phone] [facility] [phone] FX

## 2021-01-15 RX ORDER — LORAZEPAM 0.5 MG/1
0.5 TABLET ORAL DAILY PRN
Qty: 20 TABLET | Refills: 0 | Status: SHIPPED | OUTPATIENT
Start: 2021-01-15 | End: 2021-03-02 | Stop reason: SDUPTHER

## 2021-01-15 NOTE — TELEPHONE ENCOUNTER
Caller: Annalise Winters    Relationship: Self    Best call back number: [phone]    Medication needed:   Requested Prescriptions     Pending Prescriptions Disp Refills   • LORazepam (ATIVAN) 0.5 MG tablet 30 tablet 0     Sig: Take 1 tablet by mouth Daily As Needed for Anxiety.       When do you need the refill by: PATIENT STILL HAS FEW LEFT      Does the patient have less than a 3 day supply:  [] Yes  [x] No    What is the patient's preferred pharmacy: [facility] [address] [place] [address] [phone] [facility] [phone]

## 2021-01-21 ENCOUNTER — LAB (OUTPATIENT)
Dept: LAB | Facility: HOSPITAL | Age: 71
End: 2021-01-21

## 2021-01-21 ENCOUNTER — OFFICE VISIT (OUTPATIENT)
Dept: INTERNAL MEDICINE | Facility: CLINIC | Age: 71
End: 2021-01-21

## 2021-01-21 VITALS
SYSTOLIC BLOOD PRESSURE: 128 MMHG | DIASTOLIC BLOOD PRESSURE: 80 MMHG | OXYGEN SATURATION: 98 % | WEIGHT: 164.6 LBS | BODY MASS INDEX: 24.66 KG/M2 | TEMPERATURE: 97.1 F | HEART RATE: 72 BPM

## 2021-01-21 DIAGNOSIS — E55.9 VITAMIN D DEFICIENCY: ICD-10-CM

## 2021-01-21 DIAGNOSIS — E53.8 B12 DEFICIENCY: Primary | ICD-10-CM

## 2021-01-21 DIAGNOSIS — M79.662 PAIN IN BOTH LOWER LEGS: ICD-10-CM

## 2021-01-21 DIAGNOSIS — M79.661 PAIN IN BOTH LOWER LEGS: ICD-10-CM

## 2021-01-21 DIAGNOSIS — E53.8 B12 DEFICIENCY: ICD-10-CM

## 2021-01-21 PROCEDURE — 82306 VITAMIN D 25 HYDROXY: CPT

## 2021-01-21 PROCEDURE — 82607 VITAMIN B-12: CPT

## 2021-01-21 PROCEDURE — 36415 COLL VENOUS BLD VENIPUNCTURE: CPT

## 2021-01-21 PROCEDURE — 99213 OFFICE O/P EST LOW 20 MIN: CPT | Performed by: PHYSICIAN ASSISTANT

## 2021-01-21 NOTE — PROGRESS NOTES
Chief Complaint   Patient presents with   • Fatigue     Acute    • Legs aching   • Numbness in feet       Subjective     Annalise Winters is a 70 y.o. female.        History of Present Illness     Pt has been having some aching in her bilateral lower legs over the last week or so. 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 Tried elevating her legs and it does not seem to help. Does not ache all the time, sometimes feels fine and then has a shooting pain go through. 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 Occasional numbness in her feet. Plans to schedule appt with podiatrist she has seen in the past. Feet and legs feel better when she is wearing shoes. Notes that she actually has not had much pain in the last 2-3 days.    She saw her Cardiologist last week but did not mention it because it was not as much of an issue at that time.        Current Outpatient Medications:   •  acetaminophen (TYLENOL) 500 MG tablet, Take 500 mg by mouth As Needed., Disp: , Rfl:   •  amLODIPine (NORVASC) 5 MG tablet, Take 1 tablet by mouth 2 (two) times a day., Disp: 180 tablet, Rfl: 3  •  aspirin 81 MG chewable tablet, Chew 1 tablet Daily., Disp: , Rfl:   •  Cholecalciferol (VITAMIN D) 2000 UNITS capsule, Take 1 capsule by mouth daily., Disp: , Rfl:   •  Famotidine-Ca Carb-Mag Hydrox (PEPCID COMPLETE PO), Take 1 tablet by mouth As Needed., Disp: , Rfl:   •  flecainide (TAMBOCOR) 50 MG tablet, Take 1 tablet by mouth 2 (Two) Times a Day., Disp: 180 tablet, Rfl: 0  •  levothyroxine (SYNTHROID, LEVOTHROID) 50 MCG tablet, TAKE ONE TABLET BY MOUTH DAILY, Disp: 90 tablet, Rfl: 0  •  loratadine (Claritin) 10 MG tablet, Take 10 mg by mouth Daily., Disp: , Rfl:   •  LORazepam (ATIVAN) 0.5 MG tablet, Take 1 tablet by mouth Daily As Needed for Anxiety., Disp: 20 tablet, Rfl: 0  •   metoprolol tartrate (LOPRESSOR) 25 MG tablet, Take 0.5 tablets by mouth Every Night., Disp: 180 tablet, Rfl: 3  •  montelukast (SINGULAIR) 10 MG tablet, Take 1 tablet by mouth every night at bedtime., Disp: 90 tablet, Rfl: 1  •  pantoprazole (PROTONIX) 40 MG EC tablet, Take 40 mg by mouth Every Other Day., Disp: , Rfl:   •  vitamin C (ASCORBIC ACID) 500 MG tablet, Take 500 mg by mouth Daily., Disp: , Rfl:      PMFSH  The following portions of the patient's history were reviewed and updated as appropriate: allergies, current medications, past family history, past medical history, past social history, past surgical history and problem list.    Review of Systems   Constitutional: Negative for activity change, appetite change and fatigue.   HENT: Negative for congestion and rhinorrhea.    Respiratory: Negative for chest tightness and shortness of breath.    Cardiovascular: Negative for chest pain and palpitations.   Gastrointestinal: Negative for abdominal pain.   Genitourinary: Negative for dysuria.   Musculoskeletal: Positive for myalgias. Negative for arthralgias and gait problem.   Neurological: Negative for dizziness, weakness, light-headedness and headaches.   Psychiatric/Behavioral: Negative for dysphoric mood. The patient is not nervous/anxious.        Objective   /80   Pulse 72   Temp 97.1 °F (36.2 °C)   Wt 74.7 kg (164 lb 9.6 oz)   LMP  (LMP Unknown)   SpO2 98%   BMI 24.66 kg/m²     Physical Exam  Vitals signs and nursing note reviewed.   Constitutional:       Appearance: She is well-developed.   HENT:      Head: Normocephalic.      Right Ear: Hearing, tympanic membrane, ear canal and external ear normal.      Left Ear: Hearing, tympanic membrane, ear canal and external ear normal.      Nose: Nose normal.   Eyes:      Conjunctiva/sclera: Conjunctivae normal.      Pupils: Pupils are equal, round, and reactive to light.   Neck:      Musculoskeletal: Normal range of motion.   Cardiovascular:      Rate  and Rhythm: Normal rate and regular rhythm.      Pulses:           Posterior tibial pulses are 2+ on the right side and 2+ on the left side.      Heart sounds: Normal heart sounds.   Pulmonary:      Effort: Pulmonary effort is normal.      Breath sounds: Normal breath sounds. No decreased breath sounds, wheezing, rhonchi or rales.   Musculoskeletal: Normal range of motion.      Right ankle: She exhibits normal range of motion. No tenderness.      Left ankle: She exhibits normal range of motion. No tenderness.      Right lower leg: She exhibits no tenderness and no swelling. No edema.      Left lower leg: She exhibits no tenderness and no swelling. No edema.   Skin:     General: Skin is warm and dry.   Neurological:      Mental Status: She is alert.   Psychiatric:         Behavior: Behavior normal.              ASSESSMENT/PLAN    Diagnoses and all orders for this visit:    1. B12 deficiency (Primary)  -     Vitamin B12; Future    2. Vitamin D deficiency  -     Vitamin D 25 Hydroxy; Future    3. Pain in both lower legs  Comments:  Agreed that concrete floor may be contributing to discomfort- she will avoid this surface for exercise. Schedule with podiatrist to discuss orthotics. Continue stretching and rest prn. Discomfort has improved recently so she will monitor for recurrence. Check vitamin levels as ordered. Further recs based on results.             Return for Next scheduled follow up.

## 2021-01-22 LAB
25(OH)D3 SERPL-MCNC: 58.8 NG/ML (ref 30–100)
VIT B12 BLD-MCNC: 1323 PG/ML (ref 211–946)

## 2021-01-22 NOTE — PROGRESS NOTES
Your vitamin B12 level is actually a little bit high. You can either reduce the amount of B12 replacement you are taking or switch to taking it every other day.    Your vitamin D level looks great! You can continue your current dose of vitamin D.

## 2021-01-28 ENCOUNTER — TELEPHONE (OUTPATIENT)
Dept: CARDIOLOGY | Facility: CLINIC | Age: 71
End: 2021-01-28

## 2021-01-28 ENCOUNTER — TELEPHONE (OUTPATIENT)
Dept: INTERNAL MEDICINE | Facility: CLINIC | Age: 71
End: 2021-01-28

## 2021-01-28 DIAGNOSIS — I48.0 PAROXYSMAL ATRIAL FIBRILLATION (HCC): Primary | ICD-10-CM

## 2021-01-28 NOTE — TELEPHONE ENCOUNTER
Pt states she did call the podiatrist today and they said they cannot see her since she is having the numbness in her legs, francois an appt for tomorrow with Dr Jauregui

## 2021-01-28 NOTE — TELEPHONE ENCOUNTER
The patient is calling at the request of her gastro doctor.  They would like to know if she can discontinue her daily aspirin as it is complicating the treatment for diverticulosis.  She was told that bowel surgery would likely be needed if she continues the aspirin.  They recommended she ask if the Watchman implant could be an option.    Per a previous note - NOAC has been deferred due to minimal episodes for afib.    We also discussed issues with stiffness in her legs last nights which she has an appt to see her PCP about tomorrow.  The covid vaccine was discussed and I advised that our physicians are okay with patients proceeding if they are comfortable receiving it.    Do you have an recs regarding the aspirin?  Please advise.

## 2021-01-28 NOTE — TELEPHONE ENCOUNTER
If she thinks it is similar to the sensation with Headley's neuroma, she should schedule an appointment with her Podiatrist. This is something we discussed as a next step at her appointment.

## 2021-01-28 NOTE — TELEPHONE ENCOUNTER
THE PATIENT STATES SHE SAW TIGRE GOODSON ON 01/21/2021 FOR LEG PAIN BUT LAST NIGHT, 01/27/2021, SHE REPORTS SHE EXPERIENCED A TIGHTENING FEELING ON THE SKIN ON HER LEGS.  SHE EXPLAINED THAT IT FELT LIKE THE SHARP NEUROMA THAT SHE EXPERIENCES IN HER FEET BUT IT WAS IN BOTH LEGS, BUT MORE IN THE LEFT LEG.  THE PATIENT IS REQUESTING A CALL BACK WITH TO DISCUSS [phone] .

## 2021-01-29 ENCOUNTER — OFFICE VISIT (OUTPATIENT)
Dept: INTERNAL MEDICINE | Facility: CLINIC | Age: 71
End: 2021-01-29

## 2021-01-29 VITALS
TEMPERATURE: 97.1 F | OXYGEN SATURATION: 98 % | WEIGHT: 164.4 LBS | SYSTOLIC BLOOD PRESSURE: 128 MMHG | HEIGHT: 69 IN | BODY MASS INDEX: 24.35 KG/M2 | HEART RATE: 78 BPM | DIASTOLIC BLOOD PRESSURE: 86 MMHG

## 2021-01-29 DIAGNOSIS — F41.9 ANXIETY: ICD-10-CM

## 2021-01-29 DIAGNOSIS — R20.0 BILATERAL LEG NUMBNESS: Primary | ICD-10-CM

## 2021-01-29 DIAGNOSIS — K57.92 DIVERTICULITIS: ICD-10-CM

## 2021-01-29 PROCEDURE — 99214 OFFICE O/P EST MOD 30 MIN: CPT | Performed by: INTERNAL MEDICINE

## 2021-01-29 RX ORDER — VIT C/B6/B5/MAGNESIUM/HERB 173 50-5-6-5MG
CAPSULE ORAL DAILY
COMMUNITY
End: 2021-07-19

## 2021-01-29 NOTE — TELEPHONE ENCOUNTER
Refer to EP for consideration of watchman.  She can hold aspirin knowing that there will be increased risk of stroke (estimated 3.2 %/year).    Leg stiffness has nothing to do with me.    She absolutely should get Covid vaccination    H. VERENICE. Al Ruth MD [facility], [facility]  Interventional and General Cardiology

## 2021-01-29 NOTE — PROGRESS NOTES
Numbness (in legs with tightness a couple times)    Subjective   Annalise Winters is a 70 y.o. female is here today for follow-up.    History of Present Illness   Annalise is here for a follow up on her anxiety and numbness.  Has a Head[place] neuroma in her foot, and given steroid shot. Cardiologist told her not to do steroid shots.  Now both feet feel that way, and goes up to her calf. Feel slike it goes up her legs , skin felt tight.  This happened at night. Was on her feet all day. Exercised, and then vacuumed and cleaned her house after which it happened.  Also ate soup prior to that , so wondering if sodium issues.    Ha nausea, and llq pain, and occ incontinence.  Having to take her Ativan more frequently.    Current Outpatient Medications:   •  acetaminophen (TYLENOL) 500 MG tablet, Take 500 mg by mouth As Needed., Disp: , Rfl:   •  amLODIPine (NORVASC) 5 MG tablet, Take 1 tablet by mouth 2 (two) times a day., Disp: 180 tablet, Rfl: 3  •  aspirin 81 MG chewable tablet, Chew 1 tablet Daily., Disp: , Rfl:   •  Calcium Carbonate-Vit D-Min (CALCIUM 1200 PO), Take  by mouth., Disp: , Rfl:   •  Cholecalciferol (VITAMIN D) 2000 UNITS capsule, Take 1 capsule by mouth daily., Disp: , Rfl:   •  Famotidine-Ca Carb-Mag Hydrox (PEPCID COMPLETE PO), Take 1 tablet by mouth As Needed., Disp: , Rfl:   •  flecainide (TAMBOCOR) 50 MG tablet, Take 1 tablet by mouth 2 (Two) Times a Day., Disp: 180 tablet, Rfl: 0  •  levothyroxine (SYNTHROID, LEVOTHROID) 50 MCG tablet, TAKE ONE TABLET BY MOUTH DAILY, Disp: 90 tablet, Rfl: 0  •  loratadine (Claritin) 10 MG tablet, Take 10 mg by mouth Daily., Disp: , Rfl:   •  LORazepam (ATIVAN) 0.5 MG tablet, Take 1 tablet by mouth Daily As Needed for Anxiety., Disp: 20 tablet, Rfl: 0  •  metoprolol tartrate (LOPRESSOR) 25 MG tablet, Take 0.5 tablets by mouth Every Night., Disp: 180 tablet, Rfl: 3  •  montelukast (SINGULAIR) 10 MG tablet, Take 1 tablet by mouth every night at bedtime., Disp: 90 tablet,  "Rfl: 1  •  pantoprazole (PROTONIX) 40 MG EC tablet, Take 40 mg by mouth Every Other Day., Disp: , Rfl:   •  Turmeric 500 MG capsule, Take  by mouth., Disp: , Rfl:   •  vitamin C (ASCORBIC ACID) 500 MG tablet, Take 500 mg by mouth Daily., Disp: , Rfl:       The following portions of the patient's history were reviewed and updated as appropriate: allergies, current medications, past family history, past medical history, past social history, past surgical history and problem list.    Review of Systems   Constitutional: Negative.  Negative for chills and fever.   HENT: Negative for ear discharge, ear pain, sinus pressure and sore throat.    Respiratory: Negative for cough, chest tightness and shortness of breath.    Cardiovascular: Negative for chest pain, palpitations and leg swelling.   Gastrointestinal: Negative for diarrhea, nausea and vomiting.   Musculoskeletal: Positive for arthralgias and myalgias. Negative for back pain.   Neurological: Positive for numbness. Negative for dizziness, syncope and headaches.   Psychiatric/Behavioral: Positive for sleep disturbance. Negative for confusion.       Objective   /86   Pulse 78   Temp 97.1 °F (36.2 °C)   Ht 174 cm (68.5\")   Wt 74.6 kg (164 lb 6.4 oz)   LMP  (LMP Unknown)   SpO2 98% Comment: ra  BMI 24.63 kg/m²   Physical Exam  Vitals signs and nursing note reviewed.   Constitutional:       Appearance: She is well-developed.   HENT:      Head: Normocephalic and atraumatic.      Right Ear: External ear normal.      Left Ear: External ear normal.      Mouth/Throat:      Pharynx: No oropharyngeal exudate.   Eyes:      Conjunctiva/sclera: Conjunctivae normal.      Pupils: Pupils are equal, round, and reactive to light.   Neck:      Musculoskeletal: Neck supple.      Thyroid: No thyromegaly.   Cardiovascular:      Rate and Rhythm: Normal rate and regular rhythm.      Pulses: Normal pulses.      Heart sounds: Normal heart sounds.   Pulmonary:      Effort: Pulmonary " effort is normal.      Breath sounds: Normal breath sounds.   Abdominal:      General: Bowel sounds are normal. There is no distension.      Palpations: Abdomen is soft.      Tenderness: There is no abdominal tenderness.   Skin:     General: Skin is warm and dry.   Neurological:      Mental Status: She is alert and oriented to person, place, and time.      Cranial Nerves: No cranial nerve deficit.   Psychiatric:         Judgment: Judgment normal.           Results for orders placed or performed in visit on 01/21/21   Vitamin B12    Specimen: Blood   Result Value Ref Range    Vitamin B-12 1,323 (H) 211 - 946 pg/mL   Vitamin D 25 Hydroxy    Specimen: Blood   Result Value Ref Range    25 Hydroxy, Vitamin D 58.8 30.0 - 100.0 ng/ml             Assessment/Plan   Diagnoses and all orders for this visit:    Bilateral leg numbness  -     XR Spine Lumbar 2 or 3 View; Future    Diverticulitis  Comments:  start fiber, add probiotic( has align)    Anxiety  Comments:  Take Ativan sparingly.  Call for refill if runs out.  Rx sent to pharmacy last week which she has filled.    Other orders  -     Turmeric 500 MG capsule; Take  by mouth.  -     Calcium Carbonate-Vit D-Min (CALCIUM 1200 PO); Take  by mouth.             Check xrays, start stretches, continue b12.  Try epsom salt soaks at night.  Pulses are normal.  Therefore symptoms likely related to her back.  Advised if not better she will need EMG/NCV.        Return for Next scheduled follow up.

## 2021-02-03 ENCOUNTER — IMMUNIZATION (OUTPATIENT)
Dept: VACCINE CLINIC | Facility: HOSPITAL | Age: 71
End: 2021-02-03

## 2021-02-03 ENCOUNTER — HOSPITAL ENCOUNTER (OUTPATIENT)
Dept: GENERAL RADIOLOGY | Facility: HOSPITAL | Age: 71
Discharge: HOME | End: 2021-02-03

## 2021-02-03 ENCOUNTER — APPOINTMENT (OUTPATIENT)
Dept: GENERAL RADIOLOGY | Facility: HOSPITAL | Age: 71
End: 2021-02-03

## 2021-02-03 DIAGNOSIS — R20.0 BILATERAL LEG NUMBNESS: ICD-10-CM

## 2021-02-03 PROCEDURE — 91300 HC SARSCOV02 VAC 30MCG/0.3ML IM: CPT | Performed by: INTERNAL MEDICINE

## 2021-02-03 PROCEDURE — 72100 X-RAY EXAM L-S SPINE 2/3 VWS: CPT

## 2021-02-03 PROCEDURE — 0001A: CPT | Performed by: INTERNAL MEDICINE

## 2021-02-04 ENCOUNTER — TELEPHONE (OUTPATIENT)
Dept: NEUROLOGY | Facility: CLINIC | Age: 71
End: 2021-02-04

## 2021-02-04 ENCOUNTER — TELEPHONE (OUTPATIENT)
Dept: INTERNAL MEDICINE | Facility: CLINIC | Age: 71
End: 2021-02-04

## 2021-02-04 NOTE — TELEPHONE ENCOUNTER
If its for her leg numbness, Her cardiologist may be able to do her vascular studies as well.  She should make an appointment with Dr. Ruth and discuss her concerns with him.    thanks

## 2021-02-04 NOTE — TELEPHONE ENCOUNTER
Patient stated that she saw a podiatrist and was told that she should see a vascular doctor.  The vascular doctor that she has seen before has retired.  The patient is asking for a referral to a vascuilar doctor.    Patient callback: [phone]    Please advise

## 2021-02-04 NOTE — TELEPHONE ENCOUNTER
NILSON SMALL     Relationship to patient: SELF     Best call back number:[phone]    Chief complaint: PT IS HAVING INCREASE ON SHARP NEUROMA IN BOTH FEET PAIN IS WORSE      Type of visit: FOLLOW UP     Requested date: ANY     If rescheduling, when is the original appointment: 06/16/2021    Additional notes:         PLEASE ADVISE PT IS ON WAIT LIST

## 2021-02-05 ENCOUNTER — TELEPHONE (OUTPATIENT)
Dept: INTERNAL MEDICINE | Facility: CLINIC | Age: 71
End: 2021-02-05

## 2021-02-05 NOTE — TELEPHONE ENCOUNTER
Pt notified of results and decided that she would start a pilates reformer class to try to help with this first and will call back if she decides she wants PT.

## 2021-02-05 NOTE — TELEPHONE ENCOUNTER
----- Message from Maryse Jauregui MD sent at 2/4/2021 11:49 PM EST -----  Please make sure patient is aware of the following mychart message:    Your back x-ray show degenerative arthritis extending throughout the back.  This could be causing some nerve compression.  You may benefit from formal physical therapy, to see if it helps with the symptoms in your leg.  Please let me know if I can proceed with PT referral.

## 2021-02-05 NOTE — TELEPHONE ENCOUNTER
Yes, she's a Molly Ramos patient, we just don't have any sooner availability here at center. I'll just put her on the wait list in hopes we can get her in sooner.

## 2021-02-13 ENCOUNTER — OFFICE VISIT (OUTPATIENT)
Dept: INTERNAL MEDICINE | Facility: CLINIC | Age: 71
End: 2021-02-13

## 2021-02-13 VITALS
OXYGEN SATURATION: 98 % | DIASTOLIC BLOOD PRESSURE: 76 MMHG | TEMPERATURE: 97.8 F | SYSTOLIC BLOOD PRESSURE: 148 MMHG | BODY MASS INDEX: 24.15 KG/M2 | HEART RATE: 76 BPM | WEIGHT: 161.2 LBS

## 2021-02-13 DIAGNOSIS — R35.0 FREQUENCY OF MICTURITION: Primary | ICD-10-CM

## 2021-02-13 LAB
BILIRUB BLD-MCNC: NEGATIVE MG/DL
CLARITY, POC: CLEAR
COLOR UR: YELLOW
GLUCOSE UR STRIP-MCNC: NEGATIVE MG/DL
KETONES UR QL: NEGATIVE
LEUKOCYTE EST, POC: NEGATIVE
NITRITE UR-MCNC: NEGATIVE MG/ML
PH UR: 6 [PH] (ref 5–8)
PROT UR STRIP-MCNC: NEGATIVE MG/DL
RBC # UR STRIP: ABNORMAL /UL
SP GR UR: 1.01 (ref 1–1.03)
UROBILINOGEN UR QL: NORMAL

## 2021-02-13 PROCEDURE — 81003 URINALYSIS AUTO W/O SCOPE: CPT | Performed by: NURSE PRACTITIONER

## 2021-02-13 PROCEDURE — 99213 OFFICE O/P EST LOW 20 MIN: CPT | Performed by: NURSE PRACTITIONER

## 2021-02-13 RX ORDER — PHENAZOPYRIDINE HYDROCHLORIDE 100 MG/1
100 TABLET, FILM COATED ORAL 3 TIMES DAILY PRN
Qty: 6 TABLET | Refills: 0 | Status: SHIPPED | OUTPATIENT
Start: 2021-02-13 | End: 2021-02-15

## 2021-02-13 RX ORDER — NITROFURANTOIN 25; 75 MG/1; MG/1
100 CAPSULE ORAL EVERY 12 HOURS SCHEDULED
Qty: 10 CAPSULE | Refills: 0 | Status: SHIPPED | OUTPATIENT
Start: 2021-02-13 | End: 2021-02-18

## 2021-02-13 NOTE — PROGRESS NOTES
Chief Complaint   Patient presents with   • Urinary Tract Infection       History of Present Illness      Annalise Winters is a 70 y.o. female who presents today for UTI.     Urinary Tract Infection   This is a new problem. The current episode started today. The pain is at a severity of 0/10. The patient is experiencing no pain. There has been no fever. Associated symptoms include frequency, hesitancy and urgency. Pertinent negatives include no chills, discharge, flank pain, hematuria, nausea, sweats or vomiting. She has tried increased fluids for the symptoms. Her past medical history is significant for recurrent UTIs.       Review of Systems  Review of Systems   Constitutional: Negative for appetite change, chills, diaphoresis, fatigue and fever.   Respiratory: Negative for cough and shortness of breath.    Cardiovascular: Negative for chest pain and palpitations.   Gastrointestinal: Negative for abdominal distention, abdominal pain, constipation, diarrhea, nausea and vomiting.   Genitourinary: Positive for frequency, hesitancy, pelvic pain and urgency. Negative for decreased urine volume, difficulty urinating, dysuria, flank pain and hematuria.   Musculoskeletal: Negative for myalgias.   Skin: Negative for color change and rash.   Neurological: Negative for dizziness, weakness, light-headedness, headache and confusion.   Psychiatric/Behavioral: Negative for sleep disturbance.         [facility]  The following portions of the patient's history were reviewed and updated as appropriate: allergies, current medications, past family history, past medical history, past social history, past surgical history and problem list.     Past Medical History:   Diagnosis Date   • Abnormal blood chemistry    • Arthritis     right knee   • Cyst of buttocks    • Diverticulosis    • Dizziness    • Dysuria    • Esophagitis    • Flushing    • GERD (gastroesophageal reflux disease)    • Heartburn    • Hypertension    • Hyperthyroidism    •  Hypothyroidism    • Irritable bowel syndrome    • LLQ abdominal pain    • Polyp of sigmoid colon    • Sinusitis    • Skin lesion of face    • UTI (urinary tract infection)      Social History     Tobacco Use   • Smoking status: Never Smoker   • Smokeless tobacco: Never Used   Substance Use Topics   • Alcohol use: Yes     Comment: on occasion     Past Surgical History:   Procedure Laterality Date   • CARDIAC ABLATION  2013    Pulmonary vein ablation by Dr. Abdirahman Lackey, 2013.   • RHINOPLASTY     • SUBTOTAL HYSTERECTOMY     • TUBAL ABDOMINAL LIGATION       Family History   Problem Relation Age of Onset   • Dementia Mother    • Glomerulonephritis Mother    • Hypertension Mother    • Other Mother          at age 88   • Arthritis Father    • Cancer Father         prostate cancer   • Other Father          at age 65   • Heart attack Father    • Hypertension Other    • Osteoarthritis Other      Allergies   Allergen Reactions   • Ace Inhibitors Cough   • Celexa [Citalopram Hydrobromide] Dizziness   • Corticosteroids Rash   • Paxil [Paroxetine Hcl] Other (See Comments)     somnolence           Current Outpatient Medications:   •  acetaminophen (TYLENOL) 500 MG tablet, Take 500 mg by mouth As Needed., Disp: , Rfl:   •  amLODIPine (NORVASC) 5 MG tablet, Take 1 tablet by mouth 2 (two) times a day., Disp: 180 tablet, Rfl: 3  •  aspirin 81 MG chewable tablet, Chew 1 tablet Daily., Disp: , Rfl:   •  Calcium Carbonate-Vit D-Min (CALCIUM 1200 PO), Take  by mouth., Disp: , Rfl:   •  Cholecalciferol (VITAMIN D) 2000 UNITS capsule, Take 1 capsule by mouth daily., Disp: , Rfl:   •  Famotidine-Ca Carb-Mag Hydrox (PEPCID COMPLETE PO), Take 1 tablet by mouth As Needed., Disp: , Rfl:   •  flecainide (TAMBOCOR) 50 MG tablet, Take 1 tablet by mouth 2 (Two) Times a Day., Disp: 180 tablet, Rfl: 0  •  levothyroxine (SYNTHROID, LEVOTHROID) 50 MCG tablet, TAKE ONE TABLET BY MOUTH DAILY, Disp: 90 tablet, Rfl: 0  •   loratadine (Claritin) 10 MG tablet, Take 10 mg by mouth Daily., Disp: , Rfl:   •  LORazepam (ATIVAN) 0.5 MG tablet, Take 1 tablet by mouth Daily As Needed for Anxiety., Disp: 20 tablet, Rfl: 0  •  metoprolol tartrate (LOPRESSOR) 25 MG tablet, Take 0.5 tablets by mouth Every Night., Disp: 180 tablet, Rfl: 3  •  montelukast (SINGULAIR) 10 MG tablet, Take 1 tablet by mouth every night at bedtime., Disp: 90 tablet, Rfl: 1  •  pantoprazole (PROTONIX) 40 MG EC tablet, Take 40 mg by mouth Every Other Day., Disp: , Rfl:   •  Turmeric 500 MG capsule, Take  by mouth., Disp: , Rfl:   •  vitamin C (ASCORBIC ACID) 500 MG tablet, Take 500 mg by mouth Daily., Disp: , Rfl:   •  nitrofurantoin, macrocrystal-monohydrate, (MACROBID) 100 MG capsule, Take 1 capsule by mouth Every 12 (Twelve) Hours for 5 days., Disp: 10 capsule, Rfl: 0  •  phenazopyridine (Pyridium) 100 MG tablet, Take 1 tablet by mouth 3 (Three) Times a Day As Needed for Bladder Spasms for up to 2 days., Disp: 6 tablet, Rfl: 0    Objective   Vitals:  Vitals:    02/13/21 1339   BP: 148/76   Pulse: 76   Temp: 97.8 °F (36.6 °C)   SpO2: 98%   Weight: 73.1 kg (161 lb 3.2 oz)   PainSc: 0-No pain       Physical Exam  Physical Exam  Vitals signs and nursing note reviewed.   Constitutional:       Appearance: Normal appearance. She is well-developed. She is not ill-appearing or toxic-appearing.   Cardiovascular:      Rate and Rhythm: Normal rate and regular rhythm.      Heart sounds: Normal heart sounds.   Pulmonary:      Effort: Pulmonary effort is normal. No respiratory distress.      Breath sounds: Normal breath sounds. No decreased breath sounds, wheezing, rhonchi or rales.   Abdominal:      Tenderness: There is no right CVA tenderness or left CVA tenderness.   Skin:     General: Skin is warm and dry.   Neurological:      Mental Status: She is alert.   Psychiatric:         Behavior: Behavior normal. Behavior is cooperative.         Result Review :   The following data was  reviewed by: TAMMY Hallman on 02/13/2021:  Recent Results (from the past 1 hour(s))   POC Urinalysis Dipstick, Automated    Collection Time: 02/13/21  1:41 PM    Specimen: Urine   Result Value Ref Range    Color Yellow Yellow, Straw, Dark Yellow, Kimberli    Clarity, UA Clear Clear    Specific Gravity  1.010 1.005 - 1.030    pH, Urine 6.0 5.0 - 8.0    Leukocytes Negative Negative    Nitrite, UA Negative Negative    Protein, POC Negative Negative mg/dL    Glucose, UA Negative Negative, 1000 mg/dL (3+) mg/dL    Ketones, UA Negative Negative    Urobilinogen, UA Normal Normal    Bilirubin Negative Negative    Blood, UA 1+ (A) Negative         Assessment and Plan    Diagnoses and all orders for this visit:    1. Frequency of micturition (Primary)  -     POC Urinalysis Dipstick, Automated  -     phenazopyridine (Pyridium) 100 MG tablet; Take 1 tablet by mouth 3 (Three) Times a Day As Needed for Bladder Spasms for up to 2 days.  Dispense: 6 tablet; Refill: 0  -     nitrofurantoin, macrocrystal-monohydrate, (MACROBID) 100 MG capsule; Take 1 capsule by mouth Every 12 (Twelve) Hours for 5 days.  Dispense: 10 capsule; Refill: 0  -     Urine Culture - Urine, Urine, Clean Catch    UA in office unremarkable. Will culture and treat further as indicated. Antibiotic therapy sent to pharmacy and patient advised to hold off on initiating unless symptoms worsen prior to culture results returning given delay in processing due to weekend office visit. Patient was encouraged to increase fluid intake, avoid frequent tub baths, wipe from front to back after using the bathroom, and wear breathable fabrics such as cotton to decrease the incidence of UTIs. Patient was encouraged to call back into the office or seek emergency care if experiencing chills, fever, or back pain.        Follow Up   Return if symptoms worsen or fail to improve.    Plan of care reviewed with patient at conclusion of today's visit. Patient education was provided  regarding diagnosis, management, and prescribed or recommended OTC medications. Patient was informed to notify office of any new, worsening, or persistent symptoms. Patient verbalized understanding and agreement with plan of care.     Electronically Signed By:  TAMMY Hallman  02/13/2021    EMR Dragon/Transcription Disclaimer:  Please note that portions of this encounter note were completed using electronic transcription/translation of spoken language to printed text.  The electronic transcription/translation of spoken language may permit erroneous, or at times, nonsensical words or phrases to be inadvertently transcribed.  Although I have reviewed the note for such errors, some may still exist in this documentation.

## 2021-02-15 ENCOUNTER — LAB (OUTPATIENT)
Dept: LAB | Facility: HOSPITAL | Age: 71
End: 2021-02-15

## 2021-02-15 PROCEDURE — 87086 URINE CULTURE/COLONY COUNT: CPT | Performed by: NURSE PRACTITIONER

## 2021-02-15 PROCEDURE — 87186 SC STD MICRODIL/AGAR DIL: CPT | Performed by: NURSE PRACTITIONER

## 2021-02-15 PROCEDURE — 87088 URINE BACTERIA CULTURE: CPT | Performed by: NURSE PRACTITIONER

## 2021-02-17 LAB — BACTERIA SPEC AEROBE CULT: ABNORMAL

## 2021-02-24 ENCOUNTER — IMMUNIZATION (OUTPATIENT)
Dept: VACCINE CLINIC | Facility: HOSPITAL | Age: 71
End: 2021-02-24

## 2021-02-24 PROCEDURE — 0002A: CPT | Performed by: INTERNAL MEDICINE

## 2021-02-24 PROCEDURE — 91300 HC SARSCOV02 VAC 30MCG/0.3ML IM: CPT | Performed by: INTERNAL MEDICINE

## 2021-03-02 ENCOUNTER — LAB (OUTPATIENT)
Dept: LAB | Facility: HOSPITAL | Age: 71
End: 2021-03-02

## 2021-03-02 ENCOUNTER — OFFICE VISIT (OUTPATIENT)
Dept: INTERNAL MEDICINE | Facility: CLINIC | Age: 71
End: 2021-03-02

## 2021-03-02 VITALS
HEART RATE: 60 BPM | BODY MASS INDEX: 23.85 KG/M2 | HEIGHT: 69 IN | WEIGHT: 161 LBS | DIASTOLIC BLOOD PRESSURE: 72 MMHG | TEMPERATURE: 97.1 F | OXYGEN SATURATION: 98 % | SYSTOLIC BLOOD PRESSURE: 124 MMHG

## 2021-03-02 DIAGNOSIS — R35.0 FREQUENCY OF URINATION: ICD-10-CM

## 2021-03-02 DIAGNOSIS — F41.9 ANXIETY: ICD-10-CM

## 2021-03-02 DIAGNOSIS — E53.8 B12 DEFICIENCY: ICD-10-CM

## 2021-03-02 DIAGNOSIS — E03.9 HYPOTHYROIDISM, UNSPECIFIED TYPE: ICD-10-CM

## 2021-03-02 DIAGNOSIS — I48.0 PAROXYSMAL ATRIAL FIBRILLATION (HCC): ICD-10-CM

## 2021-03-02 DIAGNOSIS — E55.9 VITAMIN D DEFICIENCY: ICD-10-CM

## 2021-03-02 DIAGNOSIS — Z00.00 MEDICARE ANNUAL WELLNESS VISIT, SUBSEQUENT: Primary | ICD-10-CM

## 2021-03-02 DIAGNOSIS — D72.819 LEUKOPENIA, UNSPECIFIED TYPE: ICD-10-CM

## 2021-03-02 DIAGNOSIS — E78.49 OTHER HYPERLIPIDEMIA: ICD-10-CM

## 2021-03-02 DIAGNOSIS — G43.819 OTHER MIGRAINE WITHOUT STATUS MIGRAINOSUS, INTRACTABLE: ICD-10-CM

## 2021-03-02 LAB
25(OH)D3 SERPL-MCNC: 64 NG/ML (ref 30–100)
ALBUMIN SERPL-MCNC: 4.3 G/DL (ref 3.5–5.2)
ALBUMIN/GLOB SERPL: 1.5 G/DL
ALP SERPL-CCNC: 66 U/L (ref 39–117)
ALT SERPL W P-5'-P-CCNC: 16 U/L (ref 1–33)
ANION GAP SERPL CALCULATED.3IONS-SCNC: 9.8 MMOL/L (ref 5–15)
AST SERPL-CCNC: 17 U/L (ref 1–32)
BILIRUB BLD-MCNC: NEGATIVE MG/DL
BILIRUB SERPL-MCNC: 0.5 MG/DL (ref 0–1.2)
BUN SERPL-MCNC: 14 MG/DL (ref 8–23)
BUN/CREAT SERPL: 19.7 (ref 7–25)
CALCIUM SPEC-SCNC: 9.6 MG/DL (ref 8.6–10.5)
CHLORIDE SERPL-SCNC: 98 MMOL/L (ref 98–107)
CHOLEST SERPL-MCNC: 235 MG/DL (ref 0–200)
CLARITY, POC: CLEAR
CO2 SERPL-SCNC: 29.2 MMOL/L (ref 22–29)
COLOR UR: YELLOW
CREAT SERPL-MCNC: 0.71 MG/DL (ref 0.57–1)
DEPRECATED RDW RBC AUTO: 41.6 FL (ref 37–54)
ERYTHROCYTE [DISTWIDTH] IN BLOOD BY AUTOMATED COUNT: 12.8 % (ref 12.3–15.4)
GFR SERPL CREATININE-BSD FRML MDRD: 81 ML/MIN/1.73
GLOBULIN UR ELPH-MCNC: 2.9 GM/DL
GLUCOSE SERPL-MCNC: 94 MG/DL (ref 65–99)
GLUCOSE UR STRIP-MCNC: NEGATIVE MG/DL
HCT VFR BLD AUTO: 40 % (ref 34–46.6)
HDLC SERPL-MCNC: 85 MG/DL (ref 40–60)
HGB BLD-MCNC: 13.1 G/DL (ref 12–15.9)
KETONES UR QL: NEGATIVE
LDLC SERPL CALC-MCNC: 138 MG/DL (ref 0–100)
LDLC/HDLC SERPL: 1.6 {RATIO}
LEUKOCYTE EST, POC: NEGATIVE
MCH RBC QN AUTO: 29.1 PG (ref 26.6–33)
MCHC RBC AUTO-ENTMCNC: 32.8 G/DL (ref 31.5–35.7)
MCV RBC AUTO: 88.9 FL (ref 79–97)
NITRITE UR-MCNC: NEGATIVE MG/ML
PH UR: 6 [PH] (ref 5–8)
PLATELET # BLD AUTO: 298 10*3/MM3 (ref 140–450)
PMV BLD AUTO: 10.1 FL (ref 6–12)
POTASSIUM SERPL-SCNC: 4.2 MMOL/L (ref 3.5–5.2)
PROT SERPL-MCNC: 7.2 G/DL (ref 6–8.5)
PROT UR STRIP-MCNC: NEGATIVE MG/DL
RBC # BLD AUTO: 4.5 10*6/MM3 (ref 3.77–5.28)
RBC # UR STRIP: NEGATIVE /UL
SODIUM SERPL-SCNC: 137 MMOL/L (ref 136–145)
SP GR UR: 1.01 (ref 1–1.03)
T3FREE SERPL-MCNC: 2.87 PG/ML (ref 2–4.4)
T4 FREE SERPL-MCNC: 1.4 NG/DL (ref 0.93–1.7)
TRIGL SERPL-MCNC: 71 MG/DL (ref 0–150)
TSH SERPL DL<=0.05 MIU/L-ACNC: 1.83 UIU/ML (ref 0.27–4.2)
UROBILINOGEN UR QL: NORMAL
VIT B12 BLD-MCNC: 1074 PG/ML (ref 211–946)
VLDLC SERPL-MCNC: 12 MG/DL (ref 5–40)
WBC # BLD AUTO: 6.23 10*3/MM3 (ref 3.4–10.8)

## 2021-03-02 PROCEDURE — 84439 ASSAY OF FREE THYROXINE: CPT

## 2021-03-02 PROCEDURE — 84481 FREE ASSAY (FT-3): CPT

## 2021-03-02 PROCEDURE — 84443 ASSAY THYROID STIM HORMONE: CPT

## 2021-03-02 PROCEDURE — 82607 VITAMIN B-12: CPT

## 2021-03-02 PROCEDURE — G0439 PPPS, SUBSEQ VISIT: HCPCS | Performed by: INTERNAL MEDICINE

## 2021-03-02 PROCEDURE — 80061 LIPID PANEL: CPT

## 2021-03-02 PROCEDURE — 82306 VITAMIN D 25 HYDROXY: CPT

## 2021-03-02 PROCEDURE — G0444 DEPRESSION SCREEN ANNUAL: HCPCS | Performed by: INTERNAL MEDICINE

## 2021-03-02 PROCEDURE — 80053 COMPREHEN METABOLIC PANEL: CPT

## 2021-03-02 PROCEDURE — 86376 MICROSOMAL ANTIBODY EACH: CPT

## 2021-03-02 PROCEDURE — 85027 COMPLETE CBC AUTOMATED: CPT

## 2021-03-02 PROCEDURE — 99214 OFFICE O/P EST MOD 30 MIN: CPT | Performed by: INTERNAL MEDICINE

## 2021-03-02 PROCEDURE — 81003 URINALYSIS AUTO W/O SCOPE: CPT | Performed by: INTERNAL MEDICINE

## 2021-03-02 RX ORDER — LORAZEPAM 0.5 MG/1
0.5 TABLET ORAL DAILY PRN
Qty: 30 TABLET | Refills: 1 | Status: SHIPPED | OUTPATIENT
Start: 2021-03-02 | End: 2021-09-22 | Stop reason: SDUPTHER

## 2021-03-02 RX ORDER — LANOLIN ALCOHOL/MO/W.PET/CERES
CREAM (GRAM) TOPICAL DAILY
COMMUNITY

## 2021-03-02 RX ORDER — LORAZEPAM 0.5 MG/1
0.5 TABLET ORAL DAILY PRN
Qty: 20 TABLET | Refills: 0 | Status: CANCELLED | OUTPATIENT
Start: 2021-03-02

## 2021-03-02 RX ORDER — LEVOTHYROXINE SODIUM 0.05 MG/1
50 TABLET ORAL DAILY
Qty: 90 TABLET | Refills: 1 | Status: SHIPPED | OUTPATIENT
Start: 2021-03-02 | End: 2021-09-07

## 2021-03-02 NOTE — PROGRESS NOTES
The ABCs of the Annual Wellness Visit  Subsequent Medicare Wellness Visit    Chief Complaint   Patient presents with   • Medicare Wellness-subsequent       Subjective   History of Present Illness:  Annalise Winters is a 70 y.o. female who presents for a Subsequent Medicare Wellness Visit.    HEALTH RISK ASSESSMENT    Recent Hospitalizations:  No hospitalization(s) within the last year.    Current Medical Providers:  Patient Care Team:  Maryse Jauregui MD as PCP - General (Internal Medicine)  Yumiko Hall MD as Consulting Physician (Gastroenterology)  Goran Ruth IV, MD as Cardiologist (Interventional Cardiology)    Smoking Status:  Social History     Tobacco Use   Smoking Status Never Smoker   Smokeless Tobacco Never Used       Alcohol Consumption:  Social History     Substance and Sexual Activity   Alcohol Use Yes    Comment: on occasion       Depression Screen:   PHQ-2/PHQ-9 Depression Screening 3/2/2021   Little interest or pleasure in doing things 0   Feeling down, depressed, or hopeless 0   Trouble falling or staying asleep, or sleeping too much -   Feeling tired or having little energy -   Poor appetite or overeating -   Feeling bad about yourself - or that you are a failure or have let yourself or your family down -   Trouble concentrating on things, such as reading the newspaper or watching television -   Moving or speaking so slowly that other people could have noticed. Or the opposite - being so fidgety or restless that you have been moving around a lot more than usual -   Thoughts that you would be better off dead, or of hurting yourself in some way -   Total Score 0   If you checked off any problems, how difficult have these problems made it for you to do your work, take care of things at home, or get along with other people? -       Fall Risk Screen:  STEADI Fall Risk Assessment was completed, and patient is at LOW risk for falls.Assessment completed on:3/2/2021    Health Habits and  Functional and Cognitive Screening:  Functional & Cognitive Status 3/2/2021   Do you have difficulty preparing food and eating? No   Do you have difficulty bathing yourself, getting dressed or grooming yourself? No   Do you have difficulty using the toilet? No   Do you have difficulty moving around from place to place? No   Do you have trouble with steps or getting out of a bed or a chair? No   Current Diet Well Balanced Diet   Dental Exam Up to date   Eye Exam Not up to date   Exercise (times per week) 5 times per week        Exercise Frequency Comment -   Current Exercise Activities Include Aerobics   Do you need help using the phone?  No   Are you deaf or do you have serious difficulty hearing?  No   Do you need help with transportation? No   Do you need help shopping? No   Do you need help preparing meals?  No   Do you need help with housework?  No   Do you need help with laundry? No   Do you need help taking your medications? No   Do you need help managing money? No   Do you ever drive or ride in a car without wearing a seat belt? No   Have you felt unusual stress, anger or loneliness in the last month? Yes   Who do you live with? Spouse   If you need help, do you have trouble finding someone available to you? No   Have you been bothered in the last four weeks by sexual problems? No   Do you have difficulty concentrating, remembering or making decisions? No         Does the patient have evidence of cognitive impairment? No    Asprin use counseling:off Asa per GI recs, but has paroxysmal A-fib. Will d/w Dr. Bell tomorrow.    Age-appropriate Screening Schedule:  Refer to the list below for future screening recommendations based on patient's age, sex and/or medical conditions. Orders for these recommended tests are listed in the plan section. The patient has been provided with a written plan.    Health Maintenance   Topic Date Due   • ZOSTER VACCINE (1 of 2) 12/15/1998   • LIPID PANEL  07/30/2021   • DXA SCAN   06/03/2022   • MAMMOGRAM  02/01/2023   • COLONOSCOPY  01/25/2028   • INFLUENZA VACCINE  Completed   • TDAP/TD VACCINES  Discontinued          The following portions of the patient's history were reviewed and updated as appropriate: allergies, current medications, past family history, past medical history, past social history, past surgical history and problem list.    Outpatient Medications Prior to Visit   Medication Sig Dispense Refill   • acetaminophen (TYLENOL) 500 MG tablet Take 500 mg by mouth As Needed.     • amLODIPine (NORVASC) 5 MG tablet Take 1 tablet by mouth 2 (two) times a day. 180 tablet 3   • Calcium Carbonate-Vit D-Min (CALCIUM 1200 PO) Take  by mouth.     • Cholecalciferol (VITAMIN D) 2000 UNITS capsule Take 1 capsule by mouth daily.     • Famotidine-Ca Carb-Mag Hydrox (PEPCID COMPLETE PO) Take 1 tablet by mouth As Needed.     • flecainide (TAMBOCOR) 50 MG tablet Take 1 tablet by mouth 2 (Two) Times a Day. 180 tablet 0   • loratadine (Claritin) 10 MG tablet Take 10 mg by mouth Daily.     • metoprolol tartrate (LOPRESSOR) 25 MG tablet Take 0.5 tablets by mouth Every Night. 180 tablet 3   • montelukast (SINGULAIR) 10 MG tablet Take 1 tablet by mouth every night at bedtime. 90 tablet 1   • pantoprazole (PROTONIX) 40 MG EC tablet Take 40 mg by mouth Every Other Day.     • Turmeric 500 MG capsule Take  by mouth.     • Vitamin B12 (CYANOCOBALAMIN) 500 MCG tablet tablet Take  by mouth Daily.     • vitamin C (ASCORBIC ACID) 500 MG tablet Take 500 mg by mouth Daily.     • levothyroxine (SYNTHROID, LEVOTHROID) 50 MCG tablet TAKE ONE TABLET BY MOUTH DAILY 90 tablet 0   • LORazepam (ATIVAN) 0.5 MG tablet Take 1 tablet by mouth Daily As Needed for Anxiety. 20 tablet 0   • aspirin 81 MG chewable tablet Chew 1 tablet Daily.       No facility-administered medications prior to visit.        Patient Active Problem List   Diagnosis   • Asthma   • Paroxysmal atrial fibrillation (CMS/HCC)   • Hypertonicity of bladder  "  • Essential hypertension   • Hypothyroidism   • Anxiety   • Back pain   • Hyperthyroidism   • GERD (gastroesophageal reflux disease)   • Allergic rhinitis   • Dysuria   • Skin lesion of face   • Vitamin D deficiency   • Frontal skull lesion   • B12 deficiency       Advanced Care Planning:  ACP discussion was held with the patient during this visit. Patient has an advance directive in EMR which is still valid. - at [facility]- adv to bring a copy.    Review of Systems   Constitutional: Positive for fatigue. Negative for chills and fever.   HENT: Negative for congestion, ear discharge, ear pain, sinus pressure and sore throat.    Eyes: Negative for pain, redness and visual disturbance.   Respiratory: Negative for cough, chest tightness and shortness of breath.    Cardiovascular: Negative for chest pain, palpitations and leg swelling.   Gastrointestinal: Negative for abdominal pain, diarrhea, nausea and vomiting.   Endocrine: Negative for cold intolerance and heat intolerance.   Genitourinary: Negative for flank pain and urgency.   Musculoskeletal: Negative for arthralgias, back pain, gait problem and myalgias.   Skin: Negative for pallor and rash.   Neurological: Positive for weakness and headaches. Negative for dizziness and syncope.   Psychiatric/Behavioral: Negative for confusion, dysphoric mood and sleep disturbance. The patient is not nervous/anxious.        Compared to one year ago, the patient feels her physical health is the same.  Compared to one year ago, the patient feels her mental health is the same.    Reviewed chart for potential of high risk medication in the elderly: yes  Reviewed chart for potential of harmful drug interactions in the elderly:yes    Objective         Vitals:    03/02/21 0745   BP: 124/72   Pulse: 60   Temp: 97.1 °F (36.2 °C)   SpO2: 98%  Comment: ra   Weight: 73 kg (161 lb)   Height: 174 cm (68.5\")   PainSc: 0-No pain       Body mass index is 24.12 kg/m².  Discussed the patient's BMI with " her. The BMI is in the acceptable range.    Physical Exam  Vitals signs and nursing note reviewed.   Constitutional:       Appearance: Normal appearance. She is well-developed and normal weight.   HENT:      Head: Normocephalic and atraumatic.      Right Ear: Tympanic membrane and external ear normal.      Left Ear: Tympanic membrane and external ear normal.      Nose: Nose normal.      Mouth/Throat:      Pharynx: No oropharyngeal exudate.   Eyes:      General: No scleral icterus.     Conjunctiva/sclera: Conjunctivae normal.      Pupils: Pupils are equal, round, and reactive to light.   Neck:      Musculoskeletal: Normal range of motion and neck supple.      Thyroid: No thyromegaly.      Vascular: No carotid bruit.   Cardiovascular:      Rate and Rhythm: Normal rate and regular rhythm.      Pulses: Normal pulses.      Heart sounds: No murmur. No friction rub. No gallop.    Pulmonary:      Effort: Pulmonary effort is normal.      Breath sounds: Normal breath sounds. No rhonchi or rales.   Abdominal:      General: Bowel sounds are normal. There is no distension.      Palpations: Abdomen is soft.      Tenderness: There is no abdominal tenderness. There is no right CVA tenderness, left CVA tenderness or rebound.   Musculoskeletal: Normal range of motion.      Right lower leg: No edema.      Left lower leg: No edema.   Skin:     General: Skin is warm and dry.   Neurological:      Mental Status: She is alert and oriented to person, place, and time.      Cranial Nerves: No cranial nerve deficit.   Psychiatric:         Mood and Affect: Mood normal.         Behavior: Behavior normal.         Judgment: Judgment normal.         Lab Results   Component Value Date    TRIG 71 03/02/2021    HDL 85 (H) 03/02/2021     (H) 03/02/2021    VLDL 12 03/02/2021        Assessment/Plan   Medicare Risks and Personalized Health Plan  CMS Preventative Services Quick Reference  Advance Directive Discussion  Breast Cancer/Mammogram  Screening  Cardiovascular risk  Colon Cancer Screening  Diabetic Lab Screening   Immunizations Discussed/Encouraged (specific immunizations; Shingrix )  Osteoprorosis Risk   AND COVID 19 VACCINATION.    The above risks/problems have been discussed with the patient.  Pertinent information has been shared with the patient in the After Visit Summary.  Follow up plans and orders are seen below in the Assessment/Plan Section.    Medicare Wellness-subsequent    Subjective   Annalise Winters is a 70 y.o. female is here today for follow-up.  HPI  Was advised by Dr. Hall to discuss with Dr. Bell about a watchman device, and not having to be on the Asa, due to her h/o diverticulosis.  She is worried she may have a GI bleed, but has not had a GI bleed yet, and has quit the EC ASA.    Rt sided HA - dull, achy, started appx a week prior to her Covid shots.  BP was in the 100s.so cut back on her amlodipine to 1/2 am, worried that may be aggravating her HA.  S/p UTI- a few weeks, s/p macrobid, but still has frequency.      Current Outpatient Medications:   •  acetaminophen (TYLENOL) 500 MG tablet, Take 500 mg by mouth As Needed., Disp: , Rfl:   •  amLODIPine (NORVASC) 5 MG tablet, Take 1 tablet by mouth 2 (two) times a day., Disp: 180 tablet, Rfl: 3  •  Calcium Carbonate-Vit D-Min (CALCIUM 1200 PO), Take  by mouth., Disp: , Rfl:   •  Cholecalciferol (VITAMIN D) 2000 UNITS capsule, Take 1 capsule by mouth daily., Disp: , Rfl:   •  Famotidine-Ca Carb-Mag Hydrox (PEPCID COMPLETE PO), Take 1 tablet by mouth As Needed., Disp: , Rfl:   •  flecainide (TAMBOCOR) 50 MG tablet, Take 1 tablet by mouth 2 (Two) Times a Day., Disp: 180 tablet, Rfl: 0  •  levothyroxine (SYNTHROID, LEVOTHROID) 50 MCG tablet, Take 1 tablet by mouth Daily., Disp: 90 tablet, Rfl: 1  •  loratadine (Claritin) 10 MG tablet, Take 10 mg by mouth Daily., Disp: , Rfl:   •  LORazepam (ATIVAN) 0.5 MG tablet, Take 1 tablet by mouth Daily As Needed for Anxiety., Disp: 30  "tablet, Rfl: 1  •  metoprolol tartrate (LOPRESSOR) 25 MG tablet, Take 0.5 tablets by mouth Every Night., Disp: 180 tablet, Rfl: 3  •  montelukast (SINGULAIR) 10 MG tablet, Take 1 tablet by mouth every night at bedtime., Disp: 90 tablet, Rfl: 1  •  pantoprazole (PROTONIX) 40 MG EC tablet, Take 40 mg by mouth Every Other Day., Disp: , Rfl:   •  Turmeric 500 MG capsule, Take  by mouth., Disp: , Rfl:   •  Vitamin B12 (CYANOCOBALAMIN) 500 MCG tablet tablet, Take  by mouth Daily., Disp: , Rfl:   •  vitamin C (ASCORBIC ACID) 500 MG tablet, Take 500 mg by mouth Daily., Disp: , Rfl:       The following portions of the patient's history were reviewed and updated as appropriate: allergies, current medications, past family history, past medical history, past social history, past surgical history and problem list.        Objective   /72   Pulse 60   Temp 97.1 °F (36.2 °C)   Ht 174 cm (68.5\")   Wt 73 kg (161 lb)   LMP  (LMP Unknown)   SpO2 98% Comment: ra  BMI 24.12 kg/m²         Results for orders placed or performed in visit on 03/02/21   POCT urinalysis dipstick, automated    Specimen: Urine   Result Value Ref Range    Color Yellow Yellow, Straw, Dark Yellow, Kimberli    Clarity, UA Clear Clear    Specific Gravity  1.015 1.005 - 1.030    pH, Urine 6.0 5.0 - 8.0    Leukocytes Negative Negative    Nitrite, UA Negative Negative    Protein, POC Negative Negative mg/dL    Glucose, UA Negative Negative, 1000 mg/dL (3+) mg/dL    Ketones, UA Negative Negative    Urobilinogen, UA Normal Normal    Bilirubin Negative Negative    Blood, UA Negative Negative             Assessment/Plan   Diagnoses and all orders for this visit:    Medicare annual wellness visit, subsequent    Hypothyroidism, unspecified type  -     levothyroxine (SYNTHROID, LEVOTHROID) 50 MCG tablet; Take 1 tablet by mouth Daily.  -     TSH; Future  -     T4, Free; Future  -     T3, Free; Future  -     Thyroid Peroxidase Antibody; Future    Anxiety  -     " LORazepam (ATIVAN) 0.5 MG tablet; Take 1 tablet by mouth Daily As Needed for Anxiety.    Other migraine without status migrainosus, intractable  Comments:  stress vs vaccine related. check BP and start amlodipine 5 bid, cinb-start med for migriane.    B12 deficiency  -     Vitamin B12; Future    Vitamin D deficiency  -     Vitamin D 25 Hydroxy; Future    Leukopenia, unspecified type  -     CBC (No Diff); Future    Other hyperlipidemia  -     Comprehensive Metabolic Panel; Future  -     Lipid Panel; Future    Frequency of urination  -     POCT urinalysis dipstick, automated    Paroxysmal atrial fibrillation (CMS/HCC)  Comments:  long discussion, adv. may need to be on Asa, but to d/w Cardiology. No h/o GI bleed, but has diverticulosis.    Other orders  -     Vitamin B12 (CYANOCOBALAMIN) 500 MCG tablet tablet; Take  by mouth Daily.  -     Cancel: LORazepam (ATIVAN) 0.5 MG tablet; Take 1 tablet by mouth Daily As Needed for Anxiety.    Long discussion regarding pros and cons of being on aspirin.  Advised without history of GI bleed, candidacy for watchman device lower.  But should discuss with Dr. Bell for options.  She is nervous about being on anticoagulation.  Currently with rare episodes of atrial fibrillation.  Well-controlled on the current regimen.           PHQ-2/PHQ-9 Depression screening reviewed with patient . Total time spent today for depression screening  with Annalise Winters  was 15 minutes in counseling, along with plans for any diagnositc work-up and treatment.    The patient has read and signed the [facility] Controlled Substance Contract.  I will continue to see patient for regular follow up appointments.  They are well controlled on their medication.  JASMYN has been reviewed by me and is updated every 3 months. The patient is aware of the potential for addiction and dependence.      Follow Up:  Return in about 3 months (around 6/2/2021).     An After Visit Summary and PPPS were given to the  patient.

## 2021-03-03 ENCOUNTER — CONSULT (OUTPATIENT)
Dept: CARDIOLOGY | Facility: CLINIC | Age: 71
End: 2021-03-03

## 2021-03-03 ENCOUNTER — TELEPHONE (OUTPATIENT)
Dept: INTERNAL MEDICINE | Facility: CLINIC | Age: 71
End: 2021-03-03

## 2021-03-03 VITALS
HEART RATE: 75 BPM | BODY MASS INDEX: 23.7 KG/M2 | DIASTOLIC BLOOD PRESSURE: 84 MMHG | OXYGEN SATURATION: 97 % | SYSTOLIC BLOOD PRESSURE: 140 MMHG | WEIGHT: 160 LBS | HEIGHT: 69 IN

## 2021-03-03 DIAGNOSIS — K57.90 DIVERTICULOSIS: ICD-10-CM

## 2021-03-03 DIAGNOSIS — I48.0 PAROXYSMAL ATRIAL FIBRILLATION (HCC): Primary | ICD-10-CM

## 2021-03-03 LAB — THYROPEROXIDASE AB SERPL-ACNC: <9 IU/ML (ref 0–34)

## 2021-03-03 PROCEDURE — 93000 ELECTROCARDIOGRAM COMPLETE: CPT | Performed by: INTERNAL MEDICINE

## 2021-03-03 PROCEDURE — 99204 OFFICE O/P NEW MOD 45 MIN: CPT | Performed by: INTERNAL MEDICINE

## 2021-03-03 NOTE — TELEPHONE ENCOUNTER
Please let her know her Urinalysis is normal.  Rest of the labs look okay overall.  Will send her a more detailed message in [employer].  She should ice the lump in the arm as it looks like her vein blew, and this can happen after a blood draw.  Will resolve shortly.    thanks

## 2021-03-03 NOTE — TELEPHONE ENCOUNTER
Called patient to schedule 3 month follow up she had requested to know her results of her urine lab    She also wanted to mention that after she got her labs done her arm began to hurt and she has a little lump on her arm. She stated she's sure that's normal but her arm is really sore.     Please advise     Call when labs have been reviewed.

## 2021-03-03 NOTE — PROGRESS NOTES
Please let Pt. Know , I would like to follow up with her in 3 months. Aplogize for not scheduling at appt.

## 2021-03-10 ENCOUNTER — TELEPHONE (OUTPATIENT)
Dept: CARDIOLOGY | Facility: CLINIC | Age: 71
End: 2021-03-10

## 2021-03-10 DIAGNOSIS — M79.605 PAIN IN BOTH LOWER EXTREMITIES: Primary | ICD-10-CM

## 2021-03-10 DIAGNOSIS — M79.604 PAIN IN BOTH LOWER EXTREMITIES: Primary | ICD-10-CM

## 2021-03-10 DIAGNOSIS — R09.89 OTHER SPECIFIED SYMPTOMS AND SIGNS INVOLVING THE CIRCULATORY AND RESPIRATORY SYSTEMS: ICD-10-CM

## 2021-03-10 NOTE — TELEPHONE ENCOUNTER
"Patient complaining of leg, \"tightness and hardness\". Denies pain. Happens mostly after activity. No discoloration. Pulses are good. BP and HR are, \"OK\". PCP advised her to call here.    Did you want exercise ROSEMARY's?  "

## 2021-03-17 NOTE — PROGRESS NOTES
30mg etomidate IVP per  by Alana Merritt, TAYLA  03/17/21 8331 Procedure   Large Joint Arthrocentesis: R knee  Date/Time: 12/21/2020 2:57 PM  Consent given by: patient  Site marked: site marked  Timeout: Immediately prior to procedure a time out was called to verify the correct patient, procedure, equipment, support staff and site/side marked as required   Supporting Documentation  Indications: pain   Procedure Details  Location: knee - R knee  Preparation: Patient was prepped and draped in the usual sterile fashion  Needle size: 22 G  Approach: anterolateral  Medications administered: 30 mg Hyaluronan 30 MG/2ML  Patient tolerance: patient tolerated the procedure well with no immediate complications

## 2021-04-08 ENCOUNTER — TELEPHONE (OUTPATIENT)
Dept: ORTHOPEDIC SURGERY | Facility: CLINIC | Age: 71
End: 2021-04-08

## 2021-04-10 NOTE — PROGRESS NOTES
QUICK REFERENCE INFORMATION:  The ABCs of the Annual Wellness Visit    Subsequent Medicare Wellness Visit    HEALTH RISK ASSESSMENT    1950    Recent Hospitalizations:  No hospitalization(s) within the last year..        Current Medical Providers:  Patient Care Team:  Maryse Jauregui MD as PCP - General (Internal Medicine)        Smoking Status:  Social History     Tobacco Use   Smoking Status Never Smoker   Smokeless Tobacco Never Used       Alcohol Consumption:  Social History     Substance and Sexual Activity   Alcohol Use Yes    Comment: on occasion       Depression Screen:   PHQ-2/PHQ-9 Depression Screening 1/10/2019   Little interest or pleasure in doing things 0   Feeling down, depressed, or hopeless 0   Trouble falling or staying asleep, or sleeping too much -   Feeling tired or having little energy -   Poor appetite or overeating -   Feeling bad about yourself - or that you are a failure or have let yourself or your family down -   Trouble concentrating on things, such as reading the newspaper or watching television -   Moving or speaking so slowly that other people could have noticed. Or the opposite - being so fidgety or restless that you have been moving around a lot more than usual -   Thoughts that you would be better off dead, or of hurting yourself in some way -   Total Score 0   If you checked off any problems, how difficult have these problems made it for you to do your work, take care of things at home, or get along with other people? -       Health Habits and Functional and Cognitive Screening:  Functional & Cognitive Status 1/10/2019   Do you have difficulty preparing food and eating? No   Do you have difficulty bathing yourself, getting dressed or grooming yourself? No   Do you have difficulty using the toilet? No   Do you have difficulty moving around from place to place? No   Do you have trouble with steps or getting out of a bed or a chair? No   In the past year have you fallen or  experienced a near fall? No   Current Diet Well Balanced Diet   Dental Exam Up to date   Eye Exam Up to date   Exercise (times per week) 6 times per week   Current Exercise Activities Include Aerobics   Do you need help using the phone?  No   Are you deaf or do you have serious difficulty hearing?  No   Do you need help with transportation? No   Do you need help shopping? No   Do you need help preparing meals?  No   Do you need help with housework?  No   Do you need help with laundry? No   Do you need help taking your medications? No   Do you need help managing money? No   Do you ever drive or ride in a car without wearing a seat belt? No   Have you felt unusual stress, anger or loneliness in the last month? No   Who do you live with? Spouse   If you need help, do you have trouble finding someone available to you? No   Have you been bothered in the last four weeks by sexual problems? No   Do you have difficulty concentrating, remembering or making decisions? No           Does the patient have evidence of cognitive impairment? No    Aspirin use counseling: Start ASA 81 mg daily       Recent Lab Results:  CMP:  Lab Results   Component Value Date    GLU 92 05/16/2016    BUN 19 01/10/2019    CREATININE 0.75 01/10/2019    EGFRIFNONA 76 01/10/2019    EGFRIFAFRI 106 05/16/2016    BCR 25.3 (H) 01/10/2019     01/10/2019    K 4.3 01/10/2019    CO2 32.0 (H) 01/10/2019    CALCIUM 9.6 01/10/2019    PROTENTOTREF 6.9 05/16/2016    ALBUMIN 4.48 01/10/2019    LABGLOBREF 2.3 05/16/2016    LABIL2 2.0 05/16/2016    BILITOT 0.7 01/10/2019    ALKPHOS 66 01/10/2019    AST 21 01/10/2019    ALT 20 01/10/2019     Lipid Panel:  Lab Results   Component Value Date    CHOL 234 (H) 01/10/2019    TRIG 54 01/10/2019    HDL 89 (H) 01/10/2019    VLDL 13 05/16/2016     HbA1c:       Visual Acuity:  No exam data present    Age-appropriate Screening Schedule:  Refer to the list below for future screening recommendations based on patient's age, sex  and/or medical conditions. Orders for these recommended tests are listed in the plan section. The patient has been provided with a written plan.    Health Maintenance   Topic Date Due   • ZOSTER VACCINE (1 of 2) 12/15/1998   • MAMMOGRAM  11/22/2019   • COLONOSCOPY  01/25/2028   • INFLUENZA VACCINE  Completed   • PNEUMOCOCCAL VACCINES (65+ LOW/MEDIUM RISK)  Completed   • TDAP/TD VACCINES  Discontinued        Subjective   History of Present Illness    Annalise Winters is a 68 y.o. female who presents for an Subsequent Wellness Visit.    The following portions of the patient's history were reviewed and updated as appropriate: allergies, current medications, past family history, past medical history, past social history, past surgical history and problem list.    Outpatient Medications Prior to Visit   Medication Sig Dispense Refill   • acetaminophen (TYLENOL) 500 MG tablet Take 500 mg by mouth As Needed.     • amLODIPine (NORVASC) 2.5 MG tablet TAKE ONE TABLET BY MOUTH DAILY 90 tablet 2   • Calcium-Vitamin D-Vitamin K (CALCIUM SOFT CHEWS PO) Take 1 tablet by mouth Daily.     • Cholecalciferol (VITAMIN D) 2000 UNITS capsule Take 1 capsule by mouth daily.     • Famotidine-Ca Carb-Mag Hydrox (PEPCID COMPLETE PO) Take 1 tablet by mouth As Needed.     • flecainide (TAMBOCOR) 50 MG tablet Take 50 mg by mouth As Needed.     • Ginger, Zingiber officinalis, (GINGER EXTRACT PO) Take 1 tablet by mouth As Needed.     • Loratadine (CLARITIN PO) Take 10 mg by mouth Daily.     • pantoprazole (PROTONIX) 40 MG EC tablet Take 40 mg by mouth Every Other Day.     • levothyroxine (SYNTHROID, LEVOTHROID) 50 MCG tablet TAKE ONE TABLET BY MOUTH DAILY 90 tablet 0   • LORazepam (ATIVAN) 0.5 MG tablet Take 1 tablet by mouth Daily As Needed for Anxiety. 30 tablet 0   • montelukast (SINGULAIR) 10 MG tablet TAKE ONE TABLET BY MOUTH EVERY NIGHT AT BEDTIME 30 tablet 5     No facility-administered medications prior to visit.        Patient Active  Problem List   Diagnosis   • Asthma   • Persistent atrial fibrillation (CMS/HCC)   • Hypertonicity of bladder   • Essential hypertension   • Hypothyroidism   • Anxiety   • Back pain   • Acute URI   • Hyperthyroidism   • GERD (gastroesophageal reflux disease)   • Allergic rhinitis   • Shortness of breath   • Dizziness   • Dysuria   • Skin lesion of face   • Vitamin D deficiency       Advance Care Planning:  has an advance directive - a copy HAS NOT been provided. Have asked the patient to send this to us to add to record.    Identification of Risk Factors:  Risk factors include: cardiovascular risk.    Review of Systems   Constitutional: Negative.  Negative for chills and fever.   HENT: Negative for ear discharge, ear pain, sinus pressure and sore throat.    Eyes: Negative for pain and redness.   Respiratory: Negative for cough, chest tightness and shortness of breath.    Cardiovascular: Negative for chest pain, palpitations and leg swelling.   Gastrointestinal: Negative for diarrhea, nausea and vomiting.   Endocrine: Negative for polydipsia and polyphagia.   Genitourinary: Negative for frequency and urgency.   Musculoskeletal: Negative for arthralgias, back pain and myalgias.   Skin: Negative for pallor and rash.   Neurological: Negative for dizziness, syncope and headaches.   Psychiatric/Behavioral: Negative for confusion and sleep disturbance.       Compared to one year ago, the patient feels her physical health is better.  Compared to one year ago, the patient feels her mental health is better.    Objective     Physical Exam   Constitutional: She is oriented to person, place, and time. She appears well-developed and well-nourished.   HENT:   Head: Normocephalic and atraumatic.   Right Ear: External ear normal.   Left Ear: External ear normal.   Mouth/Throat: No oropharyngeal exudate.   Eyes: Conjunctivae are normal. Pupils are equal, round, and reactive to light. No scleral icterus.   Neck: Neck supple. No  "thyromegaly present.   Cardiovascular: Normal rate, regular rhythm and intact distal pulses. Exam reveals no friction rub.   Pulmonary/Chest: Effort normal and breath sounds normal. No stridor. She has no wheezes.   Abdominal: Soft. Bowel sounds are normal. She exhibits no distension and no mass. There is no tenderness. There is no rebound.   Musculoskeletal: She exhibits no edema.   Lymphadenopathy:     She has no cervical adenopathy.   Neurological: She is alert and oriented to person, place, and time. She displays normal reflexes. No cranial nerve deficit. She exhibits normal muscle tone.   Skin: Skin is warm and dry.   Psychiatric: She has a normal mood and affect. Her behavior is normal. Judgment and thought content normal.   Nursing note and vitals reviewed.      Vitals:    01/10/19 0812   BP: 140/82   Pulse: 65   SpO2: 96%  Comment: ra   Weight: 70.8 kg (156 lb)   Height: 174 cm (68.5\")   PainSc:   6   PainLoc: Generalized       Patient's Body mass index is 23.37 kg/m². BMI is within normal parameters. No follow-up required.      Assessment/Plan   Patient Self-Management and Personalized Health Advice  The patient has been provided with information about: diet, exercise, prevention of cardiac or vascular disease, fall prevention, designing advance directives and supplements and preventive services including:   · Advance directive, Colorectal cancer screening, colonoscopy referral placed, Diabetes screening, see lab orders, Exercise counseling provided, Hepatitis B vaccine, Influenza vaccine, Nutrition counseling provided, Zostavax vaccine (Herpes Zoster).    Visit Diagnoses:    ICD-10-CM ICD-9-CM   1. Medicare annual wellness visit, subsequent Z00.00 V70.0   2. Anxiety F41.9 300.00   3. Hypothyroidism, unspecified type E03.9 244.9   4. Essential hypertension I10 401.9   5. Vitamin D deficiency E55.9 268.9   6. Paroxysmal atrial fibrillation (CMS/HCC) I48.0 427.31   7. Diverticulitis K57.92 562.11   8. " Dyslipidemia E78.5 272.4   9. Osteoma of skull D16.4 213.0       Orders Placed This Encounter   Procedures   • CBC (No Diff)   • Comprehensive Metabolic Panel   • Lipid Panel   • TSH   • Vitamin D 25 Hydroxy   • T4, Free   • Ambulatory Referral to Neurosurgery     Referral Priority:   Routine     Referral Type:   Consultation     Referral Reason:   Specialty Services Required     Referred to Provider:   Sandeep Patterson MD     Requested Specialty:   Neurosurgery     Number of Visits Requested:   1       Outpatient Encounter Medications as of 1/10/2019   Medication Sig Dispense Refill   • acetaminophen (TYLENOL) 500 MG tablet Take 500 mg by mouth As Needed.     • amLODIPine (NORVASC) 2.5 MG tablet TAKE ONE TABLET BY MOUTH DAILY 90 tablet 2   • Calcium-Vitamin D-Vitamin K (CALCIUM SOFT CHEWS PO) Take 1 tablet by mouth Daily.     • Cholecalciferol (VITAMIN D) 2000 UNITS capsule Take 1 capsule by mouth daily.     • Famotidine-Ca Carb-Mag Hydrox (PEPCID COMPLETE PO) Take 1 tablet by mouth As Needed.     • flecainide (TAMBOCOR) 50 MG tablet Take 50 mg by mouth As Needed.     • Ginger, Zingiber officinalis, (GINGER EXTRACT PO) Take 1 tablet by mouth As Needed.     • levothyroxine (SYNTHROID, LEVOTHROID) 50 MCG tablet Take 1 tablet by mouth Daily. 90 tablet 1   • Loratadine (CLARITIN PO) Take 10 mg by mouth Daily.     • LORazepam (ATIVAN) 0.5 MG tablet Take 1 tablet by mouth Daily As Needed for Anxiety. 30 tablet 0   • montelukast (SINGULAIR) 10 MG tablet Take 1 tablet by mouth every night at bedtime. 90 tablet 1   • pantoprazole (PROTONIX) 40 MG EC tablet Take 40 mg by mouth Every Other Day.     • [DISCONTINUED] levothyroxine (SYNTHROID, LEVOTHROID) 50 MCG tablet TAKE ONE TABLET BY MOUTH DAILY 90 tablet 0   • [DISCONTINUED] LORazepam (ATIVAN) 0.5 MG tablet Take 1 tablet by mouth Daily As Needed for Anxiety. 30 tablet 0   • [DISCONTINUED] montelukast (SINGULAIR) 10 MG tablet TAKE ONE TABLET BY MOUTH EVERY NIGHT AT  BEDTIME 30 tablet 5   • amoxicillin-clavulanate (AUGMENTIN) 875-125 MG per tablet Take 1 tablet by mouth 2 (Two) Times a Day. 20 tablet 0     No facility-administered encounter medications on file as of 1/10/2019.        Reviewed use of high risk medication in the elderly: yes  Reviewed for potential of harmful drug interactions in the elderly: yes    Medicare Wellness-subsequent    Subjective   Annalise Winters is a 68 y.o. female is here today for follow-up.    HPI  Left IT band strain and she started PT per Ortho, also assoc with some LLQ pain,assoc. With loose bowels.  She is s/p abx, but notes she only took one of the 2 abx she was supposed to take, that was called n by Dr. Hall.  Stays stiff in the morning.  Also getting dry needling from PT.    Current Outpatient Medications:   •  acetaminophen (TYLENOL) 500 MG tablet, Take 500 mg by mouth As Needed., Disp: , Rfl:   •  amLODIPine (NORVASC) 2.5 MG tablet, TAKE ONE TABLET BY MOUTH DAILY, Disp: 90 tablet, Rfl: 2  •  Calcium-Vitamin D-Vitamin K (CALCIUM SOFT CHEWS PO), Take 1 tablet by mouth Daily., Disp: , Rfl:   •  Cholecalciferol (VITAMIN D) 2000 UNITS capsule, Take 1 capsule by mouth daily., Disp: , Rfl:   •  Famotidine-Ca Carb-Mag Hydrox (PEPCID COMPLETE PO), Take 1 tablet by mouth As Needed., Disp: , Rfl:   •  flecainide (TAMBOCOR) 50 MG tablet, Take 50 mg by mouth As Needed., Disp: , Rfl:   •  Ginger, Zingiber officinalis, (GINGER EXTRACT PO), Take 1 tablet by mouth As Needed., Disp: , Rfl:   •  levothyroxine (SYNTHROID, LEVOTHROID) 50 MCG tablet, Take 1 tablet by mouth Daily., Disp: 90 tablet, Rfl: 1  •  Loratadine (CLARITIN PO), Take 10 mg by mouth Daily., Disp: , Rfl:   •  LORazepam (ATIVAN) 0.5 MG tablet, Take 1 tablet by mouth Daily As Needed for Anxiety., Disp: 30 tablet, Rfl: 0  •  montelukast (SINGULAIR) 10 MG tablet, Take 1 tablet by mouth every night at bedtime., Disp: 90 tablet, Rfl: 1  •  pantoprazole (PROTONIX) 40 MG EC tablet, Take 40 mg by  "mouth Every Other Day., Disp: , Rfl:   •  amoxicillin-clavulanate (AUGMENTIN) 875-125 MG per tablet, Take 1 tablet by mouth 2 (Two) Times a Day., Disp: 20 tablet, Rfl: 0      The following portions of the patient's history were reviewed and updated as appropriate: allergies, current medications, past family history, past medical history, past social history, past surgical history and problem list.        Objective   /82   Pulse 65   Ht 174 cm (68.5\")   Wt 70.8 kg (156 lb)   LMP  (LMP Unknown)   SpO2 96% Comment: ra  BMI 23.37 kg/m²         Results for orders placed or performed in visit on 01/10/19   CBC (No Diff)   Result Value Ref Range    WBC 4.19 3.50 - 10.80 10*3/mm3    RBC 4.37 3.89 - 5.14 10*6/mm3    Hemoglobin 12.4 11.5 - 15.5 g/dL    Hematocrit 39.1 34.5 - 44.0 %    MCV 89.5 80.0 - 99.0 fL    MCH 28.4 27.0 - 31.0 pg    MCHC 31.7 (L) 32.0 - 36.0 g/dL    RDW 14.7 (H) 11.3 - 14.5 %    RDW-SD 48.0 37.0 - 54.0 fl    MPV 10.4 6.0 - 12.0 fL    Platelets 295 150 - 450 10*3/mm3   Comprehensive Metabolic Panel   Result Value Ref Range    Glucose 88 70 - 100 mg/dL    BUN 19 9 - 23 mg/dL    Creatinine 0.75 0.60 - 1.30 mg/dL    Sodium 137 132 - 146 mmol/L    Potassium 4.3 3.5 - 5.5 mmol/L    Chloride 102 99 - 109 mmol/L    CO2 32.0 (H) 20.0 - 31.0 mmol/L    Calcium 9.6 8.7 - 10.4 mg/dL    Total Protein 6.5 5.7 - 8.2 g/dL    Albumin 4.48 3.20 - 4.80 g/dL    ALT (SGPT) 20 7 - 40 U/L    AST (SGOT) 21 0 - 33 U/L    Alkaline Phosphatase 66 25 - 100 U/L    Total Bilirubin 0.7 0.3 - 1.2 mg/dL    eGFR Non African Amer 76 >60 mL/min/1.73    Globulin 2.0 gm/dL    A/G Ratio 2.2 1.5 - 2.5 g/dL    BUN/Creatinine Ratio 25.3 (H) 7.0 - 25.0    Anion Gap 3.0 3.0 - 11.0 mmol/L   Lipid Panel   Result Value Ref Range    Total Cholesterol 234 (H) 0 - 200 mg/dL    Triglycerides 54 0 - 150 mg/dL    HDL Cholesterol 89 (H) 40 - 60 mg/dL    LDL Cholesterol  130 0 - 130 mg/dL   TSH   Result Value Ref Range    TSH 1.762 0.350 - 5.350 " mIU/mL   Vitamin D 25 Hydroxy   Result Value Ref Range    25 Hydroxy, Vitamin D 52.7 ng/ml   T4, Free   Result Value Ref Range    Free T4 1.31 0.89 - 1.76 ng/dL             Assessment/Plan   Diagnoses and all orders for this visit:    Medicare annual wellness visit, subsequent    Anxiety  -     LORazepam (ATIVAN) 0.5 MG tablet; Take 1 tablet by mouth Daily As Needed for Anxiety.    Hypothyroidism, unspecified type  -     levothyroxine (SYNTHROID, LEVOTHROID) 50 MCG tablet; Take 1 tablet by mouth Daily.  -     TSH  -     T4, Free    Essential hypertension  -     Comprehensive Metabolic Panel    Vitamin D deficiency  -     Vitamin D 25 Hydroxy    Paroxysmal atrial fibrillation (CMS/HCC)  Comments:  s/p ablation, takes flecainide prn only.    Diverticulitis  Comments:  Mod 25:/p 1 round of abx.  Counseled on diet and restart abx.  Orders:  -     amoxicillin-clavulanate (AUGMENTIN) 875-125 MG per tablet; Take 1 tablet by mouth 2 (Two) Times a Day.  -     CBC (No Diff)    Dyslipidemia  -     Lipid Panel    Osteoma of skull  Comments:  would like it evaluated further and possbly excised. WIll refer to NS.  Orders:  -     Ambulatory Referral to Neurosurgery    Other orders  -     montelukast (SINGULAIR) 10 MG tablet; Take 1 tablet by mouth every night at bedtime.    The patient has read and signed the [facility] Controlled Substance Contract.  I will continue to see patient for regular follow up appointments.  They are well controlled on their medication.  JASMYN has been reviewed by me and is updated every 3 months. The patient is aware of the potential for addiction and dependence.           Follow Up:  Return in about 6 months (around 7/10/2019) for Next scheduled follow up.     An After Visit Summary and PPPS with all of these plans were given to the patient.          debility due to UTI

## 2021-04-13 DIAGNOSIS — I48.0 PAROXYSMAL ATRIAL FIBRILLATION (HCC): ICD-10-CM

## 2021-04-14 RX ORDER — FLECAINIDE ACETATE 50 MG/1
TABLET ORAL
Qty: 180 TABLET | Refills: 1 | Status: SHIPPED | OUTPATIENT
Start: 2021-04-14 | End: 2021-10-07 | Stop reason: SDUPTHER

## 2021-04-19 ENCOUNTER — HOSPITAL ENCOUNTER (OUTPATIENT)
Dept: CARDIOLOGY | Facility: HOSPITAL | Age: 71
Discharge: HOME | End: 2021-04-19

## 2021-04-19 DIAGNOSIS — R09.89 OTHER SPECIFIED SYMPTOMS AND SIGNS INVOLVING THE CIRCULATORY AND RESPIRATORY SYSTEMS: ICD-10-CM

## 2021-04-19 DIAGNOSIS — M79.605 PAIN IN BOTH LOWER EXTREMITIES: ICD-10-CM

## 2021-04-19 DIAGNOSIS — M79.604 PAIN IN BOTH LOWER EXTREMITIES: ICD-10-CM

## 2021-04-19 LAB
BH CV ECHO MEAS - BSA(HAYCOCK): 1.9 M^2
BH CV ECHO MEAS - BSA: 1.9 M^2
BH CV ECHO MEAS - BZI_BMI: 23.6 KILOGRAMS/M^2
BH CV ECHO MEAS - BZI_METRIC_HEIGHT: 175.3 CM
BH CV ECHO MEAS - BZI_METRIC_WEIGHT: 72.6 KG
BH CV LOWER ARTERIAL LEFT ABI RATIO: 1.2
BH CV LOWER ARTERIAL LEFT DORSALIS PEDIS SYS MAX: 171 MMHG
BH CV LOWER ARTERIAL LEFT POST EX ABI RATIO: 1.13
BH CV LOWER ARTERIAL LEFT POST TIBIAL SYS MAX: 167 MMHG
BH CV LOWER ARTERIAL RIGHT ABI RATIO: 1.45
BH CV LOWER ARTERIAL RIGHT DORSALIS PEDIS SYS MAX: 180 MMHG
BH CV LOWER ARTERIAL RIGHT POST EX ABI RATIO: 1.21
BH CV LOWER ARTERIAL RIGHT POST TIBIAL SYS MAX: 208 MMHG
BH CV LOWER VASCULAR LEFT COMMON FEMORAL AUGMENT: NORMAL
BH CV LOWER VASCULAR LEFT COMMON FEMORAL COMPRESS: NORMAL
BH CV LOWER VASCULAR LEFT COMMON FEMORAL PHASIC: NORMAL
BH CV LOWER VASCULAR LEFT COMMON FEMORAL SPONT: NORMAL
BH CV LOWER VASCULAR LEFT DISTAL FEMORAL COMPRESS: NORMAL
BH CV LOWER VASCULAR LEFT GASTRONEMIUS COMPRESS: NORMAL
BH CV LOWER VASCULAR LEFT GREATER SAPH AK COMPRESS: NORMAL
BH CV LOWER VASCULAR LEFT GREATER SAPH BK COMPRESS: NORMAL
BH CV LOWER VASCULAR LEFT LESSER SAPH COMPRESS: NORMAL
BH CV LOWER VASCULAR LEFT MID FEMORAL AUGMENT: NORMAL
BH CV LOWER VASCULAR LEFT MID FEMORAL COMPRESS: NORMAL
BH CV LOWER VASCULAR LEFT MID FEMORAL PHASIC: NORMAL
BH CV LOWER VASCULAR LEFT MID FEMORAL SPONT: NORMAL
BH CV LOWER VASCULAR LEFT PERONEAL COMPRESS: NORMAL
BH CV LOWER VASCULAR LEFT POPLITEAL AUGMENT: NORMAL
BH CV LOWER VASCULAR LEFT POPLITEAL COMPRESS: NORMAL
BH CV LOWER VASCULAR LEFT POPLITEAL PHASIC: NORMAL
BH CV LOWER VASCULAR LEFT POPLITEAL SPONT: NORMAL
BH CV LOWER VASCULAR LEFT POSTERIOR TIBIAL COMPRESS: NORMAL
BH CV LOWER VASCULAR LEFT PROFUNDA FEMORAL COMPRESS: NORMAL
BH CV LOWER VASCULAR LEFT PROXIMAL FEMORAL COMPRESS: NORMAL
BH CV LOWER VASCULAR LEFT SAPHENOFEMORAL JUNCTION COMPRESS: NORMAL
BH CV LOWER VASCULAR RIGHT COMMON FEMORAL AUGMENT: NORMAL
BH CV LOWER VASCULAR RIGHT COMMON FEMORAL COMPRESS: NORMAL
BH CV LOWER VASCULAR RIGHT COMMON FEMORAL PHASIC: NORMAL
BH CV LOWER VASCULAR RIGHT COMMON FEMORAL SPONT: NORMAL
BH CV LOWER VASCULAR RIGHT DISTAL FEMORAL COMPRESS: NORMAL
BH CV LOWER VASCULAR RIGHT GASTRONEMIUS COMPRESS: NORMAL
BH CV LOWER VASCULAR RIGHT GREATER SAPH AK COMPRESS: NORMAL
BH CV LOWER VASCULAR RIGHT GREATER SAPH BK COMPRESS: NORMAL
BH CV LOWER VASCULAR RIGHT LESSER SAPH COMPRESS: NORMAL
BH CV LOWER VASCULAR RIGHT MID FEMORAL AUGMENT: NORMAL
BH CV LOWER VASCULAR RIGHT MID FEMORAL COMPRESS: NORMAL
BH CV LOWER VASCULAR RIGHT MID FEMORAL PHASIC: NORMAL
BH CV LOWER VASCULAR RIGHT MID FEMORAL SPONT: NORMAL
BH CV LOWER VASCULAR RIGHT PERONEAL COMPRESS: NORMAL
BH CV LOWER VASCULAR RIGHT POPLITEAL AUGMENT: NORMAL
BH CV LOWER VASCULAR RIGHT POPLITEAL COMPRESS: NORMAL
BH CV LOWER VASCULAR RIGHT POPLITEAL PHASIC: NORMAL
BH CV LOWER VASCULAR RIGHT POPLITEAL SPONT: NORMAL
BH CV LOWER VASCULAR RIGHT POSTERIOR TIBIAL COMPRESS: NORMAL
BH CV LOWER VASCULAR RIGHT PROFUNDA FEMORAL COMPRESS: NORMAL
BH CV LOWER VASCULAR RIGHT PROXIMAL FEMORAL COMPRESS: NORMAL
BH CV LOWER VASCULAR RIGHT SAPHENOFEMORAL JUNCTION COMPRESS: NORMAL
UPPER ARTERIAL LEFT ARM BRACHIAL SYS MAX: 143 MMHG
UPPER ARTERIAL RIGHT ARM BRACHIAL SYS MAX: 143 MMHG

## 2021-04-19 PROCEDURE — 93924 LWR XTR VASC STDY BILAT: CPT

## 2021-04-19 PROCEDURE — 93924 LWR XTR VASC STDY BILAT: CPT | Performed by: INTERNAL MEDICINE

## 2021-04-19 PROCEDURE — 93970 EXTREMITY STUDY: CPT

## 2021-04-19 PROCEDURE — 93970 EXTREMITY STUDY: CPT | Performed by: INTERNAL MEDICINE

## 2021-05-24 ENCOUNTER — OFFICE VISIT (OUTPATIENT)
Dept: ORTHOPEDIC SURGERY | Facility: CLINIC | Age: 71
End: 2021-05-24

## 2021-05-24 VITALS
WEIGHT: 161 LBS | HEIGHT: 69 IN | HEART RATE: 66 BPM | BODY MASS INDEX: 23.85 KG/M2 | SYSTOLIC BLOOD PRESSURE: 155 MMHG | DIASTOLIC BLOOD PRESSURE: 79 MMHG

## 2021-05-24 DIAGNOSIS — M17.11 PRIMARY OSTEOARTHRITIS OF RIGHT KNEE: Primary | ICD-10-CM

## 2021-05-24 PROCEDURE — 99213 OFFICE O/P EST LOW 20 MIN: CPT | Performed by: PHYSICIAN ASSISTANT

## 2021-05-26 NOTE — TELEPHONE ENCOUNTER
Patient called about heart monitor results. I told her we would be in contact when we got them. She verbalized understanding.  
Consultant

## 2021-05-27 NOTE — PROGRESS NOTES
"    [facility] Note        Subjective     CC: Follow-up of the Right Knee (Last Orthovisc 01/04/21)      HPI    Annalise Winters is a 70 y.o. female.  She returns today for follow-up of her right knee.  She has known osteoarthritis to the knee.  She is undergone viscosupplementation series which tend to work okay for her.  Her last series was completed 1/4/2021.  She understands because of this she is unable to proceed with repeat series until 7/5/2021.  Patient is unable to take corticosteroid secondary to heart palpitations.  We discussed with her proceeding with TKA in the past but she has been uninterested.  She is here to start thinking about it more now.    Currently she endorses a pain scale of 4/10.  Notes increased pain especially with stairs.  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  No reported numbness or tingling into the extremity.    Overall, patient's symptoms are worse since medication has begun to wear off.    ROS:    Constiutional:Pt denies fever, chills, nausea, or vomiting.  MSK:as above        Objective      Past Medical History  Past Medical History:   Diagnosis Date   • Abnormal blood chemistry    • Arthritis     right knee   • Atrial fibrillation (CMS/HCC)    • Cyst of buttocks    • Diverticulosis    • Dizziness    • Dysuria    • Esophagitis    • Flushing    • GERD (gastroesophageal reflux disease)    • Heartburn    • Hypertension    • Hyperthyroidism    • Hypothyroidism    • Irritable bowel syndrome    • LLQ abdominal pain    • Polyp of sigmoid colon    • Sinusitis    • Skin lesion of face    • UTI (urinary tract infection)          Physical Exam  /79   Pulse 66   Ht 174 cm (68.5\")   Wt 73 kg (161 lb)   LMP  (LMP Unknown)   BMI 24.12 kg/m²     Body mass index is 24.12 kg/m².    Patient is well nourished and well developed.        Ortho Exam  Right knee  Skin: Grossly intact without any redness, warmth or " swelling.  Tenderness: Positive especially along the lateral aspect of the knee.  Motion: 0-130 degrees.      Imaging/Labs/EMG Reviewed:  Ordered right knee plain films.  Imaging read by Dr. Bucio.    Right Knee Radiographs  Indication: right knee pain  Views: Standing AP's and skiers of both knees, with lateral and sunrise views of the right knee     Comparison: 9/16/2019     Findings:   Bone-on-bone contact medial compartment, tricompartmental osteophytes, varus alignment, advanced arthritic changes.  Worsening compared to the previous imaging.  No acute bony abnormalities.      Assessment:  1. Primary osteoarthritis of right knee        Plan:  1. Primary osteoarthritis right knee.  Patient cannot get the viscosupplementation series until 7/5/2021.  2. Long discussion was had with her regarding proceeding with repeat series versus TKA.  She states she and her  will discuss it.  3. Patient will call me if she wishes to proceed with the viscosupplementation series last week June so I can get the medication ordered for her.  If she chooses to proceed with TKA she will reschedule an appointment for preoperative visit.  This visit will need to be done on a Monday when Dr. Bucio is also in clinic.  4. Follow-up as needed.  5. Questions and concerns answered.      Rianna Lay PA-C  05/27/21  13:03 LOANT      Dragon disclaimer:  Much of this encounter note is an electronic transcription/translation of spoken language to printed text. The electronic translation of spoken language may permit erroneous, or at times, nonsensical words or phrases to be inadvertently transcribed; Although I have reviewed the note for such errors, some may still exist.

## 2021-06-03 DIAGNOSIS — J30.89 ALLERGIC RHINITIS DUE TO OTHER ALLERGIC TRIGGER, UNSPECIFIED SEASONALITY: ICD-10-CM

## 2021-06-04 RX ORDER — MONTELUKAST SODIUM 10 MG/1
TABLET ORAL
Qty: 90 TABLET | Refills: 0 | Status: SHIPPED | OUTPATIENT
Start: 2021-06-04 | End: 2021-09-08

## 2021-06-04 NOTE — TELEPHONE ENCOUNTER
Last Office Visit: 3/2/21  Next Office Visit: 7/19/21    Labs completed in past 6 months? yes  Labs completed in past year? yes    Last Refill Date: 12/12/20  Quantity: 90  Refills: 1    Pharmacy:     Please review pended refill request for any changes needed on refills or quantities. Thank you!

## 2021-06-14 ENCOUNTER — OFFICE VISIT (OUTPATIENT)
Dept: INTERNAL MEDICINE | Facility: CLINIC | Age: 71
End: 2021-06-14

## 2021-06-14 ENCOUNTER — LAB (OUTPATIENT)
Dept: LAB | Facility: HOSPITAL | Age: 71
End: 2021-06-14

## 2021-06-14 ENCOUNTER — TELEPHONE (OUTPATIENT)
Dept: INTERNAL MEDICINE | Facility: CLINIC | Age: 71
End: 2021-06-14

## 2021-06-14 VITALS
TEMPERATURE: 97.2 F | HEIGHT: 68 IN | BODY MASS INDEX: 24.83 KG/M2 | OXYGEN SATURATION: 96 % | SYSTOLIC BLOOD PRESSURE: 118 MMHG | DIASTOLIC BLOOD PRESSURE: 72 MMHG | WEIGHT: 163.8 LBS | HEART RATE: 58 BPM

## 2021-06-14 DIAGNOSIS — R30.0 DYSURIA: Primary | ICD-10-CM

## 2021-06-14 LAB
BILIRUB BLD-MCNC: NEGATIVE MG/DL
CLARITY, POC: CLEAR
COLOR UR: ABNORMAL
GLUCOSE UR STRIP-MCNC: NEGATIVE MG/DL
KETONES UR QL: NEGATIVE
LEUKOCYTE EST, POC: ABNORMAL
NITRITE UR-MCNC: NEGATIVE MG/ML
PH UR: 6 [PH] (ref 5–8)
PROT UR STRIP-MCNC: NEGATIVE MG/DL
RBC # UR STRIP: ABNORMAL /UL
SP GR UR: 1.01 (ref 1–1.03)
UROBILINOGEN UR QL: NORMAL

## 2021-06-14 PROCEDURE — 87077 CULTURE AEROBIC IDENTIFY: CPT | Performed by: NURSE PRACTITIONER

## 2021-06-14 PROCEDURE — 87186 SC STD MICRODIL/AGAR DIL: CPT | Performed by: NURSE PRACTITIONER

## 2021-06-14 PROCEDURE — 81003 URINALYSIS AUTO W/O SCOPE: CPT | Performed by: NURSE PRACTITIONER

## 2021-06-14 PROCEDURE — 99213 OFFICE O/P EST LOW 20 MIN: CPT | Performed by: NURSE PRACTITIONER

## 2021-06-14 PROCEDURE — 87086 URINE CULTURE/COLONY COUNT: CPT | Performed by: NURSE PRACTITIONER

## 2021-06-14 RX ORDER — NITROFURANTOIN 25; 75 MG/1; MG/1
100 CAPSULE ORAL EVERY 12 HOURS SCHEDULED
Qty: 10 CAPSULE | Refills: 0 | Status: SHIPPED | OUTPATIENT
Start: 2021-06-14 | End: 2021-06-19

## 2021-06-14 NOTE — PROGRESS NOTES
Chief Complaint   Patient presents with   • Urinary Tract Infection     about a week ago, burning and urge to go       History of Present Illness      Annalise Winters is a 70 y.o. female who presents today for UTI.       Urinary Tract Infection   This is a new problem. The current episode started yesterday. The problem occurs every urination. The problem has been gradually worsening. The quality of the pain is described as aching and burning. The patient is experiencing no pain. There has been no fever. Associated symptoms include frequency, hesitancy and urgency. Pertinent negatives include no chills, discharge, flank pain, hematuria, nausea, sweats or vomiting. She has tried increased fluids for the symptoms. The treatment provided no relief. There is no history of recurrent UTIs.       Review of Systems  Review of Systems   Constitutional: Negative for appetite change, chills, diaphoresis, fatigue and fever.   Eyes: Negative for visual disturbance.   Respiratory: Negative for cough and shortness of breath.    Cardiovascular: Negative for chest pain and palpitations.   Gastrointestinal: Negative for abdominal distention, abdominal pain, constipation, diarrhea, nausea and vomiting.   Genitourinary: Positive for frequency, hesitancy, pelvic pain and urgency. Negative for decreased urine volume, difficulty urinating, dysuria, flank pain, hematuria and pelvic pressure.   Musculoskeletal: Negative for myalgias.   Skin: Negative for color change and rash.   Neurological: Negative for dizziness and confusion.   Psychiatric/Behavioral: Negative for sleep disturbance.         [facility]  The following portions of the patient's history were reviewed and updated as appropriate: allergies, current medications, past family history, past medical history, past social history, past surgical history and problem list.     Past Medical History:   Diagnosis Date   • Abnormal blood chemistry    • Arthritis     right knee   • Atrial  fibrillation (CMS/HCC)    • Cyst of buttocks    • Diverticulosis    • Dizziness    • Dysuria    • Esophagitis    • Flushing    • GERD (gastroesophageal reflux disease)    • Heartburn    • Hypertension    • Hyperthyroidism    • Hypothyroidism    • Irritable bowel syndrome    • LLQ abdominal pain    • Polyp of sigmoid colon    • Sinusitis    • Skin lesion of face    • UTI (urinary tract infection)      Social History     Tobacco Use   • Smoking status: Never Smoker   • Smokeless tobacco: Never Used   Substance Use Topics   • Alcohol use: Yes     Comment: on occasion     Past Surgical History:   Procedure Laterality Date   • CARDIAC ABLATION  2013    Pulmonary vein ablation by Dr. Abdirahman Lackey, 2013.   • RHINOPLASTY     • SUBTOTAL HYSTERECTOMY     • TUBAL ABDOMINAL LIGATION       Family History   Problem Relation Age of Onset   • Dementia Mother    • Glomerulonephritis Mother    • Hypertension Mother    • Other Mother          at age 88   • Arthritis Father    • Cancer Father         prostate cancer   • Other Father          at age 65   • Heart attack Father    • Hypertension Other    • Osteoarthritis Other      Allergies   Allergen Reactions   • Ace Inhibitors Cough   • Celexa [Citalopram Hydrobromide] Dizziness   • Corticosteroids Rash   • Paxil [Paroxetine Hcl] Other (See Comments)     somnolence           Current Outpatient Medications:   •  acetaminophen (TYLENOL) 500 MG tablet, Take 500 mg by mouth As Needed., Disp: , Rfl:   •  amLODIPine (NORVASC) 5 MG tablet, Take 1 tablet by mouth 2 (two) times a day., Disp: 180 tablet, Rfl: 3  •  Calcium Carbonate-Vit D-Min (CALCIUM 1200 PO), Take  by mouth., Disp: , Rfl:   •  Cholecalciferol (VITAMIN D) 2000 UNITS capsule, Take 1 capsule by mouth daily., Disp: , Rfl:   •  Famotidine-Ca Carb-Mag Hydrox (PEPCID COMPLETE PO), Take 1 tablet by mouth As Needed., Disp: , Rfl:   •  flecainide (TAMBOCOR) 50 MG tablet, TAKE ONE TABLET BY MOUTH TWICE A  "DAY, Disp: 180 tablet, Rfl: 1  •  levothyroxine (SYNTHROID, LEVOTHROID) 50 MCG tablet, Take 1 tablet by mouth Daily., Disp: 90 tablet, Rfl: 1  •  loratadine (Claritin) 10 MG tablet, Take 10 mg by mouth Daily., Disp: , Rfl:   •  LORazepam (ATIVAN) 0.5 MG tablet, Take 1 tablet by mouth Daily As Needed for Anxiety., Disp: 30 tablet, Rfl: 1  •  metoprolol tartrate (LOPRESSOR) 25 MG tablet, Take 0.5 tablets by mouth Every Night., Disp: 180 tablet, Rfl: 3  •  montelukast (SINGULAIR) 10 MG tablet, TAKE ONE TABLET BY MOUTH EVERY NIGHT AT BEDTIME, Disp: 90 tablet, Rfl: 0  •  pantoprazole (PROTONIX) 40 MG EC tablet, Take 40 mg by mouth Daily., Disp: , Rfl:   •  Vitamin B12 (CYANOCOBALAMIN) 500 MCG tablet tablet, Take  by mouth Daily., Disp: , Rfl:   •  vitamin C (ASCORBIC ACID) 500 MG tablet, Take 500 mg by mouth Daily., Disp: , Rfl:   •  nitrofurantoin, macrocrystal-monohydrate, (MACROBID) 100 MG capsule, Take 1 capsule by mouth Every 12 (Twelve) Hours for 5 days., Disp: 10 capsule, Rfl: 0  •  Turmeric 500 MG capsule, Take  by mouth Daily., Disp: , Rfl:     Objective   Vitals:  Vitals:    06/14/21 1242   BP: 118/72   Pulse: 58   Temp: 97.2 °F (36.2 °C)   SpO2: 96%   Weight: 74.3 kg (163 lb 12.8 oz)   Height: 172.7 cm (68\")       Physical Exam  Physical Exam  Vitals and nursing note reviewed.   Constitutional:       Appearance: Normal appearance. She is well-developed. She is not ill-appearing or toxic-appearing.   Cardiovascular:      Rate and Rhythm: Normal rate and regular rhythm.      Heart sounds: Normal heart sounds.   Pulmonary:      Effort: Pulmonary effort is normal. No respiratory distress.      Breath sounds: Normal breath sounds. No decreased breath sounds, wheezing, rhonchi or rales.   Abdominal:      Tenderness: There is no right CVA tenderness or left CVA tenderness.   Skin:     General: Skin is warm and dry.   Neurological:      Mental Status: She is alert.   Psychiatric:         Behavior: Behavior normal. " Behavior is cooperative.         Result Review :   The following data was reviewed by: TAMMY Hallman on 06/14/2021:  No results found for this or any previous visit (from the past 1 hour(s)).      Assessment and Plan    Diagnoses and all orders for this visit:    1. Dysuria (Primary)  -     POCT urinalysis dipstick, automated  -     Urine Culture - Urine, Urine, Clean Catch  -     nitrofurantoin, macrocrystal-monohydrate, (MACROBID) 100 MG capsule; Take 1 capsule by mouth Every 12 (Twelve) Hours for 5 days.  Dispense: 10 capsule; Refill: 0    Will notify of urine culture results when received and treat accordingly. Patient started on antibiotics empirically given presenting symptoms and lab results in office. Patient was encouraged to increase fluid intake, avoid frequent tub baths, wipe from front to back after using the bathroom, and wear breathable fabrics such as cotton to decrease the incidence of UTIs. Patient was encouraged to call back into the office or seek emergency care if experiencing chills, fever, or back pain.          Follow Up   Return if symptoms worsen or fail to improve.    Plan of care reviewed with patient at conclusion of today's visit. Patient education was provided regarding diagnosis, management, and prescribed or recommended OTC medications. Patient was informed to notify office of any new, worsening, or persistent symptoms. Patient verbalized understanding and agreement with plan of care.     Electronically Signed By:  TAMMY Hallman  06/14/2021    EMR Dragon/Transcription Disclaimer:  Please note that portions of this encounter note were completed using electronic transcription/translation of spoken language to printed text.  The electronic transcription/translation of spoken language may permit erroneous, or at times, nonsensical words or phrases to be inadvertently transcribed.  Although I have reviewed the note for such errors, some may still exist in this documentation.

## 2021-06-14 NOTE — TELEPHONE ENCOUNTER
Caller: Annalise Winters    Relationship: Self    Best call back number: [phone]    What is the best time to reach you: ANYTIME     Who are you requesting to speak with (clinical staff, provider,  specific staff member): CLINICAL TEST     What was the call regarding: PATIENT THINKS THAT SHE IS GETTING A UTI. SHE WOULD LIKE TO KNOW IF IT WOULD BE POSSIBLE FOR HER TO COME IN HAVE GIVE A URIN SPECIMIN     Do you require a callback: YES

## 2021-06-16 ENCOUNTER — LAB (OUTPATIENT)
Dept: LAB | Facility: HOSPITAL | Age: 71
End: 2021-06-16

## 2021-06-16 ENCOUNTER — OFFICE VISIT (OUTPATIENT)
Dept: NEUROLOGY | Facility: CLINIC | Age: 71
End: 2021-06-16

## 2021-06-16 VITALS
WEIGHT: 163 LBS | SYSTOLIC BLOOD PRESSURE: 122 MMHG | BODY MASS INDEX: 24.71 KG/M2 | OXYGEN SATURATION: 98 % | TEMPERATURE: 97.3 F | HEIGHT: 68 IN | DIASTOLIC BLOOD PRESSURE: 80 MMHG | HEART RATE: 58 BPM

## 2021-06-16 DIAGNOSIS — R20.0 NUMBNESS: Primary | ICD-10-CM

## 2021-06-16 LAB — FOLATE SERPL-MCNC: 13.9 NG/ML (ref 4.78–24.2)

## 2021-06-16 PROCEDURE — 99214 OFFICE O/P EST MOD 30 MIN: CPT | Performed by: PHYSICIAN ASSISTANT

## 2021-06-16 PROCEDURE — 36415 COLL VENOUS BLD VENIPUNCTURE: CPT | Performed by: PHYSICIAN ASSISTANT

## 2021-06-16 PROCEDURE — 82746 ASSAY OF FOLIC ACID SERUM: CPT | Performed by: PHYSICIAN ASSISTANT

## 2021-06-16 PROCEDURE — 84165 PROTEIN E-PHORESIS SERUM: CPT | Performed by: PHYSICIAN ASSISTANT

## 2021-06-16 PROCEDURE — 84155 ASSAY OF PROTEIN SERUM: CPT | Performed by: PHYSICIAN ASSISTANT

## 2021-06-16 PROCEDURE — 84207 ASSAY OF VITAMIN B-6: CPT | Performed by: PHYSICIAN ASSISTANT

## 2021-06-16 NOTE — PROGRESS NOTES
"Subjective       Chief Complaint: headache, numbness      History of Present Illness   Annalise Winters is a 70 y.o. female who returns to clinic today for evaluation of headache. She initially noted symptoms since late 2019 marked by a headache described as a diffuse sensation of pressure primarily noted after coughing and sneezing. This typically lasts for several seconds before resolving. There is associated neck pain. She denies any additional associated neurologic symptoms.      Prior evaluation has included a head CT and CTA of the head which were unremarkable. A CT of the cervical spine showed advanced degenerative changes throughout the mid and lower cervical segments without critical spinal canal narrowing.     Today: Since her last visit in 3/20, she feels that her headaches are essentially unchanged. She has noted intermittent numbness in all of her toes for several months. This has remained relatively static over time. 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  Her symptoms are worse when wearing certain shoes. She denies any additional associated neurologic symptoms, including pain, paresthesias or weakness.       I have reviewed and confirmed the past family, social and medical history as accurate on 6/16/21.     Review of Systems   Constitutional: Negative.    HENT: Negative.    Eyes: Negative.    Respiratory: Negative.    Cardiovascular: Negative.    Gastrointestinal: Negative.    Endocrine: Negative.    Genitourinary: Negative.    Musculoskeletal: Negative.    Skin: Negative.    Allergic/Immunologic: Negative.    Hematological: Negative.        Objective     /80   Pulse 58   Temp 97.3 °F (36.3 °C)   Ht 172.7 cm (68\")   Wt 73.9 kg (163 lb)   LMP  (LMP Unknown)   SpO2 98%   BMI 24.78 kg/m²     General appearance today is normal.       Physical Exam  Neurological:      Coordination: Finger-Nose-Finger Test and Heel to Shin Test " normal.      Gait: Gait is intact.      Deep Tendon Reflexes: Strength normal.      Reflex Scores:       Patellar reflexes are 2+ on the right side and 2+ on the left side.  Psychiatric:         Speech: Speech normal.          Neurologic Exam     Mental Status   Speech: speech is normal   Level of consciousness: alert  Normal comprehension.     Cranial Nerves   Cranial nerves II through XII intact.     Motor Exam   Muscle bulk: normal  Overall muscle tone: normal    Strength   Strength 5/5 throughout.     Sensory Exam   Light touch normal.     Gait, Coordination, and Reflexes     Gait  Gait: normal    Coordination   Finger to nose coordination: normal  Heel to shin coordination: normal    Tremor   Resting tremor: absent    Reflexes   Right patellar: 2+  Left patellar: 2+          Assessment/Plan   Diagnoses and all orders for this visit:    1. Numbness (Primary)  -     Folate  -     Vitamin B6  -     Protein Elec + Interp, Serum          Discussion/Summary   Annalise Winters returns to clinic today with a history of suspected primary cough headache and numbness. While the etiology of her numbness is unclear, it was elected to obtain  screening blood work and an EMG to rule out a potential peripheral neuropathy. It was not elected to add any new medications at this time. She will then follow up in my clinic based on the results of her diagnostic testing.   Total time of visit today: 30 minutes. As part of this visit I reviewed prior lab results. I also discussed diagnosis, diagnostic testing, evaluation, current status, treatment options and management as discussed above.             Molly Ramos PA-C

## 2021-06-17 LAB
ALBUMIN SERPL ELPH-MCNC: 3.9 G/DL (ref 2.9–4.4)
ALBUMIN/GLOB SERPL: 1.3 {RATIO} (ref 0.7–1.7)
ALPHA1 GLOB SERPL ELPH-MCNC: 0.3 G/DL (ref 0–0.4)
ALPHA2 GLOB SERPL ELPH-MCNC: 0.7 G/DL (ref 0.4–1)
B-GLOBULIN SERPL ELPH-MCNC: 1 G/DL (ref 0.7–1.3)
BACTERIA SPEC AEROBE CULT: ABNORMAL
GAMMA GLOB SERPL ELPH-MCNC: 1 G/DL (ref 0.4–1.8)
GLOBULIN SER CALC-MCNC: 3 G/DL (ref 2.2–3.9)
LABORATORY COMMENT REPORT: NORMAL
M PROTEIN SERPL ELPH-MCNC: NORMAL G/DL
PROT PATTERN SERPL ELPH-IMP: NORMAL
PROT SERPL-MCNC: 6.9 G/DL (ref 6–8.5)

## 2021-06-25 LAB — VIT B6 SERPL-MCNC: 7.5 UG/L (ref 2–32.8)

## 2021-07-19 ENCOUNTER — OFFICE VISIT (OUTPATIENT)
Dept: INTERNAL MEDICINE | Facility: CLINIC | Age: 71
End: 2021-07-19

## 2021-07-19 ENCOUNTER — LAB (OUTPATIENT)
Dept: LAB | Facility: HOSPITAL | Age: 71
End: 2021-07-19

## 2021-07-19 ENCOUNTER — TELEPHONE (OUTPATIENT)
Dept: CARDIOLOGY | Facility: CLINIC | Age: 71
End: 2021-07-19

## 2021-07-19 VITALS
BODY MASS INDEX: 24.71 KG/M2 | WEIGHT: 163 LBS | SYSTOLIC BLOOD PRESSURE: 150 MMHG | HEART RATE: 115 BPM | DIASTOLIC BLOOD PRESSURE: 88 MMHG | OXYGEN SATURATION: 98 % | HEIGHT: 68 IN

## 2021-07-19 DIAGNOSIS — I48.0 PAROXYSMAL ATRIAL FIBRILLATION (HCC): ICD-10-CM

## 2021-07-19 DIAGNOSIS — R42 DIZZINESS: ICD-10-CM

## 2021-07-19 DIAGNOSIS — R20.0 BILATERAL LEG NUMBNESS: ICD-10-CM

## 2021-07-19 DIAGNOSIS — E03.9 HYPOTHYROIDISM, UNSPECIFIED TYPE: ICD-10-CM

## 2021-07-19 DIAGNOSIS — R42 DIZZINESS: Primary | ICD-10-CM

## 2021-07-19 LAB
BILIRUB BLD-MCNC: NEGATIVE MG/DL
CLARITY, POC: CLEAR
COLOR UR: YELLOW
DEPRECATED RDW RBC AUTO: 41.9 FL (ref 37–54)
ERYTHROCYTE [DISTWIDTH] IN BLOOD BY AUTOMATED COUNT: 13.1 % (ref 12.3–15.4)
GLUCOSE UR STRIP-MCNC: NEGATIVE MG/DL
HCT VFR BLD AUTO: 41.5 % (ref 34–46.6)
HGB BLD-MCNC: 13.6 G/DL (ref 12–15.9)
KETONES UR QL: NEGATIVE
LEUKOCYTE EST, POC: NEGATIVE
MCH RBC QN AUTO: 28.9 PG (ref 26.6–33)
MCHC RBC AUTO-ENTMCNC: 32.8 G/DL (ref 31.5–35.7)
MCV RBC AUTO: 88.1 FL (ref 79–97)
NITRITE UR-MCNC: NEGATIVE MG/ML
PH UR: 6 [PH] (ref 5–8)
PLATELET # BLD AUTO: 294 10*3/MM3 (ref 140–450)
PMV BLD AUTO: 10.3 FL (ref 6–12)
PROT UR STRIP-MCNC: NEGATIVE MG/DL
RBC # BLD AUTO: 4.71 10*6/MM3 (ref 3.77–5.28)
RBC # UR STRIP: NEGATIVE /UL
SP GR UR: 1.01 (ref 1–1.03)
UROBILINOGEN UR QL: NORMAL
WBC # BLD AUTO: 6.14 10*3/MM3 (ref 3.4–10.8)

## 2021-07-19 PROCEDURE — 81003 URINALYSIS AUTO W/O SCOPE: CPT | Performed by: INTERNAL MEDICINE

## 2021-07-19 PROCEDURE — 80053 COMPREHEN METABOLIC PANEL: CPT

## 2021-07-19 PROCEDURE — 84443 ASSAY THYROID STIM HORMONE: CPT

## 2021-07-19 PROCEDURE — 85027 COMPLETE CBC AUTOMATED: CPT

## 2021-07-19 PROCEDURE — 84439 ASSAY OF FREE THYROXINE: CPT

## 2021-07-19 PROCEDURE — 99214 OFFICE O/P EST MOD 30 MIN: CPT | Performed by: INTERNAL MEDICINE

## 2021-07-19 NOTE — TELEPHONE ENCOUNTER
Patient went into Afib this morning. She took an extra dose of Flecainide and started taking Eliquis. She saw Dr. Jauregui today. She did not do an EKG, but instructed her to take up to an extra 2 or 3 doses of Flecainide to control her HR until she spoke with our office. When I spoke with the patient she states that she is now back in NSR, but has been experiencing lower BP's. She is going to keep a log of her BP and HR for the next week and call back with the readings.

## 2021-07-19 NOTE — PROGRESS NOTES
Hypothyroidism    Subjective   Annalise Winters is a 70 y.o. female is here today for follow-up.    History of Present Illness   Started having an a-fib attack this am, took extra flecainide, blood thinner pill and did do a cardio class today and did fine.  Called pharmacy and was adv. Okay to take more- took 2 extra doses in total of flecainide.  Was cleaning yesterday- does not use chemicals, no extra caffeine.  Last night felt back was uncomfortable.  Took an ativan this am, after her class.  Here for a f/u on her numbness and hypothyroid.      Current Outpatient Medications:   •  acetaminophen (TYLENOL) 500 MG tablet, Take 500 mg by mouth As Needed., Disp: , Rfl:   •  amLODIPine (NORVASC) 5 MG tablet, Take 1 tablet by mouth 2 (two) times a day. (Patient taking differently: Take 5 mg by mouth 2 (two) times a day. 1/2 in am and 1 tablet at night), Disp: 180 tablet, Rfl: 3  •  Calcium Carbonate-Vit D-Min (CALCIUM 1200 PO), Take  by mouth., Disp: , Rfl:   •  Cholecalciferol (VITAMIN D) 2000 UNITS capsule, Take 1 capsule by mouth daily., Disp: , Rfl:   •  COLLAGEN PO, Take  by mouth., Disp: , Rfl:   •  Famotidine-Ca Carb-Mag Hydrox (PEPCID COMPLETE PO), Take 1 tablet by mouth As Needed., Disp: , Rfl:   •  flecainide (TAMBOCOR) 50 MG tablet, TAKE ONE TABLET BY MOUTH TWICE A DAY, Disp: 180 tablet, Rfl: 1  •  levothyroxine (SYNTHROID, LEVOTHROID) 50 MCG tablet, Take 1 tablet by mouth Daily., Disp: 90 tablet, Rfl: 1  •  loratadine (Claritin) 10 MG tablet, Take 10 mg by mouth Daily., Disp: , Rfl:   •  metoprolol tartrate (LOPRESSOR) 25 MG tablet, Take 0.5 tablets by mouth Every Night. (Patient taking differently: Take 12.5 mg by mouth Every Night. .25 of tablet), Disp: 180 tablet, Rfl: 3  •  montelukast (SINGULAIR) 10 MG tablet, TAKE ONE TABLET BY MOUTH EVERY NIGHT AT BEDTIME, Disp: 90 tablet, Rfl: 0  •  pantoprazole (PROTONIX) 40 MG EC tablet, Take 40 mg by mouth Daily., Disp: , Rfl:   •  Vitamin B12 (CYANOCOBALAMIN) 500  "MCG tablet tablet, Take  by mouth Daily., Disp: , Rfl:   •  vitamin C (ASCORBIC ACID) 500 MG tablet, Take 500 mg by mouth Daily., Disp: , Rfl:   •  LORazepam (ATIVAN) 0.5 MG tablet, Take 1 tablet by mouth Daily As Needed for Anxiety., Disp: 30 tablet, Rfl: 1      The following portions of the patient's history were reviewed and updated as appropriate: allergies, current medications, past family history, past medical history, past social history, past surgical history and problem list.    Review of Systems   Constitutional: Negative.  Negative for chills and fever.   HENT: Negative for ear discharge, ear pain, sinus pressure and sore throat.    Respiratory: Positive for chest tightness and shortness of breath. Negative for cough.    Cardiovascular: Positive for palpitations. Negative for chest pain and leg swelling.   Gastrointestinal: Negative for diarrhea, nausea and vomiting.   Musculoskeletal: Negative for arthralgias, back pain and myalgias.   Neurological: Positive for dizziness. Negative for syncope and headaches.   Psychiatric/Behavioral: Negative for confusion and sleep disturbance.       Objective   /88   Pulse 115   Ht 172.7 cm (68\")   Wt 73.9 kg (163 lb)   LMP  (LMP Unknown)   SpO2 98%   BMI 24.78 kg/m²   Physical Exam  Vitals and nursing note reviewed.   Constitutional:       Appearance: She is well-developed.   HENT:      Head: Normocephalic and atraumatic.      Right Ear: External ear normal.      Left Ear: External ear normal.      Mouth/Throat:      Pharynx: No oropharyngeal exudate.   Eyes:      Conjunctiva/sclera: Conjunctivae normal.      Pupils: Pupils are equal, round, and reactive to light.   Neck:      Thyroid: No thyromegaly.   Cardiovascular:      Rate and Rhythm: Tachycardia present. Rhythm irregular.      Pulses: Normal pulses.      Heart sounds: Normal heart sounds. No murmur heard.   No friction rub. No gallop.    Pulmonary:      Effort: Pulmonary effort is normal.      " Breath sounds: Normal breath sounds.   Abdominal:      General: Bowel sounds are normal. There is no distension.      Palpations: Abdomen is soft.      Tenderness: There is no abdominal tenderness.   Musculoskeletal:      Cervical back: Neck supple.   Skin:     General: Skin is warm and dry.   Neurological:      Mental Status: She is alert and oriented to person, place, and time.      Cranial Nerves: No cranial nerve deficit.   Psychiatric:         Judgment: Judgment normal.           Results for orders placed or performed in visit on 07/19/21   POCT urinalysis dipstick, automated    Specimen: Urine   Result Value Ref Range    Color Yellow Yellow, Straw, Dark Yellow, Kimberli    Clarity, UA Clear Clear    Specific Gravity  1.015 1.005 - 1.030    pH, Urine 6.0 5.0 - 8.0    Leukocytes Negative Negative    Nitrite, UA Negative Negative    Protein, POC Negative Negative mg/dL    Glucose, UA Negative Negative, 1000 mg/dL (3+) mg/dL    Ketones, UA Negative Negative    Urobilinogen, UA Normal Normal    Bilirubin Negative Negative    Blood, UA Negative Negative             Assessment/Plan   Diagnoses and all orders for this visit:    Dizziness  -     POCT urinalysis dipstick, automated  -     CBC (No Diff); Future  -     Comprehensive Metabolic Panel; Future    Hypothyroidism, unspecified type  -     TSH; Future  -     T4, Free; Future    Paroxysmal atrial fibrillation (CMS/HCC)  -     CBC (No Diff); Future  -     Comprehensive Metabolic Panel; Future    Bilateral leg numbness  Comments:  await NCV - folowed by urology    Other orders  -     COLLAGEN PO; Take  by mouth.    pt called next day for a f/u, and notes sxs resolved.         Adv. Can take upto an extra 2 or 3 flecainide pills.  Await Dr. Ruth's recommendations.    Return in about 4 months (around 11/19/2021) for Next scheduled follow up.    Electronically signed by:    Maryse Jauregui MD

## 2021-07-20 ENCOUNTER — TELEPHONE (OUTPATIENT)
Dept: INTERNAL MEDICINE | Facility: CLINIC | Age: 71
End: 2021-07-20

## 2021-07-20 LAB
ALBUMIN SERPL-MCNC: 4.8 G/DL (ref 3.5–5.2)
ALBUMIN/GLOB SERPL: 1.8 G/DL
ALP SERPL-CCNC: 67 U/L (ref 39–117)
ALT SERPL W P-5'-P-CCNC: 12 U/L (ref 1–33)
ANION GAP SERPL CALCULATED.3IONS-SCNC: 9.4 MMOL/L (ref 5–15)
AST SERPL-CCNC: 16 U/L (ref 1–32)
BILIRUB SERPL-MCNC: 0.5 MG/DL (ref 0–1.2)
BUN SERPL-MCNC: 12 MG/DL (ref 8–23)
BUN/CREAT SERPL: 15.4 (ref 7–25)
CALCIUM SPEC-SCNC: 10 MG/DL (ref 8.6–10.5)
CHLORIDE SERPL-SCNC: 97 MMOL/L (ref 98–107)
CO2 SERPL-SCNC: 27.6 MMOL/L (ref 22–29)
CREAT SERPL-MCNC: 0.78 MG/DL (ref 0.57–1)
GFR SERPL CREATININE-BSD FRML MDRD: 73 ML/MIN/1.73
GLOBULIN UR ELPH-MCNC: 2.6 GM/DL
GLUCOSE SERPL-MCNC: 112 MG/DL (ref 65–99)
POTASSIUM SERPL-SCNC: 4.4 MMOL/L (ref 3.5–5.2)
PROT SERPL-MCNC: 7.4 G/DL (ref 6–8.5)
SODIUM SERPL-SCNC: 134 MMOL/L (ref 136–145)
T4 FREE SERPL-MCNC: 1.37 NG/DL (ref 0.93–1.7)
TSH SERPL DL<=0.05 MIU/L-ACNC: 1.94 UIU/ML (ref 0.27–4.2)

## 2021-07-20 NOTE — TELEPHONE ENCOUNTER
Please let her know labs show that her sodium and chloride were a little low,  Very likely from dehydration.    Even though she has been drinking plenty of fluids, because of the weather and exercises she is may be losing fluids through sweating.  She needs to hydrate with electrolyte rich fluids like Pedialyte or Gatorade for the next 1 to 2 weeks.    Is her atrial fibrillation better?

## 2021-07-20 NOTE — TELEPHONE ENCOUNTER
Caller: Annalise Winters    Relationship: Self    Best call back number: [phone]     Caller requesting test results: RESULTS EXPLAINED    What test was performed: LAB WORK    When was the test performed: 07/19/21    Where was the test performed: [facility]    Additional notes: PATIENT HAS QUESTIONS ABOUT RESULTS THAT WERE FLAGGED

## 2021-07-23 ENCOUNTER — PROCEDURE VISIT (OUTPATIENT)
Dept: NEUROLOGY | Facility: CLINIC | Age: 71
End: 2021-07-23

## 2021-07-23 DIAGNOSIS — G62.9 POLYNEUROPATHY: Primary | ICD-10-CM

## 2021-07-23 DIAGNOSIS — R20.0 NUMBNESS: ICD-10-CM

## 2021-07-23 PROCEDURE — 95911 NRV CNDJ TEST 9-10 STUDIES: CPT | Performed by: PSYCHIATRY & NEUROLOGY

## 2021-07-23 PROCEDURE — 95886 MUSC TEST DONE W/N TEST COMP: CPT | Performed by: PSYCHIATRY & NEUROLOGY

## 2021-07-23 NOTE — PROGRESS NOTES
[facility] Neurology [place]   Electrodiagnostic Laboratory    Nerve Conduction & EMG Report        Patient:  Annalise Winters   Patient ID: [id number]   YOB: 1950  Sex:  female      Exam Physician:  Arthur Shirley MD  Refer Physician:  Molly Ramos PA-C    Electromyogram and Nerve Conduction Velocity Procedure Note    Hx: 70 y.o. right handed female with complaint of numbness involving the both lower extremities. Symptoms have been present for several months and were provoked by no clear event. Significant past medical history includes nothing suggestive of neuropathy.  Family history no family history of nerve or muscle disease.    Exam: Motor power is normal. There is no atrophy. There are no fasciculations. Deep tendon reflexes are present and symmetrical. Sensory exam is normal.      Edx studies of the B LE were performed to evaluate for peripheral neuropathy.     NCS Examination   For sensory nerve conduction studies, the amplitude is measured peak-to-peak, the latency reported is the distal peak latency, and the conduction velocity, if measured, is determined from onset latencies and is over the forearm.   For motor nerve conduction studies, the amplitude is measured baseline-to-peak, the latency reported is the distal onset latency, the conduction velocity is calculated over the forearm, and the F wave latency is the minimum latency.   Unless otherwise noted, the hand temperature was monitored continuously and remained between 32°C and 36°C during the performance of the NCSs.        Sensory NCS      Nerve / Sites Rec. Site Onset Lat Peak Lat NP Amp PP Amp Segments Distance Velocity     ms ms µV µV  cm m/s   L Sural - Ankle (Calf)      Calf Ankle NR NR NR NR Calf - Ankle 14 NR   R Sural - Ankle (Calf)      Calf Ankle NR NR NR NR Calf - Ankle 14 NR   L Superficial peroneal - Ankle      Lat leg Ankle NR NR NR NR Lat leg - Ankle 14 NR   R Superficial peroneal - Ankle      Lat leg Ankle NR NR  NR NR Lat leg - Ankle 14 NR               Motor NCS      Nerve / Sites Muscle Latency Amplitude Amp % Duration Segments Distance Lat Diff Velocity     ms mV % ms  cm ms m/s   L Peroneal - EDB      Ankle EDB 3.44 4.0 100 5.63 Ankle - EDB 8        Fib head EDB 12.55 3.1 77.2 6.30 Fib head - Ankle 38 9.11 42      Pop fossa EDB 14.22 2.2 54.4 6.41 Pop fossa - Fib head 10 1.67 60   R Peroneal - EDB      Ankle EDB 3.80 3.0 100 6.09 Ankle - EDB 8        Fib head EDB 12.40 1.7 57.4 6.09 Fib head - Ankle 37 8.59 43      Pop fossa EDB 13.75 2.0 66.1 6.09 Pop fossa - Fib head 10 1.35 74   L Tibial - AH      Ankle AH 3.28 9.4 100 5.57 Ankle - AH 8        Pop fossa AH 14.11 1.7 17.9 6.98 Pop fossa - Ankle 43 10.83 40   R Tibial - AH      Ankle AH 3.65 5.9 100 6.09 Ankle - AH 8        Pop fossa AH 13.70 3.9 66 6.51 Pop fossa - Ankle 43 10.05 43               F  Wave      Nerve F Lat M Lat F-M Lat    ms ms ms   L Peroneal - EDB 46.7 3.3 43.3   L Tibial - AH 56.6 3.4 53.2   R Tibial - AH 53.3 4.2 49.1   R Peroneal - EDB 55.8 3.2 52.6               H Reflex      Nerve H Lat    ms   L Tibial - Soleus NR   R Tibial - Soleus 34.9               EMG Examination   The study was performed with a concentric needle electrode. Fibrillation and fasciculation activity is graded from none (0) to continuous (4+). The configuration and recruitment pattern of motor unit action potentials under voluntary control, if not normal, are described below        EMG Summary Table     Spontaneous MUAP Recruitment   Muscle Nerve Roots IA Fib PSW Fasc H.F. Amp Dur. PPP Pattern   L. Lumbar paraspinals Spinal L1-L5 N None None None None N N N N   L. Gastrocnemius (Medial head) Tibial S1-S2 N None None None None N N N N   R. Gastrocnemius (Medial head) Tibial S1-S2 N None None None None N N N N   R. Lumbar paraspinals Spinal L1-L5 N None None None None N N N N   L. Peroneus longus Perineal L5-S1 N None None None None N N N N   R. Peroneus longus Perineal L5-S1 N  None None None None N N N N   L. Tibialis anterior Deep peroneal (Fibular) L4-L5 N None None None None N N N N   R. Tibialis anterior Deep peroneal (Fibular) L4-L5 N None None None None N N N N   L. Tibialis posterior Tibial L4-L5 N None None None None N N N N   R. Tibialis posterior Tibial L4-L5 N None None None None N N N N   L. Vastus lateralis Femoral L2-L4 N None None None None N N N N   R. Vastus lateralis Femoral L2-L4 N None None None None N N N N       Summary    The motor conduction test was normal in all 4 of the tested nerves: L Peroneal - EDB, R Peroneal - EDB, L Tibial - AH, R Tibial - AH.    The sensory conduction test had results outside of the specified normal range in all 4 of the tested nerves:  • In the L Sural - Ankle (Calf) study  o the response was considered absent for Calf stimulation  • In the R Sural - Ankle (Calf) study  o the response was considered absent for Calf stimulation  • In the L Superficial peroneal - Ankle study  o the response was considered absent for Lat leg stimulation  • In the R Superficial peroneal - Ankle study  o the response was considered absent for Lat leg stimulation    The F wave study was normal in all 4 of the tested nerves: L Peroneal - EDB, L Tibial - AH, R Tibial - AH, R Peroneal - EDB.    The H reflex study was performed on 2 nerve(s). The results were normal in 1 nerve(s): R Tibial - Soleus. Results outside the specified normal range were found in 1 nerve(s), as follows:  • In the L Tibial - Soleus study  o the response was considered absent    The needle EMG study was normal in all 12 tested muscles: L. Lumbar paraspinals, L. Gastrocnemius (Medial head), R. Gastrocnemius (Medial head), R. Lumbar paraspinals, L. Peroneus longus, R. Peroneus longus, L. Tibialis anterior, R. Tibialis anterior, L. Tibialis posterior, R. Tibialis posterior, L. Vastus lateralis, R. Vastus lateralis.       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  Absent L H reflex suggestive of S1 radiculopathy.           Instrument used:  Teca Synergy        Performed by:          Arthur Shirley MD

## 2021-07-28 NOTE — TELEPHONE ENCOUNTER
Patient called to report BPs for the last week:  AM  Noon  PM    7/20  114/63    56                       122/68   64  7/21  123/68    51                               128/60   60  7/22  127/69    56                               120/64   60  7/23  130/70    53     124/68   67  7/24  111/60    57        120/63   56      150/60   55  7/25  114/66    51        107/53   67      125/66    47  7/26  128/70   50                                 126/68   61  7/27  125/68   50         127/70 57        119/63   53  7/28  127/67   53    Taking:   Lopressor 12.5mg q hs  Norvasc 2.5mg am and 5mg q pm  Flecainide 50mg po BID    Should she stop her Lopressor? She says at times she feels run down, especially with HR is running low.

## 2021-07-29 NOTE — PROGRESS NOTES
Would you care to let Ms. Winters know that I reviewed her EMG results? It was potentially suggestive of narrowing in the lower spine. Therefore, I would recommend obtaining an MRI of the lumbar spine to further investigate. Thanks

## 2021-07-30 NOTE — TELEPHONE ENCOUNTER
Next appt - 8/31/2021 - LM to clarify dosage.   Lab Results   Component Value Date    GLUCOSE 112 (H) 07/19/2021    BUN 12 07/19/2021    CREATININE 0.78 07/19/2021    EGFRIFNONA 73 07/19/2021    EGFRIFAFRI 106 05/16/2016    BCR 15.4 07/19/2021    K 4.4 07/19/2021    CO2 27.6 07/19/2021    CALCIUM 10.0 07/19/2021    PROTENTOTREF 6.9 06/16/2021    ALBUMIN 4.80 07/19/2021    LABIL2 1.3 06/16/2021    AST 16 07/19/2021    ALT 12 07/19/2021        Pt called back to report GFT has told her to hold metoprolol for 2 weeks. She is to update EP with HR/BP readings per TAMMY Huffman.

## 2021-08-26 ENCOUNTER — TELEPHONE (OUTPATIENT)
Dept: CARDIOLOGY | Facility: CLINIC | Age: 71
End: 2021-08-26

## 2021-08-26 NOTE — TELEPHONE ENCOUNTER
Pt called her HR is in the 50's and she feels weak. She initially stopped her metoprolol d/t bradycardia but went back on 1/4-1/2 tablet because her HR was  after stopping. Now her HR is back in the 50's and she feels weak and tired. I advised that she hold her metoprolol document her HR and BP and she will see Dr Ruth next week.

## 2021-08-30 NOTE — PROGRESS NOTES
"[facility]      Identification: Annalise Winters is a 70 y.o. female who resides in [place], [place].  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    Annalise reports it she has had recurrence of what she believes is atrial fibrillation.  She reports having an episode that lasted >6 hours in July.  She saw her PCP with ongoing symptoms but no EKG was performed.  The patient reports several other episodes and shorter duration.  She has been taking extra flecainide when these episodes occur.  She saw Dr. Bell in March regarding candidacy for Watchman.  At that time, she was not having recurrence of atrial fibrillation and Watchman device was not recommended.  She is presently on no antiplatelet or anticoagulation.            Review of Systems   Cardiovascular: Negative.    Respiratory: Negative.        Objective     /72 (BP Location: Left arm, Patient Position: Sitting)   Pulse 71   Ht 174 cm (68.5\")   Wt 73.2 kg (161 lb 6.4 oz)   SpO2 94%   BMI 24.18 kg/m²       Physical Exam    Result Review :    Lab Results   Component Value Date    GLUCOSE 112 (H) 07/19/2021    BUN 12 07/19/2021    CREATININE 0.78 07/19/2021    EGFRIFNONA 73 07/19/2021    BCR 15.4 07/19/2021    K 4.4 07/19/2021    CO2 27.6 07/19/2021    CALCIUM 10.0 07/19/2021    PROTENTOTREF 6.9 06/16/2021    ALBUMIN 4.80 07/19/2021    LABIL2 1.3 06/16/2021    AST 16 07/19/2021    ALT 12 07/19/2021     Lab Results   Component Value Date    WBC 6.14 07/19/2021    HGB 13.6 07/19/2021    HCT 41.5 07/19/2021    MCV 88.1 07/19/2021     07/19/2021     Lab Results   Component Value Date    CHOL 235 (H) 03/02/2021    TRIG 71 03/02/2021    HDL 85 (H) 03/02/2021     (H) 03/02/2021         ECG 12 Lead    Date/Time: 8/31/2021 4:12 PM  Performed " by: Goran Ruth IV, MD  Authorized by: Goran Ruth IV, MD   Comparison: compared with previous ECG from 3/3/2021  Similar to previous ECG  Rhythm: sinus rhythm  BPM: 71               Assessment     Problem List Items Addressed This Visit        Cardiology Problems    Paroxysmal atrial fibrillation (CMS/HCC) - Primary (Chronic)    Overview     · Initially diagnosed 2012  · Myocardial perfusion study (04/02/2013):  No ischemia.  · Event monitor demonstrating atrial fibrillation,10/2013  · Failure of flecainide, sotalol and Multaq therapy.  · Pulmonary vein ablation by Dr. Abdirahman Lackey, 08/21/2013.  · Chads Vasc=3 (age, female, HTN)         Current Assessment & Plan     · Recent occasional episodes of palpitations concerning for atrial fibrillation  · Recommend patient get iWatch or Kardia to help us confirm symptoms are in fact atrial fibrillation  · We will touch base with EP regarding stroke prophylaxis.  Presently she is not on aspirin or NOAC.  If her episodes are atrial fibrillation, patient deserves NOAC unless high bleeding risk  · Continue flecainide and low-dose metoprolol for rhythm control strategy         Relevant Medications    amLODIPine (NORVASC) 5 MG tablet    Essential hypertension (Chronic)    Overview     • Target blood pressure <130/80 mmHg         Current Assessment & Plan     Well-controlled  Continue amlodipine         Relevant Medications    amLODIPine (NORVASC) 5 MG tablet          Plan   • Patient to purchase iWatch or kardia device to confirm palpitations are indeed A. fib  • I will contact EP for recommendations regarding stroke prophylaxis   • Continue metoprolol and flecainide for rhythm control strategy      Follow-up   Return in about 6 months (around 2/28/2022).        Al Ruth MD, [facility], [facility]  8/31/2021

## 2021-08-31 ENCOUNTER — OFFICE VISIT (OUTPATIENT)
Dept: CARDIOLOGY | Facility: CLINIC | Age: 71
End: 2021-08-31

## 2021-08-31 VITALS
HEIGHT: 69 IN | BODY MASS INDEX: 23.91 KG/M2 | HEART RATE: 71 BPM | WEIGHT: 161.4 LBS | OXYGEN SATURATION: 94 % | DIASTOLIC BLOOD PRESSURE: 72 MMHG | SYSTOLIC BLOOD PRESSURE: 132 MMHG

## 2021-08-31 DIAGNOSIS — I10 ESSENTIAL HYPERTENSION: ICD-10-CM

## 2021-08-31 DIAGNOSIS — I48.0 PAROXYSMAL ATRIAL FIBRILLATION (HCC): Primary | Chronic | ICD-10-CM

## 2021-08-31 PROCEDURE — 99214 OFFICE O/P EST MOD 30 MIN: CPT | Performed by: INTERNAL MEDICINE

## 2021-08-31 PROCEDURE — 93000 ELECTROCARDIOGRAM COMPLETE: CPT | Performed by: INTERNAL MEDICINE

## 2021-08-31 RX ORDER — AMLODIPINE BESYLATE 5 MG/1
5 TABLET ORAL 2 TIMES DAILY
Qty: 180 TABLET | Refills: 3
Start: 2021-08-31 | End: 2022-01-14

## 2021-08-31 NOTE — ASSESSMENT & PLAN NOTE
· Recent occasional episodes of palpitations concerning for atrial fibrillation  · Recommend patient get iWatch or Kardia to help us confirm symptoms are in fact atrial fibrillation  · We will touch base with EP regarding stroke prophylaxis.  Presently she is not on aspirin or NOAC.  If her episodes are atrial fibrillation, patient deserves NOAC unless high bleeding risk  · Continue flecainide and low-dose metoprolol for rhythm control strategy

## 2021-09-02 ENCOUNTER — TELEPHONE (OUTPATIENT)
Dept: CARDIOLOGY | Facility: CLINIC | Age: 71
End: 2021-09-02

## 2021-09-05 DIAGNOSIS — E03.9 HYPOTHYROIDISM, UNSPECIFIED TYPE: ICD-10-CM

## 2021-09-07 ENCOUNTER — TELEPHONE (OUTPATIENT)
Dept: NEUROLOGY | Facility: CLINIC | Age: 71
End: 2021-09-07

## 2021-09-07 DIAGNOSIS — J30.89 ALLERGIC RHINITIS DUE TO OTHER ALLERGIC TRIGGER, UNSPECIFIED SEASONALITY: ICD-10-CM

## 2021-09-07 RX ORDER — LEVOTHYROXINE SODIUM 0.05 MG/1
TABLET ORAL
Qty: 90 TABLET | Refills: 1 | Status: SHIPPED | OUTPATIENT
Start: 2021-09-07 | End: 2022-03-01

## 2021-09-07 NOTE — TELEPHONE ENCOUNTER
Caller: NILSON SMALL  Relationship: SELF      Caller requesting test results: NILSON    What test was performed: EMG    When was the test performed: 7-23-21    Where was the test performed: [facility]    Additional notes: PT NEVER HEARD BACK ABOUT THE RESULTS OF THE EMG AND WOULD LIKE FOR SOMEONE TO CALL HER BACK WITH RESULTS.      PLEASE  CALL BACK -[phone]

## 2021-09-07 NOTE — TELEPHONE ENCOUNTER
I sent a message to call her regarding results when I reviewed them. Sorry that she did not receive the call.     Would you care to let Ms. Winters know that I reviewed her EMG results? It was potentially suggestive of narrowing in the lower spine. Therefore, I would recommend obtaining an MRI of the lumbar spine to further investigate. Thanks

## 2021-09-08 RX ORDER — MONTELUKAST SODIUM 10 MG/1
TABLET ORAL
Qty: 90 TABLET | Refills: 0 | Status: SHIPPED | OUTPATIENT
Start: 2021-09-08 | End: 2021-12-09

## 2021-09-08 NOTE — TELEPHONE ENCOUNTER
NILSON IS RETURNING CALL AND SHE HAS SOME QUESTIONS .   1) SAID SHE HAD NCV TEST AND THE DR THAT DID THE TEST STATED NILSON HAS SLIGHT  NEUROPATHY TOLD HER TO    WATCH SUGARS AND ALCOHOL SHE WANTS TO SEE WHAT ELSE SHE CAN DO IF ANYTHING. DOES SHE REALLY NEED TO HAVE A MRI ?? SINCE SHE ALREADY KNOW'S SHE HAS NEUROPATHY       NILSON [phone]    PLEASE ADVISE.

## 2021-09-10 NOTE — TELEPHONE ENCOUNTER
It would help to rule out narrowing in the lower spine, which the nerve conduction study was potentially consistent with. Thanks.

## 2021-09-16 ENCOUNTER — TELEPHONE (OUTPATIENT)
Dept: CARDIOLOGY | Facility: CLINIC | Age: 71
End: 2021-09-16

## 2021-09-16 NOTE — TELEPHONE ENCOUNTER
----- Message from Kuldeep Bell MD sent at 9/16/2021  3:42 PM EDT -----  Put her in my clinic in 2-3 weks    ----- Message -----  From: Goran Ruth IV, MD  Sent: 8/31/2021   5:18 PM EDT  To: Kuldeep Bell MD

## 2021-09-22 ENCOUNTER — TELEPHONE (OUTPATIENT)
Dept: INTERNAL MEDICINE | Facility: CLINIC | Age: 71
End: 2021-09-22

## 2021-09-22 DIAGNOSIS — F41.9 ANXIETY: ICD-10-CM

## 2021-09-22 RX ORDER — LORAZEPAM 0.5 MG/1
0.5 TABLET ORAL DAILY PRN
Qty: 30 TABLET | Refills: 1 | Status: SHIPPED | OUTPATIENT
Start: 2021-09-22 | End: 2022-03-08 | Stop reason: SDUPTHER

## 2021-09-22 NOTE — TELEPHONE ENCOUNTER
Pt advised labs were done in July, no need for any at this time.    Pt wanted to know if we have high dose flu shot, advised not yet, to check back later this week.    Pt wanted to now about COVID vaccine booster, advised we do not have here, pt will check with her pharmacy.    Pt also wanting to know if she can get a refill on her ativan, as she only has 8-10 left.  Uses for her afib and has been having issues, has appt with cardiology soon to f/u on this.    Please advise on ativan.

## 2021-09-22 NOTE — TELEPHONE ENCOUNTER
Caller: Annalise Winters    Relationship to patient: Self    Best call back number:[phone]    Patient is needing: PATIENT STATED THAT SHE WAS WANTING TO FIND OUT IF THERE IS BLOOD WORK SHE IS DUE AND TALK ABOUT FLU SHOT    PLEASE ADVISE

## 2021-09-25 ENCOUNTER — OFFICE VISIT (OUTPATIENT)
Dept: INTERNAL MEDICINE | Facility: CLINIC | Age: 71
End: 2021-09-25

## 2021-09-25 VITALS
BODY MASS INDEX: 23.7 KG/M2 | SYSTOLIC BLOOD PRESSURE: 152 MMHG | OXYGEN SATURATION: 96 % | HEART RATE: 94 BPM | HEIGHT: 69 IN | WEIGHT: 160 LBS | DIASTOLIC BLOOD PRESSURE: 86 MMHG

## 2021-09-25 DIAGNOSIS — N30.01 ACUTE CYSTITIS WITH HEMATURIA: ICD-10-CM

## 2021-09-25 DIAGNOSIS — R30.0 DYSURIA: Primary | ICD-10-CM

## 2021-09-25 LAB
BILIRUB BLD-MCNC: NEGATIVE MG/DL
CLARITY, POC: CLEAR
COLOR UR: YELLOW
GLUCOSE UR STRIP-MCNC: NEGATIVE MG/DL
KETONES UR QL: NEGATIVE
LEUKOCYTE EST, POC: ABNORMAL
NITRITE UR-MCNC: NEGATIVE MG/ML
PH UR: 6 [PH] (ref 5–8)
PROT UR STRIP-MCNC: NEGATIVE MG/DL
RBC # UR STRIP: ABNORMAL /UL
SP GR UR: 1.01 (ref 1–1.03)
UROBILINOGEN UR QL: NORMAL

## 2021-09-25 PROCEDURE — 87086 URINE CULTURE/COLONY COUNT: CPT

## 2021-09-25 PROCEDURE — 99214 OFFICE O/P EST MOD 30 MIN: CPT

## 2021-09-25 PROCEDURE — 87077 CULTURE AEROBIC IDENTIFY: CPT

## 2021-09-25 PROCEDURE — 87186 SC STD MICRODIL/AGAR DIL: CPT

## 2021-09-25 PROCEDURE — 81003 URINALYSIS AUTO W/O SCOPE: CPT

## 2021-09-25 RX ORDER — NITROFURANTOIN 25; 75 MG/1; MG/1
100 CAPSULE ORAL 2 TIMES DAILY
Qty: 10 CAPSULE | Refills: 0 | Status: SHIPPED | OUTPATIENT
Start: 2021-09-25 | End: 2021-09-30

## 2021-09-25 NOTE — PROGRESS NOTES
Chief Complaint  Urinary Tract Infection (Pt states sxs started this morning)    Annalise Winters presents to [facility]    HPI: Increased urinary urgency, frequency, and dysuria x 2 days. Experiencing burning with urination. Not tried any medications. Has tried increasing fluid intake with mild relief. Pain is a 2 out of 10.    Subjective       PMSFH  The following portions of the patient's history were reviewed and updated as appropriate: allergies, current medications, past family history, past medical history, past social history, past surgical history and problem list.     Past Medical History:   Diagnosis Date   • Abnormal blood chemistry    • Arthritis     right knee   • Atrial fibrillation (CMS/HCC)    • Cyst of buttocks    • Diverticulosis    • Dizziness    • Dysuria    • Esophagitis    • Flushing    • GERD (gastroesophageal reflux disease)    • Heartburn    • Hypertension    • Hyperthyroidism    • Hypothyroidism    • Irritable bowel syndrome    • LLQ abdominal pain    • Polyp of sigmoid colon    • Sinusitis    • Skin lesion of face    • UTI (urinary tract infection)       Allergies   Allergen Reactions   • Ace Inhibitors Cough   • Celexa [Citalopram Hydrobromide] Dizziness   • Corticosteroids Rash   • Paxil [Paroxetine Hcl] Other (See Comments)     somnolence        Social History     Tobacco Use   • Smoking status: Never Smoker   • Smokeless tobacco: Never Used   Vaping Use   • Vaping Use: Never used   Substance Use Topics   • Alcohol use: Yes     Comment: on occasion   • Drug use: No     Past Surgical History:   Procedure Laterality Date   • CARDIAC ABLATION  2013    Pulmonary vein ablation by Dr. Abdirahman Lackey, 2013.   • RHINOPLASTY     • SUBTOTAL HYSTERECTOMY     • TUBAL ABDOMINAL LIGATION        Family History   Problem Relation Age of Onset   • Dementia Mother    • Glomerulonephritis Mother    • Hypertension Mother    • Other Mother          at  age 88   • Arthritis Father    • Cancer Father         prostate cancer   • Other Father          at age 65   • Heart attack Father    • Hypertension Other    • Osteoarthritis Other          Current Outpatient Medications:   •  acetaminophen (TYLENOL) 500 MG tablet, Take 500 mg by mouth As Needed., Disp: , Rfl:   •  amLODIPine (NORVASC) 5 MG tablet, Take 1 tablet by mouth 2 (two) times a day., Disp: 180 tablet, Rfl: 3  •  Calcium Carbonate-Vit D-Min (CALCIUM 1200 PO), Take  by mouth., Disp: , Rfl:   •  Cholecalciferol (VITAMIN D) 2000 UNITS capsule, Take 1 capsule by mouth daily., Disp: , Rfl:   •  COLLAGEN PO, Take  by mouth., Disp: , Rfl:   •  Famotidine-Ca Carb-Mag Hydrox (PEPCID COMPLETE PO), Take 1 tablet by mouth As Needed., Disp: , Rfl:   •  flecainide (TAMBOCOR) 50 MG tablet, TAKE ONE TABLET BY MOUTH TWICE A DAY, Disp: 180 tablet, Rfl: 1  •  levothyroxine (SYNTHROID, LEVOTHROID) 50 MCG tablet, TAKE ONE TABLET BY MOUTH DAILY, Disp: 90 tablet, Rfl: 1  •  loratadine (Claritin) 10 MG tablet, Take 10 mg by mouth Daily., Disp: , Rfl:   •  LORazepam (ATIVAN) 0.5 MG tablet, Take 1 tablet by mouth Daily As Needed for Anxiety., Disp: 30 tablet, Rfl: 1  •  metoprolol tartrate (LOPRESSOR) 25 MG tablet, Take 0.5 tablets by mouth Every Night. (Patient taking differently: Take 12.5 mg by mouth Every Night. .25 of tablet), Disp: 180 tablet, Rfl: 3  •  montelukast (SINGULAIR) 10 MG tablet, TAKE ONE TABLET BY MOUTH EVERY NIGHT AT BEDTIME, Disp: 90 tablet, Rfl: 0  •  pantoprazole (PROTONIX) 40 MG EC tablet, Take 40 mg by mouth Daily., Disp: , Rfl:   •  Vitamin B12 (CYANOCOBALAMIN) 500 MCG tablet tablet, Take  by mouth Daily., Disp: , Rfl:   •  vitamin C (ASCORBIC ACID) 500 MG tablet, Take 500 mg by mouth Daily., Disp: , Rfl:   •  nitrofurantoin, macrocrystal-monohydrate, (Macrobid) 100 MG capsule, Take 1 capsule by mouth 2 (Two) Times a Day for 5 days., Disp: 10 capsule, Rfl: 0    Review of Systems   Constitutional:  "Negative for chills, fatigue and fever.   Respiratory: Negative for apnea, cough, choking, chest tightness, shortness of breath, wheezing and stridor.    Cardiovascular: Negative for chest pain, palpitations and leg swelling.   Gastrointestinal: Negative for abdominal distention, abdominal pain, anal bleeding, blood in stool, constipation, diarrhea, nausea, rectal pain, vomiting, GERD and indigestion.   Genitourinary: Positive for dysuria, frequency and urgency. Negative for decreased urine volume, difficulty urinating, dyspareunia, flank pain, genital sores, hematuria, menstrual problem, pelvic pain, pelvic pressure, urinary incontinence, vaginal bleeding, vaginal discharge and vaginal pain.   Musculoskeletal: Negative for back pain.   Neurological: Negative for confusion.       Objective   Vital Signs  /86   Pulse 94   Ht 174 cm (68.5\")   Wt 72.6 kg (160 lb)   SpO2 96%   BMI 23.97 kg/m²     Physical Exam  Constitutional:       Appearance: Normal appearance. She is normal weight.   HENT:      Head: Normocephalic.      Mouth/Throat:      Mouth: Mucous membranes are moist.      Pharynx: Oropharynx is clear.   Eyes:      Extraocular Movements: Extraocular movements intact.      Conjunctiva/sclera: Conjunctivae normal.      Pupils: Pupils are equal, round, and reactive to light.   Cardiovascular:      Rate and Rhythm: Normal rate and regular rhythm.      Pulses: Normal pulses.      Heart sounds: Normal heart sounds.   Pulmonary:      Effort: Pulmonary effort is normal.      Breath sounds: Normal breath sounds.   Abdominal:      General: Bowel sounds are normal.      Palpations: Abdomen is soft.      Tenderness: There is no abdominal tenderness. There is no right CVA tenderness or left CVA tenderness.   Skin:     General: Skin is warm and dry.      Capillary Refill: Capillary refill takes less than 2 seconds.   Neurological:      Mental Status: She is alert and oriented to person, place, and time.      "     Result Review :     The following data was reviewed by: TAMMY Lowe on 09/25/2021:  CMP    CMP 3/2/21 6/16/21 7/19/21   Glucose 94  112 (A)   BUN 14  12   Creatinine 0.71  0.78   eGFR Non African Am 81  73   Sodium 137  134 (A)   Potassium 4.2  4.4   Chloride 98  97 (A)   Calcium 9.6  10.0   Total Protein  6.9    Albumin 4.30 3.9 4.80   Globulin  3.0    Total Bilirubin 0.5  0.5   Alkaline Phosphatase 66  67   AST (SGOT) 17  16   ALT (SGPT) 16  12   (A) Abnormal value            POC dipstick + for leukocytes and blood    Assessment and Plan    1. Dysuria  - POCT urinalysis dipstick, automated  - + for leukocytes and blood. Will treat for UTI.  2. Acute cystitis with hematuria  - Urine Culture - Urine, Urine, Clean Catch  - nitrofurantoin, macrocrystal-monohydrate, (Macrobid) 100 MG capsule; Take 1 capsule by mouth 2 (Two) Times a Day for 5 days.  Dispense: 10 capsule; Refill: 0  - Will send urine for culture and adjust antibiotics as indicated based off of susceptibility.   - Encouraged patient to drink 5-6 bottles of water per day.  - Encouraged patient to wipe from front to back after using the restroom.  - Encouraged patient to follow-up with PCP if no improvement or worsening of symptoms.     Follow Up     Return for Next scheduled follow up.    Patient was given instructions and counseling regarding her condition or for health maintenance advice. Please see specific information pulled into the AVS if appropriate.    Part of this note may be an electronic transcription/translation of spoken language to printed text using the Dragon Dictation System.    Electronically signed by:   TAMMY Lowe  09/25/2021

## 2021-09-27 LAB — BACTERIA SPEC AEROBE CULT: ABNORMAL

## 2021-09-28 NOTE — TELEPHONE ENCOUNTER
NILSON IS CALLING BECAUSE IT HAS BEEN A FEW WEEKS SINCE SHE LEFT A MESSAGE REGARDING THE MRI. SHE STATES SHE NEEDS FURTHER GUIDANCE AND WOULD LIKE A CALL BACK.    PLEASE CALL AND DISCUSS.

## 2021-09-29 ENCOUNTER — TELEPHONE (OUTPATIENT)
Dept: NEUROLOGY | Facility: CLINIC | Age: 71
End: 2021-09-29

## 2021-09-29 DIAGNOSIS — R20.0 NUMBNESS: ICD-10-CM

## 2021-09-29 DIAGNOSIS — G62.9 POLYNEUROPATHY: Primary | ICD-10-CM

## 2021-10-01 ENCOUNTER — CLINICAL SUPPORT (OUTPATIENT)
Dept: INTERNAL MEDICINE | Facility: CLINIC | Age: 71
End: 2021-10-01

## 2021-10-01 DIAGNOSIS — Z23 NEED FOR INFLUENZA VACCINATION: Primary | ICD-10-CM

## 2021-10-01 PROCEDURE — 90662 IIV NO PRSV INCREASED AG IM: CPT | Performed by: INTERNAL MEDICINE

## 2021-10-01 PROCEDURE — G0008 ADMIN INFLUENZA VIRUS VAC: HCPCS | Performed by: INTERNAL MEDICINE

## 2021-10-01 NOTE — PROGRESS NOTES
Immunization  Immunization performed in left deltoid by Mary Anne Bowen MA. Patient tolerated the procedure well without complications.  10/01/21   Mary Anne Bowen MA

## 2021-10-01 NOTE — TELEPHONE ENCOUNTER
Called pt to inform. She voiced understanding and stated that she would think about getting the MRI.

## 2021-10-05 NOTE — TELEPHONE ENCOUNTER
"Note in referral states \"SHE STATED THAT SHE TOLD THE DOC AND DOC NURSE SHE WASN'T GOING TO HAVE THIS DONE\".  "

## 2021-10-06 ENCOUNTER — OFFICE VISIT (OUTPATIENT)
Dept: CARDIOLOGY | Facility: CLINIC | Age: 71
End: 2021-10-06

## 2021-10-06 VITALS
OXYGEN SATURATION: 98 % | BODY MASS INDEX: 23.25 KG/M2 | HEART RATE: 85 BPM | WEIGHT: 157 LBS | HEIGHT: 69 IN | DIASTOLIC BLOOD PRESSURE: 70 MMHG | SYSTOLIC BLOOD PRESSURE: 132 MMHG

## 2021-10-06 DIAGNOSIS — I48.0 PAROXYSMAL ATRIAL FIBRILLATION (HCC): Primary | Chronic | ICD-10-CM

## 2021-10-06 PROCEDURE — 99213 OFFICE O/P EST LOW 20 MIN: CPT | Performed by: INTERNAL MEDICINE

## 2021-10-06 NOTE — PROGRESS NOTES
Annalise SHANA Winters  1950  [phone]    10/06/2021    [facility] CARDIOLOGY     Referring Provider: No ref. provider found     Maryse Jauregui MD  [facility] [address]    Chief Complaint   Patient presents with   • Paroxysmal atrial fibrillation (CMS/HCC)       Problem List:  1. Paroxysmal Atrial Fibrillation   a. CHADSvasc = 3 (age, female, HTN) previously Xarelto prior to ablation   b. Initially diagnosed 2021  c. Myocardial perfusion study 4/2/2013: no ischemia  d. Event Monitor 10/2013: demonstrates atrial fibrillation  e. Failure of Flecainide, Sotalol, Multaq  f. PVA 8/21/2013 - Dr. Abdirahman Lackey  g. Treated with Flecainide currently  2. HTN  3. Diverticulosis   4. Esophagitis  5. GERD  6. Hyperthyroidism  7. IBS  8. Anxiety   9. Surgical History:  a. Rhinoplasty  b. Subtotal hysterectomy  c. Tubal abdominal ligation     Allergies  Allergies   Allergen Reactions   • Ace Inhibitors Cough   • Celexa [Citalopram Hydrobromide] Dizziness   • Corticosteroids Rash   • Paxil [Paroxetine Hcl] Other (See Comments)     somnolence         Current Medications    Current Outpatient Medications:   •  acetaminophen (TYLENOL) 500 MG tablet, Take 500 mg by mouth As Needed., Disp: , Rfl:   •  amLODIPine (NORVASC) 5 MG tablet, Take 1 tablet by mouth 2 (two) times a day., Disp: 180 tablet, Rfl: 3  •  Calcium Carbonate-Vit D-Min (CALCIUM 1200 PO), Take  by mouth., Disp: , Rfl:   •  Cholecalciferol (VITAMIN D) 2000 UNITS capsule, Take 1 capsule by mouth daily., Disp: , Rfl:   •  COLLAGEN PO, Take  by mouth., Disp: , Rfl:   •  Famotidine-Ca Carb-Mag Hydrox (PEPCID COMPLETE PO), Take 1 tablet by mouth As Needed., Disp: , Rfl:   •  flecainide (TAMBOCOR) 50 MG tablet, TAKE ONE TABLET BY MOUTH TWICE A DAY, Disp: 180 tablet, Rfl: 1  •  levothyroxine (SYNTHROID, LEVOTHROID) 50 MCG tablet, TAKE ONE TABLET BY MOUTH DAILY, Disp: 90 tablet, Rfl: 1  •  loratadine (Claritin) 10 MG tablet, Take 10 mg by  "mouth Daily., Disp: , Rfl:   •  LORazepam (ATIVAN) 0.5 MG tablet, Take 1 tablet by mouth Daily As Needed for Anxiety., Disp: 30 tablet, Rfl: 1  •  metoprolol tartrate (LOPRESSOR) 25 MG tablet, Take 0.5 tablets by mouth Every Night. (Patient taking differently: Take 12.5 mg by mouth Every Night. .25 of tablet), Disp: 180 tablet, Rfl: 3  •  montelukast (SINGULAIR) 10 MG tablet, TAKE ONE TABLET BY MOUTH EVERY NIGHT AT BEDTIME, Disp: 90 tablet, Rfl: 0  •  pantoprazole (PROTONIX) 40 MG EC tablet, Take 40 mg by mouth Daily., Disp: , Rfl:   •  Vitamin B12 (CYANOCOBALAMIN) 500 MCG tablet tablet, Take  by mouth Daily., Disp: , Rfl:   •  vitamin C (ASCORBIC ACID) 500 MG tablet, Take 500 mg by mouth Daily., Disp: , Rfl:     History of Present Illness     Pt presents for follow up of atrial fibrillation and to discuss stroke prophylaxis at the request of Dr. Al Ruth. In March, we discussed that Watchman Device would not be recommended as she stated that she was told by her GI physician that she can never take ASA. Since we last saw the pt, she states that she had a prolonged episode of atrial fibrillation in July which lasted about 11 hours. She did take a Xarelto. She took an extra Flecainide 100 mg that day. She continues to take Flecainide and Metoprolol. She had another episode in September which just lasted for about 1.5 hours. Otherwise, she denies any SOB, CP, LH, and dizziness. Denies any hospitalizations, ER visits, bleeding issues, or TIA/CVA symptoms. Overall feels well for the most part.     ROS:  General:  Denies fatigue, weight gain or loss  Cardiovascular:  Denies CP, PND, syncope, near syncope, edema + palpitations.  Pulmonary:  Denies LAI, cough, or wheezing      Vitals:    10/06/21 0842   BP: 132/70   BP Location: Right arm   Patient Position: Sitting   Pulse: 85   SpO2: 98%   Weight: 71.2 kg (157 lb)   Height: 174 cm (68.5\")     Body mass index is 23.52 kg/m².  PE:  General: NAD. A & O x 3   Neck: no " JVD, no carotid bruits, no TM  Heart RRR, NL S1, S2, S4 present, no rubs, murmurs  Lungs: CTA, no wheezes, rhonchi, or rales  Abd: soft, non-tender, NL BS  Ext: No musculoskeletal deformities, no edema, cyanosis, or clubbing  Psych: normal mood and affect    Diagnostic Data:    EKG reviewed from 8/3/1/2021: NSR 71 bpm.   Procedures    1. Paroxysmal atrial fibrillation (HCC)          Plan:  1. Paroxysmal Atrial Fibrillation:   - CHADSvasc = 3, deferred anticoagulation and not on ASA due to history of diverticulosis. Watchman not recommended due to the fact that she cannot take ASA.   - on Fleciainide 50 mg BID and Metoprolol. She has had two episodes of atrial fibrillation in the last 6 months. We talked extensively about options today either continue Flecainide 50 mg BID and take an extra 100 mg of Flecainide with episodes (instead of only extra 50 mg which she was doing) or two increase maintainence dose of Flecainide to 75 mg BID. For now, we will continue same dose of 50 mg BID with extra dose with episodes. She knows to call us if episodes become more frequent or if she needs to come in for an EKG to document the episode.     2. HTN:  - BP controlled on Metoprolol     F/up in 8 months    Electronically signed by ANDREW Petersen, 10/06/21, 9:11 AM EDT.

## 2021-10-07 DIAGNOSIS — I48.0 PAROXYSMAL ATRIAL FIBRILLATION (HCC): ICD-10-CM

## 2021-10-07 RX ORDER — FLECAINIDE ACETATE 50 MG/1
50 TABLET ORAL 2 TIMES DAILY
Qty: 180 TABLET | Refills: 3 | Status: SHIPPED | OUTPATIENT
Start: 2021-10-07 | End: 2022-06-15 | Stop reason: SDUPTHER

## 2021-10-12 ENCOUNTER — OFFICE VISIT (OUTPATIENT)
Dept: INTERNAL MEDICINE | Facility: CLINIC | Age: 71
End: 2021-10-12

## 2021-10-12 VITALS
BODY MASS INDEX: 23.46 KG/M2 | WEIGHT: 156.6 LBS | OXYGEN SATURATION: 98 % | DIASTOLIC BLOOD PRESSURE: 74 MMHG | SYSTOLIC BLOOD PRESSURE: 132 MMHG | HEART RATE: 96 BPM

## 2021-10-12 DIAGNOSIS — L98.8 SKIN LESION OF BREAST: Primary | ICD-10-CM

## 2021-10-12 PROCEDURE — 99213 OFFICE O/P EST LOW 20 MIN: CPT | Performed by: PHYSICIAN ASSISTANT

## 2021-10-12 NOTE — PROGRESS NOTES
Chief Complaint   Patient presents with   • Spot on right breast     Acute        Subjective     Annalise Winters is a 70 y.o. female.        History of Present Illness     Pt noticed a dry skin lesion on her right breast yesterday. Has not changed, never had anything like it before. Made an appointment with gyn on Friday. No palpable lesion nodule/mass underneath. Mammogram up to date- done through [facility].         Current Outpatient Medications:   •  acetaminophen (TYLENOL) 500 MG tablet, Take 500 mg by mouth As Needed., Disp: , Rfl:   •  amLODIPine (NORVASC) 5 MG tablet, Take 1 tablet by mouth 2 (two) times a day., Disp: 180 tablet, Rfl: 3  •  Calcium Carbonate-Vit D-Min (CALCIUM 1200 PO), Take  by mouth., Disp: , Rfl:   •  Cholecalciferol (VITAMIN D) 2000 UNITS capsule, Take 1 capsule by mouth daily., Disp: , Rfl:   •  COLLAGEN PO, Take  by mouth., Disp: , Rfl:   •  Famotidine-Ca Carb-Mag Hydrox (PEPCID COMPLETE PO), Take 1 tablet by mouth As Needed., Disp: , Rfl:   •  flecainide (TAMBOCOR) 50 MG tablet, Take 1 tablet by mouth 2 (Two) Times a Day., Disp: 180 tablet, Rfl: 3  •  levothyroxine (SYNTHROID, LEVOTHROID) 50 MCG tablet, TAKE ONE TABLET BY MOUTH DAILY, Disp: 90 tablet, Rfl: 1  •  loratadine (Claritin) 10 MG tablet, Take 10 mg by mouth Daily., Disp: , Rfl:   •  LORazepam (ATIVAN) 0.5 MG tablet, Take 1 tablet by mouth Daily As Needed for Anxiety., Disp: 30 tablet, Rfl: 1  •  metoprolol tartrate (LOPRESSOR) 25 MG tablet, Take 0.5 tablets by mouth Every Night. (Patient taking differently: Take 12.5 mg by mouth Every Night. .25 of tablet), Disp: 180 tablet, Rfl: 3  •  montelukast (SINGULAIR) 10 MG tablet, TAKE ONE TABLET BY MOUTH EVERY NIGHT AT BEDTIME, Disp: 90 tablet, Rfl: 0  •  pantoprazole (PROTONIX) 40 MG EC tablet, Take 40 mg by mouth Daily., Disp: , Rfl:   •  Vitamin B12 (CYANOCOBALAMIN) 500 MCG tablet tablet, Take  by mouth Daily., Disp: , Rfl:   •  vitamin C (ASCORBIC ACID) 500 MG tablet,  Take 500 mg by mouth Daily., Disp: , Rfl:      PMFSH  The following portions of the patient's history were reviewed and updated as appropriate: allergies, current medications, past family history, past medical history, past social history, past surgical history and problem list.    Review of Systems   Constitutional: Negative for diaphoresis and fatigue.   HENT: Negative for congestion.    Respiratory: Negative for chest tightness, shortness of breath and wheezing.    Cardiovascular: Negative for chest pain and palpitations.   Musculoskeletal: Negative for arthralgias and myalgias.   Skin:        Skin lesion right breast   Neurological: Negative for dizziness, syncope, weakness, light-headedness and headaches.       Objective   /74   Pulse 96   Wt 71 kg (156 lb 9.6 oz)   LMP  (LMP Unknown)   SpO2 98%   BMI 23.46 kg/m²     Physical Exam  Vitals and nursing note reviewed.   Constitutional:       Appearance: She is well-developed.   HENT:      Head: Normocephalic and atraumatic.      Right Ear: External ear normal.      Left Ear: External ear normal.   Eyes:      Conjunctiva/sclera: Conjunctivae normal.   Cardiovascular:      Rate and Rhythm: Normal rate and regular rhythm.   Pulmonary:      Effort: Pulmonary effort is normal.      Breath sounds: Normal breath sounds.   Chest:       Musculoskeletal:         General: Normal range of motion.      Cervical back: Normal range of motion.   Skin:     General: Skin is warm and dry.   Psychiatric:         Behavior: Behavior normal.         ASSESSMENT/PLAN    Diagnoses and all orders for this visit:    1. Skin lesion of breast (Primary)  Comments:  Pt will schedule appointment with Dermatologist to have the skin lesion evaluated.             Return if symptoms worsen or fail to improve, for Next scheduled follow up.

## 2021-10-16 PROCEDURE — 87186 SC STD MICRODIL/AGAR DIL: CPT | Performed by: PHYSICIAN ASSISTANT

## 2021-10-16 PROCEDURE — 87077 CULTURE AEROBIC IDENTIFY: CPT | Performed by: PHYSICIAN ASSISTANT

## 2021-10-16 PROCEDURE — 87086 URINE CULTURE/COLONY COUNT: CPT | Performed by: PHYSICIAN ASSISTANT

## 2021-10-18 ENCOUNTER — TELEPHONE (OUTPATIENT)
Dept: INTERNAL MEDICINE | Facility: CLINIC | Age: 71
End: 2021-10-18

## 2021-10-18 NOTE — TELEPHONE ENCOUNTER
Pt states that this is the 2nd UTI she has had in 3 weeks, wants to know if she needs appt tbs when she finishes abx?

## 2021-10-18 NOTE — TELEPHONE ENCOUNTER
Caller: Annalise Winters    Relationship to patient: Self    Best call back number: [phone]    Patient is needing: PATIENT STATED THAT SHE WENT TO URGENT CARE OVER WEEKEND AND WANTED TO KNOW IF SHE NEEDED TO COME INTO OFFICE TO GET RECHECK OR WHAT SHE WOULD NEED TO DO    PLEASE ADVISE

## 2021-12-09 DIAGNOSIS — J30.89 ALLERGIC RHINITIS DUE TO OTHER ALLERGIC TRIGGER, UNSPECIFIED SEASONALITY: ICD-10-CM

## 2021-12-09 RX ORDER — MONTELUKAST SODIUM 10 MG/1
TABLET ORAL
Qty: 90 TABLET | Refills: 0 | Status: SHIPPED | OUTPATIENT
Start: 2021-12-09 | End: 2022-03-08 | Stop reason: SDUPTHER

## 2022-01-13 DIAGNOSIS — I10 ESSENTIAL HYPERTENSION: ICD-10-CM

## 2022-01-14 RX ORDER — AMLODIPINE BESYLATE 5 MG/1
TABLET ORAL
Qty: 180 TABLET | Refills: 3 | Status: SHIPPED | OUTPATIENT
Start: 2022-01-14 | End: 2023-02-27

## 2022-03-01 ENCOUNTER — OFFICE VISIT (OUTPATIENT)
Dept: CARDIOLOGY | Facility: CLINIC | Age: 72
End: 2022-03-01

## 2022-03-01 VITALS
BODY MASS INDEX: 23.55 KG/M2 | HEIGHT: 69 IN | DIASTOLIC BLOOD PRESSURE: 64 MMHG | OXYGEN SATURATION: 98 % | WEIGHT: 159 LBS | HEART RATE: 67 BPM | SYSTOLIC BLOOD PRESSURE: 122 MMHG

## 2022-03-01 DIAGNOSIS — E03.9 HYPOTHYROIDISM, UNSPECIFIED TYPE: ICD-10-CM

## 2022-03-01 DIAGNOSIS — I48.0 PAROXYSMAL ATRIAL FIBRILLATION: Primary | Chronic | ICD-10-CM

## 2022-03-01 DIAGNOSIS — I10 ESSENTIAL HYPERTENSION: Chronic | ICD-10-CM

## 2022-03-01 PROCEDURE — 93000 ELECTROCARDIOGRAM COMPLETE: CPT | Performed by: NURSE PRACTITIONER

## 2022-03-01 PROCEDURE — 99214 OFFICE O/P EST MOD 30 MIN: CPT | Performed by: NURSE PRACTITIONER

## 2022-03-01 RX ORDER — LEVOTHYROXINE SODIUM 0.05 MG/1
TABLET ORAL
Qty: 90 TABLET | Refills: 1 | Status: SHIPPED | OUTPATIENT
Start: 2022-03-01 | End: 2022-03-08 | Stop reason: SDUPTHER

## 2022-03-01 NOTE — ASSESSMENT & PLAN NOTE
· Continue flecainide 50 mg twice daily  · Would prefer patient take aspirin but she reports intolerance

## 2022-03-08 ENCOUNTER — LAB (OUTPATIENT)
Dept: LAB | Facility: HOSPITAL | Age: 72
End: 2022-03-08

## 2022-03-08 ENCOUNTER — OFFICE VISIT (OUTPATIENT)
Dept: INTERNAL MEDICINE | Facility: CLINIC | Age: 72
End: 2022-03-08

## 2022-03-08 VITALS
BODY MASS INDEX: 23.55 KG/M2 | OXYGEN SATURATION: 99 % | WEIGHT: 159 LBS | HEART RATE: 63 BPM | SYSTOLIC BLOOD PRESSURE: 130 MMHG | HEIGHT: 69 IN | DIASTOLIC BLOOD PRESSURE: 72 MMHG

## 2022-03-08 DIAGNOSIS — E53.8 B12 DEFICIENCY: ICD-10-CM

## 2022-03-08 DIAGNOSIS — E03.9 ACQUIRED HYPOTHYROIDISM: ICD-10-CM

## 2022-03-08 DIAGNOSIS — E03.9 HYPOTHYROIDISM, UNSPECIFIED TYPE: ICD-10-CM

## 2022-03-08 DIAGNOSIS — R20.0 BILATERAL LEG NUMBNESS: ICD-10-CM

## 2022-03-08 DIAGNOSIS — J30.89 ALLERGIC RHINITIS DUE TO OTHER ALLERGIC TRIGGER, UNSPECIFIED SEASONALITY: ICD-10-CM

## 2022-03-08 DIAGNOSIS — E55.9 VITAMIN D DEFICIENCY: ICD-10-CM

## 2022-03-08 DIAGNOSIS — F41.9 ANXIETY: ICD-10-CM

## 2022-03-08 DIAGNOSIS — E78.5 DYSLIPIDEMIA: ICD-10-CM

## 2022-03-08 DIAGNOSIS — R79.9 ABNORMAL FINDING OF BLOOD CHEMISTRY, UNSPECIFIED: ICD-10-CM

## 2022-03-08 DIAGNOSIS — I48.0 PAROXYSMAL ATRIAL FIBRILLATION: ICD-10-CM

## 2022-03-08 DIAGNOSIS — Z00.00 MEDICARE ANNUAL WELLNESS VISIT, SUBSEQUENT: Primary | ICD-10-CM

## 2022-03-08 LAB
25(OH)D3 SERPL-MCNC: 61.9 NG/ML (ref 30–100)
ALBUMIN SERPL-MCNC: 4.8 G/DL (ref 3.5–5.2)
ALBUMIN/GLOB SERPL: 1.9 G/DL
ALP SERPL-CCNC: 69 U/L (ref 39–117)
ALT SERPL W P-5'-P-CCNC: 21 U/L (ref 1–33)
ANION GAP SERPL CALCULATED.3IONS-SCNC: 12.3 MMOL/L (ref 5–15)
AST SERPL-CCNC: 20 U/L (ref 1–32)
BILIRUB SERPL-MCNC: 0.5 MG/DL (ref 0–1.2)
BUN SERPL-MCNC: 14 MG/DL (ref 8–23)
BUN/CREAT SERPL: 20 (ref 7–25)
CALCIUM SPEC-SCNC: 10 MG/DL (ref 8.6–10.5)
CHLORIDE SERPL-SCNC: 98 MMOL/L (ref 98–107)
CHOLEST SERPL-MCNC: 256 MG/DL (ref 0–200)
CO2 SERPL-SCNC: 27.7 MMOL/L (ref 22–29)
CREAT SERPL-MCNC: 0.7 MG/DL (ref 0.57–1)
DEPRECATED RDW RBC AUTO: 42.8 FL (ref 37–54)
EGFRCR SERPLBLD CKD-EPI 2021: 91.5 ML/MIN/1.73
ERYTHROCYTE [DISTWIDTH] IN BLOOD BY AUTOMATED COUNT: 12.9 % (ref 12.3–15.4)
GLOBULIN UR ELPH-MCNC: 2.5 GM/DL
GLUCOSE SERPL-MCNC: 93 MG/DL (ref 65–99)
HBA1C MFR BLD: 5.5 % (ref 4.8–5.6)
HCT VFR BLD AUTO: 39.8 % (ref 34–46.6)
HDLC SERPL-MCNC: 94 MG/DL (ref 40–60)
HGB BLD-MCNC: 12.7 G/DL (ref 12–15.9)
LDLC SERPL CALC-MCNC: 154 MG/DL (ref 0–100)
LDLC/HDLC SERPL: 1.62 {RATIO}
MCH RBC QN AUTO: 28.7 PG (ref 26.6–33)
MCHC RBC AUTO-ENTMCNC: 31.9 G/DL (ref 31.5–35.7)
MCV RBC AUTO: 89.8 FL (ref 79–97)
PLATELET # BLD AUTO: 263 10*3/MM3 (ref 140–450)
PMV BLD AUTO: 10.1 FL (ref 6–12)
POTASSIUM SERPL-SCNC: 3.7 MMOL/L (ref 3.5–5.2)
PROT SERPL-MCNC: 7.3 G/DL (ref 6–8.5)
RBC # BLD AUTO: 4.43 10*6/MM3 (ref 3.77–5.28)
SODIUM SERPL-SCNC: 138 MMOL/L (ref 136–145)
T4 FREE SERPL-MCNC: 1.32 NG/DL (ref 0.93–1.7)
TRIGL SERPL-MCNC: 50 MG/DL (ref 0–150)
TSH SERPL DL<=0.05 MIU/L-ACNC: 1.71 UIU/ML (ref 0.27–4.2)
VIT B12 BLD-MCNC: 1072 PG/ML (ref 211–946)
VLDLC SERPL-MCNC: 8 MG/DL (ref 5–40)
WBC NRBC COR # BLD: 4.95 10*3/MM3 (ref 3.4–10.8)

## 2022-03-08 PROCEDURE — 83036 HEMOGLOBIN GLYCOSYLATED A1C: CPT

## 2022-03-08 PROCEDURE — 1126F AMNT PAIN NOTED NONE PRSNT: CPT | Performed by: INTERNAL MEDICINE

## 2022-03-08 PROCEDURE — 82306 VITAMIN D 25 HYDROXY: CPT

## 2022-03-08 PROCEDURE — G0439 PPPS, SUBSEQ VISIT: HCPCS | Performed by: INTERNAL MEDICINE

## 2022-03-08 PROCEDURE — G0444 DEPRESSION SCREEN ANNUAL: HCPCS | Performed by: INTERNAL MEDICINE

## 2022-03-08 PROCEDURE — 1159F MED LIST DOCD IN RCRD: CPT | Performed by: INTERNAL MEDICINE

## 2022-03-08 PROCEDURE — 80061 LIPID PANEL: CPT

## 2022-03-08 PROCEDURE — 82607 VITAMIN B-12: CPT

## 2022-03-08 PROCEDURE — 80053 COMPREHEN METABOLIC PANEL: CPT

## 2022-03-08 PROCEDURE — 85027 COMPLETE CBC AUTOMATED: CPT

## 2022-03-08 PROCEDURE — 1170F FXNL STATUS ASSESSED: CPT | Performed by: INTERNAL MEDICINE

## 2022-03-08 PROCEDURE — 99214 OFFICE O/P EST MOD 30 MIN: CPT | Performed by: INTERNAL MEDICINE

## 2022-03-08 PROCEDURE — 36415 COLL VENOUS BLD VENIPUNCTURE: CPT

## 2022-03-08 PROCEDURE — 84439 ASSAY OF FREE THYROXINE: CPT

## 2022-03-08 PROCEDURE — 84443 ASSAY THYROID STIM HORMONE: CPT

## 2022-03-08 RX ORDER — LEVOTHYROXINE SODIUM 0.05 MG/1
50 TABLET ORAL DAILY
Qty: 90 TABLET | Refills: 3 | Status: SHIPPED | OUTPATIENT
Start: 2022-03-08 | End: 2023-03-13 | Stop reason: SDUPTHER

## 2022-03-08 RX ORDER — MONTELUKAST SODIUM 10 MG/1
10 TABLET ORAL
Qty: 90 TABLET | Refills: 3 | Status: SHIPPED | OUTPATIENT
Start: 2022-03-08 | End: 2023-02-27

## 2022-03-08 RX ORDER — LORAZEPAM 0.5 MG/1
0.5 TABLET ORAL DAILY PRN
Qty: 30 TABLET | Refills: 1 | Status: SHIPPED | OUTPATIENT
Start: 2022-03-08 | End: 2022-09-13 | Stop reason: SDUPTHER

## 2022-03-08 NOTE — PROGRESS NOTES
The ABCs of the Annual Wellness Visit  Subsequent Medicare Wellness Visit    Chief Complaint   Patient presents with   • Medicare Wellness-subsequent   • Neurologic Problem     Neurpoathy      Subjective    History of Present Illness:  Annalise Winters is a 71 y.o. female who presents for a Subsequent Medicare Wellness Visit.    The following portions of the patient's history were reviewed and   updated as appropriate: allergies, current medications, past family history, past medical history, past social history, past surgical history and problem list.    Compared to one year ago, the patient feels her physical   health is better.    Compared to one year ago, the patient feels her mental   health is worse.under a lot of stress due to daughter having Ovarian ca.    Recent Hospitalizations:  She was not admitted to the hospital during the last year.       Current Medical Providers:  Patient Care Team:  Maryse Jauregui MD as PCP - General (Internal Medicine)  Yumiko Hall MD as Consulting Physician (Gastroenterology)  Goran Ruth IV, MD as Cardiologist (Interventional Cardiology)  Kuldeep Bell MD as Consulting Physician (Cardiac Electrophysiology)    Outpatient Medications Prior to Visit   Medication Sig Dispense Refill   • acetaminophen (TYLENOL) 500 MG tablet Take 500 mg by mouth As Needed.     • amLODIPine (NORVASC) 5 MG tablet TAKE ONE TABLET BY MOUTH TWICE A DAY (Patient taking differently: Take 5 mg by mouth Daily. Patient has only been taking 1 tablet daily, at night) 180 tablet 3   • Calcium Carbonate-Vit D-Min (CALCIUM 1200 PO) Take  by mouth.     • Cholecalciferol (VITAMIN D) 2000 UNITS capsule Take 1 capsule by mouth daily.     • COLLAGEN PO Take  by mouth.     • Famotidine-Ca Carb-Mag Hydrox (PEPCID COMPLETE PO) Take 1 tablet by mouth As Needed.     • flecainide (TAMBOCOR) 50 MG tablet Take 1 tablet by mouth 2 (Two) Times a Day. 180 tablet 3   • loratadine (CLARITIN) 10 MG tablet  Take 10 mg by mouth Daily.     • Multiple Vitamins-Minerals (ZINC PO) Take 1 tablet by mouth Daily.     • pantoprazole (PROTONIX) 40 MG EC tablet Take 40 mg by mouth Daily.     • TURMERIC PO Take 1 tablet by mouth Daily.     • Vitamin B12 (CYANOCOBALAMIN) 500 MCG tablet tablet Take  by mouth Daily.     • vitamin C (ASCORBIC ACID) 500 MG tablet Take 500 mg by mouth Daily.     • levothyroxine (SYNTHROID, LEVOTHROID) 50 MCG tablet TAKE ONE TABLET BY MOUTH DAILY 90 tablet 1   • LORazepam (ATIVAN) 0.5 MG tablet Take 1 tablet by mouth Daily As Needed for Anxiety. 30 tablet 1   • montelukast (SINGULAIR) 10 MG tablet TAKE ONE TABLET BY MOUTH EVERY NIGHT AT BEDTIME 90 tablet 0   • metoprolol tartrate (LOPRESSOR) 25 MG tablet Take 0.5 tablets by mouth Every Night. (Patient taking differently: Take 12.5 mg by mouth Every Night. .25 of tablet) 180 tablet 3     No facility-administered medications prior to visit.       No opioid medication identified on active medication list. I have reviewed chart for other potential  high risk medication/s and harmful drug interactions in the elderly.          Aspirin is not on active medication list.  Aspirin use is not indicated based on review of current medical condition/s. Risk of harm outweighs potential benefits.  .    Patient Active Problem List   Diagnosis   • Asthma   • Paroxysmal atrial fibrillation (HCC)   • Hypertonicity of bladder   • Essential hypertension   • Hypothyroidism   • Anxiety   • Back pain   • Hyperthyroidism   • GERD (gastroesophageal reflux disease)   • Allergic rhinitis   • Dysuria   • Skin lesion of face   • Vitamin D deficiency   • Frontal skull lesion   • B12 deficiency   • Diverticulosis   • Polyneuropathy   • Numbness     Advance Care Planning  Advance Directive is not on file.  ACP discussion was held with the patient during this visit. Patient does not have an advance directive, information provided.    Review of Systems   Constitutional: Negative for chills  "and fever.   HENT: Negative for congestion, ear pain and sore throat.    Eyes: Negative for pain, redness and visual disturbance.   Respiratory: Negative for cough and shortness of breath.    Cardiovascular: Positive for palpitations. Negative for chest pain and leg swelling.   Gastrointestinal: Negative for abdominal pain, diarrhea and nausea.   Endocrine: Negative for cold intolerance and heat intolerance.   Genitourinary: Negative for flank pain and urgency.   Musculoskeletal: Negative for arthralgias and gait problem.   Skin: Negative for pallor and rash.   Neurological: Positive for numbness. Negative for dizziness and weakness.   Psychiatric/Behavioral: Positive for dysphoric mood. Negative for sleep disturbance. The patient is not nervous/anxious.         Increased stress        Objective    Vitals:    03/08/22 0750   BP: 130/72   Pulse: 63   SpO2: 99%  Comment: ra   Weight: 72.1 kg (159 lb)   Height: 174 cm (68.5\")   PainSc: 0-No pain     BMI Readings from Last 1 Encounters:   03/08/22 23.82 kg/m²   BMI is within normal parameters. No follow-up required.    Does the patient have evidence of cognitive impairment? No    Physical Exam  Lab Results   Component Value Date    TRIG 50 03/08/2022    HDL 94 (H) 03/08/2022     (H) 03/08/2022    VLDL 8 03/08/2022    HGBA1C 5.50 03/08/2022            HEALTH RISK ASSESSMENT    Smoking Status:  Social History     Tobacco Use   Smoking Status Never Smoker   Smokeless Tobacco Never Used     Alcohol Consumption:  Social History     Substance and Sexual Activity   Alcohol Use Yes    Comment: on occasion     Fall Risk Screen:    [facility], and patient is at LOW risk for falls.Assessment completed on:3/8/2022    Depression Screening:  PHQ-2/PHQ-9 Depression Screening 3/8/2022   Retired Total Score -   Little Interest or Pleasure in Doing Things 0-->not at all   Feeling Down, Depressed or Hopeless 0-->not at all   PHQ-9: Brief Depression " Severity Measure Score 0       Health Habits and Functional and Cognitive Screening:  Functional & Cognitive Status 3/8/2022   Do you have difficulty preparing food and eating? No   Do you have difficulty bathing yourself, getting dressed or grooming yourself? No   Do you have difficulty using the toilet? No   Do you have difficulty moving around from place to place? No   Do you have trouble with steps or getting out of a bed or a chair? No   Current Diet Well Balanced Diet   Dental Exam Up to date   Eye Exam Up to date   Exercise (times per week) 5 times per week        Exercise Frequency Comment -   Current Exercises Include Weightlifting        Exercise Comment Jasvir   Current Exercise Activities Include -   Do you need help using the phone?  No   Are you deaf or do you have serious difficulty hearing?  No   Do you need help with transportation? No   Do you need help shopping? No   Do you need help preparing meals?  No   Do you need help with housework?  No   Do you need help with laundry? No   Do you need help taking your medications? No   Do you need help managing money? No   Do you ever drive or ride in a car without wearing a seat belt? No   Have you felt unusual stress, anger or loneliness in the last month? No   Who do you live with? Spouse   If you need help, do you have trouble finding someone available to you? No   Have you been bothered in the last four weeks by sexual problems? No   Do you have difficulty concentrating, remembering or making decisions? No       Age-appropriate Screening Schedule:  Refer to the list below for future screening recommendations based on patient's age, sex and/or medical conditions. Orders for these recommended tests are listed in the plan section. The patient has been provided with a written plan.    Health Maintenance   Topic Date Due   • ZOSTER VACCINE (1 of 2) Never done   • DXA SCAN  06/03/2022   • LIPID PANEL  03/08/2023   • MAMMOGRAM  02/22/2024   • INFLUENZA VACCINE   Completed   • TDAP/TD VACCINES  Discontinued              Assessment/Plan   CMS Preventative Services Quick Reference  Risk Factors Identified During Encounter  Cardiovascular Disease  Depression/Dysphoria  Immunizations Discussed/Encouraged (specific Immunizations; Shingrix  The above risks/problems have been discussed with the patient.  Follow up actions/plans if indicated are seen below in the Assessment/Plan Section.  Pertinent information has been shared with the patient in the After Visit Summary.    Medicare Wellness-subsequent and Neurologic Problem (Neurpoathy)    Subjective   Annalise Winters is a 71 y.o. female is here today for follow-up.  HPI  Annalise is here for a follow up on her htn, hlp. Daughter was dxed with ovarian ca stage 4A, and is on Chemo, since then.and to have surgery next week.  Getting treatment at [facility] and followed by Dr. Dr. Price.  S/p Neurological workup, and reports received conflicting info abot the results of her EMG, and was told she needed an MRI of lumbar spine.  She declined it as she was told by the nurse that she may need surgery.    Current Outpatient Medications:   •  acetaminophen (TYLENOL) 500 MG tablet, Take 500 mg by mouth As Needed., Disp: , Rfl:   •  amLODIPine (NORVASC) 5 MG tablet, TAKE ONE TABLET BY MOUTH TWICE A DAY (Patient taking differently: Take 5 mg by mouth Daily. Patient has only been taking 1 tablet daily, at night), Disp: 180 tablet, Rfl: 3  •  Calcium Carbonate-Vit D-Min (CALCIUM 1200 PO), Take  by mouth., Disp: , Rfl:   •  Cholecalciferol (VITAMIN D) 2000 UNITS capsule, Take 1 capsule by mouth daily., Disp: , Rfl:   •  COLLAGEN PO, Take  by mouth., Disp: , Rfl:   •  Famotidine-Ca Carb-Mag Hydrox (PEPCID COMPLETE PO), Take 1 tablet by mouth As Needed., Disp: , Rfl:   •  flecainide (TAMBOCOR) 50 MG tablet, Take 1 tablet by mouth 2 (Two) Times a Day., Disp: 180 tablet, Rfl: 3  •  levothyroxine (SYNTHROID, LEVOTHROID) 50 MCG tablet, Take 1 tablet by mouth  "Daily., Disp: 90 tablet, Rfl: 3  •  loratadine (CLARITIN) 10 MG tablet, Take 10 mg by mouth Daily., Disp: , Rfl:   •  LORazepam (ATIVAN) 0.5 MG tablet, Take 1 tablet by mouth Daily As Needed for Anxiety., Disp: 30 tablet, Rfl: 1  •  montelukast (SINGULAIR) 10 MG tablet, Take 1 tablet by mouth every night at bedtime., Disp: 90 tablet, Rfl: 3  •  Multiple Vitamins-Minerals (ZINC PO), Take 1 tablet by mouth Daily., Disp: , Rfl:   •  pantoprazole (PROTONIX) 40 MG EC tablet, Take 40 mg by mouth Daily., Disp: , Rfl:   •  TURMERIC PO, Take 1 tablet by mouth Daily., Disp: , Rfl:   •  Vitamin B12 (CYANOCOBALAMIN) 500 MCG tablet tablet, Take  by mouth Daily., Disp: , Rfl:   •  vitamin C (ASCORBIC ACID) 500 MG tablet, Take 500 mg by mouth Daily., Disp: , Rfl:       The following portions of the patient's history were reviewed and updated as appropriate: allergies, current medications, past family history, past medical history, past social history, past surgical history and problem list.        Objective   /72   Pulse 63   Ht 174 cm (68.5\")   Wt 72.1 kg (159 lb)   LMP  (LMP Unknown)   SpO2 99% Comment: ra  BMI 23.82 kg/m²         Results for orders placed or performed during the hospital encounter of 10/16/21   Urine Culture - Urine, Urine, Clean Catch    Specimen: Urine, Clean Catch   Result Value Ref Range    Urine Culture >100,000 CFU/mL Escherichia coli (A)        Susceptibility    Escherichia coli - CHARLES     Ampicillin  Susceptible ug/ml     Ampicillin + Sulbactam  Susceptible ug/ml     Cefazolin  Susceptible ug/ml     Cefepime  Susceptible ug/ml     Ceftazidime  Susceptible ug/ml     Ceftriaxone  Susceptible ug/ml     Gentamicin  Susceptible ug/ml     Levofloxacin  Susceptible ug/ml     Nitrofurantoin  Susceptible ug/ml     Piperacillin + Tazobactam  Susceptible ug/ml     Tetracycline  Susceptible ug/ml     Trimethoprim + Sulfamethoxazole  Susceptible ug/ml   POC Urinalysis Dipstick, Multipro (Automated " dipstick)    Specimen: Urine   Result Value Ref Range    Color Yellow Yellow, Straw, Dark Yellow, Kimberli    Clarity, UA Clear Clear    Glucose, UA Negative Negative, 1000 mg/dL (3+) mg/dL    Bilirubin Negative Negative    Ketones, UA Negative Negative    Specific Gravity  1.005 1.005 - 1.030    Blood, UA 50 Norm/ul (A) Negative    pH, Urine 6.0 5.0 - 8.0    Protein, POC Negative Negative mg/dL    Urobilinogen, UA Normal Normal    Nitrite, UA Negative Negative    Leukocytes 25 Sola/ul (A) Negative             Assessment/Plan   Diagnoses and all orders for this visit:    Medicare annual wellness visit, subsequent    Hypothyroidism, unspecified type  -     levothyroxine (SYNTHROID, LEVOTHROID) 50 MCG tablet; Take 1 tablet by mouth Daily.  -     Hemoglobin A1c; Future    Allergic rhinitis due to other allergic trigger, unspecified seasonality  -     montelukast (SINGULAIR) 10 MG tablet; Take 1 tablet by mouth every night at bedtime.    Anxiety  -     LORazepam (ATIVAN) 0.5 MG tablet; Take 1 tablet by mouth Daily As Needed for Anxiety.    B12 deficiency  -     CBC (No Diff); Future  -     Vitamin B12; Future    Vitamin D deficiency  -     Vitamin D 25 Hydroxy; Future    Dyslipidemia  -     Comprehensive Metabolic Panel; Future  -     Lipid Panel; Future  -     Hemoglobin A1c; Future    Acquired hypothyroidism  -     TSH; Future  -     T4, Free; Future    Paroxysmal atrial fibrillation (HCC)  -     Hemoglobin A1c; Future    Bilateral leg numbness  -     Hemoglobin A1c; Future    Abnormal finding of blood chemistry, unspecified   -     Hemoglobin A1c; Future               PHQ-2/PHQ-9 Depression screening reviewed with patient . Total time spent today for depression screening  with Annalise Winters  was 15 minutes in counseling, along with plans for any diagnositc work-up and treatment.    Advice and education were given regarding cardiovascular risk reduction, healthy dietary habits, Seatbelt and helmet use and self skin  examination.       The patient has read and signed the [facility] Controlled Substance Contract.  I will continue to see patient for regular follow up appointments.  They are well controlled on their medication.  JASMYN has been reviewed by me and is updated every 3 months. The patient is aware of the potential for addiction and dependence.  Takes ativan prn only for palps, therefore UDS not done.    Follow Up:   Return in about 6 months (around 9/8/2022) for Next scheduled follow up.     An After Visit Summary and PPPS were made available to the patient.                   Electronically signed by:    Maryse Jauregui MD

## 2022-03-10 DIAGNOSIS — J30.89 ALLERGIC RHINITIS DUE TO OTHER ALLERGIC TRIGGER, UNSPECIFIED SEASONALITY: ICD-10-CM

## 2022-03-11 ENCOUNTER — TELEPHONE (OUTPATIENT)
Dept: INTERNAL MEDICINE | Facility: CLINIC | Age: 72
End: 2022-03-11

## 2022-03-11 RX ORDER — MONTELUKAST SODIUM 10 MG/1
TABLET ORAL
Qty: 90 TABLET | Refills: 3 | OUTPATIENT
Start: 2022-03-11

## 2022-03-11 NOTE — TELEPHONE ENCOUNTER
Provider: KINGS     Caller: NILSON SMALL     Phone Number: [phone]    Reason for Call: PATIENT HAS QUESTIONS ABOUT HER RECENT LAB RESULTS     When was the patient last seen: 3/8/22

## 2022-03-11 NOTE — TELEPHONE ENCOUNTER
Pt wanting her lab results to be sent to her in mychart from Dr Jauregui.  Worried about her cholesterol and if she needs to start meds.

## 2022-03-13 RX ORDER — ATORVASTATIN CALCIUM 10 MG/1
10 TABLET, FILM COATED ORAL DAILY
Qty: 30 TABLET | Refills: 5 | Status: SHIPPED | OUTPATIENT
Start: 2022-03-13 | End: 2022-09-02

## 2022-03-14 ENCOUNTER — TELEPHONE (OUTPATIENT)
Dept: CARDIOLOGY | Facility: CLINIC | Age: 72
End: 2022-03-14

## 2022-03-14 NOTE — TELEPHONE ENCOUNTER
Spoke with pt, she has picked up the prescription and will speak to cardiologist first to discuss if medication may affect her afib, pt will inform us if she decides to start medication or change diet.

## 2022-03-14 NOTE — TELEPHONE ENCOUNTER
Please make sure patient has seen the following [employer] message:    Your labs including blood counts, electrolytes, thyroid,A1C/ sugars, B12 and Vitamin D look good.  Cholesterol has gone up and is now high. Please start the Rx for lipitor 10 mg sent to your pharmacy.  Continue current regimen otherwise.

## 2022-03-14 NOTE — TELEPHONE ENCOUNTER
Patient called stating that her PCP is wanting to start her on Lipitor. She wanted to make sure that it was not going to effect her from a cardiac standpoint or make her afib worse. I told her I would update Dr. Bell and let her know if he advised against this.

## 2022-03-14 NOTE — TELEPHONE ENCOUNTER
Its not absolutely necessary, more of a recommendation due to increase in her cholesterol numbers.   If she wants to try improving her diet, and follow up in 3 months , we can recheck and discuss her options.    thanks

## 2022-03-18 ENCOUNTER — TELEPHONE (OUTPATIENT)
Dept: INTERNAL MEDICINE | Facility: CLINIC | Age: 72
End: 2022-03-18

## 2022-03-18 NOTE — TELEPHONE ENCOUNTER
Spoke to pt, states she started her cholesterol medication.  Also had another question about labs, but could not remember at this time, will call back or send a [employer] message.

## 2022-03-18 NOTE — TELEPHONE ENCOUNTER
Caller: Annalise Winters    Relationship: Self    Best call back number: [phone]    What is the best time to reach you: AFTER 12:00    Who are you requesting to speak with (clinical staff, provider,  specific staff member): NURSE        What was the call regarding: THE PATIENT WOULD LIKE SOMEONE TO CALL HER AND HELP HER UNDERSTAND HER BLOOD TEST RESULTS ON MYCHART     Do you require a callback:YES

## 2022-06-15 ENCOUNTER — OFFICE VISIT (OUTPATIENT)
Dept: CARDIOLOGY | Facility: CLINIC | Age: 72
End: 2022-06-15

## 2022-06-15 VITALS
OXYGEN SATURATION: 97 % | SYSTOLIC BLOOD PRESSURE: 140 MMHG | BODY MASS INDEX: 23.4 KG/M2 | HEART RATE: 71 BPM | HEIGHT: 69 IN | DIASTOLIC BLOOD PRESSURE: 68 MMHG | WEIGHT: 158 LBS

## 2022-06-15 DIAGNOSIS — I48.0 PAROXYSMAL ATRIAL FIBRILLATION: Primary | Chronic | ICD-10-CM

## 2022-06-15 DIAGNOSIS — I10 ESSENTIAL HYPERTENSION: Chronic | ICD-10-CM

## 2022-06-15 PROCEDURE — 99213 OFFICE O/P EST LOW 20 MIN: CPT | Performed by: INTERNAL MEDICINE

## 2022-06-15 PROCEDURE — 93000 ELECTROCARDIOGRAM COMPLETE: CPT | Performed by: INTERNAL MEDICINE

## 2022-06-15 RX ORDER — FLECAINIDE ACETATE 50 MG/1
50 TABLET ORAL 2 TIMES DAILY
Qty: 180 TABLET | Refills: 3 | Status: SHIPPED | OUTPATIENT
Start: 2022-06-15

## 2022-06-15 RX ORDER — LANOLIN ALCOHOL/MO/W.PET/CERES
6 CREAM (GRAM) TOPICAL NIGHTLY
COMMUNITY

## 2022-06-15 NOTE — PROGRESS NOTES
Annalise Winters  1950  [phone]    06/15/2022    [facility] CARDIOLOGY     Referring Provider: No ref. provider found     Maryse Jauregui MD  [facility] [address]    Chief Complaint   Patient presents with   • Paroxysmal atrial fibrillation (HCC)       Problem List:   1. Paroxysmal Atrial Fibrillation   a. CHADSvasc = 3 (age, female, HTN) previously Xarelto prior to ablation   b. Initially diagnosed 2021  c. Myocardial perfusion study 4/2/2013: no ischemia  d. Event Monitor 10/2013: demonstrates atrial fibrillation  e. Failure of Flecainide, Sotalol, Multaq  f. PVA 8/21/2013 - Dr. Abdirahman Lackey  gPriscila Treated with Flecainide currently  2. HTN  3. Diverticulosis   4. Esophagitis  5. GERD  6. Hyperthyroidism  7. IBS  8. Anxiety   9. Surgical History:  a. Rhinoplasty  b. Subtotal hysterectomy  c. Tubal abdominal ligation     Allergies  Allergies   Allergen Reactions   • Ace Inhibitors Cough   • Celexa [Citalopram Hydrobromide] Dizziness   • Corticosteroids Rash   • Paxil [Paroxetine Hcl] Other (See Comments)     somnolence         Current Medications    Current Outpatient Medications:   •  acetaminophen (TYLENOL) 500 MG tablet, Take 500 mg by mouth As Needed., Disp: , Rfl:   •  amLODIPine (NORVASC) 5 MG tablet, TAKE ONE TABLET BY MOUTH TWICE A DAY (Patient taking differently: Take 5 mg by mouth Daily. Patient has only been taking 1 tablet daily, at night), Disp: 180 tablet, Rfl: 3  •  atorvastatin (LIPITOR) 10 MG tablet, Take 1 tablet by mouth Daily., Disp: 30 tablet, Rfl: 5  •  Calcium Carbonate-Vit D-Min (CALCIUM 1200 PO), Take  by mouth., Disp: , Rfl:   •  Cholecalciferol (VITAMIN D) 2000 UNITS capsule, Take 1 capsule by mouth daily., Disp: , Rfl:   •  COLLAGEN PO, Take  by mouth., Disp: , Rfl:   •  Famotidine-Ca Carb-Mag Hydrox (PEPCID COMPLETE PO), Take 1 tablet by mouth As Needed., Disp: , Rfl:   •  flecainide (TAMBOCOR) 50 MG tablet, Take 1 tablet by mouth 2 (Two)  Times a Day., Disp: 180 tablet, Rfl: 3  •  levothyroxine (SYNTHROID, LEVOTHROID) 50 MCG tablet, Take 1 tablet by mouth Daily., Disp: 90 tablet, Rfl: 3  •  loratadine (CLARITIN) 10 MG tablet, Take 10 mg by mouth Daily., Disp: , Rfl:   •  LORazepam (ATIVAN) 0.5 MG tablet, Take 1 tablet by mouth Daily As Needed for Anxiety., Disp: 30 tablet, Rfl: 1  •  melatonin 3 MG tablet, Take 6 mg by mouth Every Night., Disp: , Rfl:   •  montelukast (SINGULAIR) 10 MG tablet, Take 1 tablet by mouth every night at bedtime., Disp: 90 tablet, Rfl: 3  •  pantoprazole (PROTONIX) 40 MG EC tablet, Take 40 mg by mouth Daily., Disp: , Rfl:   •  TURMERIC PO, Take 1 tablet by mouth Daily., Disp: , Rfl:   •  Vitamin B12 (CYANOCOBALAMIN) 500 MCG tablet tablet, Take  by mouth Daily., Disp: , Rfl:   •  vitamin C (ASCORBIC ACID) 500 MG tablet, Take 500 mg by mouth Daily., Disp: , Rfl:   •  Multiple Vitamins-Minerals (ZINC PO), Take 1 tablet by mouth Daily., Disp: , Rfl:     History of Present Illness     Pt presents for follow up of AF/HTN. Since we last saw the pt, she denies any AF episodes since July of last year. She has continued to take flecainide 50 mg bid, she takes an extra 100 mg flecainide with episodes, which she did take her her episode last July. Episode lasted 8-12 hours without known triggers. She is symptomatic with mod palps and Dizziness with episodes. Denies any SOB, CP, LH. Denies any hospitalizations, ER visits. She continues to take xarelto prn AF episodes without bleeding issues. No TIA or CVA symptoms. Her PCP started her on atorvastatin 3 months ago. Overall feels well she is under stress related to her daughters ovarian cancer.     ROS:  General:  Denies fatigue, weight gain or loss  Cardiovascular:  Denies CP, PND,+LH/Dizziness, - syncope, near syncope, edema + palpitations.  Pulmonary:  Denies LAI, cough, or wheezing      Vitals:    06/15/22 1535   BP: 140/68   BP Location: Right arm   Patient Position: Sitting   Cuff  "Size: Adult   Pulse: 71   SpO2: 97%   Weight: 71.7 kg (158 lb)   Height: 174 cm (68.5\")     Body mass index is 23.67 kg/m².  PE:  General: NAD  Neck: no JVD, no carotid bruits, no TM  Heart RRR, NL S1, S2, no rubs, murmurs  Lungs: CTA, no wheezes, rhonchi, or rales  Abd: soft, non-tender, NL BS  Ext: No musculoskeletal deformities, no edema, cyanosis, or clubbing  Psych: normal mood and affect     Diagnostic Data:        ECG 12 Lead    Date/Time: 6/15/2022 3:39 PM  Performed by: Kuldeep Bell MD  Authorized by: Kuldeep Bell MD   Comparison: compared with previous ECG from 3/1/2022  Similar to previous ECG  Rhythm: sinus rhythm  Rate: normal  BPM: 71  Comments: With premature supraventricular complexes             1. Paroxysmal atrial fibrillation (HCC)    2. Essential hypertension        Plan:    1. Paroxysmal Atrial Fibrillation:   -Overall maintaining NSR on flecainide 50 mg bid. No AF since July 2021. QRS stable today on EKG. Continue flecainide 50 mg bid with 100 mg dose prn episodes.   - CHADSvasc = 3, deferred anticoagulation and not on ASA due to history of diverticulosis. Watchman not recommended due to the fact that she cannot take ASA. Continue to take Xarelto prn episodes, has tolerated thus far.       2. HTN:  - BP controlled on Metoprolol      F/up in 6 months    Scribed for Kuldeep Bell MD by TAMMY Hope. 6/15/2022 15:40 EDT     I, Kuldeep Bell MD, personally performed the services described in this documentation as scribed by the above named individual in my presence, and it is both accurate and complete.  6/15/2022  16:03 EDT    "

## 2022-07-21 ENCOUNTER — OFFICE VISIT (OUTPATIENT)
Dept: OBSTETRICS AND GYNECOLOGY | Facility: CLINIC | Age: 72
End: 2022-07-21

## 2022-07-21 VITALS
HEIGHT: 69 IN | SYSTOLIC BLOOD PRESSURE: 110 MMHG | WEIGHT: 157.8 LBS | DIASTOLIC BLOOD PRESSURE: 66 MMHG | BODY MASS INDEX: 23.37 KG/M2

## 2022-07-21 DIAGNOSIS — Z01.411 ENCOUNTER FOR GYNECOLOGICAL EXAMINATION WITH ABNORMAL FINDING: Primary | ICD-10-CM

## 2022-07-21 DIAGNOSIS — N95.2 ATROPHY OF VAGINA: ICD-10-CM

## 2022-07-21 PROCEDURE — G0101 CA SCREEN;PELVIC/BREAST EXAM: HCPCS | Performed by: NURSE PRACTITIONER

## 2022-07-21 NOTE — PROGRESS NOTES
Chief Complaint  Annalise Winters is a 71 y.o.  female presenting for Gynecologic Exam (Former  patient.  )    History of Present Illness  Annalise is a very pleasant 70yo woman, , my longtime pt from [facility].  She does not use HRT.  Her health has been good since I saw her last.  (She is dealing with the family crisis re: 48yo daughter's ovarian cancer.  She had surgery in , which was very extensive, requiring excision of the spleen & omentum.  Chemo, etc.)  Mrs. Woody is her primary caregiver and support.      Denies any signs of vaginal atrophy.  No UTIs in the past year.  And she is still comfortable enough with sexual activity.  No dyspareunia.  HTN is well controlled.  Hypothyroidism, compensated.  Lipids require a statin.  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      Preventive health procedures are up to date.    Willing to sign ROBERT & get records to be sure we aren't missing anything.  Last Contrast Enhanced Mammogram (due to density) was in .  Her last colonoscopy was in , and she had benign polyps; repeat recommended in 3 yrs.    The following portions of the patient's history were reviewed and updated as appropriate: allergies, current medications, past family history, past medical history, past social history, past surgical history and problem list.    Allergies   Allergen Reactions   • Ace Inhibitors Cough   • Celexa [Citalopram Hydrobromide] Dizziness   • Corticosteroids Rash   • Paxil [Paroxetine Hcl] Other (See Comments)     somnolence           Current Outpatient Medications:   •  acetaminophen (TYLENOL) 500 MG tablet, Take 500 mg by mouth As Needed., Disp: , Rfl:   •  amLODIPine (NORVASC) 5 MG tablet, TAKE ONE TABLET BY MOUTH TWICE A DAY (Patient taking differently: Take 5 mg by mouth Daily. Patient has only been taking 1 tablet daily, at night), Disp: 180 tablet, Rfl: 3  •  atorvastatin (LIPITOR) 10 MG tablet, Take 1 tablet by  mouth Daily., Disp: 30 tablet, Rfl: 5  •  Calcium Carbonate-Vit D-Min (CALCIUM 1200 PO), Take  by mouth., Disp: , Rfl:   •  Cholecalciferol (VITAMIN D) 2000 UNITS capsule, Take 1 capsule by mouth daily., Disp: , Rfl:   •  COLLAGEN PO, Take  by mouth., Disp: , Rfl:   •  Famotidine-Ca Carb-Mag Hydrox (PEPCID COMPLETE PO), Take 1 tablet by mouth As Needed., Disp: , Rfl:   •  flecainide (TAMBOCOR) 50 MG tablet, Take 1 tablet by mouth 2 (Two) Times a Day., Disp: 180 tablet, Rfl: 3  •  levothyroxine (SYNTHROID, LEVOTHROID) 50 MCG tablet, Take 1 tablet by mouth Daily., Disp: 90 tablet, Rfl: 3  •  loratadine (CLARITIN) 10 MG tablet, Take 10 mg by mouth Daily., Disp: , Rfl:   •  LORazepam (ATIVAN) 0.5 MG tablet, Take 1 tablet by mouth Daily As Needed for Anxiety., Disp: 30 tablet, Rfl: 1  •  melatonin 3 MG tablet, Take 6 mg by mouth Every Night., Disp: , Rfl:   •  montelukast (SINGULAIR) 10 MG tablet, Take 1 tablet by mouth every night at bedtime., Disp: 90 tablet, Rfl: 3  •  Multiple Vitamins-Minerals (ZINC PO), Take 1 tablet by mouth Daily., Disp: , Rfl:   •  pantoprazole (PROTONIX) 40 MG EC tablet, Take 40 mg by mouth Daily., Disp: , Rfl:   •  TURMERIC PO, Take 1 tablet by mouth Daily., Disp: , Rfl:   •  Vitamin B12 (CYANOCOBALAMIN) 500 MCG tablet tablet, Take  by mouth Daily., Disp: , Rfl:   •  vitamin C (ASCORBIC ACID) 500 MG tablet, Take 500 mg by mouth Daily., Disp: , Rfl:     Past Medical History:   Diagnosis Date   • Abnormal blood chemistry    • Arthritis     right knee   • Atrial fibrillation (HCC)    • Cyst of buttocks    • Diverticulosis    • Dizziness    • Dysuria    • Esophagitis    • Flushing    • GERD (gastroesophageal reflux disease)    • Heartburn    • Hyperlipidemia    • Hypertension    • Hyperthyroidism    • Hypothyroidism    • Irritable bowel syndrome    • LLQ abdominal pain    • Menopause    • Polyp of sigmoid colon    • Sinusitis    • Skin lesion of face    • UTI (urinary tract infection)         Past  "Surgical History:   Procedure Laterality Date   • CARDIAC ABLATION  08/21/2013    Pulmonary vein ablation by Dr. Abdirahman Lackey, 08/21/2013.   • RHINOPLASTY     • TOTAL ABDOMINAL HYSTERECTOMY     • TUBAL ABDOMINAL LIGATION     • TUBAL REANASTOMOSIS         Objective  /66   Ht 174 cm (68.5\")   Wt 71.6 kg (157 lb 12.8 oz)   LMP  (LMP Unknown)   Breastfeeding No   BMI 23.64 kg/m²     Physical Exam  Exam conducted with a chaperone present.   Constitutional:       Appearance: Normal appearance.   HENT:      Head: Normocephalic.   Neck:      Thyroid: No thyroid mass or thyromegaly.   Cardiovascular:      Rate and Rhythm: Normal rate and regular rhythm.      Heart sounds: Normal heart sounds.   Pulmonary:      Effort: Pulmonary effort is normal.      Breath sounds: Normal breath sounds.   Chest:   Breasts:      Right: No inverted nipple, mass, nipple discharge, axillary adenopathy or supraclavicular adenopathy.      Left: No inverted nipple, mass, nipple discharge, axillary adenopathy or supraclavicular adenopathy.       Abdominal:      Palpations: Abdomen is soft. There is no mass.      Tenderness: There is no abdominal tenderness.   Genitourinary:     General: Normal vulva.      Labia:         Right: No lesion.         Left: No lesion.       Vagina: Erythema present. No vaginal discharge.      Uterus: Absent.       Adnexa:         Right: No mass or tenderness.          Left: No mass or tenderness.        Comments: Moderate atrophic erythema.  No abnormal discharge.  She does not have a cervix.  Anus appears wnl.  No rectal exam performed.  Lymphadenopathy:      Upper Body:      Right upper body: No supraclavicular or axillary adenopathy.      Left upper body: No supraclavicular or axillary adenopathy.   Neurological:      Mental Status: She is alert.   Psychiatric:         Mood and Affect: Mood normal.         Behavior: Behavior normal.         Assessment/Plan   Diagnoses and all orders for this visit:    1. " Encounter for gynecological examination with abnormal finding (Primary)    2. Atrophy of vagina    Declines any vaginal ERT at this time.  She will call or send msg on the portal if she changes her mind.    Procedures    40 to 64: Counseling/Anticipatory Guidance Discussed: nutrition, physical activity, screenings and self-breast exam    Return in about 1 year (around 7/21/2023) for Annual physical, Please sign ROBERT & get records from ..    Britta Aldrich, APRN  07/21/2022

## 2022-09-02 RX ORDER — ATORVASTATIN CALCIUM 10 MG/1
TABLET, FILM COATED ORAL
Qty: 90 TABLET | Refills: 0 | Status: SHIPPED | OUTPATIENT
Start: 2022-09-02 | End: 2022-11-29

## 2022-09-13 ENCOUNTER — OFFICE VISIT (OUTPATIENT)
Dept: INTERNAL MEDICINE | Facility: CLINIC | Age: 72
End: 2022-09-13

## 2022-09-13 ENCOUNTER — LAB (OUTPATIENT)
Dept: LAB | Facility: HOSPITAL | Age: 72
End: 2022-09-13

## 2022-09-13 VITALS
TEMPERATURE: 97.6 F | SYSTOLIC BLOOD PRESSURE: 158 MMHG | DIASTOLIC BLOOD PRESSURE: 88 MMHG | HEIGHT: 69 IN | HEART RATE: 65 BPM | OXYGEN SATURATION: 98 % | WEIGHT: 156.8 LBS | BODY MASS INDEX: 23.22 KG/M2

## 2022-09-13 DIAGNOSIS — Z79.899 ENCOUNTER FOR LONG-TERM (CURRENT) USE OF OTHER MEDICATIONS: ICD-10-CM

## 2022-09-13 DIAGNOSIS — I10 ESSENTIAL HYPERTENSION: ICD-10-CM

## 2022-09-13 DIAGNOSIS — F43.9 SITUATIONAL STRESS: ICD-10-CM

## 2022-09-13 DIAGNOSIS — E78.49 OTHER HYPERLIPIDEMIA: ICD-10-CM

## 2022-09-13 DIAGNOSIS — R10.31 RIGHT LOWER QUADRANT ABDOMINAL PAIN: ICD-10-CM

## 2022-09-13 DIAGNOSIS — R35.0 FREQUENCY OF URINATION: ICD-10-CM

## 2022-09-13 DIAGNOSIS — M17.11 PRIMARY OSTEOARTHRITIS OF RIGHT KNEE: ICD-10-CM

## 2022-09-13 DIAGNOSIS — F41.9 ANXIETY: ICD-10-CM

## 2022-09-13 DIAGNOSIS — R35.0 FREQUENCY OF URINATION: Primary | ICD-10-CM

## 2022-09-13 DIAGNOSIS — E78.5 DYSLIPIDEMIA: ICD-10-CM

## 2022-09-13 LAB
ALBUMIN SERPL-MCNC: 4.9 G/DL (ref 3.5–5.2)
ALBUMIN/GLOB SERPL: 2 G/DL
ALP SERPL-CCNC: 69 U/L (ref 39–117)
ALT SERPL W P-5'-P-CCNC: 15 U/L (ref 1–33)
ANION GAP SERPL CALCULATED.3IONS-SCNC: 10 MMOL/L (ref 5–15)
AST SERPL-CCNC: 16 U/L (ref 1–32)
BILIRUB BLD-MCNC: NEGATIVE MG/DL
BILIRUB SERPL-MCNC: 0.6 MG/DL (ref 0–1.2)
BUN SERPL-MCNC: 17 MG/DL (ref 8–23)
BUN/CREAT SERPL: 22.7 (ref 7–25)
CALCIUM SPEC-SCNC: 9.9 MG/DL (ref 8.6–10.5)
CHLORIDE SERPL-SCNC: 97 MMOL/L (ref 98–107)
CHOLEST SERPL-MCNC: 186 MG/DL (ref 0–200)
CLARITY, POC: CLEAR
CO2 SERPL-SCNC: 29 MMOL/L (ref 22–29)
COLOR UR: YELLOW
CREAT SERPL-MCNC: 0.75 MG/DL (ref 0.57–1)
DEPRECATED RDW RBC AUTO: 43.2 FL (ref 37–54)
EGFRCR SERPLBLD CKD-EPI 2021: 84.2 ML/MIN/1.73
ERYTHROCYTE [DISTWIDTH] IN BLOOD BY AUTOMATED COUNT: 12.9 % (ref 12.3–15.4)
EXPIRATION DATE: NORMAL
GLOBULIN UR ELPH-MCNC: 2.5 GM/DL
GLUCOSE SERPL-MCNC: 95 MG/DL (ref 65–99)
GLUCOSE UR STRIP-MCNC: NEGATIVE MG/DL
HCT VFR BLD AUTO: 41.8 % (ref 34–46.6)
HDLC SERPL-MCNC: 86 MG/DL (ref 40–60)
HGB BLD-MCNC: 13.3 G/DL (ref 12–15.9)
KETONES UR QL: NEGATIVE
LDLC SERPL CALC-MCNC: 89 MG/DL (ref 0–100)
LDLC/HDLC SERPL: 1.02 {RATIO}
LEUKOCYTE EST, POC: NEGATIVE
Lab: NORMAL
MCH RBC QN AUTO: 29.1 PG (ref 26.6–33)
MCHC RBC AUTO-ENTMCNC: 31.8 G/DL (ref 31.5–35.7)
MCV RBC AUTO: 91.5 FL (ref 79–97)
NITRITE UR-MCNC: NEGATIVE MG/ML
PH UR: 6 [PH] (ref 5–8)
PLATELET # BLD AUTO: 261 10*3/MM3 (ref 140–450)
PMV BLD AUTO: 10.1 FL (ref 6–12)
POTASSIUM SERPL-SCNC: 3.8 MMOL/L (ref 3.5–5.2)
PROT SERPL-MCNC: 7.4 G/DL (ref 6–8.5)
PROT UR STRIP-MCNC: NEGATIVE MG/DL
RBC # BLD AUTO: 4.57 10*6/MM3 (ref 3.77–5.28)
RBC # UR STRIP: NEGATIVE /UL
SODIUM SERPL-SCNC: 136 MMOL/L (ref 136–145)
SP GR UR: 1.01 (ref 1–1.03)
TRIGL SERPL-MCNC: 60 MG/DL (ref 0–150)
UROBILINOGEN UR QL: NORMAL
VLDLC SERPL-MCNC: 11 MG/DL (ref 5–40)
WBC NRBC COR # BLD: 5.78 10*3/MM3 (ref 3.4–10.8)

## 2022-09-13 PROCEDURE — 81003 URINALYSIS AUTO W/O SCOPE: CPT | Performed by: INTERNAL MEDICINE

## 2022-09-13 PROCEDURE — 85027 COMPLETE CBC AUTOMATED: CPT

## 2022-09-13 PROCEDURE — 80053 COMPREHEN METABOLIC PANEL: CPT

## 2022-09-13 PROCEDURE — 99214 OFFICE O/P EST MOD 30 MIN: CPT | Performed by: INTERNAL MEDICINE

## 2022-09-13 PROCEDURE — 80061 LIPID PANEL: CPT

## 2022-09-13 RX ORDER — CEPHALEXIN 500 MG/1
500 CAPSULE ORAL 2 TIMES DAILY
Qty: 20 CAPSULE | Refills: 0 | Status: SHIPPED | OUTPATIENT
Start: 2022-09-13 | End: 2022-12-02

## 2022-09-13 RX ORDER — LORAZEPAM 0.5 MG/1
0.5 TABLET ORAL DAILY PRN
Qty: 30 TABLET | Refills: 1 | Status: SHIPPED | OUTPATIENT
Start: 2022-09-13 | End: 2023-03-13 | Stop reason: SDUPTHER

## 2022-09-13 RX ORDER — ATORVASTATIN CALCIUM 10 MG/1
10 TABLET, FILM COATED ORAL DAILY
Qty: 90 TABLET | Refills: 1 | Status: CANCELLED | OUTPATIENT
Start: 2022-09-13

## 2022-09-13 NOTE — PROGRESS NOTES
Hypothyroidism, Urinary Frequency (With bloating and low back pain), and Hyperlipidemia    Subjective   Annalise Winters is a 71 y.o. female is here today for follow-up.    History of Present Illness   Pt is here for a f/u on her HLP, started on lipitor 10 after last labs. C/o urinary frequency and back pain.      Current Outpatient Medications:   •  acetaminophen (TYLENOL) 500 MG tablet, Take 500 mg by mouth As Needed., Disp: , Rfl:   •  amLODIPine (NORVASC) 5 MG tablet, TAKE ONE TABLET BY MOUTH TWICE A DAY (Patient taking differently: Take 2.5 mg by mouth Daily. Patient has only been taking 1 tablet daily, at night), Disp: 180 tablet, Rfl: 3  •  atorvastatin (LIPITOR) 10 MG tablet, TAKE ONE TABLET BY MOUTH DAILY, Disp: 90 tablet, Rfl: 0  •  Calcium Carbonate-Vit D-Min (CALCIUM 1200 PO), Take  by mouth., Disp: , Rfl:   •  Cholecalciferol (VITAMIN D) 2000 UNITS capsule, Take 1 capsule by mouth daily., Disp: , Rfl:   •  COLLAGEN PO, Take  by mouth., Disp: , Rfl:   •  Famotidine-Ca Carb-Mag Hydrox (PEPCID COMPLETE PO), Take 1 tablet by mouth As Needed., Disp: , Rfl:   •  flecainide (TAMBOCOR) 50 MG tablet, Take 1 tablet by mouth 2 (Two) Times a Day., Disp: 180 tablet, Rfl: 3  •  levothyroxine (SYNTHROID, LEVOTHROID) 50 MCG tablet, Take 1 tablet by mouth Daily., Disp: 90 tablet, Rfl: 3  •  loratadine (CLARITIN) 10 MG tablet, Take 10 mg by mouth Daily., Disp: , Rfl:   •  LORazepam (ATIVAN) 0.5 MG tablet, Take 1 tablet by mouth Daily As Needed for Anxiety., Disp: 30 tablet, Rfl: 1  •  melatonin 3 MG tablet, Take 6 mg by mouth Every Night., Disp: , Rfl:   •  montelukast (SINGULAIR) 10 MG tablet, Take 1 tablet by mouth every night at bedtime., Disp: 90 tablet, Rfl: 3  •  Multiple Vitamins-Minerals (ZINC PO), Take 1 tablet by mouth Daily., Disp: , Rfl:   •  pantoprazole (PROTONIX) 40 MG EC tablet, Take 40 mg by mouth Daily., Disp: , Rfl:   •  TURMERIC PO, Take 1 tablet by mouth Daily., Disp: , Rfl:   •  Vitamin B12  "(CYANOCOBALAMIN) 500 MCG tablet tablet, Take  by mouth Daily., Disp: , Rfl:   •  vitamin C (ASCORBIC ACID) 500 MG tablet, Take 500 mg by mouth Daily., Disp: , Rfl:   •  cephalexin (Keflex) 500 MG capsule, Take 1 capsule by mouth 2 (Two) Times a Day., Disp: 20 capsule, Rfl: 0      The following portions of the patient's history were reviewed and updated as appropriate: allergies, current medications, past family history, past medical history, past social history, past surgical history and problem list.    Review of Systems   Constitutional: Negative.  Negative for chills and fever.   HENT: Negative for ear discharge, ear pain, sinus pressure and sore throat.    Respiratory: Negative for cough, chest tightness and shortness of breath.    Cardiovascular: Negative for chest pain, palpitations and leg swelling.   Gastrointestinal: Negative for diarrhea, nausea and vomiting.   Genitourinary: Positive for frequency.   Musculoskeletal: Positive for arthralgias, back pain and myalgias.   Neurological: Negative for dizziness, syncope and headaches.   Psychiatric/Behavioral: Negative for confusion and sleep disturbance.       Objective   /88   Pulse 65   Temp 97.6 °F (36.4 °C)   Ht 174 cm (68.5\")   Wt 71.1 kg (156 lb 12.8 oz)   LMP  (LMP Unknown)   SpO2 98% Comment: ra  BMI 23.49 kg/m²   Physical Exam  Vitals and nursing note reviewed.   Constitutional:       Appearance: She is well-developed.   HENT:      Head: Normocephalic and atraumatic.      Right Ear: External ear normal.      Left Ear: External ear normal.      Mouth/Throat:      Pharynx: No oropharyngeal exudate.   Eyes:      Conjunctiva/sclera: Conjunctivae normal.      Pupils: Pupils are equal, round, and reactive to light.   Neck:      Thyroid: No thyromegaly.   Cardiovascular:      Rate and Rhythm: Normal rate and regular rhythm.      Pulses: Normal pulses.      Heart sounds: Normal heart sounds. No murmur heard.    No friction rub. No gallop. "   Pulmonary:      Effort: Pulmonary effort is normal.      Breath sounds: Normal breath sounds.   Abdominal:      General: There is distension.      Palpations: Abdomen is soft.      Tenderness: There is abdominal tenderness.      Comments: Hyperactive BS   Musculoskeletal:         General: Swelling and tenderness present.      Cervical back: Neck supple.   Skin:     General: Skin is warm and dry.   Neurological:      Mental Status: She is alert and oriented to person, place, and time.      Cranial Nerves: No cranial nerve deficit.   Psychiatric:         Judgment: Judgment normal.           Results for orders placed or performed in visit on 09/13/22   POCT urinalysis dipstick, automated    Specimen: Urine   Result Value Ref Range    Color Yellow Yellow, Straw, Dark Yellow, Kimberli    Clarity, UA Clear Clear    Specific Gravity  1.015 1.005 - 1.030    pH, Urine 6.0 5.0 - 8.0    Leukocytes Negative Negative    Nitrite, UA Negative Negative    Protein, POC Negative Negative mg/dL    Glucose, UA Negative Negative mg/dL    Ketones, UA Negative Negative    Urobilinogen, UA Normal Normal, 0.2 E.U./dL    Bilirubin Negative Negative    Blood, UA Negative Negative    Lot Number [id number]     Expiration Date 12/17/23              Assessment & Plan   Diagnoses and all orders for this visit:    Frequency of urination  -     POCT urinalysis dipstick, automated  -     Urine Culture - Urine, Urine, Clean Catch; Future  -     CBC (No Diff); Future  -     cephalexin (Keflex) 500 MG capsule; Take 1 capsule by mouth 2 (Two) Times a Day.    Anxiety  -     LORazepam (ATIVAN) 0.5 MG tablet; Take 1 tablet by mouth Daily As Needed for Anxiety.    Encounter for long-term (current) use of other medications  -     Urine Drug Screen - Urine, Clean Catch; Future  -     Urine Drug Screen - Urine, Clean Catch    Primary osteoarthritis of right knee    Situational stress    Essential hypertension  Comments:  take the whole pill of  amlodipine.  Orders:  -     Comprehensive Metabolic Panel; Future    Dyslipidemia    Other hyperlipidemia  -     Lipid Panel; Future    Right lower quadrant abdominal pain  Comments:  bilateral LQ- check cbc and if high, proceed with CT scan  Orders:  -     cephalexin (Keflex) 500 MG capsule; Take 1 capsule by mouth 2 (Two) Times a Day.           The patient has read and signed the [facility] Controlled Substance Contract.  I will continue to see patient for regular follow up appointments.  They are well controlled on their medication.  JASMYN has been reviewed by me and is updated every 3 months. The patient is aware of the potential for addiction and dependence.        Return in about 6 months (around 3/13/2023) for Medicare Wellness.    Electronically signed by:    Maryse Jauregui MD

## 2022-09-14 LAB
AMPHETAMINES UR QL SCN: NEGATIVE NG/ML
BARBITURATES UR QL SCN: NEGATIVE NG/ML
BENZODIAZ UR QL SCN: NEGATIVE NG/ML
BZE UR QL SCN: NEGATIVE NG/ML
CANNABINOIDS UR QL SCN: NEGATIVE NG/ML
CREAT UR-MCNC: 49.4 MG/DL (ref 20–300)
LABORATORY COMMENT REPORT: NORMAL
METHADONE UR QL SCN: NEGATIVE NG/ML
OPIATES UR QL SCN: NEGATIVE NG/ML
OXYCODONE+OXYMORPHONE UR QL SCN: NEGATIVE NG/ML
PCP UR QL: NEGATIVE NG/ML
PH UR: 5.7 [PH] (ref 4.5–8.9)
PROPOXYPH UR QL SCN: NEGATIVE NG/ML

## 2022-10-05 ENCOUNTER — CLINICAL SUPPORT (OUTPATIENT)
Dept: INTERNAL MEDICINE | Facility: CLINIC | Age: 72
End: 2022-10-05

## 2022-10-05 DIAGNOSIS — Z23 NEED FOR INFLUENZA VACCINATION: Primary | ICD-10-CM

## 2022-10-05 PROCEDURE — 90662 IIV NO PRSV INCREASED AG IM: CPT | Performed by: INTERNAL MEDICINE

## 2022-10-05 PROCEDURE — G0008 ADMIN INFLUENZA VIRUS VAC: HCPCS | Performed by: INTERNAL MEDICINE

## 2022-10-13 ENCOUNTER — TELEPHONE (OUTPATIENT)
Dept: INTERNAL MEDICINE | Facility: CLINIC | Age: 72
End: 2022-10-13

## 2022-10-19 ENCOUNTER — OFFICE VISIT (OUTPATIENT)
Dept: CARDIOLOGY | Facility: CLINIC | Age: 72
End: 2022-10-19

## 2022-10-19 VITALS
WEIGHT: 157 LBS | HEART RATE: 67 BPM | SYSTOLIC BLOOD PRESSURE: 124 MMHG | OXYGEN SATURATION: 97 % | HEIGHT: 69 IN | BODY MASS INDEX: 23.25 KG/M2 | DIASTOLIC BLOOD PRESSURE: 66 MMHG

## 2022-10-19 DIAGNOSIS — I48.0 PAROXYSMAL ATRIAL FIBRILLATION: Primary | Chronic | ICD-10-CM

## 2022-10-19 DIAGNOSIS — I10 ESSENTIAL HYPERTENSION: Chronic | ICD-10-CM

## 2022-10-19 PROCEDURE — 99213 OFFICE O/P EST LOW 20 MIN: CPT | Performed by: INTERNAL MEDICINE

## 2022-10-19 RX ORDER — ZINC 25 MG
TABLET ORAL DAILY
COMMUNITY
End: 2023-02-23

## 2022-10-19 NOTE — PROGRESS NOTES
"    Cardiology Outpatient Visit      Identification: Annalise Winters is a 71 y.o. female who resides in [place], [place].    Reason for visit:  Atrial fibrillation    Assessment     Problem List Items Addressed This Visit        Cardiology Problems    Essential hypertension    Overview     • Target blood pressure <130/80 mmHg         Paroxysmal atrial fibrillation (HCC) - Primary    Overview     · Initially diagnosed 2012  · Myocardial perfusion study (04/02/2013):  No ischemia.  · Event monitor demonstrating atrial fibrillation,10/2013  · Failure of flecainide, sotalol and Multaq therapy.  · Pulmonary vein ablation by Dr. Abdirahman Lackey, 08/21/2013.  · Chads Vasc=3 (age, female, HTN)         Current Assessment & Plan     · Symptoms well controlled with flecainide  · Presently on no stroke prophylaxis.  Device closure of the left atrial appendage is in discussion with Dr. Bell            Plan   • Proceed with total knee arthroplasty with acceptable cardiovascular risk  • Postoperative DVT prophylaxis okay from cardiac standpoint.  May need to discuss with GI doctor as previously aspirin intolerant      Follow-up   • EP follow-up as scheduled        Subjective      Annalise returns the office today.  She has been doing well and states that her A. fib has been relatively well controlled.  She has been seeing Dr. Bell and they have been discussing interventional options for stroke prophylaxis as she is unable to tolerate aspirin.    She states that her blood pressures been well controlled.  She states her cholesterol was elevated and she was recently started on statin therapy, although she does not like taking this because of \"other effects\"    Review of Systems   Cardiovascular: Positive for leg swelling.   Respiratory: Negative.        Objective     /66 (BP Location: Right arm, Patient Position: Sitting)   Pulse 67   Ht 174 cm (68.5\")   Wt 71.2 kg (157 lb)   SpO2 97%   BMI 23.52 kg/m²   "     Constitutional:       Appearance: Healthy appearance. Well-developed.   Eyes:      General: Lids are normal. No scleral icterus.  HENT:      Head: Normocephalic and atraumatic.   Neck:      Thyroid: No thyroid mass.      Vascular: No carotid bruit or JVD. JVD normal.   Pulmonary:      Effort: Pulmonary effort is normal.      Breath sounds: Normal breath sounds.   Cardiovascular:      Normal rate. Regular rhythm.      Murmurs: There is no murmur.      No gallop.   Musculoskeletal:      Extremities: No clubbing present.Skin:     General: Skin is warm and dry. There is no cyanosis.   Neurological:      General: No focal deficit present.      Mental Status: Alert.   Psychiatric:         Attention and Perception: Attention normal.         Behavior: Behavior normal. Behavior is cooperative.         Result Review  (reviewed with patient):            Lab Results   Component Value Date    GLUCOSE 95 09/13/2022    BUN 17 09/13/2022    CREATININE 0.75 09/13/2022    BCR 22.7 09/13/2022    K 3.8 09/13/2022    CO2 29.0 09/13/2022    CALCIUM 9.9 09/13/2022    ALBUMIN 4.90 09/13/2022    AST 16 09/13/2022    ALT 15 09/13/2022     Lab Results   Component Value Date    WBC 5.78 09/13/2022    HGB 13.3 09/13/2022    HCT 41.8 09/13/2022    MCV 91.5 09/13/2022     09/13/2022     Lab Results   Component Value Date    CHOL 186 09/13/2022    TRIG 60 09/13/2022    HDL 86 (H) 09/13/2022    LDL 89 09/13/2022     Lab Results   Component Value Date    HGBA1C 5.50 03/08/2022         Al Ruth MD, FACC, FSCAI  10/19/2022

## 2022-10-19 NOTE — ASSESSMENT & PLAN NOTE
· Symptoms well controlled with flecainide  · Presently on no stroke prophylaxis.  Device closure of the left atrial appendage is in discussion with Dr. Bell

## 2022-10-21 ENCOUNTER — TELEPHONE (OUTPATIENT)
Dept: CARDIOLOGY | Facility: CLINIC | Age: 72
End: 2022-10-21

## 2022-10-21 NOTE — TELEPHONE ENCOUNTER
Patient is scheduled for joint replacement surgery at [facility] on 11/14/22. They are requesting cardiac clearance.

## 2022-10-24 ENCOUNTER — CLINICAL SUPPORT (OUTPATIENT)
Dept: INTERNAL MEDICINE | Facility: CLINIC | Age: 72
End: 2022-10-24

## 2022-10-24 DIAGNOSIS — Z23 NEED FOR VACCINATION: Primary | ICD-10-CM

## 2022-10-24 PROCEDURE — 91312 COVID-19 (PFIZER) BIVALENT BOOSTER 12+YRS: CPT | Performed by: INTERNAL MEDICINE

## 2022-10-24 PROCEDURE — 0124A PR ADM SARSCOV2 30MCG/0.3ML BST: CPT | Performed by: INTERNAL MEDICINE

## 2022-10-24 NOTE — TELEPHONE ENCOUNTER
Looks like Dr. Ruth already cleared her per his last office note, but she is cleared from EP standpoint as well.

## 2022-11-29 RX ORDER — ATORVASTATIN CALCIUM 10 MG/1
TABLET, FILM COATED ORAL
Qty: 90 TABLET | Refills: 0 | Status: SHIPPED | OUTPATIENT
Start: 2022-11-29 | End: 2023-02-27

## 2022-12-02 PROCEDURE — 87088 URINE BACTERIA CULTURE: CPT | Performed by: NURSE PRACTITIONER

## 2022-12-02 PROCEDURE — 87186 SC STD MICRODIL/AGAR DIL: CPT | Performed by: NURSE PRACTITIONER

## 2022-12-02 PROCEDURE — 87086 URINE CULTURE/COLONY COUNT: CPT | Performed by: NURSE PRACTITIONER

## 2023-01-09 ENCOUNTER — OFFICE VISIT (OUTPATIENT)
Dept: CARDIOLOGY | Facility: CLINIC | Age: 73
End: 2023-01-09
Payer: MEDICARE

## 2023-01-09 ENCOUNTER — TELEPHONE (OUTPATIENT)
Dept: CARDIOLOGY | Facility: CLINIC | Age: 73
End: 2023-01-09
Payer: MEDICARE

## 2023-01-09 VITALS
OXYGEN SATURATION: 94 % | SYSTOLIC BLOOD PRESSURE: 150 MMHG | HEIGHT: 68 IN | DIASTOLIC BLOOD PRESSURE: 82 MMHG | HEART RATE: 95 BPM | WEIGHT: 159 LBS | BODY MASS INDEX: 24.1 KG/M2

## 2023-01-09 DIAGNOSIS — I10 ESSENTIAL HYPERTENSION: ICD-10-CM

## 2023-01-09 DIAGNOSIS — I48.0 PAROXYSMAL ATRIAL FIBRILLATION: Primary | ICD-10-CM

## 2023-01-09 PROCEDURE — 99214 OFFICE O/P EST MOD 30 MIN: CPT | Performed by: PHYSICIAN ASSISTANT

## 2023-01-09 NOTE — PROGRESS NOTES
Annalise SHANA Winters  1950  [phone]        [facility]       Maryse Jauregui MD  [address] [id number]    Chief Complaint   Patient presents with   • Hypertension   • PAF       Problem List:   1. Paroxysmal Atrial Fibrillation   a. CHADSvasc = 3 (age, female, HTN) previously Xarelto prior to ablation   b. Initially diagnosed 2021  c. Myocardial perfusion study 4/2/2013: no ischemia  d. Event Monitor 10/2013: demonstrates atrial fibrillation  e. Failure of Flecainide, Sotalol, Multaq  f. PVA 8/21/2013 - Dr. Abdirahman Lackey  g. Treated with Flecainide currently  2. HTN  3. Diverticulosis   4. Esophagitis  5. GERD  6. Hyperthyroidism  7. IBS  8. Anxiety   9. Surgical History:  a. Rhinoplasty  b. Subtotal hysterectomy  c. Tubal abdominal ligation     Allergies  Allergies   Allergen Reactions   • Ace Inhibitors Cough   • Celexa [Citalopram Hydrobromide] Dizziness   • Corticosteroids Rash   • Paxil [Paroxetine Hcl] Other (See Comments)     somnolence         Current Medications    Current Outpatient Medications:   •  acetaminophen (TYLENOL) 500 MG tablet, Take 500 mg by mouth As Needed., Disp: , Rfl:   •  amLODIPine (NORVASC) 5 MG tablet, TAKE ONE TABLET BY MOUTH TWICE A DAY (Patient taking differently: Take 2.5 mg by mouth Daily. Patient has only been taking 1 tablet daily, at night), Disp: 180 tablet, Rfl: 3  •  atorvastatin (LIPITOR) 10 MG tablet, TAKE ONE TABLET BY MOUTH DAILY, Disp: 90 tablet, Rfl: 0  •  Calcium Carbonate-Vit D-Min (CALCIUM 1200 PO), Take  by mouth., Disp: , Rfl:   •  Cholecalciferol (VITAMIN D) 2000 UNITS capsule, Take 1 capsule by mouth daily., Disp: , Rfl:   •  COLLAGEN PO, Take  by mouth., Disp: , Rfl:   •  Famotidine-Ca Carb-Mag Hydrox (PEPCID COMPLETE PO), Take 1 tablet by mouth As Needed., Disp: , Rfl:   •  flecainide (TAMBOCOR) 50 MG tablet, Take 1 tablet by mouth 2 (Two) Times a Day., Disp: 180 tablet, Rfl: 3  •  levothyroxine  (SYNTHROID, LEVOTHROID) 50 MCG tablet, Take 1 tablet by mouth Daily., Disp: 90 tablet, Rfl: 3  •  loratadine (CLARITIN) 10 MG tablet, Take 10 mg by mouth Daily., Disp: , Rfl:   •  LORazepam (ATIVAN) 0.5 MG tablet, Take 1 tablet by mouth Daily As Needed for Anxiety., Disp: 30 tablet, Rfl: 1  •  melatonin 3 MG tablet, Take 6 mg by mouth Every Night., Disp: , Rfl:   •  montelukast (SINGULAIR) 10 MG tablet, Take 1 tablet by mouth every night at bedtime., Disp: 90 tablet, Rfl: 3  •  pantoprazole (PROTONIX) 40 MG EC tablet, Take 40 mg by mouth Daily., Disp: , Rfl:   •  Vitamin B12 (CYANOCOBALAMIN) 500 MCG tablet tablet, Take  by mouth Daily., Disp: , Rfl:   •  vitamin C (ASCORBIC ACID) 500 MG tablet, Take 500 mg by mouth Daily., Disp: , Rfl:   •  cefadroxil (DURICEF) 500 MG capsule, , Disp: , Rfl:   •  meloxicam (MOBIC) 15 MG tablet, , Disp: , Rfl:   •  metoprolol tartrate (LOPRESSOR) 25 MG tablet, Take 0.5 tablets by mouth As Needed (palpitations)., Disp: 60 tablet, Rfl: 11  •  Multiple Vitamins-Minerals (ZINC PO), Take 1 tablet by mouth Daily., Disp: , Rfl:   •  TURMERIC PO, Take 1 tablet by mouth Daily., Disp: , Rfl:   •  Zinc 25 MG tablet, Take  by mouth Daily., Disp: , Rfl:     History of Present Illness     Pt presents for follow up of AF/HTN. She presents today with complaint of recurrent palpitations that started back in December 2022. She recently had right knee surgery on 11/14/2022. She recovered well. She states she made this appoint after she started had more heart \"racing\" this weekend. She feels her HR Is faster than it should be. She also \"weak\" all the time. She took a COVID test and it was negative. She does have some dizziness when standing but otherwise no episodes. She did have recent UTI after knee surgery but she denies any other symptoms at this time.        Vitals:    01/09/23 1256   BP: 150/82   BP Location: Right arm   Patient Position: Sitting   Pulse: 95   SpO2: 94%   Weight: 72.1 kg (159 lb)    Height: 172.7 cm (67.99\")     Body mass index is 24.18 kg/m².  PE:  General: NAD  Neck: no JVD, no carotid bruits, no TM  Heart RRR, NL S1, S2, no rubs, murmurs  Lungs: CTA, no wheezes, rhonchi, or rales  Abd: soft, non-tender, NL BS  Ext: No musculoskeletal deformities, no edema, cyanosis, or clubbing  Psych: normal mood and affect     Diagnostic Data:      Procedures    1. Paroxysmal atrial fibrillation (HCC)    2. Essential hypertension        Plan:    1. Paroxysmal Atrial Fibrillation:   -Overall maintaining NSR on flecainide 50 mg bid. No documented AF since July 2021. QRS stable today on EKG. Continue flecainide 50 mg bid with 100 mg dose prn episodes.   - CHADSvasc = 3, deferred anticoagulation and not on ASA due to history of diverticulosis. Watchman not recommended due to the fact that she cannot take ASA. Continue to take Xarelto prn episodes, has tolerated thus far.      2. Tachybrady syndrome: Low hr with daily beta-blocker.  She is willing to try as needed beta-blockers for elevated heart rate.     2. HTN:  -Elevated today in setting of anxiety.  She states normally when heart rates not fast her blood pressure well controlled.  She is taking amlodipine 2.5 mg twice daily currently.     3. Sinus tachycardia, anxiety.  She has been not sleeping well the past few weeks taking care of her dog.  She has more anxiety than usual.  Discussed this with her she also will check with her PCP for routine labs.  She will take as needed metoprolol.  F/up in 6 months      zio Monitor  PRN Lopressor 12.5mg for palpitations.  Follow-up PCP for routine laboratory data.  Improve sleep hygiene.  Return follow-up spring 2023 or sooner if any change in symptoms.  Electronically signed by ANDREW Gavin, 01/09/23, 1:33 PM EST.

## 2023-01-09 NOTE — TELEPHONE ENCOUNTER
Patient states that her BP and HR has been elevated for the past two days. At rest she states that her BP has been ranging from 129/70 to 165/90 and her HR has been between . She said that she took 2 extra Flecainide yesterday and an extra dose of amlodipine yesterday and today. She states that nothing seems to be helping. I scheduled her an appointment for her to see Ruben LIGHT today at 1:00 pm.

## 2023-01-11 ENCOUNTER — TELEPHONE (OUTPATIENT)
Dept: CARDIOLOGY | Facility: CLINIC | Age: 73
End: 2023-01-11
Payer: MEDICARE

## 2023-01-11 ENCOUNTER — LAB (OUTPATIENT)
Dept: LAB | Facility: HOSPITAL | Age: 73
End: 2023-01-11
Payer: MEDICARE

## 2023-01-11 ENCOUNTER — OFFICE VISIT (OUTPATIENT)
Dept: INTERNAL MEDICINE | Facility: CLINIC | Age: 73
End: 2023-01-11
Payer: MEDICARE

## 2023-01-11 VITALS
BODY MASS INDEX: 24.33 KG/M2 | HEART RATE: 90 BPM | WEIGHT: 160 LBS | OXYGEN SATURATION: 95 % | RESPIRATION RATE: 16 BRPM | SYSTOLIC BLOOD PRESSURE: 118 MMHG | TEMPERATURE: 97.8 F | DIASTOLIC BLOOD PRESSURE: 66 MMHG

## 2023-01-11 DIAGNOSIS — R00.0 TACHYCARDIA: ICD-10-CM

## 2023-01-11 DIAGNOSIS — R00.0 TACHYCARDIA: Primary | ICD-10-CM

## 2023-01-11 LAB
BILIRUB BLD-MCNC: NEGATIVE MG/DL
CLARITY, POC: CLEAR
COLOR UR: YELLOW
DEPRECATED RDW RBC AUTO: 40.5 FL (ref 37–54)
ERYTHROCYTE [DISTWIDTH] IN BLOOD BY AUTOMATED COUNT: 12.5 % (ref 12.3–15.4)
GLUCOSE UR STRIP-MCNC: NEGATIVE MG/DL
HCT VFR BLD AUTO: 38.1 % (ref 34–46.6)
HGB BLD-MCNC: 12.2 G/DL (ref 12–15.9)
KETONES UR QL: NEGATIVE
LEUKOCYTE EST, POC: NEGATIVE
MCH RBC QN AUTO: 28.8 PG (ref 26.6–33)
MCHC RBC AUTO-ENTMCNC: 32 G/DL (ref 31.5–35.7)
MCV RBC AUTO: 89.9 FL (ref 79–97)
NITRITE UR-MCNC: NEGATIVE MG/ML
PH UR: 6 [PH] (ref 5–8)
PLATELET # BLD AUTO: 314 10*3/MM3 (ref 140–450)
PMV BLD AUTO: 9.7 FL (ref 6–12)
PROT UR STRIP-MCNC: NEGATIVE MG/DL
RBC # BLD AUTO: 4.24 10*6/MM3 (ref 3.77–5.28)
RBC # UR STRIP: NEGATIVE /UL
SP GR UR: 1.01 (ref 1–1.03)
UROBILINOGEN UR QL: NORMAL
WBC NRBC COR # BLD: 6.38 10*3/MM3 (ref 3.4–10.8)

## 2023-01-11 PROCEDURE — 84443 ASSAY THYROID STIM HORMONE: CPT

## 2023-01-11 PROCEDURE — 80053 COMPREHEN METABOLIC PANEL: CPT

## 2023-01-11 PROCEDURE — 36415 COLL VENOUS BLD VENIPUNCTURE: CPT

## 2023-01-11 PROCEDURE — 81002 URINALYSIS NONAUTO W/O SCOPE: CPT | Performed by: NURSE PRACTITIONER

## 2023-01-11 PROCEDURE — 99213 OFFICE O/P EST LOW 20 MIN: CPT | Performed by: NURSE PRACTITIONER

## 2023-01-11 PROCEDURE — 85027 COMPLETE CBC AUTOMATED: CPT

## 2023-01-11 PROCEDURE — 87086 URINE CULTURE/COLONY COUNT: CPT | Performed by: NURSE PRACTITIONER

## 2023-01-11 NOTE — PROGRESS NOTES
Follow Up Office Visit      Date: 2023   Patient Name: Annalise Winters  : 1950   MRN: [id number]     Chief Complaint:    Chief Complaint   Patient presents with   • Rapid Heart Rate     Pt seen cardiology 2 days ago, everything checked ok. The  Suggested being seen by pcp to rule out other causes. Needs repeat labs       History of Present Illness: Annalise Winters is a 72 y.o. female who is here today to follow up with tachycardia.       Subjective      Tachycardia  She had  knee surgery approximately 8 weeks ago.   [facility].   She was taking antibiotics, anti-inflammatories, Tylenol and vitamin D supplements after her surgery.   She is no longer taking these medications.   Experienced heart rates of 80 to 100 beats per minute over the weekend.   Her highest rate was 106 beats per minute.   Heart rate is normally around 60 beats per minute.   Takes flecainide for atrial fibrillation.   Had an ablation years ago.   Usually has uneven heart rates that last a couple of hours.   She is normally able to get it under control.   Saw cardiologist, Dr. Bell's, physician assistant on 2023.  Recent EKG was normal.   She checks her blood pressure multiple times until it decreases.   Had a positive urine culture when she went to urgent care.   Denies any angina.   She gets a weird feeling that goes away.     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   Takes Ativan as needed.   She took 0.5 tablet of Ativan over the weekend.   She also takes it when she cannot sleep at night.   Denies sleeping well after her surgery due to pain and restlessness.   Her dog gets up in the middle of the night and affects her sleep.   Denies trouble falling asleep.   Wakes up in the middle of the night and has trouble falling back asleep.   Gets approximately 6 hours of sleep nightly.   She is dealing with her daughter having cancer, exacerbating trigger.   Tried  Paxil and Celexa years ago but had a bad reaction.   Denies having sleep apnea.   Notes that she snores.   Denies waking up with headaches.     Asthma  Has a history of asthma.   Denies any dyspnea.   Had pulmonary function test years ago.   Does not use rescue inhaler.       Review of Systems:   Review of Systems   Constitutional: Negative for chills, fatigue and fever.   HENT: Negative for postnasal drip.    Respiratory: Negative for cough, shortness of breath and wheezing.    Cardiovascular: Negative for chest pain.   Gastrointestinal: Negative for abdominal pain, diarrhea, nausea and vomiting.   Endocrine: Negative for cold intolerance and heat intolerance.   Genitourinary: Negative for dysuria.   Musculoskeletal: Negative for arthralgias and myalgias.   Skin: Negative for rash and bruise.   Neurological: Negative for headache.   Hematological: Negative for adenopathy. Does not bruise/bleed easily.   Psychiatric/Behavioral: Negative for depressed mood.       I have reviewed the patients family history, social history, past medical history, past surgical history and have updated it as appropriate.     Medications:     Current Outpatient Medications:   •  acetaminophen (TYLENOL) 500 MG tablet, Take 500 mg by mouth As Needed., Disp: , Rfl:   •  amLODIPine (NORVASC) 5 MG tablet, TAKE ONE TABLET BY MOUTH TWICE A DAY (Patient taking differently: Take 2.5 mg by mouth Daily. Patient has only been taking 1 tablet daily, at night), Disp: 180 tablet, Rfl: 3  •  atorvastatin (LIPITOR) 10 MG tablet, TAKE ONE TABLET BY MOUTH DAILY, Disp: 90 tablet, Rfl: 0  •  Calcium Carbonate-Vit D-Min (CALCIUM 1200 PO), Take  by mouth., Disp: , Rfl:   •  cefadroxil (DURICEF) 500 MG capsule, , Disp: , Rfl:   •  Cholecalciferol (VITAMIN D) 2000 UNITS capsule, Take 1 capsule by mouth daily., Disp: , Rfl:   •  COLLAGEN PO, Take  by mouth., Disp: , Rfl:   •  Famotidine-Ca Carb-Mag Hydrox (PEPCID COMPLETE PO), Take 1 tablet by mouth As Needed.,  Disp: , Rfl:   •  flecainide (TAMBOCOR) 50 MG tablet, Take 1 tablet by mouth 2 (Two) Times a Day., Disp: 180 tablet, Rfl: 3  •  levothyroxine (SYNTHROID, LEVOTHROID) 50 MCG tablet, Take 1 tablet by mouth Daily., Disp: 90 tablet, Rfl: 3  •  loratadine (CLARITIN) 10 MG tablet, Take 10 mg by mouth Daily., Disp: , Rfl:   •  LORazepam (ATIVAN) 0.5 MG tablet, Take 1 tablet by mouth Daily As Needed for Anxiety., Disp: 30 tablet, Rfl: 1  •  melatonin 3 MG tablet, Take 6 mg by mouth Every Night., Disp: , Rfl:   •  meloxicam (MOBIC) 15 MG tablet, , Disp: , Rfl:   •  metoprolol tartrate (LOPRESSOR) 25 MG tablet, Take 0.5 tablets by mouth As Needed (palpitations)., Disp: 60 tablet, Rfl: 11  •  montelukast (SINGULAIR) 10 MG tablet, Take 1 tablet by mouth every night at bedtime., Disp: 90 tablet, Rfl: 3  •  Multiple Vitamins-Minerals (ZINC PO), Take 1 tablet by mouth Daily., Disp: , Rfl:   •  pantoprazole (PROTONIX) 40 MG EC tablet, Take 40 mg by mouth Daily., Disp: , Rfl:   •  TURMERIC PO, Take 1 tablet by mouth Daily., Disp: , Rfl:   •  Vitamin B12 (CYANOCOBALAMIN) 500 MCG tablet tablet, Take  by mouth Daily., Disp: , Rfl:   •  vitamin C (ASCORBIC ACID) 500 MG tablet, Take 500 mg by mouth Daily., Disp: , Rfl:   •  Zinc 25 MG tablet, Take  by mouth Daily., Disp: , Rfl:     Allergies:   Allergies   Allergen Reactions   • Ace Inhibitors Cough   • Celexa [Citalopram Hydrobromide] Dizziness   • Corticosteroids Rash   • Paxil [Paroxetine Hcl] Other (See Comments)     somnolence         Objective     Physical Exam: Please see above  Vital Signs:   Vitals:    01/11/23 1340   BP: 118/66   Pulse: 90   Resp: 16   Temp: 97.8 °F (36.6 °C)   SpO2: 95%   Weight: 72.6 kg (160 lb)   PainSc: 0-No pain     Body mass index is 24.33 kg/m².    Physical Exam  Vitals and nursing note reviewed.   Constitutional:       General: She is awake. She is not in acute distress.     Appearance: Normal appearance. She is well-developed and normal weight. She is  not ill-appearing or diaphoretic.   HENT:      Head: Normocephalic and atraumatic.      Mouth/Throat:      Mouth: Mucous membranes are moist.      Pharynx: Oropharynx is clear.   Eyes:      Extraocular Movements: Extraocular movements intact.      Pupils: Pupils are equal, round, and reactive to light.   Neck:      Vascular: No JVD.   Cardiovascular:      Rate and Rhythm: Normal rate and regular rhythm.      Pulses: Normal pulses.      Heart sounds: Normal heart sounds.     No S3 or S4 sounds.   Pulmonary:      Effort: Pulmonary effort is normal. No respiratory distress.      Breath sounds: Normal breath sounds and air entry.   Abdominal:      General: Abdomen is flat. Bowel sounds are normal.      Palpations: Abdomen is soft.      Tenderness: There is no abdominal tenderness.   Musculoskeletal:      Cervical back: Neck supple.      Right lower leg: No edema.      Left lower leg: No edema.   Lymphadenopathy:      Cervical: No cervical adenopathy.   Skin:     General: Skin is warm and dry.      Capillary Refill: Capillary refill takes less than 2 seconds.   Neurological:      General: No focal deficit present.      Mental Status: She is alert and oriented to person, place, and time. Mental status is at baseline.   Psychiatric:         Attention and Perception: Attention and perception normal.         Mood and Affect: Mood and affect normal.         Speech: Speech normal.         Behavior: Behavior normal.         Thought Content: Thought content normal.         Judgment: Judgment normal.           Results:   Imaging:   SCANNED EKG (01/09/2023)    Labs:       Assessment / Plan      Assessment/Plan:   Diagnoses and all orders for this visit:    -Recommend consideration of counseling for anxiety  -offered med that would be taken daily for anxiety in addition to prn    1. Tachycardia (Primary)  Comments:  Labs and urine culture will be obtained.   POC UA WNL    Orders:  -     CBC (No Diff); Future  -     Comprehensive  Metabolic Panel; Future  -     TSH Rfx On Abnormal To Free T4; Future  -     POC Urinalysis Dipstick  -     Urine Culture - Urine, Urine, Clean Catch; Future  -     Urine Culture - Urine, Urine, Clean Catch         Follow Up:   Return in about 1 week (around 1/18/2023) for Recheck.    TAMMY Sanchez  [facility] [place]     Transcribed from ambient dictation for TAMMY Ambrosio by Sarai Guerrero.  01/11/23   14:54 EST    Patient or patient representative verbalized consent to the visit recording.  I have personally performed the services described in this document as transcribed by the above individual, and it is both accurate and complete.

## 2023-01-12 LAB
ALBUMIN SERPL-MCNC: 4.7 G/DL (ref 3.5–5.2)
ALBUMIN/GLOB SERPL: 2 G/DL
ALP SERPL-CCNC: 74 U/L (ref 39–117)
ALT SERPL W P-5'-P-CCNC: 15 U/L (ref 1–33)
ANION GAP SERPL CALCULATED.3IONS-SCNC: 11 MMOL/L (ref 5–15)
AST SERPL-CCNC: 16 U/L (ref 1–32)
BILIRUB SERPL-MCNC: 0.3 MG/DL (ref 0–1.2)
BUN SERPL-MCNC: 27 MG/DL (ref 8–23)
BUN/CREAT SERPL: 37 (ref 7–25)
CALCIUM SPEC-SCNC: 9.7 MG/DL (ref 8.6–10.5)
CHLORIDE SERPL-SCNC: 99 MMOL/L (ref 98–107)
CO2 SERPL-SCNC: 28 MMOL/L (ref 22–29)
CREAT SERPL-MCNC: 0.73 MG/DL (ref 0.57–1)
EGFRCR SERPLBLD CKD-EPI 2021: 86.4 ML/MIN/1.73
GLOBULIN UR ELPH-MCNC: 2.3 GM/DL
GLUCOSE SERPL-MCNC: 78 MG/DL (ref 65–99)
POTASSIUM SERPL-SCNC: 3.7 MMOL/L (ref 3.5–5.2)
PROT SERPL-MCNC: 7 G/DL (ref 6–8.5)
SODIUM SERPL-SCNC: 138 MMOL/L (ref 136–145)
TSH SERPL DL<=0.05 MIU/L-ACNC: 1.67 UIU/ML (ref 0.27–4.2)

## 2023-01-13 ENCOUNTER — TELEPHONE (OUTPATIENT)
Dept: INTERNAL MEDICINE | Facility: CLINIC | Age: 73
End: 2023-01-13
Payer: MEDICARE

## 2023-01-13 LAB — BACTERIA SPEC AEROBE CULT: NO GROWTH

## 2023-01-13 NOTE — TELEPHONE ENCOUNTER
Please let her know that her labs look normal/good except for dehydration.  She needs to hydrate well 80 to 100 ounces of fluids.  Once Nancy reviews her labs, she will also send her message through [employer].

## 2023-01-13 NOTE — TELEPHONE ENCOUNTER
Caller: Nilson Winters    Relationship: Self    Best call back number: [phone]    What is the best time to reach you: ANY TIME     Who are you requesting to speak with (clinical staff, provider,  specific staff member): CLINICAL STAFF    Do you know the name of the person who called: NILSON    What was the call regarding: PATIENT IS REQUESTING A CALL BACK TO DISCUSS IN FURTHER DETAIL HER RECENT ABNORMAL LAB RESULTS.    Do you require a callback: YES

## 2023-01-18 ENCOUNTER — PATIENT MESSAGE (OUTPATIENT)
Dept: INTERNAL MEDICINE | Facility: CLINIC | Age: 73
End: 2023-01-18
Payer: MEDICARE

## 2023-01-18 ENCOUNTER — TELEPHONE (OUTPATIENT)
Dept: INTERNAL MEDICINE | Facility: CLINIC | Age: 73
End: 2023-01-18
Payer: MEDICARE

## 2023-01-18 NOTE — TELEPHONE ENCOUNTER
Please call and reassure patient of normal lab results. Urine culture demonstrated no bacterial growth= no UTI/ need for abx. Labs checking blood counts, kidney & liver function, and electrolytes all normal. Mild dehydration appreciated on chemistry panel. Be sure you are drinking plenty of water each day, as this can certainly exacerbate tachycardia. Follow up as scheduled for re-check. I am happy to see pt sooner for concerning sxs/ concerns.

## 2023-01-25 ENCOUNTER — TELEPHONE (OUTPATIENT)
Dept: INTERNAL MEDICINE | Facility: CLINIC | Age: 73
End: 2023-01-25

## 2023-01-25 ENCOUNTER — OFFICE VISIT (OUTPATIENT)
Dept: INTERNAL MEDICINE | Facility: CLINIC | Age: 73
End: 2023-01-25
Payer: MEDICARE

## 2023-01-25 VITALS
TEMPERATURE: 98 F | SYSTOLIC BLOOD PRESSURE: 116 MMHG | DIASTOLIC BLOOD PRESSURE: 70 MMHG | HEART RATE: 78 BPM | RESPIRATION RATE: 16 BRPM | OXYGEN SATURATION: 95 % | WEIGHT: 162 LBS | BODY MASS INDEX: 24.64 KG/M2

## 2023-01-25 DIAGNOSIS — R00.0 TACHYCARDIA: Primary | ICD-10-CM

## 2023-01-25 PROCEDURE — 99213 OFFICE O/P EST LOW 20 MIN: CPT | Performed by: NURSE PRACTITIONER

## 2023-01-25 NOTE — PROGRESS NOTES
Follow Up Office Visit      Date: 2023   Patient Name: Annalise Winters  : 1950   MRN: [id number]     Chief Complaint:    Chief Complaint   Patient presents with   • Rapid Heart Rate     Follow up       History of Present Illness: Annalise Winters is a 72 y.o. female who is here today to follow up with her rapid heart rate.    Rapid heart rate  -She states everything is getting better.  -She wanted to discuss her lab work.  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         Subjective      Review of Systems:   Review of Systems   Respiratory: Negative.  Negative for shortness of breath.    Cardiovascular: Negative for chest pain and palpitations.   Neurological: Negative for weakness and light-headedness.       I have reviewed the patients family history, social history, past medical history, past surgical history and have updated it as appropriate.     Medications:     Current Outpatient Medications:   •  acetaminophen (TYLENOL) 500 MG tablet, Take 500 mg by mouth As Needed., Disp: , Rfl:   •  amLODIPine (NORVASC) 5 MG tablet, TAKE ONE TABLET BY MOUTH TWICE A DAY (Patient taking differently: Take 2.5 mg by mouth Daily. Patient has only been taking 1 tablet daily, at night), Disp: 180 tablet, Rfl: 3  •  atorvastatin (LIPITOR) 10 MG tablet, TAKE ONE TABLET BY MOUTH DAILY, Disp: 90 tablet, Rfl: 0  •  Calcium Carbonate-Vit D-Min (CALCIUM 1200 PO), Take  by mouth., Disp: , Rfl:   •  cefadroxil (DURICEF) 500 MG capsule, , Disp: , Rfl:   •  Cholecalciferol (VITAMIN D) 2000 UNITS capsule, Take 1 capsule by mouth daily., Disp: , Rfl:   •  COLLAGEN PO, Take  by mouth., Disp: , Rfl:   •  Famotidine-Ca Carb-Mag Hydrox (PEPCID COMPLETE PO), Take 1 tablet by mouth As Needed., Disp: , Rfl:   •  flecainide (TAMBOCOR) 50 MG tablet, Take 1 tablet by mouth 2 (Two) Times a Day., Disp: 180  tablet, Rfl: 3  •  levothyroxine (SYNTHROID, LEVOTHROID) 50 MCG tablet, Take 1 tablet by mouth Daily., Disp: 90 tablet, Rfl: 3  •  loratadine (CLARITIN) 10 MG tablet, Take 10 mg by mouth Daily., Disp: , Rfl:   •  LORazepam (ATIVAN) 0.5 MG tablet, Take 1 tablet by mouth Daily As Needed for Anxiety., Disp: 30 tablet, Rfl: 1  •  melatonin 3 MG tablet, Take 6 mg by mouth Every Night., Disp: , Rfl:   •  meloxicam (MOBIC) 15 MG tablet, , Disp: , Rfl:   •  metoprolol tartrate (LOPRESSOR) 25 MG tablet, Take 0.5 tablets by mouth As Needed (palpitations)., Disp: 60 tablet, Rfl: 11  •  montelukast (SINGULAIR) 10 MG tablet, Take 1 tablet by mouth every night at bedtime., Disp: 90 tablet, Rfl: 3  •  Multiple Vitamins-Minerals (ZINC PO), Take 1 tablet by mouth Daily., Disp: , Rfl:   •  pantoprazole (PROTONIX) 40 MG EC tablet, Take 40 mg by mouth Daily., Disp: , Rfl:   •  TURMERIC PO, Take 1 tablet by mouth Daily., Disp: , Rfl:   •  Vitamin B12 (CYANOCOBALAMIN) 500 MCG tablet tablet, Take  by mouth Daily., Disp: , Rfl:   •  vitamin C (ASCORBIC ACID) 500 MG tablet, Take 500 mg by mouth Daily., Disp: , Rfl:   •  Zinc 25 MG tablet, Take  by mouth Daily., Disp: , Rfl:     Allergies:   Allergies   Allergen Reactions   • Ace Inhibitors Cough   • Celexa [Citalopram Hydrobromide] Dizziness   • Corticosteroids Rash   • Paxil [Paroxetine Hcl] Other (See Comments)     somnolence         Objective     Physical Exam: Please see above  Vital Signs:   Vitals:    01/25/23 1452   BP: 116/70   Pulse: 78   Resp: 16   Temp: 98 °F (36.7 °C)   SpO2: 95%   Weight: 73.5 kg (162 lb)   PainSc: 0-No pain     Body mass index is 24.64 kg/m².    Physical Exam  Vitals and nursing note reviewed.   Constitutional:       General: She is not in acute distress.     Appearance: Normal appearance.   HENT:      Head: Normocephalic and atraumatic.   Cardiovascular:      Rate and Rhythm: Normal rate and regular rhythm.      Heart sounds: Normal heart sounds. No murmur  heard.  Pulmonary:      Effort: Pulmonary effort is normal. No respiratory distress.      Breath sounds: Normal breath sounds.   Skin:     General: Skin is warm and dry.   Neurological:      General: No focal deficit present.      Mental Status: She is alert and oriented to person, place, and time. Mental status is at baseline.      Motor: No weakness.           Results:   Imaging:     Labs:    Latest Reference Range & Units 01/11/23 14:28   Glucose 65 - 99 mg/dL 78   Sodium 136 - 145 mmol/L 138   Potassium 3.5 - 5.2 mmol/L 3.7   CO2 22.0 - 29.0 mmol/L 28.0   Chloride 98 - 107 mmol/L 99   Anion Gap 5.0 - 15.0 mmol/L 11.0   Creatinine 0.57 - 1.00 mg/dL 0.73   BUN 8 - 23 mg/dL 27 (H)   BUN/Creatinine Ratio 7.0 - 25.0  37.0 (H)   Calcium 8.6 - 10.5 mg/dL 9.7   eGFR >60.0 mL/min/1.73 86.4   Alkaline Phosphatase 39 - 117 U/L 74   Total Protein 6.0 - 8.5 g/dL 7.0   ALT (SGPT) 1 - 33 U/L 15   AST (SGOT) 1 - 32 U/L 16   Total Bilirubin 0.0 - 1.2 mg/dL 0.3   Albumin 3.5 - 5.2 g/dL 4.7   Globulin gm/dL 2.3   A/G Ratio g/dL 2.0   TSH Baseline 0.270 - 4.200 uIU/mL 1.670   WBC 3.40 - 10.80 10*3/mm3 6.38   RBC 3.77 - 5.28 10*6/mm3 4.24   Hemoglobin 12.0 - 15.9 g/dL 12.2   Hematocrit 34.0 - 46.6 % 38.1   RDW 12.3 - 15.4 % 12.5   MCV 79.0 - 97.0 fL 89.9   MCH 26.6 - 33.0 pg 28.8   MCHC 31.5 - 35.7 g/dL 32.0   MPV 6.0 - 12.0 fL 9.7   Platelets 140 - 450 10*3/mm3 314   RDW-SD 37.0 - 54.0 fl 40.5   (H): Data is abnormally high    Assessment / Plan      Assessment/Plan:   Diagnoses and all orders for this visit:    1. Tachycardia (Primary)      -She will keep all her health maintenance appointments.     Patient will follow-up in 03/2023 with Dr. Jauregui.    Follow Up:   Return if symptoms worsen or fail to improve, for Next scheduled follow up.    TAMMY Sanchez  [facility] [place]     Transcribed from ambient dictation for TAMMY Ambrosio by Eleonora Zee.  01/25/23   17:00 EST    Patient or patient  representative verbalized consent to the visit recording.  I have personally performed the services described in this document as transcribed by the above individual, and it is both accurate and complete.

## 2023-01-25 NOTE — TELEPHONE ENCOUNTER
Caller: Annalise Winters    Relationship: Self    Best call back number: [phone]    What is the best time to reach you: BEFORE 2PM    Who are you requesting to speak with (clinical staff, provider,  specific staff member): CLINICAL     What was the call regarding: PATIENT HAS AN APPT TODAY AT 2:45 BUT WANTED TO KNOW IF SHE NEEDED TO KEEP THIS APPOINTMENT DUE TO  BLOOD WORK RESULTS    Do you require a callback: YES

## 2023-01-31 ENCOUNTER — TELEPHONE (OUTPATIENT)
Dept: OBSTETRICS AND GYNECOLOGY | Facility: CLINIC | Age: 73
End: 2023-01-31
Payer: MEDICARE

## 2023-02-01 NOTE — TELEPHONE ENCOUNTER
I have contacted the patient and advised her that Talya recommends a screening mammogram this year.  No contrast enhanced mammogram is recommended this year.  Patient voiced understanding.

## 2023-02-23 ENCOUNTER — OFFICE VISIT (OUTPATIENT)
Dept: OBSTETRICS AND GYNECOLOGY | Facility: CLINIC | Age: 73
End: 2023-02-23
Payer: MEDICARE

## 2023-02-23 VITALS
BODY MASS INDEX: 24.77 KG/M2 | SYSTOLIC BLOOD PRESSURE: 100 MMHG | WEIGHT: 163.4 LBS | DIASTOLIC BLOOD PRESSURE: 66 MMHG | HEIGHT: 68 IN

## 2023-02-23 DIAGNOSIS — N95.2 ATROPHY OF VAGINA: ICD-10-CM

## 2023-02-23 DIAGNOSIS — Z87.39 HISTORY OF OSTEOPENIA: ICD-10-CM

## 2023-02-23 DIAGNOSIS — Z13.820 SCREENING FOR OSTEOPOROSIS: ICD-10-CM

## 2023-02-23 DIAGNOSIS — Z78.0 POSTMENOPAUSAL: Primary | ICD-10-CM

## 2023-02-23 PROCEDURE — 99212 OFFICE O/P EST SF 10 MIN: CPT | Performed by: NURSE PRACTITIONER

## 2023-02-23 NOTE — PROGRESS NOTES
"Chief Complaint  Annalise Winters is a 72 y.o.  female presenting for Follow-up (Patient feels \"bumpy\" on the labia/genital area. )    History of Present Illness  Annalise is a very pleasant 73yo woman, , who is here with a request to check derm condition in the groin area / slightly bumpy on the hair follicles there.  No margaret lesions or sores.  No warts.  No pain or itching.      She has had knee replacement and recovered well.  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      Her daughter (with ovarian cancer) is doing better & has gone back to work.  Will get another scan in < 2 months.      The following portions of the patient's history were reviewed and updated as appropriate: allergies, current medications, past family history, past medical history, past social history, past surgical history and problem list.    Allergies   Allergen Reactions   • Ace Inhibitors Cough   • Celexa [Citalopram Hydrobromide] Dizziness   • Corticosteroids Rash   • Paxil [Paroxetine Hcl] Other (See Comments)     somnolence           Current Outpatient Medications:   •  acetaminophen (TYLENOL) 500 MG tablet, Take 500 mg by mouth As Needed., Disp: , Rfl:   •  amLODIPine (NORVASC) 5 MG tablet, TAKE ONE TABLET BY MOUTH TWICE A DAY, Disp: 180 tablet, Rfl: 3  •  atorvastatin (LIPITOR) 10 MG tablet, TAKE ONE TABLET BY MOUTH DAILY, Disp: 90 tablet, Rfl: 1  •  Calcium Carbonate-Vit D-Min (CALCIUM 1200 PO), Take  by mouth., Disp: , Rfl:   •  Cholecalciferol (VITAMIN D) 2000 UNITS capsule, Take 1 capsule by mouth daily., Disp: , Rfl:   •  COLLAGEN PO, Take  by mouth., Disp: , Rfl:   •  Famotidine-Ca Carb-Mag Hydrox (PEPCID COMPLETE PO), Take 1 tablet by mouth As Needed., Disp: , Rfl:   •  flecainide (TAMBOCOR) 50 MG tablet, Take 1 tablet by mouth 2 (Two) Times a Day., Disp: 180 tablet, Rfl: 3  •  levothyroxine (SYNTHROID, LEVOTHROID) 50 MCG tablet, Take 1 tablet by mouth Daily., Disp: 90 tablet, Rfl: 3  •  loratadine " "(CLARITIN) 10 MG tablet, Take 10 mg by mouth Daily., Disp: , Rfl:   •  LORazepam (ATIVAN) 0.5 MG tablet, Take 1 tablet by mouth Daily As Needed for Anxiety., Disp: 30 tablet, Rfl: 1  •  melatonin 3 MG tablet, Take 6 mg by mouth Every Night., Disp: , Rfl:   •  metoprolol tartrate (LOPRESSOR) 25 MG tablet, Take 0.5 tablets by mouth As Needed (palpitations)., Disp: 60 tablet, Rfl: 11  •  montelukast (SINGULAIR) 10 MG tablet, TAKE ONE TABLET BY MOUTH EVERY NIGHT AT BEDTIME, Disp: 90 tablet, Rfl: 1  •  pantoprazole (PROTONIX) 40 MG EC tablet, Take 40 mg by mouth Daily., Disp: , Rfl:   •  TURMERIC PO, Take 1 tablet by mouth Daily., Disp: , Rfl:   •  Vitamin B12 (CYANOCOBALAMIN) 500 MCG tablet tablet, Take  by mouth Daily., Disp: , Rfl:   •  vitamin C (ASCORBIC ACID) 500 MG tablet, Take 500 mg by mouth Daily., Disp: , Rfl:     Past Medical History:   Diagnosis Date   • Abnormal blood chemistry    • Arthritis     right knee   • Atrial fibrillation (HCC)    • Cyst of buttocks    • Diverticulosis    • Dizziness    • Dysuria    • Esophagitis    • Flushing    • GERD (gastroesophageal reflux disease)    • Heartburn    • Hyperlipidemia    • Hypertension    • Hyperthyroidism    • Hypothyroidism    • Irritable bowel syndrome    • LLQ abdominal pain    • Menopause    • Polyp of sigmoid colon    • Sinusitis    • Skin lesion of face    • UTI (urinary tract infection)         Past Surgical History:   Procedure Laterality Date   • CARDIAC ABLATION  08/21/2013    Pulmonary vein ablation by Dr. Abdirahman Lackey, 08/21/2013.   • REPLACEMENT TOTAL KNEE Right    • RHINOPLASTY     • TOTAL ABDOMINAL HYSTERECTOMY     • TUBAL ABDOMINAL LIGATION     • TUBAL REANASTOMOSIS         Objective  /66   Ht 172.7 cm (68\")   Wt 74.1 kg (163 lb 6.4 oz)   LMP  (LMP Unknown)   Breastfeeding No   BMI 24.84 kg/m²     Physical Exam  Vitals and nursing note reviewed.   Constitutional:       General: She is not in acute distress.     Appearance: Normal " appearance. She is not ill-appearing.   Genitourinary:     General: Normal vulva.      Labia:         Right: No rash, tenderness or lesion.         Left: No rash, tenderness or lesion.       Vagina: Erythema present. No vaginal discharge.      Comments: Small follicles and some pearly pink papules in the groin bilaterally.  No worrisome or suspicious lesions.  Reassurance given.  Neurological:      Mental Status: She is alert.         Assessment/Plan   Diagnoses and all orders for this visit:    1. Postmenopausal (Primary)  -     DEXA Bone Density Axial; Future    2. Atrophy of vagina    3. Screening for osteoporosis  -     DEXA Bone Density Axial; Future    4. History of osteopenia  -     DEXA Bone Density Axial; Future    Declines atrophy Tx at this time.      Procedures            Return for Annual physical, Next scheduled follow up.    Britta Aldrich, APRN  02/23/2023

## 2023-02-25 DIAGNOSIS — J30.89 ALLERGIC RHINITIS DUE TO OTHER ALLERGIC TRIGGER, UNSPECIFIED SEASONALITY: ICD-10-CM

## 2023-02-25 DIAGNOSIS — I10 ESSENTIAL HYPERTENSION: ICD-10-CM

## 2023-02-27 RX ORDER — MONTELUKAST SODIUM 10 MG/1
TABLET ORAL
Qty: 90 TABLET | Refills: 1 | Status: SHIPPED | OUTPATIENT
Start: 2023-02-27

## 2023-02-27 RX ORDER — AMLODIPINE BESYLATE 5 MG/1
TABLET ORAL
Qty: 180 TABLET | Refills: 3 | Status: SHIPPED | OUTPATIENT
Start: 2023-02-27

## 2023-02-27 RX ORDER — ATORVASTATIN CALCIUM 10 MG/1
TABLET, FILM COATED ORAL
Qty: 90 TABLET | Refills: 1 | Status: SHIPPED | OUTPATIENT
Start: 2023-02-27 | End: 2023-03-13

## 2023-02-28 ENCOUNTER — TELEPHONE (OUTPATIENT)
Dept: CARDIOLOGY | Facility: CLINIC | Age: 73
End: 2023-02-28
Payer: MEDICARE

## 2023-03-01 NOTE — TELEPHONE ENCOUNTER
I called patient, she did go to [place]. She states she just wore a 2 week monitor (it is scanned into Epic), do you want her to wear another one?

## 2023-03-13 ENCOUNTER — LAB (OUTPATIENT)
Dept: LAB | Facility: HOSPITAL | Age: 73
End: 2023-03-13
Payer: MEDICARE

## 2023-03-13 ENCOUNTER — OFFICE VISIT (OUTPATIENT)
Dept: INTERNAL MEDICINE | Facility: CLINIC | Age: 73
End: 2023-03-13
Payer: MEDICARE

## 2023-03-13 VITALS
TEMPERATURE: 98.7 F | DIASTOLIC BLOOD PRESSURE: 70 MMHG | SYSTOLIC BLOOD PRESSURE: 130 MMHG | HEART RATE: 67 BPM | HEIGHT: 68 IN | WEIGHT: 163 LBS | OXYGEN SATURATION: 96 % | BODY MASS INDEX: 24.71 KG/M2

## 2023-03-13 DIAGNOSIS — E78.49 OTHER HYPERLIPIDEMIA: ICD-10-CM

## 2023-03-13 DIAGNOSIS — R73.09 ELEVATED GLUCOSE: ICD-10-CM

## 2023-03-13 DIAGNOSIS — E03.9 HYPOTHYROIDISM, UNSPECIFIED TYPE: ICD-10-CM

## 2023-03-13 DIAGNOSIS — F41.9 ANXIETY: ICD-10-CM

## 2023-03-13 DIAGNOSIS — E55.9 VITAMIN D DEFICIENCY: ICD-10-CM

## 2023-03-13 DIAGNOSIS — I48.0 PAROXYSMAL ATRIAL FIBRILLATION: ICD-10-CM

## 2023-03-13 DIAGNOSIS — Z00.00 MEDICARE ANNUAL WELLNESS VISIT, SUBSEQUENT: Primary | ICD-10-CM

## 2023-03-13 DIAGNOSIS — G89.29 CHRONIC MIDLINE LOW BACK PAIN WITHOUT SCIATICA: ICD-10-CM

## 2023-03-13 DIAGNOSIS — Z96.651 S/P TKR (TOTAL KNEE REPLACEMENT), RIGHT: ICD-10-CM

## 2023-03-13 DIAGNOSIS — I10 ESSENTIAL HYPERTENSION: ICD-10-CM

## 2023-03-13 DIAGNOSIS — M54.50 CHRONIC MIDLINE LOW BACK PAIN WITHOUT SCIATICA: ICD-10-CM

## 2023-03-13 LAB
25(OH)D3 SERPL-MCNC: 60.5 NG/ML (ref 30–100)
ALBUMIN SERPL-MCNC: 4.7 G/DL (ref 3.5–5.2)
ALBUMIN/GLOB SERPL: 1.7 G/DL
ALP SERPL-CCNC: 79 U/L (ref 39–117)
ALT SERPL W P-5'-P-CCNC: 13 U/L (ref 1–33)
ANION GAP SERPL CALCULATED.3IONS-SCNC: 11.5 MMOL/L (ref 5–15)
AST SERPL-CCNC: 20 U/L (ref 1–32)
BILIRUB SERPL-MCNC: 0.7 MG/DL (ref 0–1.2)
BUN SERPL-MCNC: 16 MG/DL (ref 8–23)
BUN/CREAT SERPL: 21.1 (ref 7–25)
CALCIUM SPEC-SCNC: 10.3 MG/DL (ref 8.6–10.5)
CHLORIDE SERPL-SCNC: 99 MMOL/L (ref 98–107)
CHOLEST SERPL-MCNC: 188 MG/DL (ref 0–200)
CO2 SERPL-SCNC: 28.5 MMOL/L (ref 22–29)
CREAT SERPL-MCNC: 0.76 MG/DL (ref 0.57–1)
DEPRECATED RDW RBC AUTO: 39.8 FL (ref 37–54)
EGFRCR SERPLBLD CKD-EPI 2021: 82.3 ML/MIN/1.73
ERYTHROCYTE [DISTWIDTH] IN BLOOD BY AUTOMATED COUNT: 12.6 % (ref 12.3–15.4)
GLOBULIN UR ELPH-MCNC: 2.8 GM/DL
GLUCOSE SERPL-MCNC: 88 MG/DL (ref 65–99)
HBA1C MFR BLD: 5.8 % (ref 4.8–5.6)
HCT VFR BLD AUTO: 41.3 % (ref 34–46.6)
HDLC SERPL-MCNC: 82 MG/DL (ref 40–60)
HGB BLD-MCNC: 13.6 G/DL (ref 12–15.9)
LDLC SERPL CALC-MCNC: 96 MG/DL (ref 0–100)
LDLC/HDLC SERPL: 1.17 {RATIO}
MCH RBC QN AUTO: 28.9 PG (ref 26.6–33)
MCHC RBC AUTO-ENTMCNC: 32.9 G/DL (ref 31.5–35.7)
MCV RBC AUTO: 87.9 FL (ref 79–97)
PLATELET # BLD AUTO: 265 10*3/MM3 (ref 140–450)
PMV BLD AUTO: 9.9 FL (ref 6–12)
POTASSIUM SERPL-SCNC: 4.3 MMOL/L (ref 3.5–5.2)
PROT SERPL-MCNC: 7.5 G/DL (ref 6–8.5)
RBC # BLD AUTO: 4.7 10*6/MM3 (ref 3.77–5.28)
SODIUM SERPL-SCNC: 139 MMOL/L (ref 136–145)
TRIGL SERPL-MCNC: 50 MG/DL (ref 0–150)
TSH SERPL DL<=0.05 MIU/L-ACNC: 1.56 UIU/ML (ref 0.27–4.2)
VIT B12 BLD-MCNC: 1387 PG/ML (ref 211–946)
VLDLC SERPL-MCNC: 10 MG/DL (ref 5–40)
WBC NRBC COR # BLD: 4.54 10*3/MM3 (ref 3.4–10.8)

## 2023-03-13 PROCEDURE — 36415 COLL VENOUS BLD VENIPUNCTURE: CPT

## 2023-03-13 PROCEDURE — 1160F RVW MEDS BY RX/DR IN RCRD: CPT | Performed by: INTERNAL MEDICINE

## 2023-03-13 PROCEDURE — 3075F SYST BP GE 130 - 139MM HG: CPT | Performed by: INTERNAL MEDICINE

## 2023-03-13 PROCEDURE — 83036 HEMOGLOBIN GLYCOSYLATED A1C: CPT

## 2023-03-13 PROCEDURE — 80053 COMPREHEN METABOLIC PANEL: CPT

## 2023-03-13 PROCEDURE — 1170F FXNL STATUS ASSESSED: CPT | Performed by: INTERNAL MEDICINE

## 2023-03-13 PROCEDURE — 1159F MED LIST DOCD IN RCRD: CPT | Performed by: INTERNAL MEDICINE

## 2023-03-13 PROCEDURE — 1126F AMNT PAIN NOTED NONE PRSNT: CPT | Performed by: INTERNAL MEDICINE

## 2023-03-13 PROCEDURE — 80061 LIPID PANEL: CPT

## 2023-03-13 PROCEDURE — 82306 VITAMIN D 25 HYDROXY: CPT

## 2023-03-13 PROCEDURE — 3078F DIAST BP <80 MM HG: CPT | Performed by: INTERNAL MEDICINE

## 2023-03-13 PROCEDURE — G0444 DEPRESSION SCREEN ANNUAL: HCPCS | Performed by: INTERNAL MEDICINE

## 2023-03-13 PROCEDURE — 82607 VITAMIN B-12: CPT

## 2023-03-13 PROCEDURE — 84443 ASSAY THYROID STIM HORMONE: CPT

## 2023-03-13 PROCEDURE — 85027 COMPLETE CBC AUTOMATED: CPT

## 2023-03-13 PROCEDURE — 99214 OFFICE O/P EST MOD 30 MIN: CPT | Performed by: INTERNAL MEDICINE

## 2023-03-13 PROCEDURE — G0439 PPPS, SUBSEQ VISIT: HCPCS | Performed by: INTERNAL MEDICINE

## 2023-03-13 RX ORDER — ROSUVASTATIN CALCIUM 5 MG/1
5 TABLET, COATED ORAL DAILY
Qty: 90 TABLET | Refills: 1 | Status: SHIPPED | OUTPATIENT
Start: 2023-03-13

## 2023-03-13 RX ORDER — LEVOTHYROXINE SODIUM 0.05 MG/1
50 TABLET ORAL DAILY
Qty: 90 TABLET | Refills: 3 | Status: SHIPPED | OUTPATIENT
Start: 2023-03-13

## 2023-03-13 RX ORDER — LORAZEPAM 0.5 MG/1
0.5 TABLET ORAL DAILY PRN
Qty: 30 TABLET | Refills: 0 | Status: SHIPPED | OUTPATIENT
Start: 2023-03-13

## 2023-03-13 NOTE — PROGRESS NOTES
The ABCs of the Annual Wellness Visit  Subsequent Medicare Wellness Visit    Subjective    Annalise Winters is a 72 y.o. female who presents for a Subsequent Medicare Wellness Visit.    The following portions of the patient's history were reviewed and   updated as appropriate: allergies, current medications, past family history, past medical history, past social history, past surgical history and problem list.    Compared to one year ago, the patient feels her physical   health is worse.    Compared to one year ago, the patient feels her mental   health is better.    Recent Hospitalizations:  She was not admitted to the hospital during the last year.       Current Medical Providers:  Patient Care Team:  Maryse Jauregui MD as PCP - General (Internal Medicine)  Yumiko Hall MD as Consulting Physician (Gastroenterology)  Kuldeep Bell MD as Consulting Physician (Cardiac Electrophysiology)  Britta Aldrich APRN as Nurse Practitioner (Obstetrics and Gynecology)  Christiano Vargas MD as Consulting Physician (Orthopedic Surgery)    Outpatient Medications Prior to Visit   Medication Sig Dispense Refill   • acetaminophen (TYLENOL) 500 MG tablet Take 1 tablet by mouth As Needed.     • amLODIPine (NORVASC) 5 MG tablet TAKE ONE TABLET BY MOUTH TWICE A  tablet 3   • Calcium Carbonate-Vit D-Min (CALCIUM 1200 PO) Take  by mouth.     • Cholecalciferol (VITAMIN D) 2000 UNITS capsule Take 1 capsule by mouth Daily.     • COLLAGEN PO Take  by mouth.     • Famotidine-Ca Carb-Mag Hydrox (PEPCID COMPLETE PO) Take 1 tablet by mouth As Needed.     • flecainide (TAMBOCOR) 50 MG tablet Take 1 tablet by mouth 2 (Two) Times a Day. 180 tablet 3   • loratadine (CLARITIN) 10 MG tablet Take 1 tablet by mouth Daily.     • melatonin 3 MG tablet Take 2 tablets by mouth Every Night.     • metoprolol tartrate (LOPRESSOR) 25 MG tablet Take 0.5 tablets by mouth As Needed (palpitations). 60 tablet 11   • montelukast (SINGULAIR) 10  "MG tablet TAKE ONE TABLET BY MOUTH EVERY NIGHT AT BEDTIME 90 tablet 1   • pantoprazole (PROTONIX) 40 MG EC tablet Take 1 tablet by mouth Daily.     • TURMERIC PO Take 1 tablet by mouth Daily.     • Vitamin B12 (CYANOCOBALAMIN) 500 MCG tablet tablet Take  by mouth Daily.     • vitamin C (ASCORBIC ACID) 500 MG tablet Take 1 tablet by mouth Daily.     • atorvastatin (LIPITOR) 10 MG tablet TAKE ONE TABLET BY MOUTH DAILY 90 tablet 1   • levothyroxine (SYNTHROID, LEVOTHROID) 50 MCG tablet Take 1 tablet by mouth Daily. 90 tablet 3   • LORazepam (ATIVAN) 0.5 MG tablet Take 1 tablet by mouth Daily As Needed for Anxiety. 30 tablet 1     No facility-administered medications prior to visit.       No opioid medication identified on active medication list. I have reviewed chart for other potential  high risk medication/s and harmful drug interactions in the elderly.          Aspirin is not on active medication list.  Aspirin use is not indicated based on review of current medical condition/s. Risk of harm outweighs potential benefits.  .    Patient Active Problem List   Diagnosis   • Asthma   • Paroxysmal atrial fibrillation (HCC)   • Hypertonicity of bladder   • Essential hypertension   • Hypothyroidism   • Anxiety   • Back pain   • Hyperthyroidism   • GERD (gastroesophageal reflux disease)   • Allergic rhinitis   • Dysuria   • Skin lesion of face   • Vitamin D deficiency   • Frontal skull lesion   • B12 deficiency   • Diverticulosis   • Polyneuropathy   • Numbness   • S/P TKR (total knee replacement), right     Advance Care Planning  Advance Directive is not on file.  ACP discussion was held with the patient during this visit. Patient has an advance directive (not in EMR), copy requested.     Objective    Vitals:    03/13/23 0853   BP: 130/70   Pulse: 67   Temp: 98.7 °F (37.1 °C)   SpO2: 96%  Comment: ra   Weight: 73.9 kg (163 lb)   Height: 172.7 cm (68\")   PainSc: 0-No pain     Estimated body mass index is 24.78 kg/m² as " "calculated from the following:    Height as of this encounter: 172.7 cm (68\").    Weight as of this encounter: 73.9 kg (163 lb).    BMI is within normal parameters. No other follow-up for BMI required.      Does the patient have evidence of cognitive impairment? No          HEALTH RISK ASSESSMENT    Smoking Status:  Social History     Tobacco Use   Smoking Status Never   Smokeless Tobacco Never     Alcohol Consumption:  Social History     Substance and Sexual Activity   Alcohol Use Not Currently    Comment: on occasion     Fall Risk Screen:    LUNAADI Fall Risk Assessment was completed, and patient is at LOW risk for falls.Assessment completed on:3/13/2023    Depression Screening:  PHQ-2/PHQ-9 Depression Screening 3/13/2023   Little Interest or Pleasure in Doing Things 0-->not at all   Feeling Down, Depressed or Hopeless 0-->not at all   PHQ-9: Brief Depression Severity Measure Score 0       Health Habits and Functional and Cognitive Screening:  Functional & Cognitive Status 3/13/2023   Do you have difficulty preparing food and eating? No   Do you have difficulty bathing yourself, getting dressed or grooming yourself? No   Do you have difficulty using the toilet? No   Do you have difficulty moving around from place to place? No   Do you have trouble with steps or getting out of a bed or a chair? No   Current Diet Well Balanced Diet   Dental Exam Up to date   Eye Exam Up to date   Exercise (times per week) 6 times per week        Exercise Frequency Comment -   Current Exercises Include (No Data)        Exercise Comment mildred   Current Exercise Activities Include -   Do you need help using the phone?  No   Are you deaf or do you have serious difficulty hearing?  No   Do you need help with transportation? No   Do you need help shopping? No   Do you need help preparing meals?  No   Do you need help with housework?  No   Do you need help with laundry? No   Do you need help taking your medications? No   Do you need help " managing money? No   Do you ever drive or ride in a car without wearing a seat belt? No   Have you felt unusual stress, anger or loneliness in the last month? No   Who do you live with? Spouse   If you need help, do you have trouble finding someone available to you? No   Have you been bothered in the last four weeks by sexual problems? No   Do you have difficulty concentrating, remembering or making decisions? No       Age-appropriate Screening Schedule:  Refer to the list below for future screening recommendations based on patient's age, sex and/or medical conditions. Orders for these recommended tests are listed in the plan section. The patient has been provided with a written plan.    Health Maintenance   Topic Date Due   • DXA SCAN  09/15/2022   • ZOSTER VACCINE (1 of 2) 01/11/2024 (Originally 12/15/1998)   • LIPID PANEL  09/13/2023   • ANNUAL WELLNESS VISIT  03/13/2024   • MAMMOGRAM  02/13/2025   • COLORECTAL CANCER SCREENING  04/06/2031   • HEPATITIS C SCREENING  Completed   • COVID-19 Vaccine  Completed   • INFLUENZA VACCINE  Completed   • Pneumococcal Vaccine 65+  Completed   • TDAP/TD VACCINES  Discontinued                CMS Preventative Services Quick Reference  Risk Factors Identified During Encounter  Depression/Dysphoria: counseled  Immunizations Discussed/Encouraged: Shingrix  The above risks/problems have been discussed with the patient.  Pertinent information has been shared with the patient in the After Visit Summary.  An After Visit Summary and PPPS were made available to the patient.    Follow Up:   Next Medicare Wellness visit to be scheduled in 1 year.       Additional E&M Note during same encounter follows:  Patient has multiple medical problems which are significant and separately identifiable that require additional work above and beyond the Medicare Wellness Visit.      Chief Complaint  Medicare Wellness-subsequent    Subjective          The patient presents today for a Medicare wellness  visit.    She is experiencing stress and anxiety due to her daughter's cancer diagnosis. She is taking Ativan as needed. She is not on any maintenance medications.    She had an episode of dizziness a couple of weeks ago. She went to the ER at [facility]. She was concerned for a stroke.    She is taking atorvastatin daily.    She is always very achy in her back. She has degenerative disc disease. She was told she has neuropathy.    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    She had a right knee replacement in 11/2022. She was released from physical therapy in 02/2023.    She is scheduled for a bone density test on 04/26/2023.    She denies a family history of diabetes.    She saw her gynecologist 2 weeks ago.    She is up to date on all of her vaccines.    Annalise Winters is also being seen today for a follow up on her anxiety. Daughter battling Ovarian cancer, and  numbers are going up. Followed by Dr. Price at . She is able to manage with the atkenia, does not think needs an SSRI at this time.  S/p Knee surgery.here for a f/u on her htn, hlp and hypothyroid. lipitor causing back pain. Unsure if needs mri.would like to try alternative statin      Review of Systems   Constitutional: Negative for chills and fever.   HENT: Negative for congestion, ear pain and sore throat.    Eyes: Negative for pain, redness and visual disturbance.   Respiratory: Negative for cough and shortness of breath.    Cardiovascular: Positive for palpitations. Negative for chest pain and leg swelling.   Gastrointestinal: Negative for abdominal pain, diarrhea and nausea.   Endocrine: Negative for cold intolerance and heat intolerance.   Genitourinary: Negative for flank pain and urgency.   Musculoskeletal: Negative for arthralgias and gait problem.   Skin: Negative for pallor and rash.   Neurological: Negative for dizziness and weakness.   Psychiatric/Behavioral: Positive for dysphoric mood. Negative for sleep disturbance.  "The patient is nervous/anxious.        Objective   Vital Signs:  /70   Pulse 67   Temp 98.7 °F (37.1 °C)   Ht 172.7 cm (68\")   Wt 73.9 kg (163 lb)   SpO2 96% Comment: ra  BMI 24.78 kg/m²     Physical Exam  Constitutional:       General: She is not in acute distress.     Appearance: Normal appearance.   HENT:      Head: Normocephalic and atraumatic.      Right Ear: Tympanic membrane and external ear normal.      Left Ear: Tympanic membrane and external ear normal.      Nose: Nose normal.      Mouth/Throat:      Mouth: Mucous membranes are moist.   Eyes:      General: No scleral icterus.  Neck:      Vascular: No carotid bruit.   Cardiovascular:      Rate and Rhythm: Normal rate and regular rhythm.      Pulses: Normal pulses.      Heart sounds: Normal heart sounds. No murmur heard.    No friction rub. No gallop.   Pulmonary:      Effort: Pulmonary effort is normal.      Breath sounds: Normal breath sounds. No rhonchi or rales.   Abdominal:      General: Bowel sounds are normal. There is no distension.      Palpations: Abdomen is soft.      Tenderness: There is no right CVA tenderness, left CVA tenderness, guarding or rebound.      Hernia: No hernia is present.   Musculoskeletal:         General: Swelling (rt knee) and tenderness present. Normal range of motion.      Cervical back: Normal range of motion.      Right lower leg: No edema.      Left lower leg: No edema.   Lymphadenopathy:      Cervical: No cervical adenopathy.   Skin:     General: Skin is warm.      Findings: No rash.   Neurological:      General: No focal deficit present.      Mental Status: She is alert and oriented to person, place, and time. Mental status is at baseline.      Cranial Nerves: No cranial nerve deficit.      Sensory: No sensory deficit.      Coordination: Coordination normal.      Gait: Gait normal.      Deep Tendon Reflexes: Reflexes normal.   Psychiatric:         Mood and Affect: Mood normal.         Behavior: Behavior " normal.                           Current Outpatient Medications:   •  acetaminophen (TYLENOL) 500 MG tablet, Take 1 tablet by mouth As Needed., Disp: , Rfl:   •  amLODIPine (NORVASC) 5 MG tablet, TAKE ONE TABLET BY MOUTH TWICE A DAY, Disp: 180 tablet, Rfl: 3  •  Calcium Carbonate-Vit D-Min (CALCIUM 1200 PO), Take  by mouth., Disp: , Rfl:   •  Cholecalciferol (VITAMIN D) 2000 UNITS capsule, Take 1 capsule by mouth Daily., Disp: , Rfl:   •  COLLAGEN PO, Take  by mouth., Disp: , Rfl:   •  Famotidine-Ca Carb-Mag Hydrox (PEPCID COMPLETE PO), Take 1 tablet by mouth As Needed., Disp: , Rfl:   •  flecainide (TAMBOCOR) 50 MG tablet, Take 1 tablet by mouth 2 (Two) Times a Day., Disp: 180 tablet, Rfl: 3  •  levothyroxine (SYNTHROID, LEVOTHROID) 50 MCG tablet, Take 1 tablet by mouth Daily., Disp: 90 tablet, Rfl: 3  •  loratadine (CLARITIN) 10 MG tablet, Take 1 tablet by mouth Daily., Disp: , Rfl:   •  LORazepam (ATIVAN) 0.5 MG tablet, Take 1 tablet by mouth Daily As Needed for Anxiety., Disp: 30 tablet, Rfl: 0  •  melatonin 3 MG tablet, Take 2 tablets by mouth Every Night., Disp: , Rfl:   •  metoprolol tartrate (LOPRESSOR) 25 MG tablet, Take 0.5 tablets by mouth As Needed (palpitations)., Disp: 60 tablet, Rfl: 11  •  montelukast (SINGULAIR) 10 MG tablet, TAKE ONE TABLET BY MOUTH EVERY NIGHT AT BEDTIME, Disp: 90 tablet, Rfl: 1  •  pantoprazole (PROTONIX) 40 MG EC tablet, Take 1 tablet by mouth Daily., Disp: , Rfl:   •  TURMERIC PO, Take 1 tablet by mouth Daily., Disp: , Rfl:   •  Vitamin B12 (CYANOCOBALAMIN) 500 MCG tablet tablet, Take  by mouth Daily., Disp: , Rfl:   •  vitamin C (ASCORBIC ACID) 500 MG tablet, Take 1 tablet by mouth Daily., Disp: , Rfl:   •  rosuvastatin (Crestor) 5 MG tablet, Take 1 tablet by mouth Daily., Disp: 90 tablet, Rfl: 1      The following portions of the patient's history were reviewed and updated as appropriate: allergies, current medications, past family history, past medical history, past social  "history, past surgical history and problem list.        Objective   /70   Pulse 67   Temp 98.7 °F (37.1 °C)   Ht 172.7 cm (68\")   Wt 73.9 kg (163 lb)   LMP  (LMP Unknown)   SpO2 96% Comment: ra  BMI 24.78 kg/m²         Results for orders placed or performed in visit on 01/11/23   CBC (No Diff)    Specimen: Blood   Result Value Ref Range    WBC 6.38 3.40 - 10.80 10*3/mm3    RBC 4.24 3.77 - 5.28 10*6/mm3    Hemoglobin 12.2 12.0 - 15.9 g/dL    Hematocrit 38.1 34.0 - 46.6 %    MCV 89.9 79.0 - 97.0 fL    MCH 28.8 26.6 - 33.0 pg    MCHC 32.0 31.5 - 35.7 g/dL    RDW 12.5 12.3 - 15.4 %    RDW-SD 40.5 37.0 - 54.0 fl    MPV 9.7 6.0 - 12.0 fL    Platelets 314 140 - 450 10*3/mm3   Comprehensive Metabolic Panel    Specimen: Blood   Result Value Ref Range    Glucose 78 65 - 99 mg/dL    BUN 27 (H) 8 - 23 mg/dL    Creatinine 0.73 0.57 - 1.00 mg/dL    Sodium 138 136 - 145 mmol/L    Potassium 3.7 3.5 - 5.2 mmol/L    Chloride 99 98 - 107 mmol/L    CO2 28.0 22.0 - 29.0 mmol/L    Calcium 9.7 8.6 - 10.5 mg/dL    Total Protein 7.0 6.0 - 8.5 g/dL    Albumin 4.7 3.5 - 5.2 g/dL    ALT (SGPT) 15 1 - 33 U/L    AST (SGOT) 16 1 - 32 U/L    Alkaline Phosphatase 74 39 - 117 U/L    Total Bilirubin 0.3 0.0 - 1.2 mg/dL    Globulin 2.3 gm/dL    A/G Ratio 2.0 g/dL    BUN/Creatinine Ratio 37.0 (H) 7.0 - 25.0    Anion Gap 11.0 5.0 - 15.0 mmol/L    eGFR 86.4 >60.0 mL/min/1.73   TSH Rfx On Abnormal To Free T4    Specimen: Blood   Result Value Ref Range    TSH 1.670 0.270 - 4.200 uIU/mL             Assessment & Plan   Diagnoses and all orders for this visit:    Medicare annual wellness visit, subsequent    Hypothyroidism, unspecified type  -     levothyroxine (SYNTHROID, LEVOTHROID) 50 MCG tablet; Take 1 tablet by mouth Daily.  -     TSH Rfx On Abnormal To Free T4; Future    Anxiety  -     LORazepam (ATIVAN) 0.5 MG tablet; Take 1 tablet by mouth Daily As Needed for Anxiety.  -     Comprehensive Metabolic Panel; Future    Other hyperlipidemia  - "     Lipid Panel; Future  -     rosuvastatin (Crestor) 5 MG tablet; Take 1 tablet by mouth Daily.    Essential hypertension    Vitamin D deficiency  -     Vitamin D,25-Hydroxy; Future  -     Vitamin B12; Future    Paroxysmal atrial fibrillation (HCC)  -     CBC (No Diff); Future    Elevated glucose  -     Hemoglobin A1c; Future    S/P TKR (total knee replacement), right  Comments:  continue home exercises. f/u with Dr. Vargas.    Chronic midline low back pain without sciatica  Comments:  monitor on crestor and if not better may needs an MRI.               PHQ-2/PHQ-9 Depression screening reviewed with patient . Total time spent today for depression screening  with Annalise Winters  was 15 minutes in counseling, along with plans for any diagnositc work-up and treatment.    Advice and education were given regarding cardiovascular risk reduction, healthy dietary habits, Seatbelt and helmet use and self skin examination.          Electronically signed by:    Maryse Jauregui MD          Follow Up   Return in about 6 months (around 9/13/2023) for Next scheduled follow up and wellness in 1 year.  Patient was given instructions and counseling regarding her condition or for health maintenance advice. Please see specific information pulled into the AVS if appropriate.       Transcribed from ambient dictation for Maryse Jauregui MD by [employer] .  03/13/23   11:38 EDT    Patient or patient representative verbalized consent to the visit recording.  I have personally performed the services described in this document as transcribed by the above individual, and it is both accurate and complete.  Maryse Jauregui MD  3/14/2023  23:56 EDT

## 2023-03-16 ENCOUNTER — TELEPHONE (OUTPATIENT)
Dept: INTERNAL MEDICINE | Facility: CLINIC | Age: 73
End: 2023-03-16
Payer: MEDICARE

## 2023-03-16 NOTE — TELEPHONE ENCOUNTER
Caller: Annalise Winters    Relationship: Self    Best call back number: [phone]    What was the call regarding: PATIENT HAS QUESTIONS ABOUT FLAGGED RESULTS ON BLOOD TESTS.     Do you require a callback: YES

## 2023-03-19 NOTE — TELEPHONE ENCOUNTER
Please make sure patient has seen the following [employer] message:    Labs show that your sugars are higher and in the early prediabetic range.  This could be stress related, please restrict carbs and sugary drinks.  Continue exercising regularly.    Rest of your labs including blood counts, electrolytes, cholesterol, thyroid and Vitamin D look good.    B12 is high but this is excellent.  Please continue current B12 supplements.    The only other flagged test is your good cholesterol, which is always on the high side, but this is also excellent as it protects your heart.    Please continue current regimen.

## 2023-03-31 ENCOUNTER — TELEPHONE (OUTPATIENT)
Dept: INTERNAL MEDICINE | Facility: CLINIC | Age: 73
End: 2023-03-31
Payer: MEDICARE

## 2023-03-31 NOTE — TELEPHONE ENCOUNTER
Ok to wait until September as her a1C was only minimally up in the prediabetic range. She should continue to restrict carbs and sugary drinks .    thanks

## 2023-03-31 NOTE — TELEPHONE ENCOUNTER
Caller: Annalise Winters    Relationship: Self    Best call back number: [phone]    What is the best time to reach you: ANYTIME    Who are you requesting to speak with (clinical staff, provider,  specific staff member): DR KU'S NURSE    What was the call regarding: PATIENT HAS A QUESTION ABOUT WHEN TO GET SOMETHING RECHECKED.    Do you require a callback: YES

## 2023-04-26 ENCOUNTER — HOSPITAL ENCOUNTER (OUTPATIENT)
Dept: BONE DENSITY | Facility: HOSPITAL | Age: 73
Discharge: HOME | End: 2023-04-26
Admitting: NURSE PRACTITIONER
Payer: MEDICARE

## 2023-04-26 DIAGNOSIS — Z13.820 SCREENING FOR OSTEOPOROSIS: ICD-10-CM

## 2023-04-26 DIAGNOSIS — Z78.0 POSTMENOPAUSAL: ICD-10-CM

## 2023-04-26 DIAGNOSIS — Z87.39 HISTORY OF OSTEOPENIA: ICD-10-CM

## 2023-04-26 PROCEDURE — 77080 DXA BONE DENSITY AXIAL: CPT

## 2023-05-04 ENCOUNTER — OFFICE VISIT (OUTPATIENT)
Dept: INTERNAL MEDICINE | Facility: CLINIC | Age: 73
End: 2023-05-04
Payer: MEDICARE

## 2023-05-04 VITALS
WEIGHT: 161.4 LBS | OXYGEN SATURATION: 97 % | BODY MASS INDEX: 24.46 KG/M2 | HEART RATE: 96 BPM | HEIGHT: 68 IN | SYSTOLIC BLOOD PRESSURE: 132 MMHG | DIASTOLIC BLOOD PRESSURE: 72 MMHG | TEMPERATURE: 98.2 F

## 2023-05-04 DIAGNOSIS — R30.0 DYSURIA: Primary | ICD-10-CM

## 2023-05-04 LAB
BILIRUB BLD-MCNC: NEGATIVE MG/DL
CLARITY, POC: CLEAR
COLOR UR: YELLOW
EXPIRATION DATE: ABNORMAL
GLUCOSE UR STRIP-MCNC: NEGATIVE MG/DL
KETONES UR QL: NEGATIVE
LEUKOCYTE EST, POC: NEGATIVE
Lab: ABNORMAL
NITRITE UR-MCNC: NEGATIVE MG/ML
PH UR: 6 [PH] (ref 5–8)
PROT UR STRIP-MCNC: NEGATIVE MG/DL
RBC # UR STRIP: ABNORMAL /UL
SP GR UR: 1.01 (ref 1–1.03)
UROBILINOGEN UR QL: ABNORMAL

## 2023-05-04 PROCEDURE — 99213 OFFICE O/P EST LOW 20 MIN: CPT | Performed by: PHYSICIAN ASSISTANT

## 2023-05-04 PROCEDURE — 87186 SC STD MICRODIL/AGAR DIL: CPT | Performed by: PHYSICIAN ASSISTANT

## 2023-05-04 PROCEDURE — 3078F DIAST BP <80 MM HG: CPT | Performed by: PHYSICIAN ASSISTANT

## 2023-05-04 PROCEDURE — 1160F RVW MEDS BY RX/DR IN RCRD: CPT | Performed by: PHYSICIAN ASSISTANT

## 2023-05-04 PROCEDURE — 87077 CULTURE AEROBIC IDENTIFY: CPT | Performed by: PHYSICIAN ASSISTANT

## 2023-05-04 PROCEDURE — 81003 URINALYSIS AUTO W/O SCOPE: CPT | Performed by: PHYSICIAN ASSISTANT

## 2023-05-04 PROCEDURE — 1159F MED LIST DOCD IN RCRD: CPT | Performed by: PHYSICIAN ASSISTANT

## 2023-05-04 PROCEDURE — 3075F SYST BP GE 130 - 139MM HG: CPT | Performed by: PHYSICIAN ASSISTANT

## 2023-05-04 PROCEDURE — 87086 URINE CULTURE/COLONY COUNT: CPT | Performed by: PHYSICIAN ASSISTANT

## 2023-05-04 RX ORDER — NITROFURANTOIN 25; 75 MG/1; MG/1
100 CAPSULE ORAL 2 TIMES DAILY
Qty: 10 CAPSULE | Refills: 0 | Status: SHIPPED | OUTPATIENT
Start: 2023-05-04

## 2023-05-04 NOTE — PROGRESS NOTES
Chief Complaint   Patient presents with   • Difficulty Urinating     Acute        Subjective     Nilson Small is a 72 y.o. female.        History of Present Illness     Ms. NILSON SMALL date of birth 1950, she is a 72-year-old female who presents to the clinic today for urinary urgency.    She woke up this morning with urinary urgency and burning. When she wipes herself, she can see a little pink.  It went away, but then she felt it again. 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 As long as she was standing up and moving around, she was okay. When she sits, that is when she gets uncomfortable. She denies any discharge or vaginal irritation. Her gynecologist moved from [facility] to [facility]. She has mention prescribing vaginal estrogen cream. She has an upcoming appointment with her gynecologist. She has not tried the vaginal estrogen cream. She has diarrhea.        Current Outpatient Medications:   •  acetaminophen (TYLENOL) 500 MG tablet, Take 1 tablet by mouth As Needed., Disp: , Rfl:   •  amLODIPine (NORVASC) 5 MG tablet, TAKE ONE TABLET BY MOUTH TWICE A DAY, Disp: 180 tablet, Rfl: 3  •  Calcium Carbonate-Vit D-Min (CALCIUM 1200 PO), Take  by mouth., Disp: , Rfl:   •  Cholecalciferol (VITAMIN D) 2000 UNITS capsule, Take 1 capsule by mouth Daily., Disp: , Rfl:   •  COLLAGEN PO, Take  by mouth., Disp: , Rfl:   •  Famotidine-Ca Carb-Mag Hydrox (PEPCID COMPLETE PO), Take 1 tablet by mouth As Needed., Disp: , Rfl:   •  flecainide (TAMBOCOR) 50 MG tablet, Take 1 tablet by mouth 2 (Two) Times a Day., Disp: 180 tablet, Rfl: 3  •  levothyroxine (SYNTHROID, LEVOTHROID) 50 MCG tablet, Take 1 tablet by mouth Daily., Disp: 90 tablet, Rfl: 3  •  loratadine (CLARITIN) 10 MG tablet, Take 1 tablet by mouth Daily., Disp: , Rfl:   •  LORazepam (ATIVAN) 0.5 MG tablet, Take 1 tablet by mouth Daily As Needed for Anxiety., Disp: 30 tablet, Rfl: 0  •  melatonin 3 MG tablet, Take 2 tablets by mouth Every Night., Disp: , Rfl:    •  metoprolol tartrate (LOPRESSOR) 25 MG tablet, Take 0.5 tablets by mouth As Needed (palpitations)., Disp: 60 tablet, Rfl: 11  •  montelukast (SINGULAIR) 10 MG tablet, TAKE ONE TABLET BY MOUTH EVERY NIGHT AT BEDTIME, Disp: 90 tablet, Rfl: 1  •  pantoprazole (PROTONIX) 40 MG EC tablet, Take 1 tablet by mouth Daily., Disp: , Rfl:   •  rosuvastatin (Crestor) 5 MG tablet, Take 1 tablet by mouth Daily., Disp: 90 tablet, Rfl: 1  •  TURMERIC PO, Take 1 tablet by mouth Daily., Disp: , Rfl:   •  Vitamin B12 (CYANOCOBALAMIN) 500 MCG tablet tablet, Take  by mouth Daily., Disp: , Rfl:   •  vitamin C (ASCORBIC ACID) 500 MG tablet, Take 1 tablet by mouth Daily., Disp: , Rfl:   •  nitrofurantoin, macrocrystal-monohydrate, (Macrobid) 100 MG capsule, Take 1 capsule by mouth 2 (Two) Times a Day., Disp: 10 capsule, Rfl: 0     PMFSH  The following portions of the patient's history were reviewed and updated as appropriate: allergies, current medications, past family history, past medical history, past social history, past surgical history and problem list.    Review of Systems   Constitutional: Negative for appetite change, chills, fever and unexpected weight change.   HENT: Negative.    Eyes: Negative.    Respiratory: Negative for chest tightness, shortness of breath and wheezing.    Cardiovascular: Negative for chest pain and palpitations.   Gastrointestinal: Negative for abdominal distention, abdominal pain and vomiting.   Endocrine: Negative.    Genitourinary: Positive for dysuria, frequency and urgency. Negative for difficulty urinating, genital sores, hematuria, menstrual problem, pelvic pain, vaginal bleeding, vaginal discharge and vaginal pain.   Musculoskeletal: Negative for back pain and joint swelling.   Skin: Negative for color change and rash.   Allergic/Immunologic: Negative.    Neurological: Negative for seizures, syncope and numbness.   Hematological: Negative for adenopathy. Does not bruise/bleed easily.  "  Psychiatric/Behavioral: Negative for confusion and decreased concentration.       Objective   /72   Pulse 96   Temp 98.2 °F (36.8 °C)   Ht 172.7 cm (68\")   Wt 73.2 kg (161 lb 6.4 oz)   LMP  (LMP Unknown)   SpO2 97%   BMI 24.54 kg/m²     Physical Exam  Vitals and nursing note reviewed.   Constitutional:       Appearance: She is well-developed.   HENT:      Head: Normocephalic and atraumatic.      Right Ear: External ear normal.      Left Ear: External ear normal.   Eyes:      Conjunctiva/sclera: Conjunctivae normal.   Cardiovascular:      Rate and Rhythm: Normal rate and regular rhythm.   Pulmonary:      Effort: Pulmonary effort is normal.      Breath sounds: Normal breath sounds.   Abdominal:      General: Bowel sounds are normal.      Palpations: Abdomen is soft.      Tenderness: There is no abdominal tenderness. There is no right CVA tenderness or left CVA tenderness.   Musculoskeletal:         General: Normal range of motion.      Cervical back: Normal range of motion.   Skin:     General: Skin is warm and dry.   Psychiatric:         Behavior: Behavior normal.         Results for orders placed or performed in visit on 05/04/23   Urine Culture - Urine, Urine, Clean Catch    Specimen: Urine, Clean Catch   Result Value Ref Range    Urine Culture >100,000 CFU/mL Gram Negative Bacilli (A)    POC Urinalysis Dipstick, Automated    Specimen: Urine   Result Value Ref Range    Color Yellow Yellow, Straw, Dark Yellow, Kimberli    Clarity, UA Clear Clear    Specific Gravity  1.010 1.005 - 1.030    pH, Urine 6.0 5.0 - 8.0    Leukocytes Negative Negative    Nitrite, UA Negative Negative    Protein, POC Negative Negative mg/dL    Glucose, UA Negative Negative mg/dL    Ketones, UA Negative Negative    Urobilinogen, UA 0.2 E.U./dL Normal, 0.2 E.U./dL    Bilirubin Negative Negative    Blood, UA 3+ (A) Negative    Lot Number [id number]     Expiration Date 03/25/2024         ASSESSMENT/PLAN    Diagnoses and all " orders for this visit:    1. Dysuria (Primary)  Assessment & Plan:  Send urine for culture. Prescribed macrobid. Continue increased water intake. Further recs based on culture results.    Orders:  -     POC Urinalysis Dipstick, Automated  -     Urine Culture - Urine, Urine, Clean Catch; Future  -     Urine Culture - Urine, Urine, Clean Catch  -     nitrofurantoin, macrocrystal-monohydrate, (Macrobid) 100 MG capsule; Take 1 capsule by mouth 2 (Two) Times a Day.  Dispense: 10 capsule; Refill: 0             Return if symptoms worsen or fail to improve, for Next scheduled follow up.           Transcribed from ambient dictation for ANDREW Mata by Molly Paulino.  05/04/23   14:18 EDT    Patient or patient representative verbalized consent to the visit recording.  I have personally performed the services described in this document as transcribed by the above individual, and it is both accurate and complete.

## 2023-05-05 NOTE — ASSESSMENT & PLAN NOTE
Send urine for culture. Prescribed macrobid. Continue increased water intake. Further recs based on culture results.

## 2023-05-06 ENCOUNTER — TELEPHONE (OUTPATIENT)
Dept: INTERNAL MEDICINE | Facility: CLINIC | Age: 73
End: 2023-05-06
Payer: MEDICARE

## 2023-05-06 DIAGNOSIS — N39.0 URINARY TRACT INFECTION DUE TO EXTENDED-SPECTRUM BETA LACTAMASE (ESBL) PRODUCING ESCHERICHIA COLI: Primary | ICD-10-CM

## 2023-05-06 DIAGNOSIS — B96.29 URINARY TRACT INFECTION DUE TO EXTENDED-SPECTRUM BETA LACTAMASE (ESBL) PRODUCING ESCHERICHIA COLI: Primary | ICD-10-CM

## 2023-05-06 DIAGNOSIS — Z16.12 URINARY TRACT INFECTION DUE TO EXTENDED-SPECTRUM BETA LACTAMASE (ESBL) PRODUCING ESCHERICHIA COLI: Primary | ICD-10-CM

## 2023-05-06 LAB — BACTERIA SPEC AEROBE CULT: ABNORMAL

## 2023-05-07 NOTE — PROGRESS NOTES
Please let her know that her urine grew a type of EColi that has some resistance to antibiotics. The macrobid she was given is listed as able to treat the infection. However, because of the resistance of this type of bacteria, I am going to order an Infectious disease referral to make sure the infection is treated completely.

## 2023-05-08 ENCOUNTER — TELEPHONE (OUTPATIENT)
Dept: INTERNAL MEDICINE | Facility: CLINIC | Age: 73
End: 2023-05-08
Payer: MEDICARE

## 2023-05-08 NOTE — TELEPHONE ENCOUNTER
----- Message from ANDREW Xie sent at 5/6/2023  9:59 PM EDT -----  Please let her know that her urine grew a type of EColi that has some resistance to antibiotics. The macrobid she was given is listed as able to treat the infection. However, because of the resistance of this type of bacteria, I am going to order an Infectious disease referral to make sure the infection is treated completely.  
NILSON WOULD LIKE TO SPEAK WITH SOMEONE REGARDING THE REFERRAL THAT WAS PLACED FOR HER UTI.    PHONE: [phone]  
Pt notified of results and questions regarding referral answered.   
Bipolar disorder
Tongue cancer
today

## 2023-05-11 NOTE — TELEPHONE ENCOUNTER
PT CALLED TO REQUEST LAB ORDERS, PT STATED THAT HER BLOOD PRESSURE HAS ELEVATED, LAST NIGHT HEART RATE WAS UP, STATED THAT SOMETHING NOT RIGHT, HAS BEEN NAUSEOUS AND WAS STUNG BY A BEE.    PLEASE ADVISE.  CALL BACK: [phone]      None

## 2023-05-15 ENCOUNTER — OFFICE VISIT (OUTPATIENT)
Dept: INTERNAL MEDICINE | Facility: CLINIC | Age: 73
End: 2023-05-15
Payer: MEDICARE

## 2023-05-15 ENCOUNTER — TELEPHONE (OUTPATIENT)
Dept: INTERNAL MEDICINE | Facility: CLINIC | Age: 73
End: 2023-05-15
Payer: MEDICARE

## 2023-05-15 VITALS
DIASTOLIC BLOOD PRESSURE: 80 MMHG | HEART RATE: 72 BPM | OXYGEN SATURATION: 98 % | BODY MASS INDEX: 24.71 KG/M2 | TEMPERATURE: 98.3 F | WEIGHT: 163 LBS | SYSTOLIC BLOOD PRESSURE: 132 MMHG | HEIGHT: 68 IN

## 2023-05-15 DIAGNOSIS — N39.0 UTI DUE TO EXTENDED-SPECTRUM BETA LACTAMASE (ESBL) PRODUCING ESCHERICHIA COLI: ICD-10-CM

## 2023-05-15 DIAGNOSIS — B96.29 UTI DUE TO EXTENDED-SPECTRUM BETA LACTAMASE (ESBL) PRODUCING ESCHERICHIA COLI: ICD-10-CM

## 2023-05-15 DIAGNOSIS — R35.0 URINARY FREQUENCY: Primary | ICD-10-CM

## 2023-05-15 DIAGNOSIS — Z16.12 UTI DUE TO EXTENDED-SPECTRUM BETA LACTAMASE (ESBL) PRODUCING ESCHERICHIA COLI: ICD-10-CM

## 2023-05-15 LAB
BILIRUB BLD-MCNC: NEGATIVE MG/DL
CLARITY, POC: CLEAR
COLOR UR: YELLOW
EXPIRATION DATE: ABNORMAL
GLUCOSE UR STRIP-MCNC: NEGATIVE MG/DL
KETONES UR QL: NEGATIVE
LEUKOCYTE EST, POC: NEGATIVE
Lab: ABNORMAL
NITRITE UR-MCNC: NEGATIVE MG/ML
PH UR: 6 [PH] (ref 5–8)
PROT UR STRIP-MCNC: NEGATIVE MG/DL
RBC # UR STRIP: ABNORMAL /UL
SP GR UR: 1.02 (ref 1–1.03)
UROBILINOGEN UR QL: NORMAL

## 2023-05-15 PROCEDURE — 87086 URINE CULTURE/COLONY COUNT: CPT | Performed by: NURSE PRACTITIONER

## 2023-05-15 RX ORDER — SULFAMETHOXAZOLE AND TRIMETHOPRIM 800; 160 MG/1; MG/1
1 TABLET ORAL 2 TIMES DAILY
Qty: 10 TABLET | Refills: 0 | Status: SHIPPED | OUTPATIENT
Start: 2023-05-15 | End: 2023-05-20

## 2023-05-15 NOTE — TELEPHONE ENCOUNTER
Pt called feels like she is beginning another UTI, did not know if she should come back in to be seen.  Pt would like a call back; please advise.    Let pt know I am also behind in referrals & will get that sent to [facility] as soon as I can.

## 2023-05-15 NOTE — PROGRESS NOTES
Office Note     Name: Annalise Winters    : 1950     MRN: [id number]     Chief Complaint  Urinary Frequency (Finished round of Macrobid and felt better but now symptoms are back. )    Subjective     History of Present Illness:  Annalise Winters is a 72 y.o. female who presents today for complaints of urinary frequency, urinary urgency, occasional reduction in urine output, back pain, and right lower quadrant pain.  Patient was seen on  by Emi in this office for LUTS and UTI.  Patient was treated with Macrobid twice daily for 5 days.  Patient reports initially her symptoms improved but have now returned.  She denies any fever.  She denies any odor to her urine.  She has not taken anything over-the-counter for her symptoms.  She has tried to increase her water intake.  She is in the process of getting referred to infectious disease for further recommendations and treatment of ESBL E. coli.  Patient denies further complaints or concerns at this time.  She presents today for evaluation of the above acute complaints.  She normally follows with Dr. Jauregui for chronic conditions.    Review of Systems   Constitutional: Negative for fatigue and fever.   Gastrointestinal: Positive for abdominal pain.   Genitourinary: Positive for decreased urine volume, frequency and urgency.   Musculoskeletal: Positive for back pain.       Past Medical History:   Diagnosis Date   • Abnormal blood chemistry    • Arthritis     right knee   • Atrial fibrillation    • Cyst of buttocks    • Diverticulosis    • Dizziness    • Dysuria    • Esophagitis    • Flushing    • GERD (gastroesophageal reflux disease)    • Heartburn    • Hyperlipidemia    • Hypertension    • Hyperthyroidism    • Hypothyroidism    • Irritable bowel syndrome    • LLQ abdominal pain    • Menopause    • Polyp of sigmoid colon    • Sinusitis    • Skin lesion of face    • UTI (urinary tract infection)        Past Surgical History:   Procedure Laterality Date   •  CARDIAC ABLATION  08/21/2013    Pulmonary vein ablation by Dr. Abdirahman Lackey, 08/21/2013.   • REPLACEMENT TOTAL KNEE Right    • RHINOPLASTY     • TOTAL ABDOMINAL HYSTERECTOMY     • TUBAL ABDOMINAL LIGATION     • TUBAL REANASTOMOSIS         Social History     Socioeconomic History   • Marital status:    Tobacco Use   • Smoking status: Never   • Smokeless tobacco: Never   Vaping Use   • Vaping Use: Never used   Substance and Sexual Activity   • Alcohol use: Not Currently     Comment: on occasion   • Drug use: Never   • Sexual activity: Yes     Birth control/protection: Post-menopausal, Tubal ligation         Current Outpatient Medications:   •  acetaminophen (TYLENOL) 500 MG tablet, Take 1 tablet by mouth As Needed., Disp: , Rfl:   •  amLODIPine (NORVASC) 5 MG tablet, TAKE ONE TABLET BY MOUTH TWICE A DAY, Disp: 180 tablet, Rfl: 3  •  Calcium Carbonate-Vit D-Min (CALCIUM 1200 PO), Take  by mouth., Disp: , Rfl:   •  Cholecalciferol (VITAMIN D) 2000 UNITS capsule, Take 1 capsule by mouth Daily., Disp: , Rfl:   •  COLLAGEN PO, Take  by mouth., Disp: , Rfl:   •  Famotidine-Ca Carb-Mag Hydrox (PEPCID COMPLETE PO), Take 1 tablet by mouth As Needed., Disp: , Rfl:   •  flecainide (TAMBOCOR) 50 MG tablet, Take 1 tablet by mouth 2 (Two) Times a Day., Disp: 180 tablet, Rfl: 3  •  levothyroxine (SYNTHROID, LEVOTHROID) 50 MCG tablet, Take 1 tablet by mouth Daily., Disp: 90 tablet, Rfl: 3  •  loratadine (CLARITIN) 10 MG tablet, Take 1 tablet by mouth Daily., Disp: , Rfl:   •  LORazepam (ATIVAN) 0.5 MG tablet, Take 1 tablet by mouth Daily As Needed for Anxiety., Disp: 30 tablet, Rfl: 0  •  melatonin 3 MG tablet, Take 2 tablets by mouth Every Night., Disp: , Rfl:   •  metoprolol tartrate (LOPRESSOR) 25 MG tablet, Take 0.5 tablets by mouth As Needed (palpitations)., Disp: 60 tablet, Rfl: 11  •  montelukast (SINGULAIR) 10 MG tablet, TAKE ONE TABLET BY MOUTH EVERY NIGHT AT BEDTIME, Disp: 90 tablet, Rfl: 1  •  pantoprazole  "(PROTONIX) 40 MG EC tablet, Take 1 tablet by mouth Daily., Disp: , Rfl:   •  rosuvastatin (Crestor) 5 MG tablet, Take 1 tablet by mouth Daily., Disp: 90 tablet, Rfl: 1  •  TURMERIC PO, Take 1 tablet by mouth Daily., Disp: , Rfl:   •  Vitamin B12 (CYANOCOBALAMIN) 500 MCG tablet tablet, Take  by mouth Daily., Disp: , Rfl:   •  vitamin C (ASCORBIC ACID) 500 MG tablet, Take 1 tablet by mouth Daily., Disp: , Rfl:   •  sulfamethoxazole-trimethoprim (Bactrim DS) 800-160 MG per tablet, Take 1 tablet by mouth 2 (Two) Times a Day for 5 days., Disp: 10 tablet, Rfl: 0    Objective     Vital Signs  /80   Pulse 72   Temp 98.3 °F (36.8 °C)   Ht 172.7 cm (67.99\")   Wt 73.9 kg (163 lb)   SpO2 98%   BMI 24.79 kg/m²   Estimated body mass index is 24.79 kg/m² as calculated from the following:    Height as of this encounter: 172.7 cm (67.99\").    Weight as of this encounter: 73.9 kg (163 lb).    BMI is within normal parameters. No other follow-up for BMI required.      Physical Exam  Constitutional:       Appearance: Normal appearance. She is normal weight.   HENT:      Head: Normocephalic and atraumatic.      Nose: Nose normal.   Eyes:      Extraocular Movements: Extraocular movements intact.      Conjunctiva/sclera: Conjunctivae normal.      Pupils: Pupils are equal, round, and reactive to light.   Cardiovascular:      Rate and Rhythm: Normal rate.   Pulmonary:      Effort: Pulmonary effort is normal. No respiratory distress.   Abdominal:      Palpations: Abdomen is soft.      Tenderness: There is no abdominal tenderness. There is no right CVA tenderness or left CVA tenderness.   Musculoskeletal:         General: Normal range of motion.      Cervical back: Neck supple.   Skin:     General: Skin is warm and dry.   Neurological:      General: No focal deficit present.      Mental Status: She is alert and oriented to person, place, and time. Mental status is at baseline.   Psychiatric:         Mood and Affect: Mood normal.    "      Behavior: Behavior normal.         Thought Content: Thought content normal.         Judgment: Judgment normal.          Assessment and Plan     Diagnoses and all orders for this visit:    1. Urinary frequency (Primary)  -     POCT urinalysis dipstick, automated  -     Urine Culture - Urine, Urine, Clean Catch; Future  -     Urine Culture - Urine, Urine, Clean Catch  -     sulfamethoxazole-trimethoprim (Bactrim DS) 800-160 MG per tablet; Take 1 tablet by mouth 2 (Two) Times a Day for 5 days.  Dispense: 10 tablet; Refill: 0    2. UTI due to extended-spectrum beta lactamase (ESBL) producing Escherichia coli  -     sulfamethoxazole-trimethoprim (Bactrim DS) 800-160 MG per tablet; Take 1 tablet by mouth 2 (Two) Times a Day for 5 days.  Dispense: 10 tablet; Refill: 0    Plan:  UA in the office today with trace blood.  Will send urine for culture.  Will send in Bactrim to take twice daily for 5 days.  Most recent EGFR is 82.  Further plan of care based on culture results.  Continue with adequate oral hydration.  Continue with good hygiene practices such as wiping front to back.  Avoid any bubble baths at this time.  May take Azo if needed for LUTS.  Keep appointment with infectious disease once established.  Return to clinic if symptoms worsen or fail to improve with current plan of care.  Go to ED for further evaluation if any symptom worsens.  Keep scheduled follow-up appointment with Dr. Jauregui.      Follow Up  Return if symptoms worsen or fail to improve.    TAMMY Barney    Part of this note may be an electronic transcription/translation of spoken language to printed text using the Dragon Dictation System.

## 2023-05-16 LAB — BACTERIA SPEC AEROBE CULT: NO GROWTH

## 2023-06-02 DIAGNOSIS — I48.0 PAROXYSMAL ATRIAL FIBRILLATION: Chronic | ICD-10-CM

## 2023-06-02 RX ORDER — FLECAINIDE ACETATE 50 MG/1
50 TABLET ORAL 2 TIMES DAILY
Qty: 180 TABLET | Refills: 3 | Status: SHIPPED | OUTPATIENT
Start: 2023-06-02

## 2023-08-24 DIAGNOSIS — J30.89 ALLERGIC RHINITIS DUE TO OTHER ALLERGIC TRIGGER, UNSPECIFIED SEASONALITY: ICD-10-CM

## 2023-08-24 DIAGNOSIS — E78.49 OTHER HYPERLIPIDEMIA: ICD-10-CM

## 2023-08-25 RX ORDER — MONTELUKAST SODIUM 10 MG/1
TABLET ORAL
Qty: 90 TABLET | Refills: 1 | Status: SHIPPED | OUTPATIENT
Start: 2023-08-25

## 2023-08-25 RX ORDER — ROSUVASTATIN CALCIUM 5 MG/1
TABLET, COATED ORAL
Qty: 90 TABLET | Refills: 1 | Status: SHIPPED | OUTPATIENT
Start: 2023-08-25

## 2023-08-30 ENCOUNTER — OFFICE VISIT (OUTPATIENT)
Dept: CARDIOLOGY | Facility: CLINIC | Age: 73
End: 2023-08-30
Payer: MEDICARE

## 2023-08-30 VITALS
BODY MASS INDEX: 23.7 KG/M2 | DIASTOLIC BLOOD PRESSURE: 68 MMHG | OXYGEN SATURATION: 96 % | HEIGHT: 69 IN | SYSTOLIC BLOOD PRESSURE: 124 MMHG | HEART RATE: 72 BPM | WEIGHT: 160 LBS

## 2023-08-30 DIAGNOSIS — I49.5 TACHYCARDIA-BRADYCARDIA SYNDROME: ICD-10-CM

## 2023-08-30 DIAGNOSIS — I47.1 ATRIAL TACHYCARDIA: ICD-10-CM

## 2023-08-30 DIAGNOSIS — I10 ESSENTIAL HYPERTENSION: ICD-10-CM

## 2023-08-30 DIAGNOSIS — I48.0 PAROXYSMAL ATRIAL FIBRILLATION: Primary | ICD-10-CM

## 2023-08-30 PROBLEM — I47.19 ATRIAL TACHYCARDIA: Status: ACTIVE | Noted: 2023-08-30

## 2023-08-30 RX ORDER — ATORVASTATIN CALCIUM 10 MG/1
TABLET, FILM COATED ORAL NIGHTLY
COMMUNITY
Start: 2023-05-31

## 2023-08-30 NOTE — PROGRESS NOTES
Annalise SHANA Winters  1950  [phone]    08/30/2023    [facility] CARDIOLOGY     Referring Provider: No ref. provider found     Maryse Jauregui MD  [facility] [address]    Chief Complaint   Patient presents with    Paroxysmal atrial fibrillation     Problem List:   Paroxysmal Atrial Fibrillation   CHADSvasc = 3 (age, female, HTN) previously Xarelto prior to ablation   Initially diagnosed 2021  Myocardial perfusion study 4/2/2013: no ischemia  Event Monitor 10/2013: demonstrates atrial fibrillation  Failure of Flecainide, Sotalol, Multaq  PVA 8/21/2013 - Dr. Abdirahman Lackey  Maintained on Flecainide currently  HTN  Diverticulosis   Esophagitis  GERD  Hyperthyroidism  IBS  Anxiety   Surgical History:  Rhinoplasty  Subtotal hysterectomy  Tubal abdominal ligation     Allergies  Allergies   Allergen Reactions    Ace Inhibitors Cough    Celexa [Citalopram Hydrobromide] Dizziness    Corticosteroids Rash    Paxil [Paroxetine Hcl] Other (See Comments)     somnolence         Current Medications    Current Outpatient Medications:     acetaminophen (TYLENOL) 500 MG tablet, Take 1 tablet by mouth As Needed., Disp: , Rfl:     amLODIPine (NORVASC) 5 MG tablet, TAKE ONE TABLET BY MOUTH TWICE A DAY, Disp: 180 tablet, Rfl: 3    atorvastatin (LIPITOR) 10 MG tablet, Every Night., Disp: , Rfl:     Calcium Carbonate-Vit D-Min (CALCIUM 1200 PO), Take  by mouth., Disp: , Rfl:     Cholecalciferol (VITAMIN D) 2000 UNITS capsule, Take 1 capsule by mouth Daily., Disp: , Rfl:     COLLAGEN PO, Take  by mouth., Disp: , Rfl:     Famotidine-Ca Carb-Mag Hydrox (PEPCID COMPLETE PO), Take 1 tablet by mouth As Needed., Disp: , Rfl:     flecainide (TAMBOCOR) 50 MG tablet, Take 1 tablet by mouth 2 (Two) Times a Day., Disp: 180 tablet, Rfl: 3    levothyroxine (SYNTHROID, LEVOTHROID) 50 MCG tablet, Take 1 tablet by mouth Daily., Disp: 90 tablet, Rfl: 3    loratadine (CLARITIN) 10 MG tablet, Take 1 tablet by mouth  Daily., Disp: , Rfl:     LORazepam (ATIVAN) 0.5 MG tablet, Take 1 tablet by mouth Daily As Needed for Anxiety., Disp: 30 tablet, Rfl: 0    melatonin 3 MG tablet, Take 2 tablets by mouth Every Night., Disp: , Rfl:     metoprolol tartrate (LOPRESSOR) 25 MG tablet, Take 0.5 tablets by mouth As Needed (palpitations)., Disp: 60 tablet, Rfl: 11    montelukast (SINGULAIR) 10 MG tablet, TAKE ONE TABLET BY MOUTH EVERY NIGHT AT BEDTIME, Disp: 90 tablet, Rfl: 1    pantoprazole (PROTONIX) 40 MG EC tablet, Take 1 tablet by mouth Daily., Disp: , Rfl:     TURMERIC PO, Take 1 tablet by mouth Daily., Disp: , Rfl:     Vitamin B12 (CYANOCOBALAMIN) 500 MCG tablet tablet, Take  by mouth Daily., Disp: , Rfl:     vitamin C (ASCORBIC ACID) 500 MG tablet, Take 1 tablet by mouth Daily., Disp: , Rfl:     rosuvastatin (CRESTOR) 5 MG tablet, TAKE ONE TABLET BY MOUTH DAILY (Patient not taking: Reported on 8/30/2023), Disp: 90 tablet, Rfl: 1    History of Present Illness     Pt presents for follow up of AF/HTN. Since we last saw the pt, pt denies any CP. She wore a holter monitor in February due to palpitations which showed no afib, but short episodes of atrial tachycardia. She reports episodes of palpitations have improved recently. She also had an episodes of dizziness, loss of balance in February and presented to the ER, CT was negative and she was diagnosed with vertigo. She has had no further episodes of dizziness, LH, balance issues. She does also report some SOB with inclines/stairs but she is able to  high intensity mildred classes without issue. Denies any hospitalizations, ER visits, bleeding, or TIA/CVA symptoms. BP is well controlled.    ROS:  General:  Denies fatigue, weight gain or loss  Cardiovascular:  Denies CP, PND, syncope, near syncope, edema + palpitations.  Pulmonary:  + LAI, -cough, or wheezing      Vitals:    08/30/23 1115   BP: 124/68   BP Location: Right arm   Patient Position: Sitting   Cuff Size: Adult   Pulse: 72  "  SpO2: 96%   Weight: 72.6 kg (160 lb)   Height: 174 cm (68.5\")     Body mass index is 23.97 kg/mý.  PE:  General: NAD  Neck: no JVD  Heart RRR, NL S1, S2, S4 present, no rubs, murmurs  Lungs: CTA, no wheezes, rhonchi, or rales  Abd: soft, non-tender, NL BS  Ext: No musculoskeletal deformities, no edema  Psych: normal mood and affect    Diagnostic Data:        ECG 12 Lead    Date/Time: 8/30/2023 11:48 AM  Performed by: Edel Le APRN  Authorized by: Edel Le APRN   Comparison: compared with previous ECG from 1/9/2023  Rhythm: sinus rhythm  Rate: normal  BPM: 72          1. Paroxysmal atrial fibrillation    2. Essential hypertension    3. Tachycardia-bradycardia syndrome    4. Atrial tachycardia          Plan:  PAF  -Maintaining NSR on Flecainide. QRS stable.   -CHADSvasc = 3, defers daily anticoagulation. Xarelto PRN. Provided Xarelto samples today. Watchman device not recommended because she is unable to take ASA due to history of diverticulosis.   -She complains of SOB with inclines that has worsened recently. Will obtain echo to evaluate LV function.     2. Tachybrady syndrome  -Using Lopressor as needed due to bradycardia    3. HTN   -well controlled on current regimen    4. NSAT  -Recent monitor  showed rare PAC/PVCs and short episodes of NSAT    Electronically signed by TAMMY Baxter, 08/30/23, 11:47 AM EDT.    "

## 2023-09-07 ENCOUNTER — TELEPHONE (OUTPATIENT)
Dept: INTERNAL MEDICINE | Facility: CLINIC | Age: 73
End: 2023-09-07

## 2023-09-07 NOTE — TELEPHONE ENCOUNTER
Caller: Annalise Winters    Relationship: Self    Best call back number: [phone]     What orders are you requesting (i.e. lab or imaging): LAB WORK, CHECK FOR UTI    In what timeframe would the patient need to come in: BEFORE 18TH    Where will you receive your lab/imaging services: HERE    Additional notes: PLEASE CALL.

## 2023-09-13 ENCOUNTER — LAB (OUTPATIENT)
Dept: LAB | Facility: HOSPITAL | Age: 73
End: 2023-09-13
Payer: MEDICARE

## 2023-09-13 DIAGNOSIS — E03.9 HYPOTHYROIDISM, UNSPECIFIED TYPE: ICD-10-CM

## 2023-09-13 DIAGNOSIS — B96.29 UTI DUE TO EXTENDED-SPECTRUM BETA LACTAMASE (ESBL) PRODUCING ESCHERICHIA COLI: ICD-10-CM

## 2023-09-13 DIAGNOSIS — E78.49 OTHER HYPERLIPIDEMIA: ICD-10-CM

## 2023-09-13 DIAGNOSIS — Z16.12 UTI DUE TO EXTENDED-SPECTRUM BETA LACTAMASE (ESBL) PRODUCING ESCHERICHIA COLI: ICD-10-CM

## 2023-09-13 DIAGNOSIS — N39.0 UTI DUE TO EXTENDED-SPECTRUM BETA LACTAMASE (ESBL) PRODUCING ESCHERICHIA COLI: ICD-10-CM

## 2023-09-13 LAB
ALBUMIN SERPL-MCNC: 4.7 G/DL (ref 3.5–5.2)
ALBUMIN/GLOB SERPL: 2 G/DL
ALP SERPL-CCNC: 70 U/L (ref 39–117)
ALT SERPL W P-5'-P-CCNC: 8 U/L (ref 1–33)
ANION GAP SERPL CALCULATED.3IONS-SCNC: 9.6 MMOL/L (ref 5–15)
AST SERPL-CCNC: 16 U/L (ref 1–32)
BACTERIA UR QL AUTO: NORMAL /HPF
BILIRUB SERPL-MCNC: 0.7 MG/DL (ref 0–1.2)
BILIRUB UR QL STRIP: NEGATIVE
BUN SERPL-MCNC: 20 MG/DL (ref 8–23)
BUN/CREAT SERPL: 25.3 (ref 7–25)
CALCIUM SPEC-SCNC: 9.8 MG/DL (ref 8.6–10.5)
CHLORIDE SERPL-SCNC: 102 MMOL/L (ref 98–107)
CHOLEST SERPL-MCNC: 168 MG/DL (ref 0–200)
CLARITY UR: CLEAR
CO2 SERPL-SCNC: 28.4 MMOL/L (ref 22–29)
COLOR UR: YELLOW
CREAT SERPL-MCNC: 0.79 MG/DL (ref 0.57–1)
EGFRCR SERPLBLD CKD-EPI 2021: 78.6 ML/MIN/1.73
GLOBULIN UR ELPH-MCNC: 2.4 GM/DL
GLUCOSE SERPL-MCNC: 84 MG/DL (ref 65–99)
GLUCOSE UR STRIP-MCNC: NEGATIVE MG/DL
HDLC SERPL-MCNC: 73 MG/DL (ref 40–60)
HGB UR QL STRIP.AUTO: NEGATIVE
HYALINE CASTS UR QL AUTO: NORMAL /LPF
KETONES UR QL STRIP: NEGATIVE
LDLC SERPL CALC-MCNC: 86 MG/DL (ref 0–100)
LDLC/HDLC SERPL: 1.19 {RATIO}
LEUKOCYTE ESTERASE UR QL STRIP.AUTO: NEGATIVE
NITRITE UR QL STRIP: NEGATIVE
PH UR STRIP.AUTO: 6 [PH] (ref 5–8)
POTASSIUM SERPL-SCNC: 4.3 MMOL/L (ref 3.5–5.2)
PROT SERPL-MCNC: 7.1 G/DL (ref 6–8.5)
PROT UR QL STRIP: NEGATIVE
RBC # UR STRIP: NORMAL /HPF
REF LAB TEST METHOD: NORMAL
SODIUM SERPL-SCNC: 140 MMOL/L (ref 136–145)
SP GR UR STRIP: 1.01 (ref 1–1.03)
SQUAMOUS #/AREA URNS HPF: NORMAL /HPF
TRIGL SERPL-MCNC: 39 MG/DL (ref 0–150)
TSH SERPL DL<=0.05 MIU/L-ACNC: 1.97 UIU/ML (ref 0.27–4.2)
UROBILINOGEN UR QL STRIP: NORMAL
VLDLC SERPL-MCNC: 9 MG/DL (ref 5–40)
WBC # UR STRIP: NORMAL /HPF

## 2023-09-13 PROCEDURE — 87086 URINE CULTURE/COLONY COUNT: CPT

## 2023-09-13 PROCEDURE — 81001 URINALYSIS AUTO W/SCOPE: CPT

## 2023-09-13 PROCEDURE — 80061 LIPID PANEL: CPT

## 2023-09-13 PROCEDURE — 80053 COMPREHEN METABOLIC PANEL: CPT

## 2023-09-13 PROCEDURE — 84443 ASSAY THYROID STIM HORMONE: CPT

## 2023-09-14 LAB — BACTERIA SPEC AEROBE CULT: NO GROWTH

## 2023-09-18 ENCOUNTER — TELEPHONE (OUTPATIENT)
Dept: INTERNAL MEDICINE | Facility: CLINIC | Age: 73
End: 2023-09-18

## 2023-09-18 ENCOUNTER — OFFICE VISIT (OUTPATIENT)
Dept: INTERNAL MEDICINE | Facility: CLINIC | Age: 73
End: 2023-09-18
Payer: MEDICARE

## 2023-09-18 VITALS
BODY MASS INDEX: 23.61 KG/M2 | HEIGHT: 69 IN | OXYGEN SATURATION: 95 % | DIASTOLIC BLOOD PRESSURE: 70 MMHG | SYSTOLIC BLOOD PRESSURE: 124 MMHG | WEIGHT: 159.4 LBS | HEART RATE: 64 BPM | TEMPERATURE: 97.6 F

## 2023-09-18 DIAGNOSIS — Z23 NEED FOR INFLUENZA VACCINATION: ICD-10-CM

## 2023-09-18 DIAGNOSIS — N39.0 UTI DUE TO EXTENDED-SPECTRUM BETA LACTAMASE (ESBL) PRODUCING ESCHERICHIA COLI: Primary | ICD-10-CM

## 2023-09-18 DIAGNOSIS — E03.9 HYPOTHYROIDISM, UNSPECIFIED TYPE: ICD-10-CM

## 2023-09-18 DIAGNOSIS — Z16.12 UTI DUE TO EXTENDED-SPECTRUM BETA LACTAMASE (ESBL) PRODUCING ESCHERICHIA COLI: Primary | ICD-10-CM

## 2023-09-18 DIAGNOSIS — R10.31 RLQ ABDOMINAL PAIN: ICD-10-CM

## 2023-09-18 DIAGNOSIS — F41.9 ANXIETY: ICD-10-CM

## 2023-09-18 DIAGNOSIS — B96.29 UTI DUE TO EXTENDED-SPECTRUM BETA LACTAMASE (ESBL) PRODUCING ESCHERICHIA COLI: Primary | ICD-10-CM

## 2023-09-18 DIAGNOSIS — E78.49 OTHER HYPERLIPIDEMIA: ICD-10-CM

## 2023-09-18 PROCEDURE — 3078F DIAST BP <80 MM HG: CPT | Performed by: INTERNAL MEDICINE

## 2023-09-18 PROCEDURE — 3074F SYST BP LT 130 MM HG: CPT | Performed by: INTERNAL MEDICINE

## 2023-09-18 PROCEDURE — 99214 OFFICE O/P EST MOD 30 MIN: CPT | Performed by: INTERNAL MEDICINE

## 2023-09-18 PROCEDURE — G0008 ADMIN INFLUENZA VIRUS VAC: HCPCS | Performed by: INTERNAL MEDICINE

## 2023-09-18 PROCEDURE — 90662 IIV NO PRSV INCREASED AG IM: CPT | Performed by: INTERNAL MEDICINE

## 2023-09-18 RX ORDER — LORAZEPAM 0.5 MG/1
0.5 TABLET ORAL DAILY PRN
Qty: 30 TABLET | Refills: 0 | Status: SHIPPED | OUTPATIENT
Start: 2023-09-18

## 2023-09-18 NOTE — PROGRESS NOTES
Hypertension, Anxiety, Hypothyroidism, and Fatigue    Subjective   Annalise Winters is a 72 y.o. female is here today for follow-up.    History of Present Illness  S/p labs, would like to follow up.  She had 2 UTIs back to back, and initial one grew ESBL E.COli, and subsequently , resolved.  Here for a follow p on her HTN, hypothyroid , anxiety and fatigue. She is s/p abx- mactrobid and bactrim..  Abd pain , worse over the RLQ. On and off x 3 weeks. Has been on probiotics. Not constipated.Called Dr. Hall's office and was advised she may need imaging, but appointment is not until November.        Current Outpatient Medications:     acetaminophen (TYLENOL) 500 MG tablet, Take 1 tablet by mouth As Needed., Disp: , Rfl:     amLODIPine (NORVASC) 5 MG tablet, TAKE ONE TABLET BY MOUTH TWICE A DAY, Disp: 180 tablet, Rfl: 3    atorvastatin (LIPITOR) 10 MG tablet, Every Night., Disp: , Rfl:     Calcium Carbonate-Vit D-Min (CALCIUM 1200 PO), Take  by mouth., Disp: , Rfl:     Cholecalciferol (VITAMIN D) 2000 UNITS capsule, Take 1 capsule by mouth Daily., Disp: , Rfl:     COLLAGEN PO, Take  by mouth., Disp: , Rfl:     Famotidine-Ca Carb-Mag Hydrox (PEPCID COMPLETE PO), Take 1 tablet by mouth As Needed., Disp: , Rfl:     flecainide (TAMBOCOR) 50 MG tablet, Take 1 tablet by mouth 2 (Two) Times a Day., Disp: 180 tablet, Rfl: 3    levothyroxine (SYNTHROID, LEVOTHROID) 50 MCG tablet, Take 1 tablet by mouth Daily., Disp: 90 tablet, Rfl: 3    loratadine (CLARITIN) 10 MG tablet, Take 1 tablet by mouth Daily., Disp: , Rfl:     LORazepam (ATIVAN) 0.5 MG tablet, Take 1 tablet by mouth Daily As Needed for Anxiety., Disp: 30 tablet, Rfl: 0    melatonin 3 MG tablet, Take 2 tablets by mouth Every Night., Disp: , Rfl:     metoprolol tartrate (LOPRESSOR) 25 MG tablet, Take 0.5 tablets by mouth As Needed (palpitations)., Disp: 60 tablet, Rfl: 11    montelukast (SINGULAIR) 10 MG tablet, TAKE ONE TABLET BY MOUTH EVERY NIGHT AT BEDTIME, Disp: 90  "tablet, Rfl: 1    pantoprazole (PROTONIX) 40 MG EC tablet, Take 1 tablet by mouth Daily., Disp: , Rfl:     TURMERIC PO, Take 1 tablet by mouth Daily., Disp: , Rfl:     Vitamin B12 (CYANOCOBALAMIN) 500 MCG tablet tablet, Take  by mouth Daily., Disp: , Rfl:     vitamin C (ASCORBIC ACID) 500 MG tablet, Take 1 tablet by mouth Daily., Disp: , Rfl:     rosuvastatin (CRESTOR) 5 MG tablet, TAKE ONE TABLET BY MOUTH DAILY, Disp: 90 tablet, Rfl: 1      The following portions of the patient's history were reviewed and updated as appropriate: allergies, current medications, past family history, past medical history, past social history, past surgical history and problem list.    Review of Systems   Constitutional: Negative.  Negative for chills and fever.   HENT:  Negative for ear discharge, ear pain, sinus pressure and sore throat.    Respiratory:  Negative for cough, chest tightness and shortness of breath.    Cardiovascular:  Negative for chest pain, palpitations and leg swelling.   Gastrointestinal:  Positive for abdominal distention and abdominal pain. Negative for anal bleeding, constipation, diarrhea, nausea and vomiting.   Musculoskeletal:  Negative for arthralgias, back pain and myalgias.   Neurological:  Negative for dizziness, syncope and headaches.   Psychiatric/Behavioral:  Negative for confusion and sleep disturbance.      Objective   /70   Pulse 64   Temp 97.6 °F (36.4 °C)   Ht 174 cm (68.5\")   Wt 72.3 kg (159 lb 6.4 oz)   LMP  (LMP Unknown)   SpO2 95% Comment: ra  BMI 23.88 kg/m²   Physical Exam  Vitals and nursing note reviewed.   Constitutional:       Appearance: She is well-developed.   HENT:      Head: Normocephalic and atraumatic.      Right Ear: External ear normal.      Left Ear: External ear normal.      Mouth/Throat:      Pharynx: No oropharyngeal exudate.   Eyes:      Conjunctiva/sclera: Conjunctivae normal.      Pupils: Pupils are equal, round, and reactive to light.   Neck:      Thyroid: " No thyromegaly.   Cardiovascular:      Rate and Rhythm: Normal rate and regular rhythm.      Pulses: Normal pulses.      Heart sounds: Normal heart sounds. No murmur heard.    No friction rub. No gallop.   Pulmonary:      Effort: Pulmonary effort is normal.      Breath sounds: Normal breath sounds.   Abdominal:      General: There is no distension.      Palpations: Abdomen is soft.      Tenderness: There is abdominal tenderness (rlq). There is no guarding or rebound.      Comments: Hyperactive BS   Musculoskeletal:      Cervical back: Neck supple.   Skin:     General: Skin is warm and dry.   Neurological:      Mental Status: She is alert and oriented to person, place, and time.      Cranial Nerves: No cranial nerve deficit.   Psychiatric:         Judgment: Judgment normal.         Results for orders placed or performed in visit on 09/13/23   Urine Culture - Urine, Urine, Clean Catch    Specimen: Urine, Clean Catch   Result Value Ref Range    Urine Culture No growth    Comprehensive Metabolic Panel    Specimen: Blood   Result Value Ref Range    Glucose 84 65 - 99 mg/dL    BUN 20 8 - 23 mg/dL    Creatinine 0.79 0.57 - 1.00 mg/dL    Sodium 140 136 - 145 mmol/L    Potassium 4.3 3.5 - 5.2 mmol/L    Chloride 102 98 - 107 mmol/L    CO2 28.4 22.0 - 29.0 mmol/L    Calcium 9.8 8.6 - 10.5 mg/dL    Total Protein 7.1 6.0 - 8.5 g/dL    Albumin 4.7 3.5 - 5.2 g/dL    ALT (SGPT) 8 1 - 33 U/L    AST (SGOT) 16 1 - 32 U/L    Alkaline Phosphatase 70 39 - 117 U/L    Total Bilirubin 0.7 0.0 - 1.2 mg/dL    Globulin 2.4 gm/dL    A/G Ratio 2.0 g/dL    BUN/Creatinine Ratio 25.3 (H) 7.0 - 25.0    Anion Gap 9.6 5.0 - 15.0 mmol/L    eGFR 78.6 >60.0 mL/min/1.73   Lipid Panel    Specimen: Blood   Result Value Ref Range    Total Cholesterol 168 0 - 200 mg/dL    Triglycerides 39 0 - 150 mg/dL    HDL Cholesterol 73 (H) 40 - 60 mg/dL    LDL Cholesterol  86 0 - 100 mg/dL    VLDL Cholesterol 9 5 - 40 mg/dL    LDL/HDL Ratio 1.19    TSH Rfx On Abnormal To  Free T4    Specimen: Blood   Result Value Ref Range    TSH 1.970 0.270 - 4.200 uIU/mL   Urinalysis without microscopic (no culture) - Urine, Clean Catch    Specimen: Urine, Clean Catch   Result Value Ref Range    Color, UA Yellow Yellow, Straw    Appearance, UA Clear Clear    pH, UA 6.0 5.0 - 8.0    Specific Gravity, UA 1.008 1.005 - 1.030    Glucose, UA Negative Negative    Ketones, UA Negative Negative    Bilirubin, UA Negative Negative    Blood, UA Negative Negative    Protein, UA Negative Negative    Leuk Esterase, UA Negative Negative    Nitrite, UA Negative Negative    Urobilinogen, UA 0.2 E.U./dL 0.2 - 1.0 E.U./dL   Urinalysis, Microscopic Only - Urine, Clean Catch    Specimen: Urine, Clean Catch   Result Value Ref Range    RBC, UA 0-2 None Seen, 0-2 /HPF    WBC, UA 0-2 None Seen, 0-2 /HPF    Bacteria, UA None Seen None Seen /HPF    Squamous Epithelial Cells, UA 0-2 None Seen, 0-2 /HPF    Hyaline Casts, UA None Seen None Seen /LPF    Methodology Automated Microscopy              Assessment & Plan   Diagnoses and all orders for this visit:    UTI due to extended-spectrum beta lactamase (ESBL) producing Escherichia coli  Comments:  repeat cx -ve. hold off ID eval for now.  Orders:  -     Urine Culture - , Urine, Clean Catch; Future  -     Urinalysis With Microscopic - Urine, Clean Catch; Future    Hypothyroidism, unspecified type  -     TSH Rfx On Abnormal To Free T4; Future    Other hyperlipidemia  -     Comprehensive Metabolic Panel; Future  -     Lipid Panel; Future    Need for influenza vaccination  -     Fluzone High-Dose 65+yrs    RLQ abdominal pain  -     CT Abdomen Pelvis Without Contrast; Future    Anxiety  -     LORazepam (ATIVAN) 0.5 MG tablet; Take 1 tablet by mouth Daily As Needed for Anxiety.      To attend a  for the [employer] tonight. A little nervous.       The patient has read and signed the [facility] Controlled Substance Contract.  I will continue to see patient for regular  follow up appointments.  They are well controlled on their medication.  JASMYN has been reviewed by me and is updated every 3 months. The patient is aware of the potential for addiction and dependence.     Return in about 6 months (around 3/18/2024) for Medicare Wellness.    Electronically signed by:    Maryse Jauregui MD Answers submitted by the patient for this visit:  Other (Submitted on 9/17/2023)  Please describe your symptoms.: Follow up.  Have you had these symptoms before?: Yes  How long have you been having these symptoms?: Greater than 2 weeks  Primary Reason for Visit (Submitted on 9/17/2023)  What is the primary reason for your visit?: Other

## 2023-09-19 ENCOUNTER — HOSPITAL ENCOUNTER (OUTPATIENT)
Dept: CT IMAGING | Facility: HOSPITAL | Age: 73
Discharge: HOME | End: 2023-09-19
Admitting: INTERNAL MEDICINE
Payer: MEDICARE

## 2023-09-19 DIAGNOSIS — R10.31 RLQ ABDOMINAL PAIN: ICD-10-CM

## 2023-09-19 PROCEDURE — 0 DIATRIZOATE MEGLUMINE & SODIUM PER 1 ML: Performed by: INTERNAL MEDICINE

## 2023-09-19 PROCEDURE — 74176 CT ABD & PELVIS W/O CONTRAST: CPT

## 2023-09-19 RX ADMIN — DIATRIZOATE MEGLUMINE AND DIATRIZOATE SODIUM 15 ML: 660; 100 LIQUID ORAL; RECTAL at 09:54

## 2023-09-21 ENCOUNTER — TELEPHONE (OUTPATIENT)
Dept: INTERNAL MEDICINE | Facility: CLINIC | Age: 73
End: 2023-09-21

## 2023-09-21 DIAGNOSIS — R73.01 IFG (IMPAIRED FASTING GLUCOSE): Primary | ICD-10-CM

## 2023-09-21 NOTE — TELEPHONE ENCOUNTER
Caller: Annalise Winters    Relationship: Self    Best call back number: [phone]    Caller requesting test results: SCAN?    What test was performed: SCAN?    When was the test performed: THIS WEEK    Where was the test performed: OFFICE    Additional notes: WOULD LIKE RESULTS OF THE SCAN? THAT WAS DONE THIS WEEK.     ALSO NEEDS TO SEE IF WE HAVE AN RSV VACCINE    ALSO NEEDS TO SEE IF SHE NEEDS TO AIDEE IN TO PRICK HER FINGER FOR CHECKING OF DM?

## 2023-09-21 NOTE — TELEPHONE ENCOUNTER
Please advise on the CT result and if pt needs to come in to have the A1C done since wasn't done at [facility] on 9/18/23. Did let pt know that we don't have the RSV currently.

## 2023-09-22 ENCOUNTER — LAB (OUTPATIENT)
Dept: LAB | Facility: HOSPITAL | Age: 73
End: 2023-09-22
Payer: MEDICARE

## 2023-09-22 DIAGNOSIS — R73.01 IFG (IMPAIRED FASTING GLUCOSE): ICD-10-CM

## 2023-09-22 LAB — HBA1C MFR BLD: 5.8 % (ref 4.8–5.6)

## 2023-09-22 PROCEDURE — 83036 HEMOGLOBIN GLYCOSYLATED A1C: CPT

## 2023-09-22 NOTE — TELEPHONE ENCOUNTER
Patient advised of results, she has an appointment with Dr Hall on Monday , she has already stopped by and done the A1C

## 2023-09-22 NOTE — TELEPHONE ENCOUNTER
Please let patient know that her CT scan was okay and did not show any ovarian or appendix issues.  Other cause for her symptoms could d be if she were constipated.  I would recommend she take MiraLAX daily for 1 week.  If not better, she may benefit from seeing Dr. Hall.      I am sorry about missing on the A1c at her appointment.  I have placed the lab order and she can have it done at any of the [facility] labs at her convenience.

## 2023-10-12 ENCOUNTER — HOSPITAL ENCOUNTER (OUTPATIENT)
Dept: CARDIOLOGY | Facility: HOSPITAL | Age: 73
Discharge: HOME | End: 2023-10-12
Admitting: INTERNAL MEDICINE
Payer: MEDICARE

## 2023-10-12 VITALS
DIASTOLIC BLOOD PRESSURE: 101 MMHG | WEIGHT: 159 LBS | BODY MASS INDEX: 23.55 KG/M2 | HEIGHT: 69 IN | SYSTOLIC BLOOD PRESSURE: 151 MMHG

## 2023-10-12 DIAGNOSIS — I47.19 ATRIAL TACHYCARDIA: ICD-10-CM

## 2023-10-12 DIAGNOSIS — I48.0 PAROXYSMAL ATRIAL FIBRILLATION: ICD-10-CM

## 2023-10-12 DIAGNOSIS — I49.5 TACHYCARDIA-BRADYCARDIA SYNDROME: ICD-10-CM

## 2023-10-12 LAB
BH CV ECHO MEAS - AO MAX PG: 8.3 MMHG
BH CV ECHO MEAS - AO MEAN PG: 4.1 MMHG
BH CV ECHO MEAS - AO ROOT DIAM: 2.7 CM
BH CV ECHO MEAS - AO V2 MAX: 144.4 CM/SEC
BH CV ECHO MEAS - AO V2 VTI: 35.9 CM
BH CV ECHO MEAS - AVA(I,D): 3.2 CM2
BH CV ECHO MEAS - EDV(CUBED): 67.2 ML
BH CV ECHO MEAS - EDV(MOD-SP2): 82 ML
BH CV ECHO MEAS - EDV(MOD-SP4): 89.3 ML
BH CV ECHO MEAS - EF(MOD-BP): 68.4 %
BH CV ECHO MEAS - EF(MOD-SP2): 57.6 %
BH CV ECHO MEAS - EF(MOD-SP4): 76 %
BH CV ECHO MEAS - ESV(CUBED): 26.4 ML
BH CV ECHO MEAS - ESV(MOD-SP2): 34.8 ML
BH CV ECHO MEAS - ESV(MOD-SP4): 21.4 ML
BH CV ECHO MEAS - FS: 26.7 %
BH CV ECHO MEAS - IVS/LVPW: 0.8 CM
BH CV ECHO MEAS - IVSD: 0.89 CM
BH CV ECHO MEAS - LA DIMENSION: 3.6 CM
BH CV ECHO MEAS - LAT PEAK E' VEL: 7.6 CM/SEC
BH CV ECHO MEAS - LV DIASTOLIC VOL/BSA (35-75): 47.7 CM2
BH CV ECHO MEAS - LV MASS(C)D: 131.3 GRAMS
BH CV ECHO MEAS - LV MAX PG: 7.9 MMHG
BH CV ECHO MEAS - LV MEAN PG: 4 MMHG
BH CV ECHO MEAS - LV SYSTOLIC VOL/BSA (12-30): 11.4 CM2
BH CV ECHO MEAS - LV V1 MAX: 140.2 CM/SEC
BH CV ECHO MEAS - LV V1 VTI: 37.7 CM
BH CV ECHO MEAS - LVIDD: 4.1 CM
BH CV ECHO MEAS - LVIDS: 3 CM
BH CV ECHO MEAS - LVOT AREA: 3.1 CM2
BH CV ECHO MEAS - LVOT DIAM: 1.98 CM
BH CV ECHO MEAS - LVPWD: 1.12 CM
BH CV ECHO MEAS - MED PEAK E' VEL: 7.5 CM/SEC
BH CV ECHO MEAS - MV MAX PG: 3.9 MMHG
BH CV ECHO MEAS - MV MEAN PG: 1.51 MMHG
BH CV ECHO MEAS - MV V2 VTI: 33.5 CM
BH CV ECHO MEAS - MVA(VTI): 3.5 CM2
BH CV ECHO MEAS - PA ACC TIME: 0.14 SEC
BH CV ECHO MEAS - PA V2 MAX: 95.4 CM/SEC
BH CV ECHO MEAS - PI END-D VEL: 102.8 CM/SEC
BH CV ECHO MEAS - RAP SYSTOLE: 8 MMHG
BH CV ECHO MEAS - RVSP: 28 MMHG
BH CV ECHO MEAS - SI(MOD-SP2): 25.2 ML/M2
BH CV ECHO MEAS - SI(MOD-SP4): 36.2 ML/M2
BH CV ECHO MEAS - SV(LVOT): 116.4 ML
BH CV ECHO MEAS - SV(MOD-SP2): 47.2 ML
BH CV ECHO MEAS - SV(MOD-SP4): 67.9 ML
BH CV ECHO MEAS - TAPSE (>1.6): 2.7 CM
BH CV ECHO MEAS - TR MAX PG: 20.3 MMHG
BH CV ECHO MEAS - TR MAX VEL: 225.1 CM/SEC
BH CV VAS BP RIGHT ARM: NORMAL MMHG
BH CV XLRA - RV BASE: 3 CM
BH CV XLRA - RV LENGTH: 6.9 CM
BH CV XLRA - RV MID: 2.31 CM
BH CV XLRA - TDI S': 8.5 CM/SEC

## 2023-10-12 PROCEDURE — 93306 TTE W/DOPPLER COMPLETE: CPT

## 2023-10-16 ENCOUNTER — OFFICE VISIT (OUTPATIENT)
Dept: OBSTETRICS AND GYNECOLOGY | Facility: CLINIC | Age: 73
End: 2023-10-16
Payer: MEDICARE

## 2023-10-16 VITALS
WEIGHT: 160.2 LBS | SYSTOLIC BLOOD PRESSURE: 128 MMHG | DIASTOLIC BLOOD PRESSURE: 72 MMHG | BODY MASS INDEX: 24.28 KG/M2 | HEIGHT: 68 IN

## 2023-10-16 DIAGNOSIS — Z12.73 SCREENING FOR OVARIAN CANCER: Primary | ICD-10-CM

## 2023-10-16 PROCEDURE — 1160F RVW MEDS BY RX/DR IN RCRD: CPT | Performed by: NURSE PRACTITIONER

## 2023-10-16 PROCEDURE — 99213 OFFICE O/P EST LOW 20 MIN: CPT | Performed by: NURSE PRACTITIONER

## 2023-10-16 PROCEDURE — 1159F MED LIST DOCD IN RCRD: CPT | Performed by: NURSE PRACTITIONER

## 2023-10-16 PROCEDURE — 3078F DIAST BP <80 MM HG: CPT | Performed by: NURSE PRACTITIONER

## 2023-10-16 PROCEDURE — 3074F SYST BP LT 130 MM HG: CPT | Performed by: NURSE PRACTITIONER

## 2023-10-16 NOTE — PROGRESS NOTES
Chief Complaint  Annalise Winters is a 72 y.o.  female presenting for Gynecologic Exam    History of Present Illness  Annalise is a very pleasant 73yo woman, , here for gyn exam.  She has no Gyn c/o's.  She is distant s/p BTL, tubal reversal, then later DEE.  (Ovaries remain in-situ)    Her daughter continues to fight ovarian cancer.  (All genetic testing was negative.)  Pt has not had any genetic testing done.  She is asymptomatic.  Gets ov ca screening pelvic US every February @ .  She also had a normal CT of abdomen & pelvis 2023.  She requests a  now, for continued screening.    Hyperlipidema, tx with statin.  BP well controlled.  Hypothyroidism, compensated.  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    Preventive health procedures up to date.  We discussed calcium & Vitamin D intake; praised for being intentional about this & exercise.  (Hx osteopenia; FRAX scores wnl.)    Otherwise, ROS neg.    The following portions of the patient's history were reviewed and updated as appropriate: allergies, current medications, past family history, past medical history, past social history, past surgical history, and problem list.    Allergies   Allergen Reactions    Ace Inhibitors Cough    Celexa [Citalopram Hydrobromide] Dizziness    Corticosteroids Rash    Paxil [Paroxetine Hcl] Other (See Comments)     somnolence           Current Outpatient Medications:     acetaminophen (TYLENOL) 500 MG tablet, Take 1 tablet by mouth As Needed., Disp: , Rfl:     amLODIPine (NORVASC) 5 MG tablet, TAKE ONE TABLET BY MOUTH TWICE A DAY, Disp: 180 tablet, Rfl: 3    atorvastatin (LIPITOR) 10 MG tablet, Every Night., Disp: , Rfl:     Calcium Carbonate-Vit D-Min (CALCIUM 1200 PO), Take  by mouth., Disp: , Rfl:     Cholecalciferol (VITAMIN D) 2000 UNITS capsule, Take 1 capsule by mouth Daily., Disp: , Rfl:     COLLAGEN PO, Take  by mouth., Disp: , Rfl:     Famotidine-Ca Carb-Mag Hydrox (PEPCID  COMPLETE PO), Take 1 tablet by mouth As Needed., Disp: , Rfl:     flecainide (TAMBOCOR) 50 MG tablet, Take 1 tablet by mouth 2 (Two) Times a Day., Disp: 180 tablet, Rfl: 3    levothyroxine (SYNTHROID, LEVOTHROID) 50 MCG tablet, Take 1 tablet by mouth Daily., Disp: 90 tablet, Rfl: 3    loratadine (CLARITIN) 10 MG tablet, Take 1 tablet by mouth Daily., Disp: , Rfl:     LORazepam (ATIVAN) 0.5 MG tablet, Take 1 tablet by mouth Daily As Needed for Anxiety., Disp: 30 tablet, Rfl: 0    melatonin 3 MG tablet, Take 2 tablets by mouth Every Night., Disp: , Rfl:     metoprolol tartrate (LOPRESSOR) 25 MG tablet, Take 0.5 tablets by mouth As Needed (palpitations)., Disp: 60 tablet, Rfl: 11    montelukast (SINGULAIR) 10 MG tablet, TAKE ONE TABLET BY MOUTH EVERY NIGHT AT BEDTIME, Disp: 90 tablet, Rfl: 1    pantoprazole (PROTONIX) 40 MG EC tablet, Take 1 tablet by mouth Daily., Disp: , Rfl:     TURMERIC PO, Take 1 tablet by mouth Daily., Disp: , Rfl:     Vitamin B12 (CYANOCOBALAMIN) 500 MCG tablet tablet, Take  by mouth Daily., Disp: , Rfl:     vitamin C (ASCORBIC ACID) 500 MG tablet, Take 1 tablet by mouth Daily., Disp: , Rfl:     Past Medical History:   Diagnosis Date    Abnormal blood chemistry     Arthritis     right knee    Atrial fibrillation     Cyst of buttocks     Diverticulosis     Dizziness     Dysuria     Esophagitis     Flushing     GERD (gastroesophageal reflux disease)     Heartburn     Hyperlipidemia     Hypertension     Hyperthyroidism     Hypothyroidism     Irritable bowel syndrome     LLQ abdominal pain     Menopause     Polyp of sigmoid colon     Sinusitis     Skin lesion of face     UTI (urinary tract infection)         Past Surgical History:   Procedure Laterality Date    CARDIAC ABLATION  08/21/2013    Pulmonary vein ablation by Dr. Abdirahman Lakcey, 08/21/2013.    REPLACEMENT TOTAL KNEE Right     RHINOPLASTY      TOTAL ABDOMINAL HYSTERECTOMY      TUBAL ABDOMINAL LIGATION      TUBAL REANASTOMOSIS    "      Objective  /72   Ht 172.7 cm (68\")   Wt 72.7 kg (160 lb 3.2 oz)   LMP  (LMP Unknown)   Breastfeeding No   BMI 24.36 kg/m²     Physical Exam  Vitals and nursing note reviewed. Exam conducted with a chaperone present.   Constitutional:       General: She is not in acute distress.     Appearance: Normal appearance. She is not ill-appearing.   HENT:      Head: Normocephalic.   Neck:      Thyroid: No thyroid mass or thyromegaly.   Cardiovascular:      Rate and Rhythm: Normal rate and regular rhythm.      Heart sounds: Normal heart sounds. No murmur heard.  Pulmonary:      Effort: Pulmonary effort is normal. No respiratory distress.      Breath sounds: Normal breath sounds.   Chest:   Breasts:     Right: No inverted nipple, mass or nipple discharge.      Left: No inverted nipple, mass or nipple discharge.   Abdominal:      Palpations: Abdomen is soft. There is no mass.      Tenderness: There is no abdominal tenderness.   Genitourinary:     General: Normal vulva.      Labia:         Right: No rash, tenderness or lesion.         Left: No rash, tenderness or lesion.       Vagina: No vaginal discharge or erythema.      Uterus: Absent.       Adnexa:         Right: No mass or tenderness.          Left: No mass or tenderness.        Comments: Anus appears wnl.  No rectal exam performed.  Lymphadenopathy:      Upper Body:      Right upper body: No supraclavicular or axillary adenopathy.      Left upper body: No supraclavicular or axillary adenopathy.   Skin:     General: Skin is warm and dry.   Neurological:      Mental Status: She is alert and oriented to person, place, and time.   Psychiatric:         Mood and Affect: Mood normal.         Behavior: Behavior normal.         Assessment/Plan   Diagnoses and all orders for this visit:    1. Screening for ovarian cancer (Primary)  -     ; Future    She will continue the ovarian cancer screening program at  every February.  She will reach out to me in July or " August, and we can repeat the pelvic US here (August).  We discussed genetic counseling.      Procedures    40 to 64: Counseling/Anticipatory Guidance Discussed: nutrition, physical activity, screenings, self-breast exam, and Ov Ca Screening.    Return in about 1 year (around 10/16/2024) for Annual physical.    Britta Aldrich, APRN  10/16/2023

## 2023-10-28 PROCEDURE — 87086 URINE CULTURE/COLONY COUNT: CPT | Performed by: FAMILY MEDICINE

## 2023-10-28 PROCEDURE — 87186 SC STD MICRODIL/AGAR DIL: CPT | Performed by: FAMILY MEDICINE

## 2023-10-28 PROCEDURE — 87077 CULTURE AEROBIC IDENTIFY: CPT | Performed by: FAMILY MEDICINE

## 2023-11-06 ENCOUNTER — OFFICE VISIT (OUTPATIENT)
Dept: INTERNAL MEDICINE | Facility: CLINIC | Age: 73
End: 2023-11-06
Payer: MEDICARE

## 2023-11-06 ENCOUNTER — TELEPHONE (OUTPATIENT)
Dept: INTERNAL MEDICINE | Facility: CLINIC | Age: 73
End: 2023-11-06

## 2023-11-06 VITALS
SYSTOLIC BLOOD PRESSURE: 130 MMHG | DIASTOLIC BLOOD PRESSURE: 70 MMHG | BODY MASS INDEX: 24.4 KG/M2 | HEART RATE: 62 BPM | TEMPERATURE: 98.5 F | HEIGHT: 68 IN | WEIGHT: 161 LBS | OXYGEN SATURATION: 97 %

## 2023-11-06 DIAGNOSIS — N95.2 VAGINAL ATROPHY: ICD-10-CM

## 2023-11-06 DIAGNOSIS — R30.9 PAINFUL URINATION: Primary | ICD-10-CM

## 2023-11-06 LAB
BILIRUB BLD-MCNC: NEGATIVE MG/DL
CLARITY, POC: CLEAR
COLOR UR: YELLOW
EXPIRATION DATE: NORMAL
GLUCOSE UR STRIP-MCNC: NEGATIVE MG/DL
KETONES UR QL: NEGATIVE
LEUKOCYTE EST, POC: NEGATIVE
Lab: NORMAL
NITRITE UR-MCNC: NEGATIVE MG/ML
PH UR: 6 [PH] (ref 5–8)
PROT UR STRIP-MCNC: NEGATIVE MG/DL
RBC # UR STRIP: NEGATIVE /UL
SP GR UR: 1.01 (ref 1–1.03)
UROBILINOGEN UR QL: NORMAL

## 2023-11-06 PROCEDURE — 99213 OFFICE O/P EST LOW 20 MIN: CPT | Performed by: INTERNAL MEDICINE

## 2023-11-06 PROCEDURE — 87077 CULTURE AEROBIC IDENTIFY: CPT | Performed by: INTERNAL MEDICINE

## 2023-11-06 PROCEDURE — 87086 URINE CULTURE/COLONY COUNT: CPT | Performed by: INTERNAL MEDICINE

## 2023-11-06 PROCEDURE — 3078F DIAST BP <80 MM HG: CPT | Performed by: INTERNAL MEDICINE

## 2023-11-06 PROCEDURE — 3075F SYST BP GE 130 - 139MM HG: CPT | Performed by: INTERNAL MEDICINE

## 2023-11-06 PROCEDURE — 81003 URINALYSIS AUTO W/O SCOPE: CPT | Performed by: INTERNAL MEDICINE

## 2023-11-06 PROCEDURE — 87186 SC STD MICRODIL/AGAR DIL: CPT | Performed by: INTERNAL MEDICINE

## 2023-11-06 RX ORDER — ESTRADIOL 0.1 MG/G
CREAM VAGINAL
Qty: 42 G | Refills: 2 | Status: SHIPPED | OUTPATIENT
Start: 2023-11-06

## 2023-11-06 RX ORDER — CEPHALEXIN 500 MG/1
500 CAPSULE ORAL 4 TIMES DAILY
Qty: 14 CAPSULE | Refills: 0 | Status: SHIPPED | OUTPATIENT
Start: 2023-11-06

## 2023-11-06 RX ORDER — PHENAZOPYRIDINE HYDROCHLORIDE 200 MG/1
200 TABLET, FILM COATED ORAL 3 TIMES DAILY PRN
Qty: 15 TABLET | Refills: 0 | Status: SHIPPED | OUTPATIENT
Start: 2023-11-06

## 2023-11-06 NOTE — PROGRESS NOTES
Urinary Tract Infection (Pain with urination)    Subjective   Annalise Winters is a 72 y.o. female is here today for follow-up.    History of Present Illness  Had a uti last week, s/p 5 day macrobid, and finished Wednesday- now having sxs since Friday. Bladder pain. No vaginal pain or discharge.    Current Outpatient Medications:     acetaminophen (TYLENOL) 500 MG tablet, Take 1 tablet by mouth As Needed., Disp: , Rfl:     amLODIPine (NORVASC) 5 MG tablet, TAKE ONE TABLET BY MOUTH TWICE A DAY, Disp: 180 tablet, Rfl: 3    atorvastatin (LIPITOR) 10 MG tablet, Every Night., Disp: , Rfl:     Calcium Carbonate-Vit D-Min (CALCIUM 1200 PO), Take  by mouth., Disp: , Rfl:     Cholecalciferol (VITAMIN D) 2000 UNITS capsule, Take 1 capsule by mouth Daily., Disp: , Rfl:     COLLAGEN PO, Take  by mouth., Disp: , Rfl:     Famotidine-Ca Carb-Mag Hydrox (PEPCID COMPLETE PO), Take 1 tablet by mouth As Needed., Disp: , Rfl:     flecainide (TAMBOCOR) 50 MG tablet, Take 1 tablet by mouth 2 (Two) Times a Day., Disp: 180 tablet, Rfl: 3    levothyroxine (SYNTHROID, LEVOTHROID) 50 MCG tablet, Take 1 tablet by mouth Daily., Disp: 90 tablet, Rfl: 3    loratadine (CLARITIN) 10 MG tablet, Take 1 tablet by mouth Daily., Disp: , Rfl:     LORazepam (ATIVAN) 0.5 MG tablet, Take 1 tablet by mouth Daily As Needed for Anxiety., Disp: 30 tablet, Rfl: 0    melatonin 3 MG tablet, Take 2 tablets by mouth Every Night., Disp: , Rfl:     metoprolol tartrate (LOPRESSOR) 25 MG tablet, Take 0.5 tablets by mouth As Needed (palpitations)., Disp: 60 tablet, Rfl: 11    montelukast (SINGULAIR) 10 MG tablet, TAKE ONE TABLET BY MOUTH EVERY NIGHT AT BEDTIME, Disp: 90 tablet, Rfl: 1    pantoprazole (PROTONIX) 40 MG EC tablet, Take 1 tablet by mouth Daily., Disp: , Rfl:     TURMERIC PO, Take 1 tablet by mouth Daily., Disp: , Rfl:     Vitamin B12 (CYANOCOBALAMIN) 500 MCG tablet tablet, Take  by mouth Daily., Disp: , Rfl:     vitamin C (ASCORBIC ACID) 500 MG tablet, Take 1  "tablet by mouth Daily., Disp: , Rfl:     cephalexin (Keflex) 500 MG capsule, Take 1 capsule by mouth 4 (Four) Times a Day., Disp: 14 capsule, Rfl: 0    estradiol (ESTRACE VAGINAL) 0.1 MG/GM vaginal cream, Insert 1 gram intravaginally at HS 1-2 times a week, Disp: 42 g, Rfl: 2    phenazopyridine (Pyridium) 200 MG tablet, Take 1 tablet by mouth 3 (Three) Times a Day As Needed for Bladder Spasms., Disp: 15 tablet, Rfl: 0      The following portions of the patient's history were reviewed and updated as appropriate: allergies, current medications, past family history, past medical history, past social history, past surgical history and problem list.    Review of Systems   Constitutional: Negative.  Negative for chills and fever.   HENT:  Negative for ear discharge, ear pain, sinus pressure and sore throat.    Respiratory:  Negative for cough, chest tightness and shortness of breath.    Cardiovascular:  Negative for chest pain, palpitations and leg swelling.   Gastrointestinal:  Negative for diarrhea, nausea and vomiting.   Genitourinary:  Positive for dysuria.   Musculoskeletal:  Negative for arthralgias, back pain and myalgias.   Neurological:  Negative for dizziness, syncope and headaches.   Psychiatric/Behavioral:  Negative for confusion and sleep disturbance.        Objective   /70   Pulse 62   Temp 98.5 °F (36.9 °C)   Ht 172.7 cm (68\")   Wt 73 kg (161 lb)   LMP  (LMP Unknown)   SpO2 97% Comment: ra  BMI 24.48 kg/m²   Physical Exam  Vitals and nursing note reviewed.   Constitutional:       Appearance: She is well-developed.   HENT:      Head: Normocephalic and atraumatic.      Right Ear: External ear normal.      Left Ear: External ear normal.      Mouth/Throat:      Pharynx: No oropharyngeal exudate.   Eyes:      Conjunctiva/sclera: Conjunctivae normal.      Pupils: Pupils are equal, round, and reactive to light.   Neck:      Thyroid: No thyromegaly.   Cardiovascular:      Rate and Rhythm: Normal rate " and regular rhythm.      Pulses: Normal pulses.      Heart sounds: Normal heart sounds. No murmur heard.     No friction rub. No gallop.   Pulmonary:      Effort: Pulmonary effort is normal.      Breath sounds: Normal breath sounds.   Abdominal:      General: Bowel sounds are normal. There is no distension.      Palpations: Abdomen is soft.      Tenderness: There is no abdominal tenderness. There is no right CVA tenderness or left CVA tenderness.   Musculoskeletal:      Cervical back: Neck supple.   Skin:     General: Skin is warm and dry.   Neurological:      Mental Status: She is alert and oriented to person, place, and time.      Cranial Nerves: No cranial nerve deficit.   Psychiatric:         Judgment: Judgment normal.         BMI is within normal parameters. No other follow-up for BMI required.       Results for orders placed or performed in visit on 11/06/23   POCT urinalysis dipstick, automated    Specimen: Urine   Result Value Ref Range    Color Yellow Yellow, Straw, Dark Yellow, Kimberli    Clarity, UA Clear Clear    Specific Gravity  1.010 1.005 - 1.030    pH, Urine 6.0 5.0 - 8.0    Leukocytes Negative Negative    Nitrite, UA Negative Negative    Protein, POC Negative Negative mg/dL    Glucose, UA Negative Negative mg/dL    Ketones, UA Negative Negative    Urobilinogen, UA Normal Normal, 0.2 E.U./dL    Bilirubin Negative Negative    Blood, UA Negative Negative    Lot Number [id number]     Expiration Date 11/21/24              Assessment & Plan   Diagnoses and all orders for this visit:    Painful urination  -     POCT urinalysis dipstick, automated  -     cephalexin (Keflex) 500 MG capsule; Take 1 capsule by mouth 4 (Four) Times a Day.  -     phenazopyridine (Pyridium) 200 MG tablet; Take 1 tablet by mouth 3 (Three) Times a Day As Needed for Bladder Spasms.  -     Urine Culture - , Urine, Clean Catch; Future    Vaginal atrophy  -     estradiol (ESTRACE VAGINAL) 0.1 MG/GM vaginal cream; Insert 1 gram  intravaginally at HS 1-2 times a week      Offered hiprex along with estrace. She is reluctant to start hiprex. Adv otc d- mannose.           No follow-ups on file.    Electronically signed by:    Maryse Jauregui MD

## 2023-11-06 NOTE — TELEPHONE ENCOUNTER
Caller: Annalise Winters     Relationship: SELF    Best call back number: [phone]     What is your medical concern? PATIENT IS STILL EXPERIENCING BURNING PRESSURE WITH SITTING AND BURNING WITH URINATION. PATIENT PATIENT FINISHED HER ANTIBIOTIC ON 11/1/23 AND HER SYMPTOMS STARTING RETURN NG BY 11/3/23.     PATIENT WANTED TO REQUEST ANOTHER URINE CULTURE OR SEE IF SHE CAN GET ANOTHER ANTIBIOTIC      How long has this issue been going on? 2 WEEKS    Is your provider already aware of this issue? YES    Have you been treated for this issue? YES    [facility] [id number] - [place], [place] - [address] [place] [address] - [phone] [facility] [phone]  [phone]

## 2023-11-08 LAB — BACTERIA SPEC AEROBE CULT: ABNORMAL

## 2023-11-09 ENCOUNTER — TELEPHONE (OUTPATIENT)
Dept: INTERNAL MEDICINE | Facility: CLINIC | Age: 73
End: 2023-11-09
Payer: MEDICARE

## 2023-11-09 DIAGNOSIS — R30.9 PAINFUL URINATION: ICD-10-CM

## 2023-11-09 NOTE — TELEPHONE ENCOUNTER
Please let her know that the culture did show that she still has the UTI.  She should finish the keflex antibiotics as discussed.  Is she feeling better?

## 2023-11-09 NOTE — TELEPHONE ENCOUNTER
Pt is wanting to check the directions again on abx. Is she supposed to take 4 times daily, or BID for seven days. Quantity of 14 was sent to pharmacy.

## 2023-11-09 NOTE — TELEPHONE ENCOUNTER
Caller: Annalise Winters    Relationship: Self    Best call back number: [phone]     Caller requesting test results: URINE TEST     What test was performed: LABS     When was the test performed: 11.6    Where was the test performed: OFFICE     Additional notes: WANTS TO SEE IF WE HAVE THE RESULTS YET

## 2023-11-15 ENCOUNTER — TELEPHONE (OUTPATIENT)
Dept: CARDIOLOGY | Facility: CLINIC | Age: 73
End: 2023-11-15
Payer: MEDICARE

## 2023-11-15 ENCOUNTER — TELEPHONE (OUTPATIENT)
Dept: INTERNAL MEDICINE | Facility: CLINIC | Age: 73
End: 2023-11-15
Payer: MEDICARE

## 2023-11-15 DIAGNOSIS — I10 ESSENTIAL HYPERTENSION: ICD-10-CM

## 2023-11-15 RX ORDER — AMLODIPINE BESYLATE 5 MG/1
7.5 TABLET ORAL 2 TIMES DAILY
Qty: 270 TABLET | Refills: 3 | Status: SHIPPED | OUTPATIENT
Start: 2023-11-15

## 2023-11-15 NOTE — TELEPHONE ENCOUNTER
Caller: Annalise Winters    Relationship: Self    Best call back number: [phone]    What is the best time to reach you: ANYTIME     Who are you requesting to speak with (clinical staff, provider,  specific staff member): NURSE         What was the call regarding: THE PATIENT WAS IN THE EMERGENCY ROOM AT [facility] IN [facility] 11/14/2023 FOR BLOOD PRESSURE CONCERNS SHE WAS TOLD TO FOLLOW UP WITH HER DOCTOR SHE WOULD LIKE TO KNOW IF SHE SHOULD BE SEEN OR WHAT THE DOCTOR WANTS TO DO. THE PATIENT IS ALSO HAVING CRACKING IN HER NECK SHE IS NOT SURE IF THAT IS RELATED OR WHY THAT IS HAPPENING

## 2023-11-15 NOTE — TELEPHONE ENCOUNTER
Yes, please have her schedule a follow-up with me.  We have openings Monday and Tuesday, okay for a 15-minute ER follow-up.  Otw, please offer her the 8 AM slot Monday morning.  Okay to overbook.  Thank you.

## 2023-11-15 NOTE — TELEPHONE ENCOUNTER
Patient called and states she has not been feeling well for the past couples of day. She states yesterday she was feeling poorly and checked her BP. She states her BP was 229/101. She states she went to the [facility] ER. She states while she was there labs, UA and an EKG were performed in which she was told were all WNL. Patient states she received IV ativan and her BP decreased and she was discharged home. Patient states when she checked her BP this morning her systolic was in the 130s. She states mid morning she started feeling poorly again. Patient states she had a headache and just did not feel well. She denies vision changes, generalized weakness, palpitations, or dizziness. She states she checked her BP and it was 175/90 HR 70. She states she took an extra amlodipine and 0.5 mg of po ativan and her BP is now 129/72 HR 72.     Patient states she has been experiencing neck pain and she can hear a cracking in her neck. She states she communicated this at the ER but they did not do any test regarding this. Patient states she does not know if this is causing her BP to become elevated but she has also reached out to her PCP and is waiting for a return call.     She would just like to know if our office has any recommendations regarding her BP. ER encounter in [facility].       Patient is aware someone will be in contact to follow up.

## 2023-11-21 ENCOUNTER — OFFICE VISIT (OUTPATIENT)
Dept: INTERNAL MEDICINE | Facility: CLINIC | Age: 73
End: 2023-11-21
Payer: MEDICARE

## 2023-11-21 VITALS
TEMPERATURE: 98.7 F | BODY MASS INDEX: 24.13 KG/M2 | SYSTOLIC BLOOD PRESSURE: 112 MMHG | WEIGHT: 159.2 LBS | HEART RATE: 96 BPM | HEIGHT: 68 IN | DIASTOLIC BLOOD PRESSURE: 68 MMHG | OXYGEN SATURATION: 98 %

## 2023-11-21 DIAGNOSIS — M54.2 CERVICALGIA: ICD-10-CM

## 2023-11-21 DIAGNOSIS — G62.9 PERIPHERAL POLYNEUROPATHY: Primary | ICD-10-CM

## 2023-11-21 DIAGNOSIS — J06.9 UPPER RESPIRATORY TRACT INFECTION, UNSPECIFIED TYPE: ICD-10-CM

## 2023-11-21 DIAGNOSIS — N39.0 URINARY TRACT INFECTION WITHOUT HEMATURIA, SITE UNSPECIFIED: ICD-10-CM

## 2023-11-21 DIAGNOSIS — I10 ESSENTIAL HYPERTENSION: ICD-10-CM

## 2023-11-21 PROCEDURE — 3078F DIAST BP <80 MM HG: CPT | Performed by: INTERNAL MEDICINE

## 2023-11-21 PROCEDURE — 99214 OFFICE O/P EST MOD 30 MIN: CPT | Performed by: INTERNAL MEDICINE

## 2023-11-21 PROCEDURE — 3074F SYST BP LT 130 MM HG: CPT | Performed by: INTERNAL MEDICINE

## 2023-11-21 PROCEDURE — 81003 URINALYSIS AUTO W/O SCOPE: CPT | Performed by: INTERNAL MEDICINE

## 2023-11-21 PROCEDURE — 1159F MED LIST DOCD IN RCRD: CPT | Performed by: INTERNAL MEDICINE

## 2023-11-21 PROCEDURE — 1160F RVW MEDS BY RX/DR IN RCRD: CPT | Performed by: INTERNAL MEDICINE

## 2023-11-21 RX ORDER — AMOXICILLIN 875 MG/1
875 TABLET, COATED ORAL 2 TIMES DAILY
Qty: 20 TABLET | Refills: 0 | Status: SHIPPED | OUTPATIENT
Start: 2023-11-21

## 2023-11-21 RX ORDER — ROSUVASTATIN CALCIUM 5 MG/1
5 TABLET, COATED ORAL NIGHTLY
COMMUNITY
Start: 2023-11-19

## 2023-11-21 NOTE — PROGRESS NOTES
ER Fu (HTN/)    Subjective   Annalise Winters is a 72 y.o. female is here today for follow-up.    History of Present Illness  Here for a ER f/u on her BP. Notes was getting ready to go to class, and BP kept going up and was > 200 systolic.  Has previously been dxed with neuropathy by Dr. Shirley, adv to keep sugars under control. Worried about her back being a cause of her neuropathy. Would abisai xrays.    Also neck is cracking, and has seen a chiropractor, who has adjusted her and given exercises. Asking if she needs to see PT.    ST and has to clear her throat more.  Called cardiology re: BP and adv to increase amlo to 7.5 bid. Legs have been more tight since then.  Has been taking more ativan to help her BP, usually just 1/2 as needed.    Current Outpatient Medications:     acetaminophen (TYLENOL) 500 MG tablet, Take 1 tablet by mouth As Needed., Disp: , Rfl:     amLODIPine (NORVASC) 5 MG tablet, Take 1.5 tablets by mouth 2 (Two) Times a Day., Disp: 270 tablet, Rfl: 3    Calcium Carbonate-Vit D-Min (CALCIUM 1200 PO), Take  by mouth., Disp: , Rfl:     Cholecalciferol (VITAMIN D) 2000 UNITS capsule, Take 1 capsule by mouth Daily., Disp: , Rfl:     COLLAGEN PO, Take  by mouth., Disp: , Rfl:     estradiol (ESTRACE VAGINAL) 0.1 MG/GM vaginal cream, Insert 1 gram intravaginally at HS 1-2 times a week, Disp: 42 g, Rfl: 2    Famotidine-Ca Carb-Mag Hydrox (PEPCID COMPLETE PO), Take 1 tablet by mouth As Needed., Disp: , Rfl:     flecainide (TAMBOCOR) 50 MG tablet, Take 1 tablet by mouth 2 (Two) Times a Day., Disp: 180 tablet, Rfl: 3    levothyroxine (SYNTHROID, LEVOTHROID) 50 MCG tablet, Take 1 tablet by mouth Daily., Disp: 90 tablet, Rfl: 3    loratadine (CLARITIN) 10 MG tablet, Take 1 tablet by mouth Daily., Disp: , Rfl:     LORazepam (ATIVAN) 0.5 MG tablet, Take 1 tablet by mouth Daily As Needed for Anxiety., Disp: 30 tablet, Rfl: 0    melatonin 3 MG tablet, Take 2 tablets by mouth Every Night., Disp: , Rfl:     metoprolol  "tartrate (LOPRESSOR) 25 MG tablet, Take 0.5 tablets by mouth As Needed (palpitations)., Disp: 60 tablet, Rfl: 11    montelukast (SINGULAIR) 10 MG tablet, TAKE ONE TABLET BY MOUTH EVERY NIGHT AT BEDTIME, Disp: 90 tablet, Rfl: 1    pantoprazole (PROTONIX) 40 MG EC tablet, Take 1 tablet by mouth Daily., Disp: , Rfl:     rosuvastatin (CRESTOR) 5 MG tablet, Take 1 tablet by mouth Every Night., Disp: , Rfl:     TURMERIC PO, Take 1 tablet by mouth Daily., Disp: , Rfl:     Vitamin B12 (CYANOCOBALAMIN) 500 MCG tablet tablet, Take  by mouth Daily., Disp: , Rfl:     vitamin C (ASCORBIC ACID) 500 MG tablet, Take 1 tablet by mouth Daily., Disp: , Rfl:     amoxicillin (AMOXIL) 875 MG tablet, Take 1 tablet by mouth 2 (Two) Times a Day. For infection, Disp: 20 tablet, Rfl: 0      The following portions of the patient's history were reviewed and updated as appropriate: allergies, current medications, past family history, past medical history, past social history, past surgical history and problem list.    Review of Systems   Constitutional: Negative.  Negative for chills and fever.   HENT:  Positive for sore throat. Negative for ear discharge, ear pain and sinus pressure.    Respiratory:  Positive for cough. Negative for chest tightness and shortness of breath.    Cardiovascular:  Negative for chest pain, palpitations and leg swelling.   Gastrointestinal:  Negative for diarrhea, nausea and vomiting.   Musculoskeletal:  Negative for arthralgias, back pain and myalgias.   Neurological:  Negative for dizziness, syncope and headaches.   Psychiatric/Behavioral:  Negative for confusion and sleep disturbance.        Objective   /68   Pulse 96   Temp 98.7 °F (37.1 °C)   Ht 172.7 cm (68\")   Wt 72.2 kg (159 lb 3.2 oz)   LMP  (LMP Unknown)   SpO2 98% Comment: ra  BMI 24.21 kg/m²   Physical Exam  Vitals and nursing note reviewed.   Constitutional:       Appearance: She is well-developed.   HENT:      Head: Normocephalic and " atraumatic.      Right Ear: External ear normal. Tympanic membrane is bulging.      Left Ear: External ear normal. Tympanic membrane is bulging.      Mouth/Throat:      Pharynx: Posterior oropharyngeal erythema present. No oropharyngeal exudate.      Tonsils: No tonsillar abscesses.   Eyes:      Conjunctiva/sclera: Conjunctivae normal.      Pupils: Pupils are equal, round, and reactive to light.   Neck:      Thyroid: No thyromegaly.   Cardiovascular:      Rate and Rhythm: Normal rate and regular rhythm.      Pulses: Normal pulses.      Heart sounds: Normal heart sounds. No murmur heard.     No friction rub. No gallop.   Pulmonary:      Effort: Pulmonary effort is normal.      Breath sounds: Normal breath sounds. No stridor.   Abdominal:      General: Bowel sounds are normal. There is no distension.      Palpations: Abdomen is soft.      Tenderness: There is no abdominal tenderness.   Musculoskeletal:      Cervical back: Normal range of motion and neck supple.   Lymphadenopathy:      Cervical: No cervical adenopathy.   Skin:     General: Skin is warm and dry.   Neurological:      Mental Status: She is alert and oriented to person, place, and time.      Cranial Nerves: No cranial nerve deficit.   Psychiatric:         Judgment: Judgment normal.         BMI is within normal parameters. No other follow-up for BMI required.       Results for orders placed or performed in visit on 11/21/23   POCT urinalysis dipstick, automated    Specimen: Urine   Result Value Ref Range    Color Yellow Yellow, Straw, Dark Yellow, Kimberli    Clarity, UA Clear Clear    Specific Gravity  1.010 1.005 - 1.030    pH, Urine 6.0 5.0 - 8.0    Leukocytes Negative Negative    Nitrite, UA Negative Negative    Protein, POC Negative Negative mg/dL    Glucose, UA Negative Negative mg/dL    Ketones, UA Negative Negative    Urobilinogen, UA Normal Normal, 0.2 E.U./dL    Bilirubin Negative Negative    Blood, UA Negative Negative    Lot Number [id number]      Expiration Date 1/14/25              Assessment & Plan   Diagnoses and all orders for this visit:    Peripheral polyneuropathy  -     XR Spine Lumbar 2 or 3 View; Future  -     Ambulatory Referral to Neurology    Cervicalgia  -     XR Spine Cervical 2 or 3 View; Future    Upper respiratory tract infection, unspecified type  -     amoxicillin (AMOXIL) 875 MG tablet; Take 1 tablet by mouth 2 (Two) Times a Day. For infection    Urinary tract infection without hematuria, site unspecified  -     POCT urinalysis dipstick, automated  -     amoxicillin (AMOXIL) 875 MG tablet; Take 1 tablet by mouth 2 (Two) Times a Day. For infection    Essential hypertension  Comments:  May cut back on amlodipine to 5mg at HS. monitor edema and BP.    Other orders  -     rosuvastatin (CRESTOR) 5 MG tablet; Take 1 tablet by mouth Every Night.    Adv ok to take robitussin dm, avoid decongestants.  Increase fluids, nasal saline and salt water gargles.    ER notes and labs reviewed with Pt, abnl tests explained.                 Return in about 4 weeks (around 12/19/2023) for Recheck.    Electronically signed by:    Maryse Jauregui MD

## 2023-11-22 ENCOUNTER — TELEPHONE (OUTPATIENT)
Dept: INTERNAL MEDICINE | Facility: CLINIC | Age: 73
End: 2023-11-22
Payer: MEDICARE

## 2023-11-22 NOTE — TELEPHONE ENCOUNTER
If she has fever and congestion, she should get tested for COVID.  If negative, she can take the antibiotic given yesterday.  Her urinalysis was negative therefore she does not have a UTI.

## 2023-11-22 NOTE — TELEPHONE ENCOUNTER
Let pt know of message. Pt verbalized understanding.  Pt states had covid test done today and is negative, has been having low grade fever, 99.8.

## 2023-11-22 NOTE — TELEPHONE ENCOUNTER
Caller: Annalise Winters    Relationship: Self    Best call back number: [phone]       Who are you requesting to speak with (clinical staff, provider,  specific staff member): CLINICAL      What was the call regarding: SHOULD SHE START THE ANTIBIOTIC SHE WAS GIVEN YESTERDAY, SHE HAS A FEVER TODAY    Is it okay if the provider responds through MyChart: YES

## 2024-01-16 ENCOUNTER — TELEPHONE (OUTPATIENT)
Dept: OBSTETRICS AND GYNECOLOGY | Facility: CLINIC | Age: 74
End: 2024-01-16
Payer: MEDICARE

## 2024-01-16 RX ORDER — NITROFURANTOIN 25; 75 MG/1; MG/1
100 CAPSULE ORAL 2 TIMES DAILY
Qty: 14 CAPSULE | Refills: 0 | Status: SHIPPED | OUTPATIENT
Start: 2024-01-16

## 2024-01-16 NOTE — TELEPHONE ENCOUNTER
Pt informed and stated understanding.    Per TAMMY Mejia:    I sent in Rx for her.  Please let her know.  (And stay in.  Be safe.  The roads are NOT good yet.)

## 2024-01-16 NOTE — TELEPHONE ENCOUNTER
Pt calling this am thinks she has a uti can we send her something in or does she needs to be seen please call her and advise -

## 2024-01-17 ENCOUNTER — OFFICE VISIT (OUTPATIENT)
Dept: NEUROLOGY | Facility: CLINIC | Age: 74
End: 2024-01-17
Payer: MEDICARE

## 2024-01-17 VITALS
OXYGEN SATURATION: 95 % | HEIGHT: 68 IN | WEIGHT: 158 LBS | HEART RATE: 63 BPM | SYSTOLIC BLOOD PRESSURE: 122 MMHG | BODY MASS INDEX: 23.95 KG/M2 | DIASTOLIC BLOOD PRESSURE: 68 MMHG

## 2024-01-17 DIAGNOSIS — G62.9 POLYNEUROPATHY: Primary | ICD-10-CM

## 2024-01-17 PROCEDURE — 1160F RVW MEDS BY RX/DR IN RCRD: CPT | Performed by: PSYCHIATRY & NEUROLOGY

## 2024-01-17 PROCEDURE — 3078F DIAST BP <80 MM HG: CPT | Performed by: PSYCHIATRY & NEUROLOGY

## 2024-01-17 PROCEDURE — 99213 OFFICE O/P EST LOW 20 MIN: CPT | Performed by: PSYCHIATRY & NEUROLOGY

## 2024-01-17 PROCEDURE — 1159F MED LIST DOCD IN RCRD: CPT | Performed by: PSYCHIATRY & NEUROLOGY

## 2024-01-17 PROCEDURE — 3074F SYST BP LT 130 MM HG: CPT | Performed by: PSYCHIATRY & NEUROLOGY

## 2024-01-17 NOTE — ASSESSMENT & PLAN NOTE
Idiopathic neuropathy    Labs are unremarkable    Minimal sx     Recommend watching for progression

## 2024-01-17 NOTE — PROGRESS NOTES
Subjective   Patient ID: Annalise Winters is a 73 y.o. female     Chief Complaint   Patient presents with    Peripheral Neuropathy        History of Present Illness    73 y.o. female referred by Dr Maryse Jauregui for peripheral neuropathy.     Feet started bothering her several years ago.  Decreased sensation in toes.      Shoes can feel tighter.      Teaches exercise classes.      EMG/NCS 21  distal symmetrical sensory neuropathy in the distal lower extremities.  Absent L H reflex suggestive of S1 radiculopathy.      HCT and CTA of the head which were unremarkable. A CT of the cervical spine showed advanced degenerative changes throughout the mid and lower cervical segments without critical spinal canal narrowing.      Previously followed by Molly Ramos for migraines.      Labs 23 CBC, TSH, BMP - NCS     A1C 23 5.8  B12 3/13/23 1387  SPEP NCS  Past Medical History:   Diagnosis Date    Abnormal blood chemistry     Arthritis     right knee    Atrial fibrillation     Cyst of buttocks     Diverticulosis     Dizziness     Dysuria     Esophagitis     Flushing     GERD (gastroesophageal reflux disease)     Heartburn     Hyperlipidemia     Hypertension     Hyperthyroidism     Hypothyroidism     Irritable bowel syndrome     LLQ abdominal pain     Menopause     Polyp of sigmoid colon     Sinusitis     Skin lesion of face     UTI (urinary tract infection)      Family History   Problem Relation Age of Onset    Arthritis Father     Cancer Father         prostate cancer    Other Father          at age 65    Heart attack Father     Dementia Mother     Glomerulonephritis Mother     Hypertension Mother     Other Mother          at age 88    Parkinsonism Sister     Dementia Sister     Alcohol abuse Sister     Ovarian cancer Daughter     Hypertension Other     Osteoarthritis Other      Social History     Socioeconomic History    Marital status:    Tobacco Use    Smoking status: Never      "Passive exposure: Past    Smokeless tobacco: Never   Vaping Use    Vaping Use: Never used   Substance and Sexual Activity    Alcohol use: Yes     Comment: on occasion    Drug use: Never    Sexual activity: Yes     Birth control/protection: Post-menopausal, Tubal ligation       Review of Systems   Constitutional:  Negative for activity change, fatigue and unexpected weight change.   HENT:  Negative for tinnitus and trouble swallowing.    Eyes:  Negative for photophobia and visual disturbance.   Respiratory:  Negative for apnea, cough and choking.    Cardiovascular:  Negative for leg swelling.   Gastrointestinal:  Negative for nausea and vomiting.   Endocrine: Negative for cold intolerance and heat intolerance.   Genitourinary:  Negative for difficulty urinating, frequency, menstrual problem and urgency.   Musculoskeletal:  Positive for arthralgias, gait problem and neck pain. Negative for back pain and myalgias.   Skin:  Negative for color change and rash.   Allergic/Immunologic: Negative for immunocompromised state.   Neurological:  Positive for dizziness and numbness. Negative for tremors, seizures, syncope, facial asymmetry, speech difficulty, weakness, light-headedness and headaches.   Hematological:  Negative for adenopathy. Does not bruise/bleed easily.   Psychiatric/Behavioral:  Negative for behavioral problems, confusion, decreased concentration, hallucinations and sleep disturbance.        Objective     Vitals:    01/17/24 1302   BP: 122/68   Pulse: 63   SpO2: 95%   Weight: 71.7 kg (158 lb)   Height: 172.7 cm (67.99\")       Neurologic Exam     Mental Status   Oriented to person, place, and time.   Speech: speech is normal   Level of consciousness: alert  Knowledge: good and consistent with education.   Normal comprehension.     Cranial Nerves   Cranial nerves II through XII intact.     CN II   Visual fields full to confrontation.   Visual acuity: normal  Right visual field deficit: none  Left visual field " deficit: none     CN III, IV, VI   Pupils are equal, round, and reactive to light.  Extraocular motions are normal.   Nystagmus: none   Diplopia: none  Ophthalmoparesis: none  Upgaze: normal  Downgaze: normal  Conjugate gaze: present    CN V   Facial sensation intact.   Right corneal reflex: normal  Left corneal reflex: normal    CN VII   Right facial weakness: none  Left facial weakness: none    CN VIII   Hearing: intact    CN IX, X   Palate: symmetric  Right gag reflex: normal  Left gag reflex: normal    CN XI   Right sternocleidomastoid strength: normal  Left sternocleidomastoid strength: normal    CN XII   Tongue: not atrophic  Fasciculations: absent  Tongue deviation: none    Motor Exam   Muscle bulk: normal  Overall muscle tone: normal    Strength   Strength 5/5 throughout.     Sensory Exam   Right leg light touch: decreased from toes  Left leg light touch: decreased from toes  Right leg pinprick: decreased from toes  Left leg pinprick: decreased from toes    Gait, Coordination, and Reflexes     Gait  Gait: normal    Tremor   Resting tremor: absent  Intention tremor: absent  Action tremor: absent    Reflexes   Reflexes 2+ except as noted.       Physical Exam  Eyes:      Extraocular Movements: EOM normal.      Pupils: Pupils are equal, round, and reactive to light.   Neurological:      Mental Status: She is oriented to person, place, and time.      Cranial Nerves: Cranial nerves 2-12 are intact.      Motor: Motor strength is normal.     Gait: Gait is intact.   Psychiatric:         Speech: Speech normal.         Admission on 11/24/2023, Discharged on 11/24/2023   Component Date Value Ref Range Status    SARS Antigen 11/24/2023 Detected (A)  Not Detected, Presumptive Negative Final    Internal Control 11/24/2023 Passed  Passed Final    Lot Number 11/24/2023 [id number]   Final    Expiration Date 11/24/2023 6,[id number]   Final         Assessment & Plan     Problem List Items Addressed This Visit          Neuro     Polyneuropathy - Primary    Current Assessment & Plan     Idiopathic neuropathy    Labs are unremarkable    Minimal sx     Recommend watching for progression                No follow-ups on file.

## 2024-01-22 ENCOUNTER — TELEPHONE (OUTPATIENT)
Dept: OBSTETRICS AND GYNECOLOGY | Facility: CLINIC | Age: 74
End: 2024-01-22
Payer: MEDICARE

## 2024-01-22 NOTE — TELEPHONE ENCOUNTER
Pt calling states she called on Friday and she is calling to day not sure ut is gone can she do labs somewhere and see if this med is working ?  Please call her and advise

## 2024-01-22 NOTE — TELEPHONE ENCOUNTER
Called and spoke with patient.  She states that she is currently still having symptoms.  She took the Macrobid as instructed and is on her last dose today.  Patient states that she has had some relief but she is still having the same urinary frequency increase - the burning/discomfort when urinating has subsided. She would like to know if she should return to lab for labwork for further work up or if Talya would recommend another route?

## 2024-01-22 NOTE — TELEPHONE ENCOUNTER
"\"See if she will call our office early in the morning, and let's see her again / re-evaluate.  May need to repeat the urinalysis too.\" - TAMMY Snyder    Called and spoke with patient, informed her of Talya's response.  She verified understanding.  We went ahead and got her scheduled for appt tomorrow afternoon with TAMMY Snyder for evaluation.   "

## 2024-01-23 ENCOUNTER — OFFICE VISIT (OUTPATIENT)
Dept: OBSTETRICS AND GYNECOLOGY | Facility: CLINIC | Age: 74
End: 2024-01-23
Payer: MEDICARE

## 2024-01-23 VITALS
HEIGHT: 68 IN | DIASTOLIC BLOOD PRESSURE: 80 MMHG | WEIGHT: 158 LBS | BODY MASS INDEX: 23.95 KG/M2 | SYSTOLIC BLOOD PRESSURE: 144 MMHG

## 2024-01-23 DIAGNOSIS — N95.2 ATROPHY OF VAGINA: ICD-10-CM

## 2024-01-23 DIAGNOSIS — R30.0 DYSURIA: Primary | ICD-10-CM

## 2024-01-23 PROCEDURE — 87086 URINE CULTURE/COLONY COUNT: CPT | Performed by: NURSE PRACTITIONER

## 2024-01-23 PROCEDURE — 81001 URINALYSIS AUTO W/SCOPE: CPT | Performed by: NURSE PRACTITIONER

## 2024-01-24 ENCOUNTER — TELEPHONE (OUTPATIENT)
Dept: OBSTETRICS AND GYNECOLOGY | Facility: CLINIC | Age: 74
End: 2024-01-24
Payer: MEDICARE

## 2024-01-24 LAB
BACTERIA SPEC AEROBE CULT: NO GROWTH
BACTERIA UR QL AUTO: ABNORMAL /HPF
BILIRUB UR QL STRIP: NEGATIVE
CLARITY UR: CLEAR
COLOR UR: YELLOW
GLUCOSE UR STRIP-MCNC: NEGATIVE MG/DL
HGB UR QL STRIP.AUTO: NEGATIVE
HYALINE CASTS UR QL AUTO: ABNORMAL /LPF
KETONES UR QL STRIP: NEGATIVE
LEUKOCYTE ESTERASE UR QL STRIP.AUTO: ABNORMAL
NITRITE UR QL STRIP: NEGATIVE
PH UR STRIP.AUTO: 6 [PH] (ref 5–8)
PROT UR QL STRIP: NEGATIVE
RBC # UR STRIP: ABNORMAL /HPF
REF LAB TEST METHOD: ABNORMAL
SP GR UR STRIP: 1.02 (ref 1–1.03)
SQUAMOUS #/AREA URNS HPF: ABNORMAL /HPF
UROBILINOGEN UR QL STRIP: ABNORMAL
WBC # UR STRIP: ABNORMAL /HPF

## 2024-01-24 NOTE — TELEPHONE ENCOUNTER
Pt calling this am received her test results in my chart and has several questions about them - please call her  [phone]

## 2024-01-24 NOTE — TELEPHONE ENCOUNTER
I have called the patient regarding results of urinalysis.  Urine culture was ordered separately and is pending.    I have told the patient that her urinalysis is not significantly abnormal, and that the findings may be from skin contamination.  I have also told her that the urinalysis did not meet criteria for culture, and that a culture would not have been done based on results.  Since we ordered a culture separately, the culture is being done.    Patient states that she is having significant pressure.  I have told her that I would relay this to Talya, but that Talya may want to wait on culture results before deciding if patient needs treatment or not.

## 2024-01-24 NOTE — TELEPHONE ENCOUNTER
Patient was advised during the call that we would contact her after culture results are available.  She was fine to wait on those results.    BRIANA.

## 2024-01-25 ENCOUNTER — TELEPHONE (OUTPATIENT)
Dept: OBSTETRICS AND GYNECOLOGY | Facility: CLINIC | Age: 74
End: 2024-01-25
Payer: MEDICARE

## 2024-01-25 NOTE — TELEPHONE ENCOUNTER
PT CALLING ZULLY SHE CAN NOT REPLY TO YOU IN MY CHART - SHE IS HAVING ISSUES WITH MY CHART - SHE WANTED TO PASS ALONG THAT SHE IS WAITING ON [facility]ST[facility]LEMAN TO SCHEDULE HER FOR A SCAN WITH /WO CONTRAST - AND YOU MAYBE WANTING HER TO SEE A UROLOGIST - SHOULD SHE WAIT UNTIL SHE HAS THE SCAN DONE - I ADVISED HER WE SHOULD GO AHEAD AND GET HER SCHEDULED NOT KNOWING HOW LONG IT MAKE TAKE AND IF SHE NEEDS AFTER THE SCAN SHE WILL BE SCHEDULED - WHAT ARE YOUR THOUGHTS ABOUT THIS - SHE DOES NOT WANT TO SEE [facility]-- WHO WOULD BE YOUR NEXT RECOMMENDATION IF SO?

## 2024-01-30 ENCOUNTER — TELEPHONE (OUTPATIENT)
Dept: OBSTETRICS AND GYNECOLOGY | Facility: CLINIC | Age: 74
End: 2024-01-30
Payer: MEDICARE

## 2024-01-30 DIAGNOSIS — R30.0 DYSURIA: Primary | ICD-10-CM

## 2024-01-30 NOTE — TELEPHONE ENCOUNTER
I have contacted the patient to let her know that Talya placed a referral to urology.  She has been informed that the urology office will call her to schedule.    She states that she was able to get CT results and they were negative, other than diverticulosis.  She does want to see urology and will let us know if she does not hear from that office within one week.

## 2024-01-30 NOTE — TELEPHONE ENCOUNTER
Patient had CT of the abdomen and pelvis yesterday per GI.  With oral and IV contrast.  Results have returned but have not been reviewed by provider.  Patient is complaining of urinary urgency following this procedure.  She has been reassured that she did not have a urinary tract infection (culture negative) last week.  She did take one dose of Azo today.    I have asked the patient to call GI office to see if she can get results.  Talya has already spoken to the patient regarding a possible need to refer to urology.  The patient would like to see  urology if a referral is made.  We discussed the possibility of bladder lining irritation without infection (IC), since she is symptomatic without infection as of last week.    She is going to call to try to get CT results and will call back when she has results.      BRIANA Babin.

## 2024-01-30 NOTE — TELEPHONE ENCOUNTER
Pt is calling and yesterday she had a CT scan w/ contrast @ [facility]. She is now having the urgency again, feels like another UTI and she just got over one. Maybe the last antibiotic didn't work? She just isn't sure if maybe they found something?    Please advise    Thank you

## 2024-02-04 PROCEDURE — 87077 CULTURE AEROBIC IDENTIFY: CPT | Performed by: PHYSICIAN ASSISTANT

## 2024-02-04 PROCEDURE — 87086 URINE CULTURE/COLONY COUNT: CPT | Performed by: PHYSICIAN ASSISTANT

## 2024-02-04 PROCEDURE — 87186 SC STD MICRODIL/AGAR DIL: CPT | Performed by: PHYSICIAN ASSISTANT

## 2024-02-07 NOTE — PROGRESS NOTES
Chief Complaint  Annalise Winters is a 73 y.o.  female presenting for Follow-up (Patient states that she has nocturia (2-3 times/night), s/p treatment with Macrobid because patient thought she was getting UTI.  Finished Macrobid yesterday.  CC urine collected today.  )    History of Present Illness  Annalise is a pleasant 72yo woman, , here for a problem visit.  She c/o persistent dysuria.  She was brought in to r/o vaginitis or an atrophic vaginitis.  She c/o nocturia 2-3x/ night.  Will get UA today.  She is to have ov ca screening at  next week.      The following portions of the patient's history were reviewed and updated as appropriate: allergies, current medications, past family history, past medical history, past social history, past surgical history, and problem list.    Allergies   Allergen Reactions    Ace Inhibitors Cough    Citalopram Hydrobromide Dizziness     Other reaction(s): Dizziness    Corticosteroids Rash    Paroxetine Hcl Other (See Comments)     somnolence    Other reaction(s): Other (See Comments)   somnolence         Current Outpatient Medications:     acetaminophen (TYLENOL) 500 MG tablet, Take 1 tablet by mouth As Needed., Disp: , Rfl:     amLODIPine (NORVASC) 5 MG tablet, Take 1.5 tablets by mouth 2 (Two) Times a Day., Disp: 270 tablet, Rfl: 3    amoxicillin (AMOXIL) 875 MG tablet, Take 1 tablet by mouth 2 (Two) Times a Day. For infection (Patient not taking: Reported on 2024), Disp: 20 tablet, Rfl: 0    Calcium Carbonate-Vit D-Min (CALCIUM 1200 PO), Take  by mouth., Disp: , Rfl:     cefdinir (OMNICEF) 300 MG capsule, Take 1 capsule by mouth 2 (Two) Times a Day for 7 days., Disp: 14 capsule, Rfl: 0    Cholecalciferol (VITAMIN D) 2000 UNITS capsule, Take 1 capsule by mouth Daily., Disp: , Rfl:     COLLAGEN PO, Take  by mouth., Disp: , Rfl:     estradiol (ESTRACE VAGINAL) 0.1 MG/GM vaginal cream, Insert 1 gram intravaginally at HS 1-2 times a week, Disp: 42 g, Rfl: 2     Famotidine-Ca Carb-Mag Hydrox (PEPCID COMPLETE PO), Take 1 tablet by mouth As Needed., Disp: , Rfl:     flecainide (TAMBOCOR) 50 MG tablet, Take 1 tablet by mouth 2 (Two) Times a Day., Disp: 180 tablet, Rfl: 3    levothyroxine (SYNTHROID, LEVOTHROID) 50 MCG tablet, Take 1 tablet by mouth Daily., Disp: 90 tablet, Rfl: 3    loratadine (CLARITIN) 10 MG tablet, Take 1 tablet by mouth Daily., Disp: , Rfl:     LORazepam (ATIVAN) 0.5 MG tablet, Take 1 tablet by mouth Daily As Needed for Anxiety., Disp: 30 tablet, Rfl: 0    melatonin 3 MG tablet, Take 2 tablets by mouth Every Night., Disp: , Rfl:     metoprolol tartrate (LOPRESSOR) 25 MG tablet, Take 0.5 tablets by mouth As Needed (palpitations)., Disp: 60 tablet, Rfl: 11    montelukast (SINGULAIR) 10 MG tablet, TAKE ONE TABLET BY MOUTH EVERY NIGHT AT BEDTIME, Disp: 90 tablet, Rfl: 1    nitrofurantoin, macrocrystal-monohydrate, (Macrobid) 100 MG capsule, Take 1 capsule by mouth 2 (Two) Times a Day., Disp: 14 capsule, Rfl: 0    pantoprazole (PROTONIX) 40 MG EC tablet, Take 1 tablet by mouth Daily., Disp: , Rfl:     phenazopyridine (PYRIDIUM) 100 MG tablet, Take 1 tablet by mouth 3 (Three) Times a Day As Needed for Bladder Spasms., Disp: 9 tablet, Rfl: 0    rosuvastatin (CRESTOR) 5 MG tablet, Take 1 tablet by mouth Every Night., Disp: , Rfl:     TURMERIC PO, Take 1 tablet by mouth Daily., Disp: , Rfl:     Vitamin B12 (CYANOCOBALAMIN) 500 MCG tablet tablet, Take  by mouth Daily., Disp: , Rfl:     vitamin C (ASCORBIC ACID) 500 MG tablet, Take 1 tablet by mouth Daily., Disp: , Rfl:     Past Medical History:   Diagnosis Date    Abnormal blood chemistry     Arthritis     right knee    Atrial fibrillation     Cyst of buttocks     Diverticulosis     Dizziness     Dysuria     Esophagitis     Flushing     GERD (gastroesophageal reflux disease)     Heartburn     Hyperlipidemia     Hypertension     Hyperthyroidism     Hypothyroidism     Irritable bowel syndrome     LLQ abdominal pain   "   Menopause     Polyp of sigmoid colon     Sinusitis     Skin lesion of face     UTI (urinary tract infection)         Past Surgical History:   Procedure Laterality Date    CARDIAC ABLATION  08/21/2013    Pulmonary vein ablation by Dr. Abdirahman Lackey, 08/21/2013.    REPLACEMENT TOTAL KNEE Right     RHINOPLASTY      TOTAL ABDOMINAL HYSTERECTOMY      TUBAL ABDOMINAL LIGATION      TUBAL REANASTOMOSIS         Objective  /80   Ht 172.7 cm (68\")   Wt 71.7 kg (158 lb)   LMP  (LMP Unknown)   Breastfeeding No   BMI 24.02 kg/m²     Physical Exam  Vitals and nursing note reviewed. Exam conducted with a chaperone present.   Constitutional:       General: She is not in acute distress.     Appearance: Normal appearance. She is not ill-appearing.   Abdominal:      Palpations: Abdomen is soft. There is no mass.      Tenderness: There is no abdominal tenderness.   Genitourinary:     Labia:         Right: No rash, tenderness or lesion.         Left: No rash, tenderness or lesion.       Vagina: Erythema present. No vaginal discharge.      Uterus: Absent.       Adnexa:         Right: No mass or tenderness.          Left: No mass or tenderness.        Comments: Mild vaginal atrophy; no abnormal discharge.    Anus appears wnl.  (No rectal exam performed.)  Skin:     General: Skin is warm and dry.   Neurological:      Mental Status: She is alert and oriented to person, place, and time.   Psychiatric:         Mood and Affect: Mood normal.         Behavior: Behavior normal.         Assessment/Plan   Diagnoses and all orders for this visit:    1. Dysuria (Primary)  -     Urinalysis With Microscopic - Urine, Clean Catch; Future  -     Urine Culture - , Urine, Clean Catch; Future  -     Urinalysis With Microscopic - Urine, Clean Catch  -     Urine Culture - Urine, Urine, Clean Catch    2. Atrophy of vagina    Adv to force fluids/    Procedures    40 to 64: Counseling/Anticipatory Guidance Discussed: atrophy & Tx (risks & benefits " / risks of not treating)    Return for Next scheduled follow up.    Britta Aldrich, APRN  01/23/2024

## 2024-02-09 ENCOUNTER — TELEPHONE (OUTPATIENT)
Dept: URGENT CARE | Facility: CLINIC | Age: 74
End: 2024-02-09
Payer: MEDICARE

## 2024-02-14 ENCOUNTER — TELEPHONE (OUTPATIENT)
Dept: UROLOGY | Facility: CLINIC | Age: 74
End: 2024-02-14
Payer: MEDICARE

## 2024-02-17 DIAGNOSIS — J30.89 ALLERGIC RHINITIS DUE TO OTHER ALLERGIC TRIGGER, UNSPECIFIED SEASONALITY: ICD-10-CM

## 2024-02-17 RX ORDER — ROSUVASTATIN CALCIUM 5 MG/1
5 TABLET, COATED ORAL DAILY
Qty: 90 TABLET | OUTPATIENT
Start: 2024-02-17

## 2024-02-17 RX ORDER — MONTELUKAST SODIUM 10 MG/1
TABLET ORAL
Qty: 90 TABLET | Refills: 1 | OUTPATIENT
Start: 2024-02-17

## 2024-02-20 ENCOUNTER — OFFICE VISIT (OUTPATIENT)
Dept: INTERNAL MEDICINE | Facility: CLINIC | Age: 74
End: 2024-02-20
Payer: MEDICARE

## 2024-02-20 VITALS
OXYGEN SATURATION: 97 % | BODY MASS INDEX: 23.43 KG/M2 | DIASTOLIC BLOOD PRESSURE: 72 MMHG | HEIGHT: 69 IN | SYSTOLIC BLOOD PRESSURE: 126 MMHG | WEIGHT: 158.2 LBS | TEMPERATURE: 97.5 F | HEART RATE: 63 BPM

## 2024-02-20 DIAGNOSIS — F41.9 ANXIETY: ICD-10-CM

## 2024-02-20 DIAGNOSIS — N39.0 RECURRENT UTI: Primary | ICD-10-CM

## 2024-02-20 DIAGNOSIS — E78.5 DYSLIPIDEMIA: ICD-10-CM

## 2024-02-20 DIAGNOSIS — L98.9 SKIN LESION: ICD-10-CM

## 2024-02-20 DIAGNOSIS — J30.89 ALLERGIC RHINITIS DUE TO OTHER ALLERGIC TRIGGER, UNSPECIFIED SEASONALITY: ICD-10-CM

## 2024-02-20 PROCEDURE — 3074F SYST BP LT 130 MM HG: CPT | Performed by: INTERNAL MEDICINE

## 2024-02-20 PROCEDURE — 3078F DIAST BP <80 MM HG: CPT | Performed by: INTERNAL MEDICINE

## 2024-02-20 PROCEDURE — 99214 OFFICE O/P EST MOD 30 MIN: CPT | Performed by: INTERNAL MEDICINE

## 2024-02-20 RX ORDER — MONTELUKAST SODIUM 10 MG/1
10 TABLET ORAL NIGHTLY PRN
Start: 2024-02-20

## 2024-02-20 RX ORDER — LORAZEPAM 0.5 MG/1
0.5 TABLET ORAL DAILY PRN
Qty: 30 TABLET | Refills: 0 | Status: SHIPPED | OUTPATIENT
Start: 2024-02-20

## 2024-02-20 RX ORDER — ROSUVASTATIN CALCIUM 5 MG/1
5 TABLET, COATED ORAL NIGHTLY
Qty: 90 TABLET | Refills: 3 | Status: SHIPPED | OUTPATIENT
Start: 2024-02-20

## 2024-02-20 NOTE — PROGRESS NOTES
Hypertension and Diarrhea (With abx)    Subjective   Annalise Winters is a 73 y.o. female is here today for follow-up.    Hypertension  Pertinent negatives include no chest pain, headaches, palpitations or shortness of breath.   Diarrhea   Pertinent negatives include no arthralgias, chills, coughing, fever, headaches, myalgias or vomiting.     S/p multiple UTIs and then Abx gave her diarrhea( Omnicef last) , also had been on amox, macrobid, and also on abx for a dental issue.  All this caused severe diarrhea- seen by Dr. Hall and tested for c.diff, and results are pending.  Took imodium, and yesterday started getting better. Uses imodium prior to class.  Has appt with Urology tomorrow.. GI had advised Enteragam, for her diarrhea.  HAs not started the estradiol cream vaginal.  Hydrates well.  Rt clavicular area skin tag.    Current Outpatient Medications:     acetaminophen (TYLENOL) 500 MG tablet, Take 1 tablet by mouth As Needed., Disp: , Rfl:     amLODIPine (NORVASC) 5 MG tablet, Take 1.5 tablets by mouth 2 (Two) Times a Day., Disp: 270 tablet, Rfl: 3    Calcium Carbonate-Vit D-Min (CALCIUM 1200 PO), Take  by mouth., Disp: , Rfl:     Cholecalciferol (VITAMIN D) 2000 UNITS capsule, Take 1 capsule by mouth Daily., Disp: , Rfl:     COLLAGEN PO, Take  by mouth., Disp: , Rfl:     Cranberry 50 MG chewable tablet, Chew., Disp: , Rfl:     estradiol (ESTRACE VAGINAL) 0.1 MG/GM vaginal cream, Insert 1 gram intravaginally at HS 1-2 times a week, Disp: 42 g, Rfl: 2    Famotidine-Ca Carb-Mag Hydrox (PEPCID COMPLETE PO), Take 1 tablet by mouth As Needed., Disp: , Rfl:     flecainide (TAMBOCOR) 50 MG tablet, Take 1 tablet by mouth 2 (Two) Times a Day., Disp: 180 tablet, Rfl: 3    levothyroxine (SYNTHROID, LEVOTHROID) 50 MCG tablet, Take 1 tablet by mouth Daily., Disp: 90 tablet, Rfl: 3    loratadine (CLARITIN) 10 MG tablet, Take 1 tablet by mouth Daily., Disp: , Rfl:     LORazepam (ATIVAN) 0.5 MG tablet, Take 1 tablet by mouth  "Daily As Needed for Anxiety., Disp: 30 tablet, Rfl: 0    melatonin 3 MG tablet, Take 2 tablets by mouth Every Night., Disp: , Rfl:     metoprolol tartrate (LOPRESSOR) 25 MG tablet, Take 0.5 tablets by mouth As Needed (palpitations)., Disp: 60 tablet, Rfl: 11    montelukast (SINGULAIR) 10 MG tablet, Take 1 tablet by mouth At Night As Needed (allergies)., Disp: , Rfl:     pantoprazole (PROTONIX) 40 MG EC tablet, Take 1 tablet by mouth Daily., Disp: , Rfl:     rosuvastatin (CRESTOR) 5 MG tablet, Take 1 tablet by mouth Every Night., Disp: 90 tablet, Rfl: 3    TURMERIC PO, Take 1 tablet by mouth Daily., Disp: , Rfl:     Vitamin B12 (CYANOCOBALAMIN) 500 MCG tablet tablet, Take  by mouth Daily., Disp: , Rfl:     vitamin C (ASCORBIC ACID) 500 MG tablet, Take 1 tablet by mouth Daily., Disp: , Rfl:       The following portions of the patient's history were reviewed and updated as appropriate: allergies, current medications, past family history, past medical history, past social history, past surgical history and problem list.    Review of Systems   Constitutional: Negative.  Negative for chills and fever.   HENT:  Negative for ear discharge, ear pain, sinus pressure and sore throat.    Respiratory:  Negative for cough, chest tightness and shortness of breath.    Cardiovascular:  Negative for chest pain, palpitations and leg swelling.   Gastrointestinal:  Positive for diarrhea. Negative for nausea and vomiting.   Genitourinary:         Recurrent uti   Musculoskeletal:  Negative for arthralgias, back pain and myalgias.   Skin:         Rt shoulder lesion   Neurological:  Negative for dizziness, syncope and headaches.   Psychiatric/Behavioral:  Negative for confusion and sleep disturbance.        Objective   /72   Pulse 63   Temp 97.5 °F (36.4 °C)   Ht 174 cm (68.5\")   Wt 71.8 kg (158 lb 3.2 oz)   LMP  (LMP Unknown)   SpO2 97% Comment: ra  BMI 23.70 kg/m²   Physical Exam  Vitals and nursing note reviewed. "   Constitutional:       Appearance: She is well-developed.   HENT:      Head: Normocephalic and atraumatic.      Right Ear: External ear normal.      Left Ear: External ear normal.      Mouth/Throat:      Pharynx: No oropharyngeal exudate.   Eyes:      Conjunctiva/sclera: Conjunctivae normal.      Pupils: Pupils are equal, round, and reactive to light.   Neck:      Thyroid: No thyromegaly.   Cardiovascular:      Rate and Rhythm: Normal rate and regular rhythm.      Pulses: Normal pulses.      Heart sounds: Normal heart sounds. No murmur heard.     No friction rub. No gallop.   Pulmonary:      Effort: Pulmonary effort is normal.      Breath sounds: Normal breath sounds.   Abdominal:      General: Bowel sounds are normal. There is no distension.      Palpations: Abdomen is soft.      Tenderness: There is no abdominal tenderness.   Musculoskeletal:      Cervical back: Neck supple.      Right lower leg: Edema (1+) present.      Left lower leg: Edema (1+) present.   Skin:     General: Skin is warm and dry.      Findings: Lesion (5mm nodular lesion) present.          Neurological:      Mental Status: She is alert and oriented to person, place, and time.      Cranial Nerves: No cranial nerve deficit.   Psychiatric:         Judgment: Judgment normal.         BMI is within normal parameters. No other follow-up for BMI required.       Results for orders placed or performed during the hospital encounter of 02/04/24   Urine Culture - Urine, Urine, Clean Catch    Specimen: Urine, Clean Catch   Result Value Ref Range    Urine Culture 50,000 CFU/mL Escherichia coli (A)        Susceptibility    Escherichia coli - CHARLES     Amoxicillin + Clavulanate  Susceptible ug/ml     Ampicillin  Resistant ug/ml     Ampicillin + Sulbactam  Intermediate ug/ml     Cefazolin  Susceptible ug/ml     Cefepime  Susceptible ug/ml     Ceftazidime  Susceptible ug/ml     Ceftriaxone  Susceptible ug/ml     Gentamicin  Susceptible ug/ml     Levofloxacin   Susceptible ug/ml     Nitrofurantoin  Susceptible ug/ml     Piperacillin + Tazobactam  Susceptible ug/ml     Trimethoprim + Sulfamethoxazole  Susceptible ug/ml   POC Urinalysis Dipstick, Multipro (Automated Dipstick)    Specimen: Urine   Result Value Ref Range    Color Yellow     Clarity, UA Cloudy (A)     Glucose, UA Negative mg/dL    Bilirubin Negative     Ketones, UA Negative     Specific Gravity  1.020 1.005 - 1.030    Blood, UA Trace (A)     pH, Urine 5.5 5.0 - 8.0    Protein, POC Negative mg/dL    Urobilinogen, UA Normal     Nitrite, UA Negative     Leukocytes Moderate (2+) (A)              Assessment & Plan   Diagnoses and all orders for this visit:    Recurrent UTI  Comments:  will discuss hiprex and estrogen with urology    Allergic rhinitis due to other allergic trigger, unspecified seasonality  -     montelukast (SINGULAIR) 10 MG tablet; Take 1 tablet by mouth At Night As Needed (allergies).    Dyslipidemia  -     rosuvastatin (CRESTOR) 5 MG tablet; Take 1 tablet by mouth Every Night.    Anxiety  -     LORazepam (ATIVAN) 0.5 MG tablet; Take 1 tablet by mouth Daily As Needed for Anxiety.    Skin lesion  Comments:  monitor and call derm if growing.    Other orders  -     Cranberry 50 MG chewable tablet; Chew.           Would recommend Hiprex and Estrace vaginal.  Add probiotic    Return for Next scheduled follow up.    Electronically signed by:    Maryse Jauregui MD

## 2024-02-21 ENCOUNTER — OFFICE VISIT (OUTPATIENT)
Dept: UROLOGY | Facility: CLINIC | Age: 74
End: 2024-02-21
Payer: MEDICARE

## 2024-02-21 VITALS — WEIGHT: 158 LBS | OXYGEN SATURATION: 98 % | BODY MASS INDEX: 23.4 KG/M2 | HEART RATE: 65 BPM | HEIGHT: 69 IN

## 2024-02-21 DIAGNOSIS — N39.0 RECURRENT UTI: Primary | ICD-10-CM

## 2024-02-21 DIAGNOSIS — N95.2 VAGINAL ATROPHY: ICD-10-CM

## 2024-02-21 LAB
BILIRUB BLD-MCNC: NEGATIVE MG/DL
CLARITY, POC: CLEAR
COLOR UR: YELLOW
EXPIRATION DATE: NORMAL
GLUCOSE UR STRIP-MCNC: NEGATIVE MG/DL
KETONES UR QL: NEGATIVE
LEUKOCYTE EST, POC: NEGATIVE
Lab: NORMAL
NITRITE UR-MCNC: NEGATIVE MG/ML
PH UR: 6 [PH] (ref 5–8)
PROT UR STRIP-MCNC: NEGATIVE MG/DL
RBC # UR STRIP: NEGATIVE /UL
SP GR UR: 1.02 (ref 1–1.03)
UROBILINOGEN UR QL: NORMAL

## 2024-02-21 RX ORDER — ESTRADIOL 0.1 MG/G
CREAM VAGINAL
Qty: 42 G | Refills: 11 | Status: SHIPPED | OUTPATIENT
Start: 2024-02-21

## 2024-02-21 RX ORDER — METHENAMINE HIPPURATE 1000 MG/1
1 TABLET ORAL 2 TIMES DAILY WITH MEALS
Qty: 60 TABLET | Refills: 11 | Status: SHIPPED | OUTPATIENT
Start: 2024-02-21

## 2024-02-21 NOTE — PROGRESS NOTES
Chief Complaint  Frequent UTI    Referring Provider:  Britta Aldrich, *    HPI  Patient is a 73 y.o. female with hx DEE (1990s) who presents as a referral for multiple UTIs.      She reports approximately 5 infections in the last 6 months.     UTI symptoms include bladder discomfort, dysuria, urgency     Patient Denies current Sx: yes    Symptoms resolve promptly with antibiotics:  yes  History of hospitalization for UTI?     no  Records of positive urine cultures?    yes  Relationship with infections and sexual activity? no, sexually active, but no relationship   Ongoing problems with constipation?  no     Urinary incontinence?     no    Vaginal Dryness?     no        History of gross hematuria?    no    How many ounces of water per day?    64oz+ (at least 2L daily)    Bladder & Bowel Symptom Questionnaire    How often do you usually urinate during the day ?   4 - At least once an hour    2.   How many timed do you urinate at night?   2 - 3 times at night   3.   What is the reason that you usually urinate?   2 - Moderate urge (can delay 10-60 min)   4.   Once you get the urge to go, how long can you     comfortably delay?   2 - 10-30 min   5.   How often do you get a sudden urge that makes you rush to the bathroom?   2 - A few times a month   6.   How often does a sudden urge to urinate result in you leaking urine or wetting pads?   1 - Rarely   7.  In your opinion, how good is your bladder control?   2 - Good   8.  Do you have accidental bowel leakage?   yes   9.  Do you have difficulty fully emptying your bladder?   yes   10.  Do you experience accidental leakage when performing some physical activity such as coughing, sneezing, laughing or exercise?   yes   11. Have you tried medications to help improve your symptoms?   no   12. Would you be interested in learning about a long-lasting option that may help you with your symptoms?   yes                                                                              Total Score   17     0-7 (Mild) 8-16 (Moderate) 17-28 (Severe)      Past Medical History  Past Medical History:   Diagnosis Date    Abnormal blood chemistry     Arthritis     right knee    Atrial fibrillation     Cyst of buttocks     Diverticulosis     Dizziness     Dysuria     Esophagitis     Flushing     GERD (gastroesophageal reflux disease)     Heartburn     Hyperlipidemia     Hypertension     Hyperthyroidism     Hypothyroidism     Irritable bowel syndrome     LLQ abdominal pain     Menopause     Polyp of sigmoid colon     Sinusitis     Skin lesion of face     UTI (urinary tract infection)        Past Surgical History  Past Surgical History:   Procedure Laterality Date    CARDIAC ABLATION  08/21/2013    Pulmonary vein ablation by Dr. Abdirahman Lackey, 08/21/2013.    REPLACEMENT TOTAL KNEE Right     RHINOPLASTY      TOTAL ABDOMINAL HYSTERECTOMY      TUBAL ABDOMINAL LIGATION      TUBAL REANASTOMOSIS         Medications    Current Outpatient Medications:     acetaminophen (TYLENOL) 500 MG tablet, Take 1 tablet by mouth As Needed., Disp: , Rfl:     amLODIPine (NORVASC) 5 MG tablet, Take 1.5 tablets by mouth 2 (Two) Times a Day., Disp: 270 tablet, Rfl: 3    Calcium Carbonate-Vit D-Min (CALCIUM 1200 PO), Take  by mouth., Disp: , Rfl:     Cholecalciferol (VITAMIN D) 2000 UNITS capsule, Take 1 capsule by mouth Daily., Disp: , Rfl:     COLLAGEN PO, Take  by mouth., Disp: , Rfl:     Cranberry 50 MG chewable tablet, Chew., Disp: , Rfl:     estradiol (ESTRACE VAGINAL) 0.1 MG/GM vaginal cream, DISPOSE OF APPLICATOR; apply pea-sized amount to urethra 3x per week after bathing, Disp: 42 g, Rfl: 11    Famotidine-Ca Carb-Mag Hydrox (PEPCID COMPLETE PO), Take 1 tablet by mouth As Needed., Disp: , Rfl:     flecainide (TAMBOCOR) 50 MG tablet, Take 1 tablet by mouth 2 (Two) Times a Day., Disp: 180 tablet, Rfl: 3    levothyroxine (SYNTHROID, LEVOTHROID) 50 MCG tablet, Take 1 tablet by mouth Daily., Disp: 90 tablet, Rfl: 3     loratadine (CLARITIN) 10 MG tablet, Take 1 tablet by mouth Daily., Disp: , Rfl:     LORazepam (ATIVAN) 0.5 MG tablet, Take 1 tablet by mouth Daily As Needed for Anxiety., Disp: 30 tablet, Rfl: 0    melatonin 3 MG tablet, Take 2 tablets by mouth Every Night., Disp: , Rfl:     metoprolol tartrate (LOPRESSOR) 25 MG tablet, Take 0.5 tablets by mouth As Needed (palpitations)., Disp: 60 tablet, Rfl: 11    montelukast (SINGULAIR) 10 MG tablet, Take 1 tablet by mouth At Night As Needed (allergies)., Disp: , Rfl:     pantoprazole (PROTONIX) 40 MG EC tablet, Take 1 tablet by mouth Daily., Disp: , Rfl:     rosuvastatin (CRESTOR) 5 MG tablet, Take 1 tablet by mouth Every Night., Disp: 90 tablet, Rfl: 3    TURMERIC PO, Take 1 tablet by mouth Daily., Disp: , Rfl:     Vitamin B12 (CYANOCOBALAMIN) 500 MCG tablet tablet, Take  by mouth Daily., Disp: , Rfl:     vitamin C (ASCORBIC ACID) 500 MG tablet, Take 1 tablet by mouth Daily., Disp: , Rfl:     methenamine (HIPREX) 1 g tablet, Take 1 tablet by mouth 2 (Two) Times a Day With Meals., Disp: 60 tablet, Rfl: 11    Allergies  Allergies   Allergen Reactions    Ceftin [Cefuroxime] Diarrhea    Ace Inhibitors Cough    Citalopram Hydrobromide Dizziness     Other reaction(s): Dizziness    Corticosteroids Rash    Paroxetine Hcl Other (See Comments)     somnolence    Other reaction(s): Other (See Comments)   somnolence       Social History  Social History     Socioeconomic History    Marital status:    Tobacco Use    Smoking status: Never     Passive exposure: Past    Smokeless tobacco: Never   Vaping Use    Vaping Use: Never used   Substance and Sexual Activity    Alcohol use: Yes     Comment: Occasional glass of wine    Drug use: Never    Sexual activity: Yes     Partners: Male     Birth control/protection: Post-menopausal, None, Tubal ligation       Family History  Family History   Problem Relation Age of Onset    Arthritis Father     Cancer Father         prostate cancer    Other  "Father          at age 65    Heart attack Father     Dementia Mother     Glomerulonephritis Mother     Hypertension Mother     Other Mother          at age 88    Parkinsonism Sister     Dementia Sister     Alcohol abuse Sister     Ovarian cancer Daughter     Hypertension Other     Osteoarthritis Other          Physical Exam  Visit Vitals  Pulse 65   Ht 174 cm (68.5\")   Wt 71.7 kg (158 lb)   LMP  (LMP Unknown)   SpO2 98%   BMI 23.67 kg/m²       Labs  Urine Culture          2023    15:56 2024    14:38 2024    14:24   Urine Culture   Urine Culture 50,000 CFU/mL Escherichia coli  No growth  50,000 CFU/mL Escherichia coli         Brief Urine Lab Results  (Last result in the past 365 days)        Color   Clarity   Blood   Leuk Est   Nitrite   Protein   CREAT   Urine HCG        24 1552 Yellow   Clear   Negative   Negative   Negative   Negative                     Post-Void Residual  A post-void residual was measured by ultrasonic bladder scanner by staff: 19 mL      Radiographic Studies  CT Abdomen Pelvis With Contrast    Result Date: 2024  1. No CT evidence of acute intra-abdominal or intrapelvic abnormality. 2. Moderate to large amount of retained stool. 3. Diverticulosis without convincing diverticulitis. Images reviewed, interpreted, and dictated by Virginia Myers MD        Assessment  Ms. Winters is a 73 y.o. female who presented today with concern for Fernanda.    This patient has previously been managing her symptoms with: antibiotics and fluids. PCP has previously recommended estrace, but patient had read the package insert and felt uncertain about the boxed warnings. We discussed that currently, the boxed warnings for estrace creams are applied to all estrogen products, regardless of format. We also discussed that topical/periurethral estrace cream does not increase estrogen in the blood stream and has not been shown to increase the risk of blood clots, cardiovascular disease " or AE, breast CA or have any other systemic effects. I also highly recommend she consider this therapy given its well established safety and efficacy profile in the urology, urogyn, gynecology, and oncology literature.     Previous urine cultures significant for: recurrent E Coli     RF for Fernanda: postmenopausal status     Given the culture proven recurrence of her UTIs, we will start Hiprex while estrace cream use is starting as it would be expected to take several weeks to begin seeing benefit.     Medical Treatment  Vaginal Estrogen Therapy - After menopause a decline in estrogen can affect the urethra and bladder tissues.  pH imbalances can result in the loss of normal bacteria in the vagina and around the urethra, increasing risk of infections and urinary symptoms.  Restoration of normal blood flow with estrogen treatment and correction of pH imbalances may help to relieve bladder overactivity symptoms.      DISCUSSION SUMMARY  Diagnoses and all orders for this visit:    1. Recurrent UTI (Primary)  -     POC Urinalysis Dipstick, Automated  -     methenamine (HIPREX) 1 g tablet; Take 1 tablet by mouth 2 (Two) Times a Day With Meals.  Dispense: 60 tablet; Refill: 11    2. Vaginal atrophy  -     estradiol (ESTRACE VAGINAL) 0.1 MG/GM vaginal cream; DISPOSE OF APPLICATOR; apply pea-sized amount to urethra 3x per week after bathing  Dispense: 42 g; Refill: 11         Follow Up:   Return in about 6 weeks (around 4/3/2024).        Leila Mac PA-C  [facility]

## 2024-03-04 ENCOUNTER — CLINICAL SUPPORT (OUTPATIENT)
Dept: UROLOGY | Facility: CLINIC | Age: 74
End: 2024-03-04
Payer: MEDICARE

## 2024-03-04 ENCOUNTER — TELEPHONE (OUTPATIENT)
Dept: UROLOGY | Facility: CLINIC | Age: 74
End: 2024-03-04
Payer: MEDICARE

## 2024-03-04 DIAGNOSIS — N39.0 RECURRENT UTI: Primary | ICD-10-CM

## 2024-03-04 PROCEDURE — 87086 URINE CULTURE/COLONY COUNT: CPT | Performed by: PHYSICIAN ASSISTANT

## 2024-03-04 NOTE — TELEPHONE ENCOUNTER
Patient just recently was treated for uti and after finishing abx is now having symptoms and asked to bring in a urine for cx and u/a.  I told her to go ahead and bring one and we will send it off for cx.

## 2024-03-05 LAB — BACTERIA SPEC AEROBE CULT: NO GROWTH

## 2024-03-18 ENCOUNTER — LAB (OUTPATIENT)
Dept: LAB | Facility: HOSPITAL | Age: 74
End: 2024-03-18
Payer: MEDICARE

## 2024-03-18 ENCOUNTER — OFFICE VISIT (OUTPATIENT)
Dept: INTERNAL MEDICINE | Facility: CLINIC | Age: 74
End: 2024-03-18
Payer: MEDICARE

## 2024-03-18 VITALS
HEIGHT: 69 IN | BODY MASS INDEX: 23.11 KG/M2 | HEART RATE: 58 BPM | OXYGEN SATURATION: 96 % | SYSTOLIC BLOOD PRESSURE: 116 MMHG | DIASTOLIC BLOOD PRESSURE: 68 MMHG | TEMPERATURE: 98.2 F | WEIGHT: 156 LBS

## 2024-03-18 DIAGNOSIS — E55.9 VITAMIN D DEFICIENCY: ICD-10-CM

## 2024-03-18 DIAGNOSIS — F41.9 ANXIETY: ICD-10-CM

## 2024-03-18 DIAGNOSIS — E03.9 HYPOTHYROIDISM, UNSPECIFIED TYPE: ICD-10-CM

## 2024-03-18 DIAGNOSIS — I49.5 TACHYCARDIA-BRADYCARDIA SYNDROME: ICD-10-CM

## 2024-03-18 DIAGNOSIS — R73.01 IFG (IMPAIRED FASTING GLUCOSE): ICD-10-CM

## 2024-03-18 DIAGNOSIS — E78.2 MIXED HYPERLIPIDEMIA: ICD-10-CM

## 2024-03-18 DIAGNOSIS — Z00.00 MEDICARE ANNUAL WELLNESS VISIT, SUBSEQUENT: Primary | ICD-10-CM

## 2024-03-18 LAB
25(OH)D3 SERPL-MCNC: 61.9 NG/ML (ref 30–100)
ALBUMIN SERPL-MCNC: 4.6 G/DL (ref 3.5–5.2)
ALBUMIN/GLOB SERPL: 1.8 G/DL
ALP SERPL-CCNC: 71 U/L (ref 39–117)
ALT SERPL W P-5'-P-CCNC: 16 U/L (ref 1–33)
ANION GAP SERPL CALCULATED.3IONS-SCNC: 10 MMOL/L (ref 5–15)
AST SERPL-CCNC: 14 U/L (ref 1–32)
BILIRUB SERPL-MCNC: 0.6 MG/DL (ref 0–1.2)
BUN SERPL-MCNC: 16 MG/DL (ref 8–23)
BUN/CREAT SERPL: 18.4 (ref 7–25)
CALCIUM SPEC-SCNC: 10.1 MG/DL (ref 8.6–10.5)
CHLORIDE SERPL-SCNC: 100 MMOL/L (ref 98–107)
CHOLEST SERPL-MCNC: 194 MG/DL (ref 0–200)
CO2 SERPL-SCNC: 29 MMOL/L (ref 22–29)
CREAT SERPL-MCNC: 0.87 MG/DL (ref 0.57–1)
DEPRECATED RDW RBC AUTO: 40.4 FL (ref 37–54)
EGFRCR SERPLBLD CKD-EPI 2021: 69.6 ML/MIN/1.73
ERYTHROCYTE [DISTWIDTH] IN BLOOD BY AUTOMATED COUNT: 12.8 % (ref 12.3–15.4)
GLOBULIN UR ELPH-MCNC: 2.5 GM/DL
GLUCOSE SERPL-MCNC: 93 MG/DL (ref 65–99)
HBA1C MFR BLD: 5.7 % (ref 4.8–5.6)
HCT VFR BLD AUTO: 39.3 % (ref 34–46.6)
HDLC SERPL-MCNC: 83 MG/DL (ref 40–60)
HGB BLD-MCNC: 12.8 G/DL (ref 12–15.9)
LDLC SERPL CALC-MCNC: 101 MG/DL (ref 0–100)
LDLC/HDLC SERPL: 1.21 {RATIO}
MCH RBC QN AUTO: 28.6 PG (ref 26.6–33)
MCHC RBC AUTO-ENTMCNC: 32.6 G/DL (ref 31.5–35.7)
MCV RBC AUTO: 87.7 FL (ref 79–97)
PLATELET # BLD AUTO: 246 10*3/MM3 (ref 140–450)
PMV BLD AUTO: 9.9 FL (ref 6–12)
POTASSIUM SERPL-SCNC: 3.7 MMOL/L (ref 3.5–5.2)
PROT SERPL-MCNC: 7.1 G/DL (ref 6–8.5)
RBC # BLD AUTO: 4.48 10*6/MM3 (ref 3.77–5.28)
SODIUM SERPL-SCNC: 139 MMOL/L (ref 136–145)
TRIGL SERPL-MCNC: 53 MG/DL (ref 0–150)
TSH SERPL DL<=0.05 MIU/L-ACNC: 1.31 UIU/ML (ref 0.27–4.2)
VIT B12 BLD-MCNC: >2000 PG/ML (ref 211–946)
VLDLC SERPL-MCNC: 10 MG/DL (ref 5–40)
WBC NRBC COR # BLD AUTO: 4.52 10*3/MM3 (ref 3.4–10.8)

## 2024-03-18 PROCEDURE — 83036 HEMOGLOBIN GLYCOSYLATED A1C: CPT

## 2024-03-18 PROCEDURE — 85027 COMPLETE CBC AUTOMATED: CPT

## 2024-03-18 PROCEDURE — 84443 ASSAY THYROID STIM HORMONE: CPT

## 2024-03-18 PROCEDURE — 82306 VITAMIN D 25 HYDROXY: CPT

## 2024-03-18 PROCEDURE — 80061 LIPID PANEL: CPT

## 2024-03-18 PROCEDURE — 82607 VITAMIN B-12: CPT

## 2024-03-18 PROCEDURE — 80053 COMPREHEN METABOLIC PANEL: CPT

## 2024-03-18 RX ORDER — LEVOTHYROXINE SODIUM 0.05 MG/1
50 TABLET ORAL DAILY
Qty: 90 TABLET | Refills: 3 | Status: SHIPPED | OUTPATIENT
Start: 2024-03-18

## 2024-03-18 RX ORDER — ROSUVASTATIN CALCIUM 5 MG/1
5 TABLET, COATED ORAL NIGHTLY
Qty: 90 TABLET | Refills: 3 | Status: SHIPPED | OUTPATIENT
Start: 2024-03-18

## 2024-03-18 RX ORDER — LORAZEPAM 0.5 MG/1
0.5 TABLET ORAL DAILY PRN
Qty: 30 TABLET | Refills: 0 | Status: SHIPPED | OUTPATIENT
Start: 2024-03-18

## 2024-03-18 NOTE — PROGRESS NOTES
The ABCs of the Annual Wellness Visit  Subsequent Medicare Wellness Visit    Subjective    Annalise Winters is a 73 y.o. female who presents for a Subsequent Medicare Wellness Visit.    The following portions of the patient's history were reviewed and   updated as appropriate: allergies, current medications, past family history, past medical history, past social history, past surgical history, and problem list.    Compared to one year ago, the patient feels her physical   health is the same.    Compared to one year ago, the patient feels her mental   health is the same.    Recent Hospitalizations:  She was not admitted to the hospital during the last year.       Current Medical Providers:  Patient Care Team:  Maryse Jauregui MD as PCP - General (Internal Medicine)  Yumiko Hall MD as Consulting Physician (Gastroenterology)  Kuldeep Bell MD as Consulting Physician (Cardiac Electrophysiology)  Britta Aldrich APRN as Nurse Practitioner (Obstetrics and Gynecology)  Christiano Vargas MD as Consulting Physician (Orthopedic Surgery)    Outpatient Medications Prior to Visit   Medication Sig Dispense Refill    acetaminophen (TYLENOL) 500 MG tablet Take 1 tablet by mouth As Needed.      amLODIPine (NORVASC) 5 MG tablet Take 1.5 tablets by mouth 2 (Two) Times a Day. 270 tablet 3    Calcium Carbonate-Vit D-Min (CALCIUM 1200 PO) Take  by mouth.      Cholecalciferol (VITAMIN D) 2000 UNITS capsule Take 1 capsule by mouth Daily.      COLLAGEN PO Take  by mouth.      Cranberry 50 MG chewable tablet Chew.      estradiol (ESTRACE VAGINAL) 0.1 MG/GM vaginal cream DISPOSE OF APPLICATOR; apply pea-sized amount to urethra 3x per week after bathing 42 g 11    Famotidine-Ca Carb-Mag Hydrox (PEPCID COMPLETE PO) Take 1 tablet by mouth As Needed.      flecainide (TAMBOCOR) 50 MG tablet Take 1 tablet by mouth 2 (Two) Times a Day. 180 tablet 3    loratadine (CLARITIN) 10 MG tablet Take 1 tablet by mouth Daily.      melatonin  3 MG tablet Take 2 tablets by mouth Every Night.      methenamine (HIPREX) 1 g tablet Take 1 tablet by mouth 2 (Two) Times a Day With Meals. 60 tablet 11    metoprolol tartrate (LOPRESSOR) 25 MG tablet Take 0.5 tablets by mouth As Needed (palpitations). 60 tablet 11    pantoprazole (PROTONIX) 40 MG EC tablet Take 1 tablet by mouth Daily.      TURMERIC PO Take 1 tablet by mouth Daily.      Vitamin B12 (CYANOCOBALAMIN) 500 MCG tablet tablet Take  by mouth Daily.      vitamin C (ASCORBIC ACID) 500 MG tablet Take 1 tablet by mouth Daily.      levothyroxine (SYNTHROID, LEVOTHROID) 50 MCG tablet Take 1 tablet by mouth Daily. 90 tablet 3    LORazepam (ATIVAN) 0.5 MG tablet Take 1 tablet by mouth Daily As Needed for Anxiety. 30 tablet 0    rosuvastatin (CRESTOR) 5 MG tablet Take 1 tablet by mouth Every Night. 90 tablet 3    SBI/Protein Isolate (ENTERAGAM PO) Take  by mouth.      montelukast (SINGULAIR) 10 MG tablet Take 1 tablet by mouth At Night As Needed (allergies).       No facility-administered medications prior to visit.       No opioid medication identified on active medication list. I have reviewed chart for other potential  high risk medication/s and harmful drug interactions in the elderly.        Aspirin is not on active medication list.  Aspirin use is not indicated based on review of current medical condition/s. Risk of harm outweighs potential benefits.  .    Patient Active Problem List   Diagnosis    Asthma    Paroxysmal atrial fibrillation    Hypertonicity of bladder    Essential hypertension    Hypothyroidism    Anxiety    Back pain    Hyperthyroidism    GERD (gastroesophageal reflux disease)    Allergic rhinitis    Dysuria    Skin lesion of face    Vitamin D deficiency    Frontal skull lesion    B12 deficiency    Diverticulosis    Polyneuropathy    Numbness    S/P TKR (total knee replacement), right    Tachycardia-bradycardia syndrome    Atrial tachycardia     Advance Care Planning   Advance Care  "Planning     Advance Directive is not on file.  ACP discussion was held with the patient during this visit. Patient has an advance directive (not in EMR), copy requested.     Objective    Vitals:    03/18/24 0846   BP: 116/68   Pulse: 58   Temp: 98.2 °F (36.8 °C)   SpO2: 96%  Comment: ra   Weight: 70.8 kg (156 lb)   Height: 174 cm (68.5\")   PainSc: 0-No pain     Estimated body mass index is 23.37 kg/m² as calculated from the following:    Height as of this encounter: 174 cm (68.5\").    Weight as of this encounter: 70.8 kg (156 lb).    BMI is within normal parameters. No other follow-up for BMI required.      Does the patient have evidence of cognitive impairment? No          HEALTH RISK ASSESSMENT    Smoking Status:  Social History     Tobacco Use   Smoking Status Never    Passive exposure: Past   Smokeless Tobacco Never     Alcohol Consumption:  Social History     Substance and Sexual Activity   Alcohol Use Yes    Comment: Occasional glass of wine     Fall Risk Screen:    JOSUÉ Fall Risk Assessment was completed, and patient is at LOW risk for falls.Assessment completed on:3/18/2024    Depression Screening:      3/18/2024     8:54 AM   PHQ-2/PHQ-9 Depression Screening   Little Interest or Pleasure in Doing Things 0-->not at all   Feeling Down, Depressed or Hopeless 0-->not at all   PHQ-9: Brief Depression Severity Measure Score 0       Health Habits and Functional and Cognitive Screening:      3/18/2024     8:53 AM   Functional & Cognitive Status   Do you have difficulty preparing food and eating? No   Do you have difficulty bathing yourself, getting dressed or grooming yourself? No   Do you have difficulty using the toilet? No   Do you have difficulty moving around from place to place? No   Do you have trouble with steps or getting out of a bed or a chair? No   Current Diet Well Balanced Diet   Dental Exam Up to date   Eye Exam Not up to date   Exercise (times per week) 7 times per week   Current Exercises " Include Walking;Aerobics   Do you need help using the phone?  No   Are you deaf or do you have serious difficulty hearing?  No   Do you need help to go to places out of walking distance? No   Do you need help shopping? No   Do you need help preparing meals?  No   Do you need help with housework?  No   Do you need help with laundry? No   Do you need help taking your medications? No   Do you need help managing money? No   Do you ever drive or ride in a car without wearing a seat belt? No   Have you felt unusual stress, anger or loneliness in the last month? No   Who do you live with? Spouse   If you need help, do you have trouble finding someone available to you? No   Have you been bothered in the last four weeks by sexual problems? No   Do you have difficulty concentrating, remembering or making decisions? No       Age-appropriate Screening Schedule:  Refer to the list below for future screening recommendations based on patient's age, sex and/or medical conditions. Orders for these recommended tests are listed in the plan section. The patient has been provided with a written plan.    Health Maintenance   Topic Date Due    ZOSTER VACCINE (2 of 2) 03/18/2024 (Originally 3/9/2024)    LIPID PANEL  09/13/2024    ANNUAL WELLNESS VISIT  03/18/2025    DXA SCAN  04/26/2025    COLORECTAL CANCER SCREENING  03/14/2029    HEPATITIS C SCREENING  Completed    COVID-19 Vaccine  Completed    RSV Vaccine - Adults  Completed    INFLUENZA VACCINE  Completed    Pneumococcal Vaccine 65+  Completed    TDAP/TD VACCINES  Discontinued                  CMS Preventative Services Quick Reference  Risk Factors Identified During Encounter  Immunizations Discussed/Encouraged: Shingrix and COVID19  The above risks/problems have been discussed with the patient.  Pertinent information has been shared with the patient in the After Visit Summary.  An After Visit Summary and PPPS were made available to the patient.    Follow Up:   Next Medicare Wellness  "visit to be scheduled in 1 year.       Additional E&M Note during same encounter follows:  Patient has multiple medical problems which are significant and separately identifiable that require additional work above and beyond the Medicare Wellness Visit.      Chief Complaint  Medicare Wellness-subsequent    Subjective        HPI  Annalise Winters is also being seen today for htn, palps and recurrent UTI. Urology started her ont he hiprex and the estrace that we had discussed.  Also has seen Dr. Hall for her chronic diarrhea.c-scope scheduled in July. Was advised enteragam to help her diarrhea.    Review of Systems   Constitutional:  Negative for chills and fever.   HENT:  Negative for congestion, ear pain and sore throat.    Eyes:  Negative for pain, redness and visual disturbance.   Respiratory:  Negative for cough and shortness of breath.    Cardiovascular:  Positive for palpitations. Negative for chest pain and leg swelling.   Gastrointestinal:  Positive for diarrhea. Negative for abdominal pain and nausea.   Endocrine: Negative for cold intolerance and heat intolerance.   Genitourinary:  Negative for flank pain and urgency.        Recurrent utis   Musculoskeletal:  Positive for arthralgias and myalgias. Negative for gait problem.   Skin:  Negative for pallor and rash.   Neurological:  Negative for dizziness and weakness.   Psychiatric/Behavioral:  Negative for dysphoric mood and sleep disturbance. The patient is not nervous/anxious.        Objective   Vital Signs:  /68   Pulse 58   Temp 98.2 °F (36.8 °C)   Ht 174 cm (68.5\")   Wt 70.8 kg (156 lb)   SpO2 96% Comment: ra  BMI 23.37 kg/m²     Physical Exam  Constitutional:       General: She is not in acute distress.     Appearance: Normal appearance.   HENT:      Head: Normocephalic and atraumatic.      Right Ear: Tympanic membrane and external ear normal.      Left Ear: Tympanic membrane and external ear normal.      Nose: Nose normal.      " Mouth/Throat:      Mouth: Mucous membranes are moist.   Eyes:      General: No scleral icterus.  Neck:      Vascular: No carotid bruit.   Cardiovascular:      Rate and Rhythm: Normal rate and regular rhythm.      Pulses: Normal pulses.      Heart sounds: Normal heart sounds. No murmur heard.     No friction rub. No gallop.   Pulmonary:      Effort: Pulmonary effort is normal.      Breath sounds: Normal breath sounds. No rhonchi or rales.   Abdominal:      General: Bowel sounds are normal. There is no distension.      Palpations: Abdomen is soft.      Tenderness: There is no right CVA tenderness, left CVA tenderness, guarding or rebound.      Hernia: No hernia is present.   Musculoskeletal:         General: No tenderness. Normal range of motion.      Cervical back: Normal range of motion.      Right lower leg: No edema.      Left lower leg: No edema.   Lymphadenopathy:      Cervical: No cervical adenopathy.   Skin:     General: Skin is warm.      Findings: No rash.   Neurological:      General: No focal deficit present.      Mental Status: She is alert and oriented to person, place, and time. Mental status is at baseline.      Cranial Nerves: No cranial nerve deficit.      Sensory: No sensory deficit.      Coordination: Coordination normal.      Gait: Gait normal.      Deep Tendon Reflexes: Reflexes normal.   Psychiatric:         Mood and Affect: Mood normal.         Behavior: Behavior normal.                             Current Outpatient Medications:     acetaminophen (TYLENOL) 500 MG tablet, Take 1 tablet by mouth As Needed., Disp: , Rfl:     amLODIPine (NORVASC) 5 MG tablet, Take 1.5 tablets by mouth 2 (Two) Times a Day., Disp: 270 tablet, Rfl: 3    Calcium Carbonate-Vit D-Min (CALCIUM 1200 PO), Take  by mouth., Disp: , Rfl:     Cholecalciferol (VITAMIN D) 2000 UNITS capsule, Take 1 capsule by mouth Daily., Disp: , Rfl:     COLLAGEN PO, Take  by mouth., Disp: , Rfl:     Cranberry 50 MG chewable tablet, Chew.,  "Disp: , Rfl:     estradiol (ESTRACE VAGINAL) 0.1 MG/GM vaginal cream, DISPOSE OF APPLICATOR; apply pea-sized amount to urethra 3x per week after bathing, Disp: 42 g, Rfl: 11    Famotidine-Ca Carb-Mag Hydrox (PEPCID COMPLETE PO), Take 1 tablet by mouth As Needed., Disp: , Rfl:     flecainide (TAMBOCOR) 50 MG tablet, Take 1 tablet by mouth 2 (Two) Times a Day., Disp: 180 tablet, Rfl: 3    levothyroxine (SYNTHROID, LEVOTHROID) 50 MCG tablet, Take 1 tablet by mouth Daily., Disp: 90 tablet, Rfl: 3    loratadine (CLARITIN) 10 MG tablet, Take 1 tablet by mouth Daily., Disp: , Rfl:     LORazepam (ATIVAN) 0.5 MG tablet, Take 1 tablet by mouth Daily As Needed for Anxiety., Disp: 30 tablet, Rfl: 0    melatonin 3 MG tablet, Take 2 tablets by mouth Every Night., Disp: , Rfl:     methenamine (HIPREX) 1 g tablet, Take 1 tablet by mouth 2 (Two) Times a Day With Meals., Disp: 60 tablet, Rfl: 11    metoprolol tartrate (LOPRESSOR) 25 MG tablet, Take 0.5 tablets by mouth As Needed (palpitations)., Disp: 60 tablet, Rfl: 11    pantoprazole (PROTONIX) 40 MG EC tablet, Take 1 tablet by mouth Daily., Disp: , Rfl:     rosuvastatin (CRESTOR) 5 MG tablet, Take 1 tablet by mouth Every Night., Disp: 90 tablet, Rfl: 3    TURMERIC PO, Take 1 tablet by mouth Daily., Disp: , Rfl:     Vitamin B12 (CYANOCOBALAMIN) 500 MCG tablet tablet, Take  by mouth Daily., Disp: , Rfl:     vitamin C (ASCORBIC ACID) 500 MG tablet, Take 1 tablet by mouth Daily., Disp: , Rfl:     SBI/Protein Isolate (ENTERAGAM PO), Take  by mouth., Disp: , Rfl:       The following portions of the patient's history were reviewed and updated as appropriate: allergies, current medications, past family history, past medical history, past social history, past surgical history and problem list.        Objective   /68   Pulse 58   Temp 98.2 °F (36.8 °C)   Ht 174 cm (68.5\")   Wt 70.8 kg (156 lb)   LMP  (LMP Unknown)   SpO2 96% Comment: ra  BMI 23.37 kg/m²         Results for " orders placed or performed in visit on 03/04/24   Urine Culture - Urine, Urine, Clean Catch    Specimen: Urine, Clean Catch   Result Value Ref Range    Urine Culture No growth              Assessment & Plan   Diagnoses and all orders for this visit:    Medicare annual wellness visit, subsequent    Hypothyroidism, unspecified type  -     levothyroxine (SYNTHROID, LEVOTHROID) 50 MCG tablet; Take 1 tablet by mouth Daily.  -     TSH Rfx On Abnormal To Free T4; Future    Anxiety  -     LORazepam (ATIVAN) 0.5 MG tablet; Take 1 tablet by mouth Daily As Needed for Anxiety.    Mixed hyperlipidemia  -     rosuvastatin (CRESTOR) 5 MG tablet; Take 1 tablet by mouth Every Night.  -     Comprehensive Metabolic Panel; Future  -     Lipid Panel; Future    IFG (impaired fasting glucose)  -     Hemoglobin A1c; Future    Vitamin D deficiency  -     Vitamin B12; Future  -     Vitamin D,25-Hydroxy; Future    Tachycardia-bradycardia syndrome  -     CBC (No Diff); Future    Other orders  -     SBI/Protein Isolate (ENTERAGAM PO); Take  by mouth.      The patient has read and signed the [facility] Controlled Substance Contract.  I will continue to see patient for regular follow up appointments.  They are well controlled on their medication.  JASMYN has been reviewed by me and is updated every 3 months. The patient is aware of the potential for addiction and dependence.          PHQ-2/PHQ-9 Depression screening reviewed with patient . Total time spent today for depression screening  with Annalise Winters  was 15 minutes in counseling, along with plans for any diagnositc work-up and treatment.    Advice and education were given regarding cardiovascular risk reduction, healthy dietary habits, Seatbelt and helmet use and self skin examination.          Electronically signed by:    Maryse Jauregui MD           Follow Up   Return in about 6 months (around 9/18/2024) for Next scheduled follow up and wellness in 1 year.  Patient was given  instructions and counseling regarding her condition or for health maintenance advice. Please see specific information pulled into the AVS if appropriate.

## 2024-03-22 ENCOUNTER — TELEPHONE (OUTPATIENT)
Dept: INTERNAL MEDICINE | Facility: CLINIC | Age: 74
End: 2024-03-22
Payer: MEDICARE

## 2024-03-22 NOTE — TELEPHONE ENCOUNTER
Caller: Annalise Winters    Relationship: Self    Best call back number: [phone]     Caller requesting test results: PATIENT    What test was performed: BLOOD WORK    When was the test performed: 03.18.24     Where was the test performed: IN OFFICE    Additional notes: PLEASE CALL PATIENT BACK WITH RESULTS ASAP. THANK YOU.

## 2024-03-22 NOTE — TELEPHONE ENCOUNTER
Please let her know that her labs including blood counts, electrolytes, cholesterol, thyroid and Vitamin D look good.    Sugars/A1c is minimally high in the very early prediabetic range.  Likely from the holidays and winter, should get better soon.  B12 is high, she should stop any B12 supplement for 3 months and restart at no more than 1000 mcg daily.  She should continue current regimen otw.

## 2024-04-03 ENCOUNTER — OFFICE VISIT (OUTPATIENT)
Dept: UROLOGY | Facility: CLINIC | Age: 74
End: 2024-04-03
Payer: MEDICARE

## 2024-04-03 ENCOUNTER — TELEPHONE (OUTPATIENT)
Dept: UROLOGY | Facility: CLINIC | Age: 74
End: 2024-04-03

## 2024-04-03 VITALS — BODY MASS INDEX: 23.11 KG/M2 | OXYGEN SATURATION: 96 % | HEART RATE: 62 BPM | WEIGHT: 156 LBS | HEIGHT: 69 IN

## 2024-04-03 DIAGNOSIS — N39.0 RECURRENT UTI: Primary | ICD-10-CM

## 2024-04-03 LAB
BILIRUB BLD-MCNC: NEGATIVE MG/DL
CLARITY, POC: CLEAR
COLOR UR: YELLOW
GLUCOSE UR STRIP-MCNC: NEGATIVE MG/DL
KETONES UR QL: NEGATIVE
LEUKOCYTE EST, POC: NEGATIVE
NITRITE UR-MCNC: NEGATIVE MG/ML
PH UR: 6 [PH] (ref 5–8)
PROT UR STRIP-MCNC: NEGATIVE MG/DL
RBC # UR STRIP: NEGATIVE /UL
SP GR UR: 1.03 (ref 1–1.03)
UROBILINOGEN UR QL: NORMAL

## 2024-04-03 NOTE — PROGRESS NOTES
Established Female Office Visit        Chief Complaint   Patient presents with    Recurrent UTI        HPI  Ms. Winters is a 73 y.o. female with history of DEE (1990s), Fernanda who presents for follow up.    At this visit, has been taking Hiprex twice per day without issue. Still taking cranberry pills. Using the vaginal estrogen cream. Denies any known adverse effects to these medications.     Past Medical History:   Diagnosis Date    Abnormal blood chemistry     Allergic     Arthritis     right knee    Atrial fibrillation     Cyst of buttocks     Diverticulosis     Dizziness     Dysuria     Esophagitis     Flushing     GERD (gastroesophageal reflux disease)     Heartburn     Hyperlipidemia     Hypertension     Hyperthyroidism     Hypothyroidism     Irritable bowel syndrome     LLQ abdominal pain     Menopause     Polyp of sigmoid colon     Sinusitis     Skin lesion of face     UTI (urinary tract infection)        Past Surgical History:   Procedure Laterality Date    CARDIAC ABLATION  08/21/2013    Pulmonary vein ablation by Dr. Abdirahman Lackey, 08/21/2013.    COLONOSCOPY      REPLACEMENT TOTAL KNEE Right     RHINOPLASTY      TOTAL ABDOMINAL HYSTERECTOMY      TUBAL ABDOMINAL LIGATION      TUBAL REANASTOMOSIS           Current Outpatient Medications:     acetaminophen (TYLENOL) 500 MG tablet, Take 1 tablet by mouth As Needed., Disp: , Rfl:     amLODIPine (NORVASC) 5 MG tablet, Take 1.5 tablets by mouth 2 (Two) Times a Day., Disp: 270 tablet, Rfl: 3    Calcium Carbonate-Vit D-Min (CALCIUM 1200 PO), Take  by mouth., Disp: , Rfl:     Cholecalciferol (VITAMIN D) 2000 UNITS capsule, Take 1 capsule by mouth Daily., Disp: , Rfl:     COLLAGEN PO, Take  by mouth., Disp: , Rfl:     Cranberry 50 MG chewable tablet, Chew., Disp: , Rfl:     estradiol (ESTRACE VAGINAL) 0.1 MG/GM vaginal cream, DISPOSE OF APPLICATOR; apply pea-sized amount to urethra 3x per week after bathing, Disp: 42 g, Rfl: 11    Famotidine-Ca Carb-Mag  "Hydrox (PEPCID COMPLETE PO), Take 1 tablet by mouth As Needed., Disp: , Rfl:     flecainide (TAMBOCOR) 50 MG tablet, Take 1 tablet by mouth 2 (Two) Times a Day., Disp: 180 tablet, Rfl: 3    levothyroxine (SYNTHROID, LEVOTHROID) 50 MCG tablet, Take 1 tablet by mouth Daily., Disp: 90 tablet, Rfl: 3    loratadine (CLARITIN) 10 MG tablet, Take 1 tablet by mouth Daily., Disp: , Rfl:     LORazepam (ATIVAN) 0.5 MG tablet, Take 1 tablet by mouth Daily As Needed for Anxiety., Disp: 30 tablet, Rfl: 0    melatonin 3 MG tablet, Take 2 tablets by mouth Every Night., Disp: , Rfl:     methenamine (HIPREX) 1 g tablet, Take 1 tablet by mouth 2 (Two) Times a Day With Meals., Disp: 60 tablet, Rfl: 11    metoprolol tartrate (LOPRESSOR) 25 MG tablet, Take 0.5 tablets by mouth As Needed (palpitations)., Disp: 60 tablet, Rfl: 11    pantoprazole (PROTONIX) 40 MG EC tablet, Take 1 tablet by mouth Daily., Disp: , Rfl:     rosuvastatin (CRESTOR) 5 MG tablet, Take 1 tablet by mouth Every Night., Disp: 90 tablet, Rfl: 3    SBI/Protein Isolate (ENTERAGAM PO), Take  by mouth., Disp: , Rfl:     TURMERIC PO, Take 1 tablet by mouth Daily., Disp: , Rfl:     Vitamin B12 (CYANOCOBALAMIN) 500 MCG tablet tablet, Take  by mouth Daily., Disp: , Rfl:     vitamin C (ASCORBIC ACID) 500 MG tablet, Take 1 tablet by mouth Daily., Disp: , Rfl:      Physical Exam  Visit Vitals  Pulse 62   Ht 174 cm (68.5\")   Wt 70.8 kg (156 lb)   LMP  (LMP Unknown)   SpO2 96%   BMI 23.37 kg/m²       Labs  Brief Urine Lab Results  (Last result in the past 365 days)        Color   Clarity   Blood   Leuk Est   Nitrite   Protein   CREAT   Urine HCG        04/03/24 1543 Yellow   Clear   Negative   Negative   Negative   Negative                   Lab Results   Component Value Date    GLUCOSE 93 03/18/2024    CALCIUM 10.1 03/18/2024     03/18/2024    K 3.7 03/18/2024    CO2 29.0 03/18/2024     03/18/2024    BUN 16 03/18/2024    CREATININE 0.87 03/18/2024    EGFRIFAFRI 106 " 05/16/2016    EGFRIFNONA 73 07/19/2021    BCR 18.4 03/18/2024    ANIONGAP 10.0 03/18/2024       Lab Results   Component Value Date    WBC 4.52 03/18/2024    HGB 12.8 03/18/2024    HCT 39.3 03/18/2024    MCV 87.7 03/18/2024     03/18/2024       Urine Culture          1/23/2024    14:38 2/4/2024    14:24 3/4/2024    16:21   Urine Culture   Urine Culture No growth  50,000 CFU/mL Escherichia coli  No growth         Radiographic Studies  CT Abdomen Pelvis With Contrast    Result Date: 1/29/2024  1. No CT evidence of acute intra-abdominal or intrapelvic abnormality. 2. Moderate to large amount of retained stool. 3. Diverticulosis without convincing diverticulitis. Images reviewed, interpreted, and dictated by Virginia Myers MD      I have reviewed the above labs and imaging.     Assessment / Plan      Assessment/Plan:   Mrs. Winters is a 73 y.o. female with history of DEE (1990s), Fernanda who presents for follow up.     Pleased with results so far. Wants to continue Hiprex for at least 6 months before reevaluating. Recommend estrace indefinitely.     Diagnoses and all orders for this visit:    1. Recurrent UTI (Primary)  -     POC Urinalysis Dipstick         Follow Up:   Return in about 6 months (around 10/3/2024) for Meds/Symptom Reassessment.      Leila Mac PA-C  [facility]

## 2024-04-03 NOTE — TELEPHONE ENCOUNTER
----- Message from Kylie Burk sent at 4/3/2024  4:05 PM EDT -----  Return in about 6 months (around 10/3/2024) for Meds/Symptom Reassessment with Sunil. Patient requested an evening appointment if possible

## 2024-05-04 DIAGNOSIS — I48.0 PAROXYSMAL ATRIAL FIBRILLATION: Chronic | ICD-10-CM

## 2024-05-05 RX ORDER — FLECAINIDE ACETATE 50 MG/1
50 TABLET ORAL 2 TIMES DAILY
Qty: 180 TABLET | Refills: 3 | Status: SHIPPED | OUTPATIENT
Start: 2024-05-05

## 2024-05-06 ENCOUNTER — TELEPHONE (OUTPATIENT)
Dept: UROLOGY | Facility: CLINIC | Age: 74
End: 2024-05-06
Payer: MEDICARE

## 2024-05-06 ENCOUNTER — CLINICAL SUPPORT (OUTPATIENT)
Dept: UROLOGY | Facility: CLINIC | Age: 74
End: 2024-05-06
Payer: MEDICARE

## 2024-05-06 DIAGNOSIS — N39.0 RECURRENT UTI: ICD-10-CM

## 2024-05-06 DIAGNOSIS — N39.0 RECURRENT UTI: Primary | ICD-10-CM

## 2024-05-06 LAB
BILIRUB BLD-MCNC: NEGATIVE MG/DL
CLARITY, POC: CLEAR
COLOR UR: YELLOW
GLUCOSE UR STRIP-MCNC: NEGATIVE MG/DL
KETONES UR QL: NEGATIVE
LEUKOCYTE EST, POC: NEGATIVE
NITRITE UR-MCNC: NEGATIVE MG/ML
PH UR: 6 [PH] (ref 5–8)
PROT UR STRIP-MCNC: NEGATIVE MG/DL
RBC # UR STRIP: NEGATIVE /UL
SP GR UR: 1.01 (ref 1–1.03)
UROBILINOGEN UR QL: NORMAL

## 2024-05-06 PROCEDURE — 87086 URINE CULTURE/COLONY COUNT: CPT | Performed by: PHYSICIAN ASSISTANT

## 2024-05-06 PROCEDURE — 87077 CULTURE AEROBIC IDENTIFY: CPT | Performed by: PHYSICIAN ASSISTANT

## 2024-05-06 PROCEDURE — 81003 URINALYSIS AUTO W/O SCOPE: CPT | Performed by: PHYSICIAN ASSISTANT

## 2024-05-06 PROCEDURE — 87186 SC STD MICRODIL/AGAR DIL: CPT | Performed by: PHYSICIAN ASSISTANT

## 2024-05-08 LAB — BACTERIA SPEC AEROBE CULT: ABNORMAL

## 2024-05-09 ENCOUNTER — TELEPHONE (OUTPATIENT)
Dept: UROLOGY | Facility: CLINIC | Age: 74
End: 2024-05-09

## 2024-05-09 ENCOUNTER — TELEPHONE (OUTPATIENT)
Dept: UROLOGY | Facility: CLINIC | Age: 74
End: 2024-05-09
Payer: MEDICARE

## 2024-05-09 DIAGNOSIS — N39.0 E. COLI UTI: Primary | ICD-10-CM

## 2024-05-09 DIAGNOSIS — B96.20 E. COLI UTI: Primary | ICD-10-CM

## 2024-05-09 RX ORDER — SULFAMETHOXAZOLE AND TRIMETHOPRIM 800; 160 MG/1; MG/1
1 TABLET ORAL 2 TIMES DAILY
Qty: 10 TABLET | Refills: 0 | Status: SHIPPED | OUTPATIENT
Start: 2024-05-09 | End: 2024-05-14

## 2024-05-09 NOTE — TELEPHONE ENCOUNTER
Caller: Annalise Winters    Relationship: Self    Best call back number: [phone]    What is the best time to reach you: NOT AVAILABLE BETWEEN 10-12 ON 05.10.2024    Who are you requesting to speak with (clinical staff, provider,  specific staff member): MIMA    What was the call regarding: PT SPOKE WITH MIMA THIS MORNING AND WOULD LIKE TO TALK TO HER AGAIN. PLEASE CALL PT. THANK YOU

## 2024-09-20 ENCOUNTER — CLINICAL SUPPORT (OUTPATIENT)
Dept: INTERNAL MEDICINE | Facility: CLINIC | Age: 74
End: 2024-09-20
Payer: MEDICARE

## 2024-09-20 DIAGNOSIS — Z23 NEED FOR INFLUENZA VACCINATION: Primary | ICD-10-CM

## 2024-09-20 PROCEDURE — G0008 ADMIN INFLUENZA VIRUS VAC: HCPCS | Performed by: INTERNAL MEDICINE

## 2024-09-20 PROCEDURE — 90662 IIV NO PRSV INCREASED AG IM: CPT | Performed by: INTERNAL MEDICINE

## 2024-09-21 PROCEDURE — 87088 URINE BACTERIA CULTURE: CPT | Performed by: NURSE PRACTITIONER

## 2024-09-21 PROCEDURE — 87086 URINE CULTURE/COLONY COUNT: CPT | Performed by: NURSE PRACTITIONER

## 2024-09-21 PROCEDURE — 87186 SC STD MICRODIL/AGAR DIL: CPT | Performed by: NURSE PRACTITIONER

## 2024-09-25 ENCOUNTER — OFFICE VISIT (OUTPATIENT)
Dept: CARDIOLOGY | Facility: CLINIC | Age: 74
End: 2024-09-25
Payer: MEDICARE

## 2024-09-25 VITALS
WEIGHT: 153.8 LBS | HEIGHT: 69 IN | HEART RATE: 72 BPM | SYSTOLIC BLOOD PRESSURE: 128 MMHG | BODY MASS INDEX: 22.78 KG/M2 | OXYGEN SATURATION: 98 % | DIASTOLIC BLOOD PRESSURE: 74 MMHG

## 2024-09-25 DIAGNOSIS — I47.19 ATRIAL TACHYCARDIA: ICD-10-CM

## 2024-09-25 DIAGNOSIS — I49.5 TACHYCARDIA-BRADYCARDIA SYNDROME: ICD-10-CM

## 2024-09-25 DIAGNOSIS — I48.0 PAROXYSMAL ATRIAL FIBRILLATION: Primary | ICD-10-CM

## 2024-09-25 DIAGNOSIS — I10 PRIMARY HYPERTENSION: ICD-10-CM

## 2024-09-25 PROCEDURE — 3078F DIAST BP <80 MM HG: CPT | Performed by: INTERNAL MEDICINE

## 2024-09-25 PROCEDURE — 93000 ELECTROCARDIOGRAM COMPLETE: CPT | Performed by: INTERNAL MEDICINE

## 2024-09-25 PROCEDURE — 3074F SYST BP LT 130 MM HG: CPT | Performed by: INTERNAL MEDICINE

## 2024-09-25 PROCEDURE — 99214 OFFICE O/P EST MOD 30 MIN: CPT | Performed by: INTERNAL MEDICINE

## 2024-10-24 ENCOUNTER — OFFICE VISIT (OUTPATIENT)
Dept: OBSTETRICS AND GYNECOLOGY | Facility: CLINIC | Age: 74
End: 2024-10-24
Payer: MEDICARE

## 2024-10-24 VITALS
WEIGHT: 153.2 LBS | SYSTOLIC BLOOD PRESSURE: 92 MMHG | HEIGHT: 69 IN | DIASTOLIC BLOOD PRESSURE: 60 MMHG | BODY MASS INDEX: 22.69 KG/M2

## 2024-10-24 DIAGNOSIS — Z01.419 ENCOUNTER FOR GYNECOLOGICAL EXAMINATION WITHOUT ABNORMAL FINDING: Primary | ICD-10-CM

## 2024-10-24 NOTE — PROGRESS NOTES
Chief Complaint  Annalise Winters is a 73 y.o.  female presenting for Gynecologic Exam (Annual)    History of Present Illness  Annalise is a 74yo woman, , here for gyn exam.  Her past surgical history includes, in part, bilat tubal sterilization and reanastomosis, and a subsequent DEE.    Her medical history is significant for Afib,GERD, hyperlipidemia, HTN, thyroid dz, and colon polyps.  Her daughter had ovarian cancer.  (She is still fighting it & using research meds.)    Pt participates now in the [place] ovarian screening program.    Last pap 2018  Mammogram 2024, neg  DEXA scan 2023, osteopenia with nl FRAX scores  Colonoscopy 3/2024, to follow up in 5 yrs       The following portions of the patient's history were reviewed and updated as appropriate: allergies, current medications, past family history, past medical history, past social history, past surgical history, and problem list.    Allergies   Allergen Reactions    Ceftin [Cefuroxime] Diarrhea    Ace Inhibitors Cough    Cefdinir Diarrhea    Citalopram Hydrobromide Dizziness     Other reaction(s): Dizziness    Corticosteroids Rash    Paroxetine Hcl Other (See Comments)     somnolence    Other reaction(s): Other (See Comments)   somnolence         Current Outpatient Medications:     acetaminophen (TYLENOL) 500 MG tablet, Take 1 tablet by mouth As Needed., Disp: , Rfl:     amLODIPine (NORVASC) 5 MG tablet, Take 1.5 tablets by mouth 2 (Two) Times a Day., Disp: 270 tablet, Rfl: 3    Calcium Carbonate-Vit D-Min (CALCIUM 1200 PO), Take  by mouth., Disp: , Rfl:     Cholecalciferol (VITAMIN D) 2000 UNITS capsule, Take 1 capsule by mouth Daily., Disp: , Rfl:     COLLAGEN PO, Take  by mouth., Disp: , Rfl:     Cranberry 50 MG chewable tablet, Chew., Disp: , Rfl:     estradiol (ESTRACE VAGINAL) 0.1 MG/GM vaginal cream, DISPOSE OF APPLICATOR; apply pea-sized amount to urethra 3x per week after bathing, Disp: 42 g, Rfl: 11    Famotidine-Ca Carb-Mag Hydrox (PEPCID  COMPLETE PO), Take 1 tablet by mouth As Needed., Disp: , Rfl:     flecainide (TAMBOCOR) 50 MG tablet, TAKE 1 TABLET BY MOUTH TWICE A DAY, Disp: 180 tablet, Rfl: 3    levothyroxine (SYNTHROID, LEVOTHROID) 50 MCG tablet, Take 1 tablet by mouth Daily., Disp: 90 tablet, Rfl: 3    loratadine (CLARITIN) 10 MG tablet, Take 1 tablet by mouth Daily., Disp: , Rfl:     LORazepam (ATIVAN) 0.5 MG tablet, Take 1 tablet by mouth Daily As Needed for Anxiety. (Patient taking differently: Take 1 tablet by mouth Daily As Needed for Anxiety. As needed), Disp: 30 tablet, Rfl: 0    melatonin 3 MG tablet, Take 2 tablets by mouth Every Night., Disp: , Rfl:     metoprolol tartrate (LOPRESSOR) 25 MG tablet, Take 0.5 tablets by mouth Daily As Needed (palpitations)., Disp: 15 tablet, Rfl: 3    pantoprazole (PROTONIX) 40 MG EC tablet, Take 1 tablet by mouth Daily., Disp: , Rfl:     rosuvastatin (CRESTOR) 5 MG tablet, Take 1 tablet by mouth Every Night., Disp: 90 tablet, Rfl: 3    TURMERIC PO, Take 1 tablet by mouth Daily., Disp: , Rfl:     vitamin C (ASCORBIC ACID) 500 MG tablet, Take 1 tablet by mouth Daily., Disp: , Rfl:     SBI/Protein Isolate (ENTERAGAM PO), Take  by mouth. (Patient not taking: Reported on 10/24/2024), Disp: , Rfl:     Vitamin B12 (CYANOCOBALAMIN) 500 MCG tablet tablet, Take  by mouth Daily. (Patient not taking: Reported on 10/24/2024), Disp: , Rfl:     Past Medical History:   Diagnosis Date    Abnormal blood chemistry     Allergic     Arthritis     right knee    Atrial fibrillation     Cyst of buttocks     Diverticulosis     Dizziness     Dysuria     Esophagitis     Flushing     GERD (gastroesophageal reflux disease)     Heartburn     Hyperlipidemia     Hypertension     Hyperthyroidism     Hypothyroidism     Irritable bowel syndrome     LLQ abdominal pain     Menopause     Polyp of sigmoid colon     Sinusitis     Skin lesion of face     UTI (urinary tract infection)         Past Surgical History:   Procedure Laterality  "Date    CARDIAC ABLATION  08/21/2013    Pulmonary vein ablation by Dr. Abdirahman Lackey, 08/21/2013.    COLONOSCOPY      REPLACEMENT TOTAL KNEE Right     RHINOPLASTY      TOTAL ABDOMINAL HYSTERECTOMY      TUBAL ABDOMINAL LIGATION      TUBAL REANASTOMOSIS         Objective  BP 92/60 (BP Location: Right arm, Patient Position: Sitting, Cuff Size: Adult)   Ht 174 cm (68.5\")   Wt 69.5 kg (153 lb 3.2 oz)   LMP  (LMP Unknown)   Breastfeeding No   BMI 22.95 kg/m²     Physical Exam  Vitals and nursing note reviewed. Exam conducted with a chaperone present.   Constitutional:       General: She is not in acute distress.     Appearance: Normal appearance. She is not ill-appearing.   HENT:      Head: Normocephalic.   Neck:      Thyroid: No thyroid mass or thyromegaly.   Cardiovascular:      Rate and Rhythm: Normal rate and regular rhythm.      Heart sounds: Normal heart sounds. No murmur heard.  Pulmonary:      Effort: Pulmonary effort is normal. No respiratory distress.      Breath sounds: Normal breath sounds.   Chest:   Breasts:     Right: No inverted nipple, mass or nipple discharge.      Left: No inverted nipple, mass or nipple discharge.   Abdominal:      Palpations: Abdomen is soft. There is no mass.      Tenderness: There is no abdominal tenderness.   Genitourinary:     General: Normal vulva.      Labia:         Right: No rash, tenderness or lesion.         Left: No rash, tenderness or lesion.       Vagina: No vaginal discharge or erythema.      Uterus: Absent.       Adnexa:         Right: No mass or tenderness.          Left: No mass or tenderness.        Comments: Anus appears wnl.  No rectal exam performed.  Lymphadenopathy:      Upper Body:      Right upper body: No supraclavicular or axillary adenopathy.      Left upper body: No supraclavicular or axillary adenopathy.   Skin:     General: Skin is warm and dry.   Neurological:      Mental Status: She is alert and oriented to person, place, and time. "   Psychiatric:         Mood and Affect: Mood normal.         Behavior: Behavior normal.         Assessment/Plan   Diagnoses and all orders for this visit:    1. Encounter for gynecological examination without abnormal finding (Primary)    Atrophy is well controlled.  Urology prescribes this med now.      Procedures    40 to 64: Counseling/Anticipatory Guidance Discussed: nutrition, physical activity, screenings, and self-breast exam    Return in about 1 year (around 10/24/2025) for Annual physical.    Britta Aldrich, APRN  10/24/2024

## 2024-11-12 ENCOUNTER — OFFICE VISIT (OUTPATIENT)
Dept: INTERNAL MEDICINE | Facility: CLINIC | Age: 74
End: 2024-11-12
Payer: MEDICARE

## 2024-11-12 VITALS
OXYGEN SATURATION: 96 % | HEIGHT: 69 IN | TEMPERATURE: 97.4 F | DIASTOLIC BLOOD PRESSURE: 66 MMHG | SYSTOLIC BLOOD PRESSURE: 120 MMHG | BODY MASS INDEX: 22.72 KG/M2 | WEIGHT: 153.4 LBS | HEART RATE: 72 BPM

## 2024-11-12 DIAGNOSIS — R35.0 URINARY FREQUENCY: Primary | ICD-10-CM

## 2024-11-12 LAB
BILIRUB BLD-MCNC: NEGATIVE MG/DL
CLARITY, POC: CLEAR
COLOR UR: YELLOW
EXPIRATION DATE: NORMAL
GLUCOSE UR STRIP-MCNC: NEGATIVE MG/DL
KETONES UR QL: NEGATIVE
LEUKOCYTE EST, POC: NEGATIVE
Lab: NORMAL
NITRITE UR-MCNC: NEGATIVE MG/ML
PH UR: 5.5 [PH] (ref 5–8)
PROT UR STRIP-MCNC: NEGATIVE MG/DL
RBC # UR STRIP: NEGATIVE /UL
SP GR UR: 1.02 (ref 1–1.03)
UROBILINOGEN UR QL: NORMAL

## 2024-11-12 PROCEDURE — 99213 OFFICE O/P EST LOW 20 MIN: CPT | Performed by: STUDENT IN AN ORGANIZED HEALTH CARE EDUCATION/TRAINING PROGRAM

## 2024-11-12 PROCEDURE — 1126F AMNT PAIN NOTED NONE PRSNT: CPT | Performed by: STUDENT IN AN ORGANIZED HEALTH CARE EDUCATION/TRAINING PROGRAM

## 2024-11-12 PROCEDURE — 3074F SYST BP LT 130 MM HG: CPT | Performed by: STUDENT IN AN ORGANIZED HEALTH CARE EDUCATION/TRAINING PROGRAM

## 2024-11-12 PROCEDURE — 3078F DIAST BP <80 MM HG: CPT | Performed by: STUDENT IN AN ORGANIZED HEALTH CARE EDUCATION/TRAINING PROGRAM

## 2024-11-12 PROCEDURE — 81003 URINALYSIS AUTO W/O SCOPE: CPT | Performed by: STUDENT IN AN ORGANIZED HEALTH CARE EDUCATION/TRAINING PROGRAM

## 2024-11-12 NOTE — PROGRESS NOTES
Follow Up Office Visit      Date: 2024   Patient Name: Annalise Winters  : 1950   MRN: [id number]     Chief Complaint:    Chief Complaint   Patient presents with    Urinary Frequency     Frequent urination last night and this morning. Admits some pressure, denies dysuria.        History of Present Illness: Annalise Winters is a 73 y.o. female who is here today for several episodes of  nocturia during the past day and mild pelvic pressure. Denies dysuria, fever, chills or flank pain.    Subjective      Review of Systems:   Review of Systems   Constitutional:  Negative for chills and fever.   Respiratory:  Negative for shortness of breath.    Cardiovascular:  Negative for chest pain.   Genitourinary:  Positive for frequency. Negative for dysuria, hematuria, pelvic pain, urgency, urinary incontinence, vaginal bleeding, vaginal discharge and vaginal pain.   Neurological:  Negative for dizziness and headache.       I have reviewed the patients family history, social history, past medical history, past surgical history and have updated it as appropriate.     Medications:     Current Outpatient Medications:     acetaminophen (TYLENOL) 500 MG tablet, Take 1 tablet by mouth As Needed., Disp: , Rfl:     amLODIPine (NORVASC) 5 MG tablet, Take 1.5 tablets by mouth 2 (Two) Times a Day., Disp: 270 tablet, Rfl: 3    Calcium Carbonate-Vit D-Min (CALCIUM 1200 PO), Take  by mouth., Disp: , Rfl:     Cholecalciferol (VITAMIN D) 2000 UNITS capsule, Take 1 capsule by mouth Daily., Disp: , Rfl:     COLLAGEN PO, Take  by mouth., Disp: , Rfl:     Cranberry 50 MG chewable tablet, Chew., Disp: , Rfl:     estradiol (ESTRACE VAGINAL) 0.1 MG/GM vaginal cream, DISPOSE OF APPLICATOR; apply pea-sized amount to urethra 3x per week after bathing, Disp: 42 g, Rfl: 11    Famotidine-Ca Carb-Mag Hydrox (PEPCID COMPLETE PO), Take 1 tablet by mouth As Needed., Disp: , Rfl:     flecainide (TAMBOCOR) 50 MG tablet, TAKE 1 TABLET BY MOUTH  "TWICE A DAY, Disp: 180 tablet, Rfl: 3    levothyroxine (SYNTHROID, LEVOTHROID) 50 MCG tablet, Take 1 tablet by mouth Daily., Disp: 90 tablet, Rfl: 3    loratadine (CLARITIN) 10 MG tablet, Take 1 tablet by mouth Daily., Disp: , Rfl:     LORazepam (ATIVAN) 0.5 MG tablet, Take 1 tablet by mouth Daily As Needed for Anxiety. (Patient taking differently: Take 1 tablet by mouth Daily As Needed for Anxiety. As needed), Disp: 30 tablet, Rfl: 0    melatonin 3 MG tablet, Take 2 tablets by mouth Every Night., Disp: , Rfl:     metoprolol tartrate (LOPRESSOR) 25 MG tablet, Take 0.5 tablets by mouth Daily As Needed (palpitations)., Disp: 15 tablet, Rfl: 3    pantoprazole (PROTONIX) 40 MG EC tablet, Take 1 tablet by mouth Daily., Disp: , Rfl:     rosuvastatin (CRESTOR) 5 MG tablet, Take 1 tablet by mouth Every Night., Disp: 90 tablet, Rfl: 3    SBI/Protein Isolate (ENTERAGAM PO), Take  by mouth., Disp: , Rfl:     TURMERIC PO, Take 1 tablet by mouth Daily., Disp: , Rfl:     Vitamin B12 (CYANOCOBALAMIN) 500 MCG tablet tablet, Take  by mouth Daily., Disp: , Rfl:     vitamin C (ASCORBIC ACID) 500 MG tablet, Take 1 tablet by mouth Daily., Disp: , Rfl:     Allergies:   Allergies   Allergen Reactions    Ceftin [Cefuroxime] Diarrhea    Ace Inhibitors Cough    Cefdinir Diarrhea    Citalopram Hydrobromide Dizziness     Other reaction(s): Dizziness    Corticosteroids Rash    Paroxetine Hcl Other (See Comments)     somnolence    Other reaction(s): Other (See Comments)   somnolence       Objective     Physical Exam: Please see above  Vital Signs:   Vitals:    11/12/24 1303   BP: 120/66   BP Location: Left arm   Patient Position: Sitting   Pulse: 72   Temp: 97.4 °F (36.3 °C)   TempSrc: Temporal   SpO2: 96%   Weight: 69.6 kg (153 lb 6.4 oz)   Height: 174 cm (68.5\")     Body mass index is 22.99 kg/m².  BMI is within normal parameters. No other follow-up for BMI required.       Physical Exam  Vitals and nursing note reviewed.   Constitutional:      "  Appearance: Normal appearance.   Abdominal:      General: Abdomen is flat. Bowel sounds are normal. There is no distension.      Palpations: Abdomen is soft. There is no mass.      Tenderness: There is no abdominal tenderness. There is no right CVA tenderness, left CVA tenderness, guarding or rebound.   Neurological:      Mental Status: She is alert and oriented to person, place, and time.         Procedures    Results:   Labs:   Hemoglobin A1C   Date Value Ref Range Status   03/18/2024 5.70 (H) 4.80 - 5.60 % Final     TSH   Date Value Ref Range Status   03/18/2024 1.310 0.270 - 4.200 uIU/mL Final   11/14/2023 2.940 0.358 - 3.740 uIU/mL Final        POCT Results (if applicable):   Results for orders placed or performed in visit on 11/12/24   POCT urinalysis dipstick, automated    Collection Time: 11/12/24  1:18 PM    Specimen: Urine   Result Value Ref Range    Color Yellow Yellow, Straw, Dark Yellow, Kimberli    Clarity, UA Clear Clear    Specific Gravity  1.020 1.005 - 1.030    pH, Urine 5.5 5.0 - 8.0    Leukocytes Negative Negative    Nitrite, UA Negative Negative    Protein, POC Negative Negative mg/dL    Glucose, UA Negative Negative mg/dL    Ketones, UA Negative Negative    Urobilinogen, UA 0.2 E.U./dL Normal, 0.2 E.U./dL    Bilirubin Negative Negative    Blood, UA Negative Negative    Lot Number [id number]     Expiration Date 01-        Imaging:   No valid procedures specified.       Assessment / Plan      Assessment/Plan:   Diagnoses and all orders for this visit:    1. Urinary frequency (Primary)  -     POCT urinalysis dipstick, automated: urinalysis unremarkable, no need to culture  -     unlikely to be urinary tract infection, continue to hydrate and follow-up with Dr. Jauregui as scheduled        Vaccine Counseling:      Follow Up:   As scheduled      Elenita Fischer MD  [facility]

## 2024-11-25 ENCOUNTER — OFFICE VISIT (OUTPATIENT)
Dept: INTERNAL MEDICINE | Facility: CLINIC | Age: 74
End: 2024-11-25
Payer: MEDICARE

## 2024-11-25 VITALS
HEIGHT: 69 IN | HEART RATE: 65 BPM | WEIGHT: 154 LBS | DIASTOLIC BLOOD PRESSURE: 70 MMHG | SYSTOLIC BLOOD PRESSURE: 120 MMHG | BODY MASS INDEX: 22.81 KG/M2 | OXYGEN SATURATION: 96 % | TEMPERATURE: 97.5 F

## 2024-11-25 DIAGNOSIS — F41.9 ANXIETY: ICD-10-CM

## 2024-11-25 DIAGNOSIS — E03.9 HYPOTHYROIDISM, UNSPECIFIED TYPE: ICD-10-CM

## 2024-11-25 DIAGNOSIS — E78.2 MIXED HYPERLIPIDEMIA: Primary | ICD-10-CM

## 2024-11-25 DIAGNOSIS — R73.01 IFG (IMPAIRED FASTING GLUCOSE): ICD-10-CM

## 2024-11-25 DIAGNOSIS — Z23 ENCOUNTER FOR IMMUNIZATION: ICD-10-CM

## 2024-11-25 RX ORDER — METHENAMINE HIPPURATE 1000 MG/1
1 TABLET ORAL 2 TIMES DAILY WITH MEALS
COMMUNITY

## 2024-11-25 RX ORDER — LORAZEPAM 0.5 MG/1
0.5 TABLET ORAL DAILY PRN
Qty: 30 TABLET | Refills: 0 | Status: SHIPPED | OUTPATIENT
Start: 2024-11-25

## 2024-11-25 NOTE — PROGRESS NOTES
Hyperlipidemia, Anxiety, and Hypothyroidism    Subjective   Annalise Winters is a 73 y.o. female is here today for follow-up.    Hyperlipidemia  Exacerbating diseases include hypothyroidism. Associated symptoms include myalgias. Pertinent negatives include no chest pain.   Anxiety   Patient reports no chest pain or nausea.   Hypothyroidism  Symptoms include fatigue. Patient reports no diaphoresis. Her past medical history is significant for hyperlipidemia.     Her UTIs have been better- last one was in September.    For follow-up on her hypothyroid and recurrent UTI.    Answers submitted by the patient for this visit:  Primary Reason for Visit (Submitted on 11/24/2024)  What is the primary reason for your visit?: Problem Not Listed      Current Outpatient Medications:     acetaminophen (TYLENOL) 500 MG tablet, Take 1 tablet by mouth As Needed., Disp: , Rfl:     amLODIPine (NORVASC) 5 MG tablet, Take 1.5 tablets by mouth 2 (Two) Times a Day., Disp: 270 tablet, Rfl: 3    Calcium Carbonate-Vit D-Min (CALCIUM 1200 PO), Take  by mouth., Disp: , Rfl:     Cholecalciferol (VITAMIN D) 2000 UNITS capsule, Take 1 capsule by mouth Daily., Disp: , Rfl:     COLLAGEN PO, Take  by mouth., Disp: , Rfl:     Cranberry 50 MG chewable tablet, Chew., Disp: , Rfl:     estradiol (ESTRACE VAGINAL) 0.1 MG/GM vaginal cream, DISPOSE OF APPLICATOR; apply pea-sized amount to urethra 3x per week after bathing, Disp: 42 g, Rfl: 11    Famotidine-Ca Carb-Mag Hydrox (PEPCID COMPLETE PO), Take 1 tablet by mouth As Needed., Disp: , Rfl:     flecainide (TAMBOCOR) 50 MG tablet, TAKE 1 TABLET BY MOUTH TWICE A DAY, Disp: 180 tablet, Rfl: 3    levothyroxine (SYNTHROID, LEVOTHROID) 50 MCG tablet, Take 1 tablet by mouth Daily., Disp: 90 tablet, Rfl: 3    loratadine (CLARITIN) 10 MG tablet, Take 1 tablet by mouth Daily., Disp: , Rfl:     LORazepam (ATIVAN) 0.5 MG tablet, Take 1 tablet by mouth Daily As Needed for Anxiety., Disp: 30 tablet, Rfl: 0    melatonin 3  "MG tablet, Take 2 tablets by mouth Every Night., Disp: , Rfl:     methenamine (HIPREX) 1 g tablet, Take 1 tablet by mouth 2 (Two) Times a Day With Meals., Disp: , Rfl:     metoprolol tartrate (LOPRESSOR) 25 MG tablet, Take 0.5 tablets by mouth Daily As Needed (palpitations)., Disp: 15 tablet, Rfl: 3    pantoprazole (PROTONIX) 40 MG EC tablet, Take 1 tablet by mouth Daily., Disp: , Rfl:     rosuvastatin (CRESTOR) 5 MG tablet, Take 1 tablet by mouth Every Night., Disp: 90 tablet, Rfl: 3    SBI/Protein Isolate (ENTERAGAM PO), Take  by mouth., Disp: , Rfl:     TURMERIC PO, Take 1 tablet by mouth Daily., Disp: , Rfl:     Vitamin B12 (CYANOCOBALAMIN) 500 MCG tablet tablet, Take  by mouth Daily., Disp: , Rfl:     vitamin C (ASCORBIC ACID) 500 MG tablet, Take 1 tablet by mouth Daily., Disp: , Rfl:       The following portions of the patient's history were reviewed and updated as appropriate: allergies, current medications, past family history, past medical history, past social history, past surgical history and problem list.    Review of Systems   Constitutional:  Positive for fatigue. Negative for chills, diaphoresis and fever.   HENT:  Negative for congestion and sore throat.    Respiratory:  Negative for cough.    Cardiovascular:  Negative for chest pain.   Gastrointestinal:  Negative for abdominal pain, nausea and vomiting.   Genitourinary:  Negative for dysuria.   Musculoskeletal:  Positive for myalgias. Negative for neck pain.   Skin:  Negative for rash.   Neurological:  Negative for weakness, numbness and headaches.       Objective   /70   Pulse 65   Temp 97.5 °F (36.4 °C)   Ht 174 cm (68.5\")   Wt 69.9 kg (154 lb)   LMP  (LMP Unknown)   SpO2 96% Comment: ra  BMI 23.08 kg/m²   Physical Exam  Vitals and nursing note reviewed.   Constitutional:       Appearance: She is well-developed.   HENT:      Head: Normocephalic and atraumatic.      Right Ear: External ear normal.      Left Ear: External ear normal.    "   Mouth/Throat:      Pharynx: No oropharyngeal exudate.   Eyes:      Conjunctiva/sclera: Conjunctivae normal.      Pupils: Pupils are equal, round, and reactive to light.   Neck:      Thyroid: No thyromegaly.   Cardiovascular:      Rate and Rhythm: Normal rate and regular rhythm.      Pulses: Normal pulses.      Heart sounds: Normal heart sounds. No murmur heard.     No friction rub. No gallop.   Pulmonary:      Effort: Pulmonary effort is normal.      Breath sounds: Normal breath sounds.   Abdominal:      General: Bowel sounds are normal. There is no distension.      Palpations: Abdomen is soft.      Tenderness: There is no abdominal tenderness.   Musculoskeletal:      Cervical back: Neck supple.   Skin:     General: Skin is warm and dry.   Neurological:      Mental Status: She is alert and oriented to person, place, and time.      Cranial Nerves: No cranial nerve deficit.   Psychiatric:         Judgment: Judgment normal.         BMI is within normal parameters. No other follow-up for BMI required.       Results for orders placed or performed in visit on 11/12/24   POCT urinalysis dipstick, automated    Collection Time: 11/12/24  1:18 PM    Specimen: Urine   Result Value Ref Range    Color Yellow Yellow, Straw, Dark Yellow, Kimberli    Clarity, UA Clear Clear    Specific Gravity  1.020 1.005 - 1.030    pH, Urine 5.5 5.0 - 8.0    Leukocytes Negative Negative    Nitrite, UA Negative Negative    Protein, POC Negative Negative mg/dL    Glucose, UA Negative Negative mg/dL    Ketones, UA Negative Negative    Urobilinogen, UA 0.2 E.U./dL Normal, 0.2 E.U./dL    Bilirubin Negative Negative    Blood, UA Negative Negative    Lot Number [id number]     Expiration Date 01-              Assessment & Plan   Diagnoses and all orders for this visit:    Mixed hyperlipidemia  -     Comprehensive Metabolic Panel; Future  -     Lipid Panel; Future    IFG (impaired fasting glucose)  -     Hemoglobin A1c; Future  -      Pneumococcal Conjugate Vaccine 20-Valent (PCV20)    Hypothyroidism, unspecified type  -     Lipid Panel; Future  -     TSH Rfx On Abnormal To Free T4; Future    Anxiety  -     LORazepam (ATIVAN) 0.5 MG tablet; Take 1 tablet by mouth Daily As Needed for Anxiety.    Encounter for immunization  -     Pneumococcal Conjugate Vaccine 20-Valent (PCV20)    Other orders  -     methenamine (HIPREX) 1 g tablet; Take 1 tablet by mouth 2 (Two) Times a Day With Meals.    The patient has read and signed the [facility] Controlled Substance Contract.  I will continue to see patient for regular follow up appointments.  They are well controlled on their medication.  JASMYN has been reviewed by me and is updated every 3 months. The patient is aware of the potential for addiction and dependence.               Return for Next scheduled follow up, Medicare Wellness.    Electronically signed by:    Maryse Jauregui MD

## 2024-11-26 ENCOUNTER — LAB (OUTPATIENT)
Dept: LAB | Facility: HOSPITAL | Age: 74
End: 2024-11-26
Payer: MEDICARE

## 2024-11-26 DIAGNOSIS — R73.01 IFG (IMPAIRED FASTING GLUCOSE): ICD-10-CM

## 2024-11-26 DIAGNOSIS — E03.9 HYPOTHYROIDISM, UNSPECIFIED TYPE: ICD-10-CM

## 2024-11-26 DIAGNOSIS — E78.2 MIXED HYPERLIPIDEMIA: ICD-10-CM

## 2024-11-26 LAB
ALBUMIN SERPL-MCNC: 4.1 G/DL (ref 3.5–5.2)
ALBUMIN/GLOB SERPL: 1.5 G/DL
ALP SERPL-CCNC: 71 U/L (ref 39–117)
ALT SERPL W P-5'-P-CCNC: 13 U/L (ref 1–33)
ANION GAP SERPL CALCULATED.3IONS-SCNC: 9 MMOL/L (ref 5–15)
AST SERPL-CCNC: 13 U/L (ref 1–32)
BILIRUB SERPL-MCNC: 0.6 MG/DL (ref 0–1.2)
BUN SERPL-MCNC: 20 MG/DL (ref 8–23)
BUN/CREAT SERPL: 25.6 (ref 7–25)
CALCIUM SPEC-SCNC: 9.5 MG/DL (ref 8.6–10.5)
CHLORIDE SERPL-SCNC: 102 MMOL/L (ref 98–107)
CHOLEST SERPL-MCNC: 178 MG/DL (ref 0–200)
CO2 SERPL-SCNC: 28 MMOL/L (ref 22–29)
CREAT SERPL-MCNC: 0.78 MG/DL (ref 0.57–1)
EGFRCR SERPLBLD CKD-EPI 2021: 79.3 ML/MIN/1.73
GLOBULIN UR ELPH-MCNC: 2.8 GM/DL
GLUCOSE SERPL-MCNC: 83 MG/DL (ref 65–99)
HBA1C MFR BLD: 5.8 % (ref 4.8–5.6)
HDLC SERPL-MCNC: 74 MG/DL (ref 40–60)
LDLC SERPL CALC-MCNC: 94 MG/DL (ref 0–100)
LDLC/HDLC SERPL: 1.27 {RATIO}
POTASSIUM SERPL-SCNC: 3.9 MMOL/L (ref 3.5–5.2)
PROT SERPL-MCNC: 6.9 G/DL (ref 6–8.5)
SODIUM SERPL-SCNC: 139 MMOL/L (ref 136–145)
TRIGL SERPL-MCNC: 51 MG/DL (ref 0–150)
TSH SERPL DL<=0.05 MIU/L-ACNC: 2.49 UIU/ML (ref 0.27–4.2)
VLDLC SERPL-MCNC: 10 MG/DL (ref 5–40)

## 2024-11-26 PROCEDURE — 83036 HEMOGLOBIN GLYCOSYLATED A1C: CPT

## 2024-11-26 PROCEDURE — 80053 COMPREHEN METABOLIC PANEL: CPT

## 2024-11-26 PROCEDURE — 80061 LIPID PANEL: CPT

## 2024-11-26 PROCEDURE — 84443 ASSAY THYROID STIM HORMONE: CPT

## 2025-02-16 DIAGNOSIS — E78.2 MIXED HYPERLIPIDEMIA: ICD-10-CM

## 2025-02-16 RX ORDER — ROSUVASTATIN CALCIUM 5 MG/1
5 TABLET, COATED ORAL NIGHTLY
Qty: 90 TABLET | Refills: 3 | Status: SHIPPED | OUTPATIENT
Start: 2025-02-16

## 2025-03-08 ENCOUNTER — TELEPHONE (OUTPATIENT)
Dept: UROLOGY | Facility: CLINIC | Age: 75
End: 2025-03-08
Payer: MEDICARE

## 2025-03-12 ENCOUNTER — OFFICE VISIT (OUTPATIENT)
Dept: UROLOGY | Facility: CLINIC | Age: 75
End: 2025-03-12
Payer: MEDICARE

## 2025-03-12 VITALS — OXYGEN SATURATION: 94 % | SYSTOLIC BLOOD PRESSURE: 126 MMHG | DIASTOLIC BLOOD PRESSURE: 72 MMHG | HEART RATE: 80 BPM

## 2025-03-12 DIAGNOSIS — N39.0 RECURRENT UTI: Primary | ICD-10-CM

## 2025-03-12 NOTE — PROGRESS NOTES
UTI Office Visit      Patient Name: Annalise Winters  : 1950   MRN: [id number]     Chief Complaint:  UTI   Chief Complaint   Patient presents with    Recurrent UTI       Referring Provider: No ref. provider found    History of Present Illness: Annalise Winters is a 74 y.o.  who presents today for history of recurrent UTI. She is using vaginal estrace cream 3 times per week. She is taking cranberry supplements. She has been taking Hiprex 1 g BID since 2024.     Creatinine 2024; 0.78.     Urinalysis without infection or blood.     Subjective      Review of System: Review of Systems   Genitourinary:  Negative for dysuria, frequency, hematuria and urgency.   All other systems reviewed and are negative.     I have reviewed the ROS documented by my clinical staff, I have updated appropriately and I agree. TAMMY Narvaez    Past Medical History:  Past Medical History:   Diagnosis Date    Abnormal blood chemistry     Allergic     Arthritis     right knee    Atrial fibrillation     Cyst of buttocks     Diverticulosis     Dizziness     Dysuria     Esophagitis     Flushing     GERD (gastroesophageal reflux disease)     Heartburn     Hyperlipidemia     Hypertension     Hyperthyroidism     Hypothyroidism     Irritable bowel syndrome     LLQ abdominal pain     Menopause     Polyp of sigmoid colon     Sinusitis     Skin lesion of face     UTI (urinary tract infection)        Past Surgical History:  Past Surgical History:   Procedure Laterality Date    CARDIAC ABLATION  2013    Pulmonary vein ablation by Dr. Abdirahman Lackey, 2013.    COLONOSCOPY      REPLACEMENT TOTAL KNEE Right     RHINOPLASTY      TOTAL ABDOMINAL HYSTERECTOMY      TUBAL ABDOMINAL LIGATION      TUBAL REANASTOMOSIS         Medications:    Current Outpatient Medications:     acetaminophen (TYLENOL) 500 MG tablet, Take 1 tablet by mouth As Needed., Disp: , Rfl:     amLODIPine (NORVASC) 5 MG tablet, Take 1.5 tablets by mouth 2 (Two)  Times a Day., Disp: 270 tablet, Rfl: 3    Calcium Carbonate-Vit D-Min (CALCIUM 1200 PO), Take  by mouth., Disp: , Rfl:     Cholecalciferol (VITAMIN D) 2000 UNITS capsule, Take 1 capsule by mouth Daily., Disp: , Rfl:     COLLAGEN PO, Take  by mouth., Disp: , Rfl:     Cranberry 50 MG chewable tablet, Chew., Disp: , Rfl:     estradiol (ESTRACE VAGINAL) 0.1 MG/GM vaginal cream, DISPOSE OF APPLICATOR; apply pea-sized amount to urethra 3x per week after bathing, Disp: 42 g, Rfl: 11    Famotidine-Ca Carb-Mag Hydrox (PEPCID COMPLETE PO), Take 1 tablet by mouth As Needed., Disp: , Rfl:     flecainide (TAMBOCOR) 50 MG tablet, TAKE 1 TABLET BY MOUTH TWICE A DAY, Disp: 180 tablet, Rfl: 3    levothyroxine (SYNTHROID, LEVOTHROID) 50 MCG tablet, Take 1 tablet by mouth Daily., Disp: 90 tablet, Rfl: 3    loratadine (CLARITIN) 10 MG tablet, Take 1 tablet by mouth Daily., Disp: , Rfl:     LORazepam (ATIVAN) 0.5 MG tablet, Take 1 tablet by mouth Daily As Needed for Anxiety., Disp: 30 tablet, Rfl: 0    melatonin 3 MG tablet, Take 2 tablets by mouth Every Night., Disp: , Rfl:     methenamine (HIPREX) 1 g tablet, Take 1 tablet by mouth 2 (Two) Times a Day With Meals., Disp: , Rfl:     metoprolol tartrate (LOPRESSOR) 25 MG tablet, Take 0.5 tablets by mouth Daily As Needed (palpitations)., Disp: 15 tablet, Rfl: 3    pantoprazole (PROTONIX) 40 MG EC tablet, Take 1 tablet by mouth Daily., Disp: , Rfl:     rosuvastatin (CRESTOR) 5 MG tablet, TAKE ONE TABLET BY MOUTH ONCE NIGHTLY, Disp: 90 tablet, Rfl: 3    SBI/Protein Isolate (ENTERAGAM PO), Take  by mouth., Disp: , Rfl:     TURMERIC PO, Take 1 tablet by mouth Daily., Disp: , Rfl:     Vitamin B12 (CYANOCOBALAMIN) 500 MCG tablet tablet, Take  by mouth Daily., Disp: , Rfl:     vitamin C (ASCORBIC ACID) 500 MG tablet, Take 1 tablet by mouth Daily., Disp: , Rfl:     Allergies:  Allergies   Allergen Reactions    Ceftin [Cefuroxime] Diarrhea    Ace Inhibitors Cough    Cefdinir Diarrhea    Citalopram  Hydrobromide Dizziness     Other reaction(s): Dizziness    Corticosteroids Rash    Paroxetine Hcl Other (See Comments)     somnolence    Other reaction(s): Other (See Comments)   somnolence       Social History:  Social History     Socioeconomic History    Marital status:    Tobacco Use    Smoking status: Never     Passive exposure: Past    Smokeless tobacco: Never   Vaping Use    Vaping status: Never Used   Substance and Sexual Activity    Alcohol use: Not Currently     Comment: Occasional glass of wine    Drug use: Never    Sexual activity: Yes     Partners: Male     Birth control/protection: Post-menopausal, None, Tubal ligation, Hysterectomy       Family History:  Family History   Problem Relation Age of Onset    Arthritis Father     Cancer Father         prostate cancer    Other Father          at age 65    Heart attack Father     Dementia Mother     Glomerulonephritis Mother     Hypertension Mother     Other Mother          at age 88    Parkinsonism Sister     Dementia Sister     Alcohol abuse Sister     Ovarian cancer Daughter     Hypertension Other     Osteoarthritis Other      Bladder & Bowel Symptom Questionnaire    How often do you usually urinate during the day ?   2 - About every 2-3 hours    2.   How many timed do you urinate at night?   2 - 3 times at night   3.   What is the reason that you usually urinate?   2 - Moderate urge (can delay 10-60 min)   4.   Once you get the urge to go, how long can you     comfortably delay?   2 - 10-30 min   5.   How often do you get a sudden urge that makes you rush to the bathroom?   3 - A few times a week   6.   How often does a sudden urge to urinate result in you leaking urine or wetting pads?   0 - Never   7.  In your opinion, how good is your bladder control?   2 - Good   8.  Do you have accidental bowel leakage?   yes   9.  Do you have difficulty fully emptying your bladder?   no   10.  Do you experience accidental leakage when  performing some physical activity such as coughing, sneezing, laughing or exercise?   no   11. Have you tried medications to help improve your symptoms?   no   12. Would you be interested in learning about a long-lasting option that may help you with your symptoms?   yes                                                                             Total Score   13     0-7 (Mild) 8-16 (Moderate) 17-28 (Severe)        Objective     Physical Exam:   Vital Signs:   Vitals:    03/12/25 1401   BP: 126/72   Pulse: 80   SpO2: 94%     There is no height or weight on file to calculate BMI.     Physical Exam  Vitals and nursing note reviewed.   Constitutional:       Appearance: Normal appearance.   HENT:      Head: Normocephalic and atraumatic.      Nose: Nose normal.      Mouth/Throat:      Mouth: Mucous membranes are moist.   Eyes:      Pupils: Pupils are equal, round, and reactive to light.   Pulmonary:      Effort: Pulmonary effort is normal.   Abdominal:      General: Abdomen is flat.      Palpations: Abdomen is soft.   Musculoskeletal:         General: Normal range of motion.      Cervical back: Normal range of motion.   Skin:     General: Skin is warm and dry.      Capillary Refill: Capillary refill takes less than 2 seconds.   Neurological:      General: No focal deficit present.      Mental Status: She is alert.   Psychiatric:         Mood and Affect: Mood normal.       Labs:   Brief Urine Lab Results  (Last result in the past 365 days)        Color   Clarity   Blood   Leuk Est   Nitrite   Protein   CREAT   Urine HCG        03/12/25 1406 Yellow   Clear   Negative   Negative   Negative   Negative                   Urine Culture          5/6/2024    14:38 9/21/2024    10:45   Urine Culture   Urine Culture >100,000 CFU/mL Escherichia coli  50,000 CFU/mL Escherichia coli         Lab Results   Component Value Date    GLUCOSE 83 11/26/2024    CALCIUM 9.5 11/26/2024     11/26/2024    K 3.9 11/26/2024    CO2 28.0  11/26/2024     11/26/2024    BUN 20 11/26/2024    CREATININE 0.78 11/26/2024    EGFRIFAFRI 106 05/16/2016    EGFRIFNONA 73 07/19/2021    BCR 25.6 (H) 11/26/2024    ANIONGAP 9.0 11/26/2024       Lab Results   Component Value Date    WBC 4.52 03/18/2024    HGB 12.8 03/18/2024    HCT 39.3 03/18/2024    MCV 87.7 03/18/2024     03/18/2024       Images:   No Images in the past 120 days found..    Measures:   Tobacco:   Annalise Winters  reports that she has never smoked. She has been exposed to tobacco smoke. She has never used smokeless tobacco.          Urine Incontinence: Patient reports that she is not currently experiencing any symptoms of urinary incontinence.      Assessment / Plan      Assessment:  Annalise Winters is a 74 y.o. who presented today with history of recurrent UTI.  She will continue Hip-Mark at this time and follow-up in 6 months.  If she has not had a UTI during that time we will plan to discontinue Hip-Mark.  Creatinine is stable.  She will continue vaginal Estrace and cranberry supplements.  She will begin d-mannose daily for UTI prophylaxis.  She is agreeable plan of care.      Diagnoses and all orders for this visit:    1. Recurrent UTI (Primary)  -     POC Urinalysis Dipstick, Automated         Follow Up:   Return in about 6 months (around 9/12/2025).    I spent approximately 20 minutes providing clinical care for this patient; including review of patient's chart and provider documentation, face to face time spent with patient in examination room (obtaining history, performing physical exam, discussing diagnosis and management options), placing orders, and completing patient documentation.     TAMMY Rose  [facility] [place]

## 2025-03-14 DIAGNOSIS — N39.0 RECURRENT UTI: Primary | ICD-10-CM

## 2025-03-14 RX ORDER — METHENAMINE HIPPURATE 1000 MG/1
1 TABLET ORAL 2 TIMES DAILY WITH MEALS
Qty: 60 TABLET | OUTPATIENT
Start: 2025-03-14

## 2025-03-14 NOTE — TELEPHONE ENCOUNTER
Rx Refill Note  Requested Prescriptions     Pending Prescriptions Disp Refills    methenamine (HIPREX) 1 g tablet 60 tablet 5     Sig: Take 1 tablet by mouth 2 (Two) Times a Day With Meals.      Last office visit with prescribing clinician: 3/12/2025   Last telemedicine visit with prescribing clinician: Visit date not found   Next office visit with prescribing clinician: 9/17/2025     Melida Mejia MA  03/14/25, 09:09 EDT

## 2025-03-17 RX ORDER — METHENAMINE HIPPURATE 1000 MG/1
1 TABLET ORAL 2 TIMES DAILY WITH MEALS
Qty: 60 TABLET | Refills: 5 | Status: SHIPPED | OUTPATIENT
Start: 2025-03-17

## 2025-03-23 DIAGNOSIS — I10 ESSENTIAL HYPERTENSION: ICD-10-CM

## 2025-03-24 RX ORDER — AMLODIPINE BESYLATE 5 MG/1
7.5 TABLET ORAL 2 TIMES DAILY
Qty: 270 TABLET | Refills: 3 | Status: SHIPPED | OUTPATIENT
Start: 2025-03-24

## 2025-03-27 ENCOUNTER — OFFICE VISIT (OUTPATIENT)
Dept: CARDIOLOGY | Facility: CLINIC | Age: 75
End: 2025-03-27
Payer: MEDICARE

## 2025-03-27 VITALS
DIASTOLIC BLOOD PRESSURE: 64 MMHG | HEART RATE: 68 BPM | BODY MASS INDEX: 24.25 KG/M2 | WEIGHT: 160 LBS | OXYGEN SATURATION: 93 % | HEIGHT: 68 IN | SYSTOLIC BLOOD PRESSURE: 122 MMHG

## 2025-03-27 DIAGNOSIS — I48.0 PAROXYSMAL ATRIAL FIBRILLATION: ICD-10-CM

## 2025-03-27 DIAGNOSIS — I49.5 TACHYCARDIA-BRADYCARDIA SYNDROME: ICD-10-CM

## 2025-03-27 DIAGNOSIS — I10 ESSENTIAL HYPERTENSION: ICD-10-CM

## 2025-03-27 DIAGNOSIS — I47.19 ATRIAL TACHYCARDIA: Primary | ICD-10-CM

## 2025-03-27 PROCEDURE — 99214 OFFICE O/P EST MOD 30 MIN: CPT | Performed by: PHYSICIAN ASSISTANT

## 2025-03-27 PROCEDURE — 3074F SYST BP LT 130 MM HG: CPT | Performed by: PHYSICIAN ASSISTANT

## 2025-03-27 PROCEDURE — 3078F DIAST BP <80 MM HG: CPT | Performed by: PHYSICIAN ASSISTANT

## 2025-03-27 NOTE — PROGRESS NOTES
Annalise Winters  1950  [phone]        [facility] CARDIOLOGY         Maryse Jauregui MD  [address] [id number]    Chief Complaint   Patient presents with    Paroxysmal atrial fibrillation       Problem List:     Paroxysmal Atrial Fibrillation   CHADSvasc = 3 (age, female, HTN) previously Xarelto prior to ablation   Initially diagnosed 2021  Myocardial perfusion study 4/2/2013: no ischemia  Event Monitor 10/2013: demonstrates atrial fibrillation  Failure of Flecainide, Sotalol, Multaq  PVA 8/21/2013 - Dr. Abdirahman Lackey  Maintained on Flecainide currently  Echo 10/12/2023 LVEf 68%,No VHD  HTN  Diverticulosis   Esophagitis  GERD  Hyperthyroidism  IBS  Anxiety   Surgical History:  Rhinoplasty  Subtotal hysterectomy  Tubal abdominal ligation   Allergies  Allergies   Allergen Reactions    Ceftin [Cefuroxime] Diarrhea    Ace Inhibitors Cough    Cefdinir Diarrhea    Citalopram Hydrobromide Dizziness     Other reaction(s): Dizziness    Corticosteroids Rash    Paroxetine Hcl Other (See Comments)     somnolence    Other reaction(s): Other (See Comments)   somnolence       Current Medications    Current Outpatient Medications:     acetaminophen (TYLENOL) 500 MG tablet, Take 1 tablet by mouth As Needed., Disp: , Rfl:     amLODIPine (NORVASC) 5 MG tablet, TAKE 1 AND 1/2 TABLETS BY MOUTH TWO TIMES A DAY, Disp: 270 tablet, Rfl: 3    Calcium Carbonate-Vit D-Min (CALCIUM 1200 PO), Take  by mouth., Disp: , Rfl:     Cholecalciferol (VITAMIN D) 2000 UNITS capsule, Take 1 capsule by mouth Daily., Disp: , Rfl:     COLLAGEN PO, Take  by mouth., Disp: , Rfl:     Cranberry 50 MG chewable tablet, Chew., Disp: , Rfl:     estradiol (ESTRACE VAGINAL) 0.1 MG/GM vaginal cream, DISPOSE OF APPLICATOR; apply pea-sized amount to urethra 3x per week after bathing, Disp: 42 g, Rfl: 11    Famotidine-Ca Carb-Mag Hydrox (PEPCID COMPLETE PO), Take 1 tablet by mouth As Needed., Disp: , Rfl:     flecainide  (TAMBOCOR) 50 MG tablet, TAKE 1 TABLET BY MOUTH TWICE A DAY, Disp: 180 tablet, Rfl: 3    levothyroxine (SYNTHROID, LEVOTHROID) 50 MCG tablet, Take 1 tablet by mouth Daily., Disp: 90 tablet, Rfl: 3    loratadine (CLARITIN) 10 MG tablet, Take 1 tablet by mouth Daily., Disp: , Rfl:     LORazepam (ATIVAN) 0.5 MG tablet, Take 1 tablet by mouth Daily As Needed for Anxiety., Disp: 30 tablet, Rfl: 0    melatonin 3 MG tablet, Take 2 tablets by mouth Every Night., Disp: , Rfl:     methenamine (HIPREX) 1 g tablet, Take 1 tablet by mouth 2 (Two) Times a Day With Meals., Disp: 60 tablet, Rfl: 5    metoprolol tartrate (LOPRESSOR) 25 MG tablet, Take 0.5 tablets by mouth Daily As Needed (palpitations)., Disp: 15 tablet, Rfl: 3    pantoprazole (PROTONIX) 40 MG EC tablet, Take 1 tablet by mouth Daily., Disp: , Rfl:     rosuvastatin (CRESTOR) 5 MG tablet, TAKE ONE TABLET BY MOUTH ONCE NIGHTLY, Disp: 90 tablet, Rfl: 3    TURMERIC PO, Take 1 tablet by mouth Daily., Disp: , Rfl:     Vitamin B12 (CYANOCOBALAMIN) 500 MCG tablet tablet, Take  by mouth Daily., Disp: , Rfl:     vitamin C (ASCORBIC ACID) 500 MG tablet, Take 1 tablet by mouth Daily., Disp: , Rfl:     SBI/Protein Isolate (ENTERAGAM PO), Take  by mouth. (Patient not taking: Reported on 3/27/2025), Disp: , Rfl:     History of Present Illness     Pt presents for follow up of PAF/HTN.  She continues to do reasonably well since last seen by our office.  She has 1 or 2 episodes of A-fib every 6 months.  These most of time are less than an hour.  She has not had to take Xarelto at this time.  She has declined redo ablation in the past.  She denies any chest pain chest tightness dizziness near syncope.  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  Sometimes she gets tired if she overdoes it.    Vitals:    03/27/25 1320   BP: 122/64   BP Location: Right arm   Patient Position: Sitting   Cuff Size: Adult   Pulse: 68   SpO2: 93%   Weight: 72.6  "kg (160 lb)   Height: 172.7 cm (68\")       Body mass index is 24.33 kg/m².  PE:  General: NAD  Neck: no JVD, no carotid bruits, no TM  Heart RRR, NL S1, S2, S4 present, no rubs, murmurs  Lungs: CTA, no wheezes, rhonchi, or rales  Abd: soft, non-tender, NL BS  Ext: No musculoskeletal deformities, no edema, cyanosis, or clubbing  Psych: normal mood and affect    Diagnostic Data:    EKG normal sinus rhythm QRS 94 ms QTc stable  Procedures        1. Atrial tachycardia    2. Essential hypertension    3. Paroxysmal atrial fibrillation    4. Tachycardia-bradycardia syndrome            Plan:    PAF; -CHADSvasc = 3, defers daily anticoagulation. Xarelto PRN. Provided Xarelto samples today. Watchman device not recommended because she is unable to take ASA due to history of diverticulosis.  Flecainide therapy A-fib we will medicate on this we offered option of redo ablation the past she declines for now she states medications working well for her.      2. Tachybrady syndrome  -Using Lopressor as needed due to bradycardia     3. HTN   -well controlled on current regimen     4. NSAT see #1      Follow-up as scheduled with Dr. Bell.  Electronically signed by ANDREW Gavin, 03/27/25, 1:42 PM EDT.       "

## 2025-03-31 ENCOUNTER — OFFICE VISIT (OUTPATIENT)
Dept: INTERNAL MEDICINE | Facility: CLINIC | Age: 75
End: 2025-03-31
Payer: MEDICARE

## 2025-03-31 ENCOUNTER — LAB (OUTPATIENT)
Dept: LAB | Facility: HOSPITAL | Age: 75
End: 2025-03-31
Payer: MEDICARE

## 2025-03-31 VITALS
DIASTOLIC BLOOD PRESSURE: 60 MMHG | OXYGEN SATURATION: 97 % | HEART RATE: 63 BPM | HEIGHT: 68 IN | SYSTOLIC BLOOD PRESSURE: 112 MMHG | TEMPERATURE: 98.4 F | WEIGHT: 157.4 LBS | BODY MASS INDEX: 23.86 KG/M2

## 2025-03-31 DIAGNOSIS — F41.9 ANXIETY: ICD-10-CM

## 2025-03-31 DIAGNOSIS — E55.9 VITAMIN D DEFICIENCY: ICD-10-CM

## 2025-03-31 DIAGNOSIS — K21.9 GASTROESOPHAGEAL REFLUX DISEASE WITHOUT ESOPHAGITIS: ICD-10-CM

## 2025-03-31 DIAGNOSIS — E03.9 HYPOTHYROIDISM, UNSPECIFIED TYPE: ICD-10-CM

## 2025-03-31 DIAGNOSIS — E78.5 DYSLIPIDEMIA: ICD-10-CM

## 2025-03-31 DIAGNOSIS — R73.01 IFG (IMPAIRED FASTING GLUCOSE): ICD-10-CM

## 2025-03-31 DIAGNOSIS — Z00.00 MEDICARE ANNUAL WELLNESS VISIT, SUBSEQUENT: Primary | ICD-10-CM

## 2025-03-31 LAB
25(OH)D3 SERPL-MCNC: 60.9 NG/ML (ref 30–100)
ALBUMIN SERPL-MCNC: 4.4 G/DL (ref 3.5–5.2)
ALBUMIN/GLOB SERPL: 1.6 G/DL
ALP SERPL-CCNC: 75 U/L (ref 39–117)
ALT SERPL W P-5'-P-CCNC: 14 U/L (ref 1–33)
ANION GAP SERPL CALCULATED.3IONS-SCNC: 12.2 MMOL/L (ref 5–15)
AST SERPL-CCNC: 18 U/L (ref 1–32)
BILIRUB SERPL-MCNC: 0.6 MG/DL (ref 0–1.2)
BUN SERPL-MCNC: 19 MG/DL (ref 8–23)
BUN/CREAT SERPL: 24.7 (ref 7–25)
CALCIUM SPEC-SCNC: 9.9 MG/DL (ref 8.6–10.5)
CHLORIDE SERPL-SCNC: 101 MMOL/L (ref 98–107)
CHOLEST SERPL-MCNC: 187 MG/DL (ref 0–200)
CO2 SERPL-SCNC: 27.8 MMOL/L (ref 22–29)
CREAT SERPL-MCNC: 0.77 MG/DL (ref 0.57–1)
DEPRECATED RDW RBC AUTO: 44 FL (ref 37–54)
EGFRCR SERPLBLD CKD-EPI 2021: 80.1 ML/MIN/1.73
ERYTHROCYTE [DISTWIDTH] IN BLOOD BY AUTOMATED COUNT: 13.2 % (ref 12.3–15.4)
GLOBULIN UR ELPH-MCNC: 2.7 GM/DL
GLUCOSE SERPL-MCNC: 92 MG/DL (ref 65–99)
HBA1C MFR BLD: 5.8 % (ref 4.8–5.6)
HCT VFR BLD AUTO: 41.7 % (ref 34–46.6)
HDLC SERPL-MCNC: 78 MG/DL (ref 40–60)
HGB BLD-MCNC: 13.3 G/DL (ref 12–15.9)
LDLC SERPL CALC-MCNC: 98 MG/DL (ref 0–100)
LDLC/HDLC SERPL: 1.26 {RATIO}
MCH RBC QN AUTO: 28.9 PG (ref 26.6–33)
MCHC RBC AUTO-ENTMCNC: 31.9 G/DL (ref 31.5–35.7)
MCV RBC AUTO: 90.7 FL (ref 79–97)
PLATELET # BLD AUTO: 261 10*3/MM3 (ref 140–450)
PMV BLD AUTO: 9.7 FL (ref 6–12)
POTASSIUM SERPL-SCNC: 3.7 MMOL/L (ref 3.5–5.2)
PROT SERPL-MCNC: 7.1 G/DL (ref 6–8.5)
RBC # BLD AUTO: 4.6 10*6/MM3 (ref 3.77–5.28)
SODIUM SERPL-SCNC: 141 MMOL/L (ref 136–145)
T4 FREE SERPL-MCNC: 1.4 NG/DL (ref 0.92–1.68)
TRIGL SERPL-MCNC: 55 MG/DL (ref 0–150)
TSH SERPL DL<=0.05 MIU/L-ACNC: 1.78 UIU/ML (ref 0.27–4.2)
VIT B12 BLD-MCNC: >2000 PG/ML (ref 211–946)
VLDLC SERPL-MCNC: 11 MG/DL (ref 5–40)
WBC NRBC COR # BLD AUTO: 4.98 10*3/MM3 (ref 3.4–10.8)

## 2025-03-31 PROCEDURE — 84443 ASSAY THYROID STIM HORMONE: CPT

## 2025-03-31 PROCEDURE — 82306 VITAMIN D 25 HYDROXY: CPT

## 2025-03-31 PROCEDURE — 83036 HEMOGLOBIN GLYCOSYLATED A1C: CPT

## 2025-03-31 PROCEDURE — 84439 ASSAY OF FREE THYROXINE: CPT

## 2025-03-31 PROCEDURE — 82607 VITAMIN B-12: CPT

## 2025-03-31 PROCEDURE — 80053 COMPREHEN METABOLIC PANEL: CPT

## 2025-03-31 PROCEDURE — 85027 COMPLETE CBC AUTOMATED: CPT

## 2025-03-31 PROCEDURE — 80061 LIPID PANEL: CPT

## 2025-03-31 RX ORDER — LEVOTHYROXINE SODIUM 50 UG/1
50 TABLET ORAL DAILY
Qty: 90 TABLET | Refills: 3 | Status: SHIPPED | OUTPATIENT
Start: 2025-03-31

## 2025-03-31 RX ORDER — LORAZEPAM 0.5 MG/1
0.5 TABLET ORAL DAILY PRN
Qty: 30 TABLET | Refills: 0 | Status: SHIPPED | OUTPATIENT
Start: 2025-03-31

## 2025-03-31 NOTE — PROGRESS NOTES
Subjective   The ABCs of the Annual Wellness Visit  Medicare Wellness Visit      Annalise Winters is a 74 y.o. patient who presents for a Medicare Wellness Visit.    The following portions of the patient's history were reviewed and   updated as appropriate: allergies, current medications, past family history, past medical history, past social history, past surgical history, and problem list.    Compared to one year ago, the patient's physical   health is better.  Compared to one year ago, the patient's mental   health is the same.    Recent Hospitalizations:  She was not admitted to the hospital during the last year.     Current Medical Providers:  Patient Care Team:  Maryse Jauregui MD as PCP - General (Internal Medicine)  Yumiko Hall MD as Consulting Physician (Gastroenterology)  Kuldeep Bell MD as Consulting Physician (Cardiac Electrophysiology)  Britta Aldrich APRN (Inactive) as Nurse Practitioner (Obstetrics and Gynecology)  Christiano Vargas MD as Consulting Physician (Orthopedic Surgery)    Outpatient Medications Prior to Visit   Medication Sig Dispense Refill    acetaminophen (TYLENOL) 500 MG tablet Take 1 tablet by mouth As Needed.      amLODIPine (NORVASC) 5 MG tablet TAKE 1 AND 1/2 TABLETS BY MOUTH TWO TIMES A  tablet 3    Calcium Carbonate-Vit D-Min (CALCIUM 1200 PO) Take  by mouth.      Cholecalciferol (VITAMIN D) 2000 UNITS capsule Take 1 capsule by mouth Daily.      COLLAGEN PO Take  by mouth.      Cranberry 50 MG chewable tablet Chew.      estradiol (ESTRACE VAGINAL) 0.1 MG/GM vaginal cream DISPOSE OF APPLICATOR; apply pea-sized amount to urethra 3x per week after bathing 42 g 11    Famotidine-Ca Carb-Mag Hydrox (PEPCID COMPLETE PO) Take 1 tablet by mouth As Needed.      flecainide (TAMBOCOR) 50 MG tablet TAKE 1 TABLET BY MOUTH TWICE A  tablet 3    loratadine (CLARITIN) 10 MG tablet Take 1 tablet by mouth Daily.      melatonin 3 MG tablet Take 2 tablets by mouth  Every Night.      methenamine (HIPREX) 1 g tablet Take 1 tablet by mouth 2 (Two) Times a Day With Meals. 60 tablet 5    metoprolol tartrate (LOPRESSOR) 25 MG tablet Take 0.5 tablets by mouth Daily As Needed (palpitations). 15 tablet 3    pantoprazole (PROTONIX) 40 MG EC tablet Take 1 tablet by mouth Daily.      rosuvastatin (CRESTOR) 5 MG tablet TAKE ONE TABLET BY MOUTH ONCE NIGHTLY 90 tablet 3    TURMERIC PO Take 1 tablet by mouth Daily.      Vitamin B12 (CYANOCOBALAMIN) 500 MCG tablet tablet Take  by mouth Daily.      vitamin C (ASCORBIC ACID) 500 MG tablet Take 1 tablet by mouth Daily.      levothyroxine (SYNTHROID, LEVOTHROID) 50 MCG tablet Take 1 tablet by mouth Daily. 90 tablet 3    LORazepam (ATIVAN) 0.5 MG tablet Take 1 tablet by mouth Daily As Needed for Anxiety. 30 tablet 0    SBI/Protein Isolate (ENTERAGAM PO) Take  by mouth. (Patient not taking: Reported on 3/27/2025)       No facility-administered medications prior to visit.     No opioid medication identified on active medication list. I have reviewed chart for other potential  high risk medication/s and harmful drug interactions in the elderly.      Aspirin is not on active medication list.  Aspirin use is not indicated based on review of current medical condition/s. Risk of harm outweighs potential benefits.  .    Patient Active Problem List   Diagnosis    Asthma    Paroxysmal atrial fibrillation    Hypertonicity of bladder    Essential hypertension    Hypothyroidism    Anxiety    Back pain    Hyperthyroidism    GERD (gastroesophageal reflux disease)    Allergic rhinitis    Dysuria    Skin lesion of face    Vitamin D deficiency    Frontal skull lesion    B12 deficiency    Diverticulosis    Polyneuropathy    Numbness    S/P TKR (total knee replacement), right    Tachycardia-bradycardia syndrome    Atrial tachycardia     Advance Care Planning Advance Directive is not on file.  ACP discussion was held with the patient during this visit. Patient has an  "advance directive (not in EMR), copy requested.            Objective   Vitals:    03/31/25 0904   BP: 112/60   Pulse: 63   Temp: 98.4 °F (36.9 °C)   SpO2: 97%  Comment: ra   Weight: 71.4 kg (157 lb 6.4 oz)   Height: 172.7 cm (68\")   PainSc: 0-No pain       Estimated body mass index is 23.93 kg/m² as calculated from the following:    Height as of this encounter: 172.7 cm (68\").    Weight as of this encounter: 71.4 kg (157 lb 6.4 oz).    BMI is within normal parameters. No other follow-up for BMI required.           Does the patient have evidence of cognitive impairment? No  Lab Results   Component Value Date    TRIG 55 03/31/2025    HDL 78 (H) 03/31/2025    LDL 98 03/31/2025    VLDL 11 03/31/2025    HGBA1C 5.80 (H) 03/31/2025                                                                                                Health  Risk Assessment    Smoking Status:  Social History     Tobacco Use   Smoking Status Never    Passive exposure: Past   Smokeless Tobacco Never     Alcohol Consumption:  Social History     Substance and Sexual Activity   Alcohol Use Not Currently    Comment: Occasional glass of wine       Fall Risk Screen  STEADI Fall Risk Assessment was completed, and patient is at LOW risk for falls.Assessment completed on:3/31/2025    Depression Screening   Little interest or pleasure in doing things? Not at all   Feeling down, depressed, or hopeless? Not at all   PHQ-2 Total Score 0      Health Habits and Functional and Cognitive Screening:      3/31/2025     9:11 AM   Functional & Cognitive Status   Do you have difficulty preparing food and eating? No   Do you have difficulty bathing yourself, getting dressed or grooming yourself? No   Do you have difficulty using the toilet? No   Do you have difficulty moving around from place to place? No   Do you have trouble with steps or getting out of a bed or a chair? No   Current Diet Well Balanced Diet   Dental Exam Up to date   Eye Exam Not up to date        Eye " Exam Comment Scheduled 6/5/25   Exercise (times per week) 7 times per week   Current Exercises Include Aerobics;Dancing        Exercise Comment Jasvir   Do you need help using the phone?  No   Are you deaf or do you have serious difficulty hearing?  No   Do you need help to go to places out of walking distance? No   Do you need help shopping? No   Do you need help preparing meals?  No   Do you need help with housework?  No   Do you need help with laundry? No   Do you need help taking your medications? No   Do you need help managing money? No   Do you ever drive or ride in a car without wearing a seat belt? No   Have you felt unusual stress, anger or loneliness in the last month? No   Who do you live with? Spouse   If you need help, do you have trouble finding someone available to you? No   Have you been bothered in the last four weeks by sexual problems? No   Do you have difficulty concentrating, remembering or making decisions? No           Age-appropriate Screening Schedule:  Refer to the list below for future screening recommendations based on patient's age, sex and/or medical conditions. Orders for these recommended tests are listed in the plan section. The patient has been provided with a written plan.    Health Maintenance List  Health Maintenance   Topic Date Due    COVID-19 Vaccine (7 - 2024-25 season) 04/05/2025    INFLUENZA VACCINE  07/01/2025    ANNUAL WELLNESS VISIT  03/31/2026    LIPID PANEL  03/31/2026    DXA SCAN  04/26/2026    MAMMOGRAM  05/21/2026    COLORECTAL CANCER SCREENING  05/14/2029    TDAP/TD VACCINES (2 - Td or Tdap) 02/15/2035    HEPATITIS C SCREENING  Completed    RSV Vaccine - Adults  Completed    Pneumococcal Vaccine 50+  Completed    ZOSTER VACCINE  Completed                                                                                                                                                CMS Preventative Services Quick Reference  Risk Factors Identified During  "Encounter  Immunizations Discussed/Encouraged:  utd    The above risks/problems have been discussed with the patient.  Pertinent information has been shared with the patient in the After Visit Summary.  An After Visit Summary and PPPS were made available to the patient.    Follow Up:   Next Medicare Wellness visit to be scheduled in 1 year.         Additional E&M Note during same encounter follows:  Patient has additional, significant, and separately identifiable condition(s)/problem(s) that require work above and beyond the Medicare Wellness Visit     Chief Complaint  Medicare Wellness-subsequent    Subjective   HPI  Annalise is also being seen today for additional medical problem/s.    WAS SITTING HERE WHEN SHE STARTED HAVING SOME SHARP SHOOTING LEFT SIDED , AND HAS BEEN SEEING INCREASING GERD.  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  Rt sided HA occasionally.    Review of Systems   Constitutional:  Negative for chills and fever.   HENT:  Negative for congestion, ear pain and sore throat.    Eyes:  Negative for pain, redness and visual disturbance.   Respiratory:  Negative for cough and shortness of breath.    Cardiovascular:  Positive for chest pain. Negative for palpitations and leg swelling.   Gastrointestinal:  Negative for abdominal pain, diarrhea and nausea.   Endocrine: Negative for cold intolerance and heat intolerance.   Genitourinary:  Negative for flank pain and urgency.   Musculoskeletal:  Negative for arthralgias and gait problem.   Skin:  Negative for pallor and rash.   Neurological:  Negative for dizziness and weakness.   Psychiatric/Behavioral:  Positive for dysphoric mood. Negative for sleep disturbance. The patient is not nervous/anxious.               Objective   Vital Signs:  /60   Pulse 63   Temp 98.4 °F (36.9 °C)   Ht 172.7 cm (68\")   Wt 71.4 kg (157 lb 6.4 oz)   SpO2 97% " Comment: ra  BMI 23.93 kg/m²   Physical Exam  Constitutional:       General: She is not in acute distress.     Appearance: Normal appearance.   HENT:      Head: Normocephalic and atraumatic.      Right Ear: Tympanic membrane and external ear normal.      Left Ear: Tympanic membrane and external ear normal.      Nose: Nose normal.      Mouth/Throat:      Mouth: Mucous membranes are moist.   Eyes:      General: No scleral icterus.  Neck:      Vascular: No carotid bruit.   Cardiovascular:      Rate and Rhythm: Normal rate and regular rhythm.      Pulses: Normal pulses.      Heart sounds: Normal heart sounds. No murmur heard.     No friction rub. No gallop.   Pulmonary:      Effort: Pulmonary effort is normal.      Breath sounds: Normal breath sounds. No rhonchi or rales.   Abdominal:      General: Bowel sounds are normal. There is no distension.      Palpations: Abdomen is soft.      Tenderness: There is no right CVA tenderness, left CVA tenderness, guarding or rebound.      Hernia: No hernia is present.   Musculoskeletal:         General: No tenderness. Normal range of motion.      Cervical back: Normal range of motion.      Right lower leg: No edema.      Left lower leg: No edema.   Lymphadenopathy:      Cervical: No cervical adenopathy.   Skin:     General: Skin is warm.      Findings: No rash.   Neurological:      General: No focal deficit present.      Mental Status: She is alert and oriented to person, place, and time. Mental status is at baseline.      Cranial Nerves: No cranial nerve deficit.      Sensory: No sensory deficit.      Coordination: Coordination normal.      Gait: Gait normal.      Deep Tendon Reflexes: Reflexes normal.      Comments: Balance wnl   Psychiatric:         Mood and Affect: Mood normal.         Behavior: Behavior normal.                    Assessment and Plan       Current Outpatient Medications:     acetaminophen (TYLENOL) 500 MG tablet, Take 1 tablet by mouth As Needed., Disp: , Rfl:      amLODIPine (NORVASC) 5 MG tablet, TAKE 1 AND 1/2 TABLETS BY MOUTH TWO TIMES A DAY, Disp: 270 tablet, Rfl: 3    Calcium Carbonate-Vit D-Min (CALCIUM 1200 PO), Take  by mouth., Disp: , Rfl:     Cholecalciferol (VITAMIN D) 2000 UNITS capsule, Take 1 capsule by mouth Daily., Disp: , Rfl:     COLLAGEN PO, Take  by mouth., Disp: , Rfl:     Cranberry 50 MG chewable tablet, Chew., Disp: , Rfl:     estradiol (ESTRACE VAGINAL) 0.1 MG/GM vaginal cream, DISPOSE OF APPLICATOR; apply pea-sized amount to urethra 3x per week after bathing, Disp: 42 g, Rfl: 11    Famotidine-Ca Carb-Mag Hydrox (PEPCID COMPLETE PO), Take 1 tablet by mouth As Needed., Disp: , Rfl:     flecainide (TAMBOCOR) 50 MG tablet, TAKE 1 TABLET BY MOUTH TWICE A DAY, Disp: 180 tablet, Rfl: 3    levothyroxine (SYNTHROID, LEVOTHROID) 50 MCG tablet, Take 1 tablet by mouth Daily., Disp: 90 tablet, Rfl: 3    loratadine (CLARITIN) 10 MG tablet, Take 1 tablet by mouth Daily., Disp: , Rfl:     LORazepam (ATIVAN) 0.5 MG tablet, Take 1 tablet by mouth Daily As Needed for Anxiety., Disp: 30 tablet, Rfl: 0    melatonin 3 MG tablet, Take 2 tablets by mouth Every Night., Disp: , Rfl:     methenamine (HIPREX) 1 g tablet, Take 1 tablet by mouth 2 (Two) Times a Day With Meals., Disp: 60 tablet, Rfl: 5    metoprolol tartrate (LOPRESSOR) 25 MG tablet, Take 0.5 tablets by mouth Daily As Needed (palpitations)., Disp: 15 tablet, Rfl: 3    pantoprazole (PROTONIX) 40 MG EC tablet, Take 1 tablet by mouth Daily., Disp: , Rfl:     rosuvastatin (CRESTOR) 5 MG tablet, TAKE ONE TABLET BY MOUTH ONCE NIGHTLY, Disp: 90 tablet, Rfl: 3    TURMERIC PO, Take 1 tablet by mouth Daily., Disp: , Rfl:     Vitamin B12 (CYANOCOBALAMIN) 500 MCG tablet tablet, Take  by mouth Daily., Disp: , Rfl:     vitamin C (ASCORBIC ACID) 500 MG tablet, Take 1 tablet by mouth Daily., Disp: , Rfl:       The following portions of the patient's history were reviewed and updated as appropriate: allergies, current  "medications, past family history, past medical history, past social history, past surgical history and problem list.       Objective   /60   Pulse 63   Temp 98.4 °F (36.9 °C)   Ht 172.7 cm (68\")   Wt 71.4 kg (157 lb 6.4 oz)   LMP  (LMP Unknown)   SpO2 97% Comment: ra  BMI 23.93 kg/m²         Results for orders placed or performed in visit on 03/12/25   POC Urinalysis Dipstick, Automated    Collection Time: 03/12/25  2:06 PM    Specimen: Urine   Result Value Ref Range    Color Yellow Yellow, Straw, Dark Yellow, Kimberli    Clarity, UA Clear Clear    Specific Gravity  1.020 1.005 - 1.030    pH, Urine 6.0 5.0 - 8.0    Leukocytes Negative Negative    Nitrite, UA Negative Negative    Protein, POC Negative Negative mg/dL    Glucose, UA Negative Negative mg/dL    Ketones, UA Negative Negative    Urobilinogen, UA Normal Normal, 0.2 E.U./dL    Bilirubin Negative Negative    Blood, UA Negative Negative    Lot Number [id number]     Expiration Date 9/19/2026              Assessment & Plan   Diagnoses and all orders for this visit:    Medicare annual wellness visit, subsequent    Anxiety  -     LORazepam (ATIVAN) 0.5 MG tablet; Take 1 tablet by mouth Daily As Needed for Anxiety.    Hypothyroidism, unspecified type  -     levothyroxine (SYNTHROID, LEVOTHROID) 50 MCG tablet; Take 1 tablet by mouth Daily.  -     TSH; Future  -     T4, Free; Future    IFG (impaired fasting glucose)  -     Hemoglobin A1c; Future    Vitamin D deficiency  -     Vitamin B12; Future  -     Vitamin D,25-Hydroxy; Future    Dyslipidemia  -     Comprehensive Metabolic Panel; Future  -     Lipid Panel; Future    Gastroesophageal reflux disease without esophagitis  -     CBC (No Diff); Future      Reasured re CP, in by cardiology and is s/p recent EKG 4 days ago, and was ok. Likely gerd. See recs above.    HA - reassured.? Sinus vs miggraine.         PHQ-2/PHQ-9 Depression screening reviewed with patient . Total time spent today for depression " screening  with Annalise Winters  was 15 minutes in counseling, along with plans for any diagnositc work-up and treatment.    Advice and education were given regarding cardiovascular risk reduction, healthy dietary habits, Seatbelt and helmet use and self skin examination.                Anxiety    Orders:    LORazepam (ATIVAN) 0.5 MG tablet; Take 1 tablet by mouth Daily As Needed for Anxiety.    Hypothyroidism, unspecified type    Orders:    levothyroxine (SYNTHROID, LEVOTHROID) 50 MCG tablet; Take 1 tablet by mouth Daily.    TSH; Future    T4, Free; Future    IFG (impaired fasting glucose)    Orders:    Hemoglobin A1c; Future    Medicare annual wellness visit, subsequent         Vitamin D deficiency    Orders:    Vitamin B12; Future    Vitamin D,25-Hydroxy; Future    Dyslipidemia    Orders:    Comprehensive Metabolic Panel; Future    Lipid Panel; Future    Gastroesophageal reflux disease without esophagitis    Orders:    CBC (No Diff); Future     Gets the  Ov screen program.         Follow Up   Return in about 6 months (around 9/30/2025) for  1 yr.  Patient was given instructions and counseling regarding her condition or for health maintenance advice. Please see specific information pulled into the AVS if appropriate.      Electronically signed by:    Maryse Jauregui MD

## 2025-03-31 NOTE — ASSESSMENT & PLAN NOTE
Orders:    LORazepam (ATIVAN) 0.5 MG tablet; Take 1 tablet by mouth Daily As Needed for Anxiety.

## 2025-03-31 NOTE — ASSESSMENT & PLAN NOTE
Orders:    levothyroxine (SYNTHROID, LEVOTHROID) 50 MCG tablet; Take 1 tablet by mouth Daily.    TSH; Future    T4, Free; Future

## 2025-04-30 DIAGNOSIS — I48.0 PAROXYSMAL ATRIAL FIBRILLATION: Chronic | ICD-10-CM

## 2025-04-30 RX ORDER — FLECAINIDE ACETATE 50 MG/1
50 TABLET ORAL 2 TIMES DAILY
Qty: 180 TABLET | Refills: 1 | Status: SHIPPED | OUTPATIENT
Start: 2025-04-30

## 2025-06-20 ENCOUNTER — TELEPHONE (OUTPATIENT)
Dept: INTERNAL MEDICINE | Facility: CLINIC | Age: 75
End: 2025-06-20

## 2025-06-20 NOTE — TELEPHONE ENCOUNTER
Caller: Annalise Winters    Relationship: Self    Best call back number: [phone]     What is the best time to reach you: ANYTIME    Who are you requesting to speak with (clinical staff, provider,  specific staff member): CLINICAL  PATIENT WOULD LIKE A CALL BACK ABOUT A LETTER SHE RECEIVED

## 2025-06-20 NOTE — TELEPHONE ENCOUNTER
Returned call to pt.   Pt stated that she had scheduled apt when she felt that she needed an apt but she had called back to cancel the apt and was upset that she had received a letter for canceling. Has been with PCP for years and has never received letter. Pt voiced frustration as she had given the courtesy of calling and canceling - felt that the message had not been relayed and voiced opinion that a letter was uncalled for.   MA stated that concerns would be shared.

## 2025-07-02 ENCOUNTER — TELEPHONE (OUTPATIENT)
Dept: CARDIOLOGY | Facility: CLINIC | Age: 75
End: 2025-07-02
Payer: MEDICARE

## 2025-07-02 NOTE — TELEPHONE ENCOUNTER
Patient called and states she had a recent ER visit. She is requesting Dr. Bell look over records.          Records on your desk

## 2025-07-07 RX ORDER — METOPROLOL TARTRATE 25 MG/1
TABLET, FILM COATED ORAL
Qty: 45 TABLET | Refills: 0 | Status: SHIPPED | OUTPATIENT
Start: 2025-07-07

## 2025-07-08 ENCOUNTER — OFFICE VISIT (OUTPATIENT)
Dept: INTERNAL MEDICINE | Facility: CLINIC | Age: 75
End: 2025-07-08
Payer: MEDICARE

## 2025-07-08 VITALS
RESPIRATION RATE: 12 BRPM | WEIGHT: 156 LBS | BODY MASS INDEX: 23.64 KG/M2 | HEIGHT: 68 IN | HEART RATE: 60 BPM | SYSTOLIC BLOOD PRESSURE: 114 MMHG | TEMPERATURE: 96.9 F | OXYGEN SATURATION: 97 % | DIASTOLIC BLOOD PRESSURE: 60 MMHG

## 2025-07-08 DIAGNOSIS — R07.89 CHEST WALL PAIN: Primary | ICD-10-CM

## 2025-07-08 PROCEDURE — 3074F SYST BP LT 130 MM HG: CPT

## 2025-07-08 PROCEDURE — 99213 OFFICE O/P EST LOW 20 MIN: CPT

## 2025-07-08 PROCEDURE — 1160F RVW MEDS BY RX/DR IN RCRD: CPT

## 2025-07-08 PROCEDURE — 1126F AMNT PAIN NOTED NONE PRSNT: CPT

## 2025-07-08 PROCEDURE — 3078F DIAST BP <80 MM HG: CPT

## 2025-07-08 PROCEDURE — 1159F MED LIST DOCD IN RCRD: CPT

## 2025-07-08 RX ORDER — LIDOCAINE 50 MG/G
1 PATCH TOPICAL DAILY
COMMUNITY
Start: 2025-06-30 | End: 2025-07-30

## 2025-07-08 NOTE — PROGRESS NOTES
Follow Up Office Visit      Date: 2025  Patient Name: Annalise Winters  : 1950   MRN: [id number]     Chief Complaint:    Chief Complaint   Patient presents with    Hospital Follow Up Visit       History of Present Illness: Annalise Winters is a 74 y.o. female who is here today for evaluation after recent ED visit. Presented to  ED on 2025 with left rib pain onset 2 weeks ago.  Patient initially went to orthopedic walk-in office and was diagnosed with a muscle strain.  Pain increased prompting her visit into the emergency department.  Workup was negative.  Since discharge, patient reports her pain has overall improved.  Prior to onset, patient tried a new workout class at the [facility] which involved lifting weights.  Patient would like to go over her lab results from the ED visit, specifically the elevated D-dimer.    She has no further complaints today.    Subjective      Review of Systems:   Review of Systems   Constitutional:  Negative for chills, fatigue and fever.   Respiratory:  Negative for cough and shortness of breath.    Cardiovascular:  Positive for chest pain. Negative for palpitations and leg swelling.   Neurological:  Negative for weakness, numbness and headache.       Medications:     Current Outpatient Medications:     acetaminophen (TYLENOL) 500 MG tablet, Take 1 tablet by mouth As Needed., Disp: , Rfl:     amLODIPine (NORVASC) 5 MG tablet, TAKE 1 AND 1/2 TABLETS BY MOUTH TWO TIMES A DAY, Disp: 270 tablet, Rfl: 3    Calcium Carbonate-Vit D-Min (CALCIUM 1200 PO), Take  by mouth., Disp: , Rfl:     Cholecalciferol (VITAMIN D) 2000 UNITS capsule, Take 1 capsule by mouth Daily., Disp: , Rfl:     COLLAGEN PO, Take  by mouth., Disp: , Rfl:     Cranberry 50 MG chewable tablet, Chew., Disp: , Rfl:     estradiol (ESTRACE VAGINAL) 0.1 MG/GM vaginal cream, DISPOSE OF APPLICATOR; apply pea-sized amount to urethra 3x per week after bathing, Disp: 42 g, Rfl: 11    Famotidine-Ca Carb-Mag Hydrox  "(PEPCID COMPLETE PO), Take 1 tablet by mouth As Needed., Disp: , Rfl:     flecainide (TAMBOCOR) 50 MG tablet, TAKE 1 TABLET BY MOUTH 2 TIMES A DAY, Disp: 180 tablet, Rfl: 1    levothyroxine (SYNTHROID, LEVOTHROID) 50 MCG tablet, Take 1 tablet by mouth Daily., Disp: 90 tablet, Rfl: 3    lidocaine (LIDODERM) 5 %, Place 1 patch on the skin as directed by provider Daily., Disp: , Rfl:     loratadine (CLARITIN) 10 MG tablet, Take 1 tablet by mouth Daily., Disp: , Rfl:     LORazepam (ATIVAN) 0.5 MG tablet, Take 1 tablet by mouth Daily As Needed for Anxiety., Disp: 30 tablet, Rfl: 0    melatonin 3 MG tablet, Take 2 tablets by mouth Every Night., Disp: , Rfl:     methenamine (HIPREX) 1 g tablet, Take 1 tablet by mouth 2 (Two) Times a Day With Meals., Disp: 60 tablet, Rfl: 5    metoprolol tartrate (LOPRESSOR) 25 MG tablet, TAKE 1/2 TABLET BY MOUTH DAILY AS NEEDED FOR PALPITATIONS, Disp: 45 tablet, Rfl: 0    pantoprazole (PROTONIX) 40 MG EC tablet, Take 1 tablet by mouth Daily., Disp: , Rfl:     rosuvastatin (CRESTOR) 5 MG tablet, TAKE ONE TABLET BY MOUTH ONCE NIGHTLY, Disp: 90 tablet, Rfl: 3    TURMERIC PO, Take 1 tablet by mouth Daily., Disp: , Rfl:     vitamin C (ASCORBIC ACID) 500 MG tablet, Take 1 tablet by mouth Daily., Disp: , Rfl:     Allergies:   Allergies   Allergen Reactions    Ceftin [Cefuroxime] Diarrhea    Ace Inhibitors Cough    Cefdinir Diarrhea    Citalopram Hydrobromide Dizziness     Other reaction(s): Dizziness    Corticosteroids Rash    Paroxetine Hcl Other (See Comments)     somnolence    Other reaction(s): Other (See Comments)   somnolence       Objective     Physical Exam:  Vital Signs:   Vitals:    07/08/25 0931   BP: 114/60   Pulse: 60   Resp: 12   Temp: 96.9 °F (36.1 °C)   TempSrc: Temporal   SpO2: 97%   Weight: 70.8 kg (156 lb)   Height: 172.7 cm (67.99\")   PainSc: 0-No pain     Body mass index is 23.73 kg/m².   Facility age limit for growth %asher is 20 years.  BMI is within normal parameters. No " other follow-up for BMI required.       Physical Exam  Vitals and nursing note reviewed.   Constitutional:       General: She is not in acute distress.     Appearance: Normal appearance.   Eyes:      Extraocular Movements: Extraocular movements intact.      Conjunctiva/sclera: Conjunctivae normal.   Cardiovascular:      Rate and Rhythm: Normal rate and regular rhythm.   Pulmonary:      Effort: Pulmonary effort is normal. No respiratory distress.   Neurological:      General: No focal deficit present.      Mental Status: She is alert and oriented to person, place, and time. Mental status is at baseline.   Psychiatric:         Mood and Affect: Mood normal.         Behavior: Behavior normal.           Assessment / Plan      Assessment/Plan:      Chest wall pain  - Likely due to muscle strain, reassured patient that D-dimer was negative when adjusted for age.  Patient encouraged to monitor symptoms and follow-up in office if symptoms recur.  She does have a follow-up with her electrophysiologist in 2 months.          Follow Up:   Return for Next scheduled follow up.      MORELIA [facility] [place]